# Patient Record
Sex: FEMALE | Race: BLACK OR AFRICAN AMERICAN | NOT HISPANIC OR LATINO | Employment: OTHER | URBAN - METROPOLITAN AREA
[De-identification: names, ages, dates, MRNs, and addresses within clinical notes are randomized per-mention and may not be internally consistent; named-entity substitution may affect disease eponyms.]

---

## 2019-11-04 ENCOUNTER — HOSPITAL ENCOUNTER (INPATIENT)
Facility: HOSPITAL | Age: 59
LOS: 6 days | Discharge: NURSING, REHAB OR LONG-TERM CARE | DRG: 494 | End: 2019-11-10
Attending: EMERGENCY MEDICINE | Admitting: GENERAL PRACTICE
Payer: MEDICARE

## 2019-11-04 ENCOUNTER — APPOINTMENT (EMERGENCY)
Dept: CT IMAGING | Facility: HOSPITAL | Age: 59
DRG: 494 | End: 2019-11-04
Payer: MEDICARE

## 2019-11-04 ENCOUNTER — APPOINTMENT (EMERGENCY)
Dept: RADIOLOGY | Facility: HOSPITAL | Age: 59
DRG: 494 | End: 2019-11-04
Payer: MEDICARE

## 2019-11-04 DIAGNOSIS — S82.142A TIBIAL PLATEAU FRACTURE, LEFT: Primary | ICD-10-CM

## 2019-11-04 DIAGNOSIS — E87.6 HYPOKALEMIA: ICD-10-CM

## 2019-11-04 PROBLEM — Z85.118 HISTORY OF LUNG CANCER: Status: ACTIVE | Noted: 2019-11-04

## 2019-11-04 PROBLEM — E78.5 HYPERLIPIDEMIA: Status: ACTIVE | Noted: 2019-11-04

## 2019-11-04 PROBLEM — I10 HTN (HYPERTENSION): Status: ACTIVE | Noted: 2019-11-04

## 2019-11-04 LAB
ANION GAP SERPL CALCULATED.3IONS-SCNC: 11 MMOL/L (ref 4–13)
APTT PPP: 31 SECONDS (ref 23–37)
ATRIAL RATE: 80 BPM
BASOPHILS # BLD AUTO: 0.05 THOUSANDS/ΜL (ref 0–0.1)
BASOPHILS NFR BLD AUTO: 1 % (ref 0–1)
BUN SERPL-MCNC: 14 MG/DL (ref 5–25)
CALCIUM SERPL-MCNC: 8.2 MG/DL (ref 8.3–10.1)
CHLORIDE SERPL-SCNC: 108 MMOL/L (ref 100–108)
CO2 SERPL-SCNC: 28 MMOL/L (ref 21–32)
CREAT SERPL-MCNC: 0.77 MG/DL (ref 0.6–1.3)
EOSINOPHIL # BLD AUTO: 0.09 THOUSAND/ΜL (ref 0–0.61)
EOSINOPHIL NFR BLD AUTO: 1 % (ref 0–6)
ERYTHROCYTE [DISTWIDTH] IN BLOOD BY AUTOMATED COUNT: 14.8 % (ref 11.6–15.1)
GFR SERPL CREATININE-BSD FRML MDRD: 98 ML/MIN/1.73SQ M
GLUCOSE SERPL-MCNC: 111 MG/DL (ref 65–140)
HCT VFR BLD AUTO: 36.8 % (ref 34.8–46.1)
HGB BLD-MCNC: 12.2 G/DL (ref 11.5–15.4)
IMM GRANULOCYTES # BLD AUTO: 0.02 THOUSAND/UL (ref 0–0.2)
IMM GRANULOCYTES NFR BLD AUTO: 0 % (ref 0–2)
INR PPP: 1.16 (ref 0.84–1.19)
LYMPHOCYTES # BLD AUTO: 1.57 THOUSANDS/ΜL (ref 0.6–4.47)
LYMPHOCYTES NFR BLD AUTO: 24 % (ref 14–44)
MCH RBC QN AUTO: 32.8 PG (ref 26.8–34.3)
MCHC RBC AUTO-ENTMCNC: 33.2 G/DL (ref 31.4–37.4)
MCV RBC AUTO: 99 FL (ref 82–98)
MONOCYTES # BLD AUTO: 0.43 THOUSAND/ΜL (ref 0.17–1.22)
MONOCYTES NFR BLD AUTO: 7 % (ref 4–12)
NEUTROPHILS # BLD AUTO: 4.28 THOUSANDS/ΜL (ref 1.85–7.62)
NEUTS SEG NFR BLD AUTO: 67 % (ref 43–75)
NRBC BLD AUTO-RTO: 0 /100 WBCS
P AXIS: 59 DEGREES
PLATELET # BLD AUTO: 281 THOUSANDS/UL (ref 149–390)
PMV BLD AUTO: 10.9 FL (ref 8.9–12.7)
POTASSIUM SERPL-SCNC: 3.6 MMOL/L (ref 3.5–5.3)
PR INTERVAL: 152 MS
PROTHROMBIN TIME: 14.9 SECONDS (ref 11.6–14.5)
QRS AXIS: 40 DEGREES
QRSD INTERVAL: 82 MS
QT INTERVAL: 386 MS
QTC INTERVAL: 445 MS
RBC # BLD AUTO: 3.72 MILLION/UL (ref 3.81–5.12)
SODIUM SERPL-SCNC: 147 MMOL/L (ref 136–145)
T WAVE AXIS: 47 DEGREES
VENTRICULAR RATE: 80 BPM
WBC # BLD AUTO: 6.44 THOUSAND/UL (ref 4.31–10.16)

## 2019-11-04 PROCEDURE — 73564 X-RAY EXAM KNEE 4 OR MORE: CPT

## 2019-11-04 PROCEDURE — 93005 ELECTROCARDIOGRAM TRACING: CPT

## 2019-11-04 PROCEDURE — 85025 COMPLETE CBC W/AUTO DIFF WBC: CPT | Performed by: PHYSICIAN ASSISTANT

## 2019-11-04 PROCEDURE — 80048 BASIC METABOLIC PNL TOTAL CA: CPT | Performed by: PHYSICIAN ASSISTANT

## 2019-11-04 PROCEDURE — 99285 EMERGENCY DEPT VISIT HI MDM: CPT

## 2019-11-04 PROCEDURE — 90471 IMMUNIZATION ADMIN: CPT | Performed by: ORTHOPAEDIC SURGERY

## 2019-11-04 PROCEDURE — 73700 CT LOWER EXTREMITY W/O DYE: CPT

## 2019-11-04 PROCEDURE — 36415 COLL VENOUS BLD VENIPUNCTURE: CPT | Performed by: PHYSICIAN ASSISTANT

## 2019-11-04 PROCEDURE — 93010 ELECTROCARDIOGRAM REPORT: CPT | Performed by: INTERNAL MEDICINE

## 2019-11-04 PROCEDURE — 85610 PROTHROMBIN TIME: CPT | Performed by: PHYSICIAN ASSISTANT

## 2019-11-04 PROCEDURE — 90682 RIV4 VACC RECOMBINANT DNA IM: CPT | Performed by: ORTHOPAEDIC SURGERY

## 2019-11-04 PROCEDURE — G0008 ADMIN INFLUENZA VIRUS VAC: HCPCS | Performed by: ORTHOPAEDIC SURGERY

## 2019-11-04 PROCEDURE — 99285 EMERGENCY DEPT VISIT HI MDM: CPT | Performed by: PHYSICIAN ASSISTANT

## 2019-11-04 PROCEDURE — 99223 1ST HOSP IP/OBS HIGH 75: CPT | Performed by: ORTHOPAEDIC SURGERY

## 2019-11-04 PROCEDURE — 99223 1ST HOSP IP/OBS HIGH 75: CPT | Performed by: HOSPITALIST

## 2019-11-04 PROCEDURE — 85730 THROMBOPLASTIN TIME PARTIAL: CPT | Performed by: PHYSICIAN ASSISTANT

## 2019-11-04 RX ORDER — ALBUTEROL SULFATE 90 UG/1
2 AEROSOL, METERED RESPIRATORY (INHALATION) EVERY 4 HOURS PRN
Status: DISCONTINUED | OUTPATIENT
Start: 2019-11-04 | End: 2019-11-10 | Stop reason: HOSPADM

## 2019-11-04 RX ORDER — TRIAMTERENE AND HYDROCHLOROTHIAZIDE 37.5; 25 MG/1; MG/1
37.5 CAPSULE ORAL DAILY
COMMUNITY
End: 2020-02-12

## 2019-11-04 RX ORDER — BUDESONIDE AND FORMOTEROL FUMARATE DIHYDRATE 80; 4.5 UG/1; UG/1
2 AEROSOL RESPIRATORY (INHALATION) 2 TIMES DAILY
COMMUNITY
End: 2019-12-04

## 2019-11-04 RX ORDER — TRIAMTERENE AND HYDROCHLOROTHIAZIDE 37.5; 25 MG/1; MG/1
1 TABLET ORAL DAILY
Status: DISCONTINUED | OUTPATIENT
Start: 2019-11-04 | End: 2019-11-10 | Stop reason: HOSPADM

## 2019-11-04 RX ORDER — OXYCODONE HYDROCHLORIDE AND ACETAMINOPHEN 5; 325 MG/1; MG/1
1 TABLET ORAL ONCE
Status: COMPLETED | OUTPATIENT
Start: 2019-11-04 | End: 2019-11-04

## 2019-11-04 RX ORDER — OXYCODONE HYDROCHLORIDE AND ACETAMINOPHEN 5; 325 MG/1; MG/1
2 TABLET ORAL EVERY 4 HOURS PRN
Status: DISCONTINUED | OUTPATIENT
Start: 2019-11-04 | End: 2019-11-05

## 2019-11-04 RX ORDER — ALBUTEROL SULFATE 90 UG/1
AEROSOL, METERED RESPIRATORY (INHALATION)
COMMUNITY
End: 2020-12-15 | Stop reason: SDUPTHER

## 2019-11-04 RX ORDER — MONTELUKAST SODIUM 10 MG/1
10 TABLET ORAL
Status: DISCONTINUED | OUTPATIENT
Start: 2019-11-04 | End: 2019-11-10 | Stop reason: HOSPADM

## 2019-11-04 RX ORDER — TRAMADOL HYDROCHLORIDE 50 MG/1
1 TABLET ORAL 3 TIMES DAILY PRN
COMMUNITY
End: 2019-11-10 | Stop reason: HOSPADM

## 2019-11-04 RX ORDER — MONTELUKAST SODIUM 10 MG/1
1 TABLET ORAL DAILY PRN
COMMUNITY
End: 2019-11-19

## 2019-11-04 RX ORDER — ICOSAPENT ETHYL 1000 MG/1
CAPSULE ORAL
COMMUNITY
End: 2022-03-14 | Stop reason: SDUPTHER

## 2019-11-04 RX ORDER — SODIUM CHLORIDE, SODIUM LACTATE, POTASSIUM CHLORIDE, CALCIUM CHLORIDE 600; 310; 30; 20 MG/100ML; MG/100ML; MG/100ML; MG/100ML
75 INJECTION, SOLUTION INTRAVENOUS CONTINUOUS
Status: DISCONTINUED | OUTPATIENT
Start: 2019-11-05 | End: 2019-11-07

## 2019-11-04 RX ORDER — DOCUSATE SODIUM 100 MG/1
100 CAPSULE, LIQUID FILLED ORAL 2 TIMES DAILY PRN
Status: DISCONTINUED | OUTPATIENT
Start: 2019-11-04 | End: 2019-11-10 | Stop reason: HOSPADM

## 2019-11-04 RX ORDER — OXYCODONE HYDROCHLORIDE AND ACETAMINOPHEN 5; 325 MG/1; MG/1
1 TABLET ORAL EVERY 4 HOURS PRN
Status: DISCONTINUED | OUTPATIENT
Start: 2019-11-04 | End: 2019-11-10 | Stop reason: HOSPADM

## 2019-11-04 RX ORDER — BUDESONIDE AND FORMOTEROL FUMARATE DIHYDRATE 80; 4.5 UG/1; UG/1
2 AEROSOL RESPIRATORY (INHALATION) 2 TIMES DAILY
Status: DISCONTINUED | OUTPATIENT
Start: 2019-11-04 | End: 2019-11-10 | Stop reason: HOSPADM

## 2019-11-04 RX ORDER — IBUPROFEN 400 MG/1
400 TABLET ORAL EVERY 6 HOURS PRN
Qty: 30 TABLET | Refills: 0 | Status: SHIPPED | OUTPATIENT
Start: 2019-11-04 | End: 2019-11-04 | Stop reason: ALTCHOICE

## 2019-11-04 RX ORDER — AMLODIPINE BESYLATE AND ATORVASTATIN CALCIUM 5; 20 MG/1; MG/1
TABLET, FILM COATED ORAL
COMMUNITY
End: 2021-06-15 | Stop reason: SDUPTHER

## 2019-11-04 RX ADMIN — OXYCODONE HYDROCHLORIDE AND ACETAMINOPHEN 1 TABLET: 5; 325 TABLET ORAL at 09:02

## 2019-11-04 RX ADMIN — BUDESONIDE AND FORMOTEROL FUMARATE DIHYDRATE 2 PUFF: 80; 4.5 AEROSOL RESPIRATORY (INHALATION) at 18:00

## 2019-11-04 RX ADMIN — BUDESONIDE AND FORMOTEROL FUMARATE DIHYDRATE 2 PUFF: 80; 4.5 AEROSOL RESPIRATORY (INHALATION) at 13:07

## 2019-11-04 RX ADMIN — DOCUSATE SODIUM 100 MG: 100 CAPSULE, LIQUID FILLED ORAL at 13:00

## 2019-11-04 RX ADMIN — OXYCODONE HYDROCHLORIDE AND ACETAMINOPHEN 2 TABLET: 5; 325 TABLET ORAL at 23:02

## 2019-11-04 RX ADMIN — INFLUENZA A VIRUS A/BRISBANE/02/2018 (H1N1) RECOMBINANT HEMAGGLUTININ ANTIGEN, INFLUENZA A VIRUS A/KANSAS/14/2017 (H3N2) RECOMBINANT HEMAGGLUTININ ANTIGEN, INFLUENZA B VIRUS B/PHUKET/3073/2013 RECOMBINANT HEMAGGLUTININ ANTIGEN, AND INFLUENZA B VIRUS B/MARYLAND/15/2016 RECOMBINANT HEMAGGLUTININ ANTIGEN 0.5 ML: 45; 45; 45; 45 INJECTION INTRAMUSCULAR at 13:07

## 2019-11-04 RX ADMIN — ENOXAPARIN SODIUM 40 MG: 40 INJECTION SUBCUTANEOUS at 13:07

## 2019-11-04 RX ADMIN — OXYCODONE HYDROCHLORIDE AND ACETAMINOPHEN 1 TABLET: 5; 325 TABLET ORAL at 13:06

## 2019-11-04 RX ADMIN — MORPHINE SULFATE 2 MG: 2 INJECTION, SOLUTION INTRAMUSCULAR; INTRAVENOUS at 20:13

## 2019-11-04 RX ADMIN — OXYCODONE HYDROCHLORIDE AND ACETAMINOPHEN 1 TABLET: 5; 325 TABLET ORAL at 17:59

## 2019-11-04 RX ADMIN — MONTELUKAST SODIUM 10 MG: 10 TABLET, FILM COATED ORAL at 22:52

## 2019-11-04 RX ADMIN — AMLODIPINE BESYLATE: 5 TABLET ORAL at 13:00

## 2019-11-04 NOTE — CONSULTS
Orthopedics   Hanane Mendoza 60 y o  female MRN: [id number]  Unit/Bed#: -01      Chief Complaint:   left knee pain    HPI:   60 y o  female status post fall complaining of left knee pain  Pain is made worse with motion or contact to the area and improves somewhat with rest   Patient states she fell injuring her left leg  She came to the [facility] and was [facility]  Patient was noted to have a displaced left knee tibial plateau fracture  Patient has been unable to ambulate  She does live alone  She denies any numbness or tingling lower extremity  She has severe pain left knee with any motion of the knee joint or any contact  Review Of Systems:   · Skin: Normal  · Neuro: See HPI  · Musculoskeletal: See HPI  · 14 point review of systems negative except as stated above     Past Medical History:   Past Medical History:   Diagnosis Date    Asthma     Cancer ([facility] [address] )     lung    Hyperlipidemia     Hypertension        Past Surgical History:   History reviewed  No pertinent surgical history  Family History:  Family history reviewed and non-contributory  History reviewed  No pertinent family history      Social History:  Social History     Socioeconomic History    Marital status: Single     Spouse name: None    Number of children: None    Years of education: None    Highest education level: None   Occupational History    None   Social Needs    Financial resource strain: None    Food insecurity:     Worry: None     Inability: None    Transportation needs:     Medical: None     Non-medical: None   Tobacco Use    Smoking status: Former Smoker    Smokeless tobacco: Never Used   Substance and Sexual Activity    Alcohol use: Yes     Comment: occ    Drug use: Never    Sexual activity: None   Lifestyle    Physical activity:     Days per week: None     Minutes per session: None    Stress: None   Relationships    Social connections:     Talks on phone: None     Gets together: None Attends Faith service: None     Active member of club or organization: None     Attends meetings of clubs or organizations: None     Relationship status: None    Intimate partner violence:     Fear of current or ex partner: None     Emotionally abused: None     Physically abused: None     Forced sexual activity: None   Other Topics Concern    None   Social History Narrative    None       Allergies:   No Known Allergies        Labs:  0   Lab Value Date/Time    HCT 36 8 11/04/2019 1057    HGB 12 2 11/04/2019 1057    INR 1 16 11/04/2019 1057    WBC 6 44 11/04/2019 1057       Meds:    Current Facility-Administered Medications:     albuterol (PROVENTIL HFA,VENTOLIN HFA) inhaler 2 puff, 2 puff, Inhalation, Q4H PRN, Randi Tran MD    amLODIPine-atorvastatin (CADUET 5/10) combo dose, , Oral, Daily, Randi Tran MD    budesonide-formoterol (SYMBICORT) 80-4 5 MCG/ACT inhaler 2 puff, 2 puff, Inhalation, BID, Randi Tran MD, 2 puff at 11/04/19 1307    docusate sodium (COLACE) capsule 100 mg, 100 mg, Oral, BID PRN, Randi rTan MD, 100 mg at 11/04/19 1300    enoxaparin (LOVENOX) subcutaneous injection 40 mg, 40 mg, Subcutaneous, Daily, Lamine Gorman PA-C, 40 mg at 11/04/19 1307    [START ON 11/5/2019] lactated ringers infusion, 75 mL/hr, Intravenous, Continuous, Randi Tran MD    montelukast (SINGULAIR) tablet 10 mg, 10 mg, Oral, HS, Randi Tran MD    morphine injection 2 mg, 2 mg, Intravenous, Q4H PRN, Randi Tran MD    oxyCODONE-acetaminophen (PERCOCET) 5-325 mg per tablet 1 tablet, 1 tablet, Oral, Q4H PRN, Randi Tran MD, 1 tablet at 11/04/19 1306    oxyCODONE-acetaminophen (PERCOCET) 5-325 mg per tablet 2 tablet, 2 tablet, Oral, Q4H PRN, Randi Tran MD    triamterene-hydrochlorothiazide (MAXZIDE-25) 37 5-25 mg per tablet 1 tablet, 1 tablet, Oral, Daily, Randi Tran MD    Blood Culture:   No results found for: BLOODCX    Wound Culture:   No results found for: WOUNDCULT    Ins and Outs:  No intake/output data recorded  Physical Exam:   /83   Pulse 80   Temp 98 1 °F (36 7 °C) (Oral)   Resp 16   Ht 5' 2" (1 575 m)   Wt 88 8 kg (195 lb 12 3 oz)   SpO2 100%   BMI 35 81 kg/m²   Gen: Alert and oriented to person, place, time  HEENT: EOMI, eyes clear, moist mucus membranes, hearing intact  Respiratory: Bilateral chest rise  No audible wheezing found  Cardiovascular: Regular Rate and Rhythm  Abdomen: soft nontender/nondistended  Musculoskeletal: left lower extremity  · Skin intact, no erythema or ecchymosis noted  · Tender to palpation over entire knee and proximal tibia  · Painful knee range of motion with large effusion  · Unable to tolerate varus/valgus stress secondary to pain  · Sensation intact left lower extremity  · Intact ankle dorsi/plantar flexion, EHL/FHL    Radiology:   I personally reviewed the films  X-rays left knee shows comminuted impacted lateral tibial plateau fracture      CT scan of the left knee shows comminuted impacted lateral tibial plateau fracture with approximately 1 0 cm above cortical depression  _*_*_*_*_*_*_*_*_*_*_*_*_*_*_*_*_*_*_*_*_*_*_*_*_*_*_*_*_*_*_*_*_*_*_*_*_*_*_*_*_*    Assessment:  60 y o  female status post fall with comminuted left lateral tibial plateau fracture with approximately 1 0 cm above cortical depression    Plan:   · Non weight bearing left lower extremity in knee immobilizer  · Knee immobilizer ordered and patient will be fitted for brace today  · To OR for ORIF of left tibial plateau fracture this Thursday  · Analgesics for pain as needed  · NPO Wednesday at midnight  · Medicine team for clearance to OR  · Body mass index is 35 81 kg/m²  mildly obese  Recommend behavior modifications, nutrition and physical activity    · Dispo: Ortho will follow up with patient      Karo Ratliff PA-C

## 2019-11-04 NOTE — PLAN OF CARE
Problem: PAIN - ADULT  Goal: Verbalizes/displays adequate comfort level or baseline comfort level  Description  Interventions:  - Encourage patient to monitor pain and request assistance  - Assess pain using appropriate pain scale  - Administer analgesics based on type and severity of pain and evaluate response  - Implement non-pharmacological measures as appropriate and evaluate response  - Consider cultural and social influences on pain and pain management  - Notify physician/advanced practitioner if interventions unsuccessful or patient reports new pain  Outcome: Progressing     Problem: INFECTION - ADULT  Goal: Absence or prevention of progression during hospitalization  Description  INTERVENTIONS:  - Assess and monitor for signs and symptoms of infection  - Monitor lab/diagnostic results  - Monitor all insertion sites, i e  indwelling lines, tubes, and drains  - Monitor endotracheal if appropriate and nasal secretions for changes in amount and color  - [place] appropriate cooling/warming therapies per order  - Administer medications as ordered  - Instruct and encourage patient and family to use good hand hygiene technique  - Identify and instruct in appropriate isolation precautions for identified infection/condition  Outcome: Progressing  Goal: Absence of fever/infection during neutropenic period  Description  INTERVENTIONS:  - Monitor WBC    Outcome: Progressing     Problem: SAFETY ADULT  Goal: Patient will remain free of falls  Description  INTERVENTIONS:  - Assess patient frequently for physical needs  -  Identify cognitive and physical deficits and behaviors that affect risk of falls    -  [place] fall precautions as indicated by assessment   - Educate patient/family on patient safety including physical limitations  - Instruct patient to call for assistance with activity based on assessment  - Modify environment to reduce risk of injury  - Consider OT/PT consult to assist with strengthening/mobility  Outcome: Progressing  Goal: Maintain or return to baseline ADL function  Description  INTERVENTIONS:  -  Assess patient's ability to carry out ADLs; assess patient's baseline for ADL function and identify physical deficits which impact ability to perform ADLs (bathing, care of mouth/teeth, toileting, grooming, dressing, etc )  - Assess/evaluate cause of self-care deficits   - Assess range of motion  - Assess patient's mobility; develop plan if impaired  - Assess patient's need for assistive devices and provide as appropriate  - Encourage maximum independence but intervene and supervise when necessary  - Involve family in performance of ADLs  - Assess for home care needs following discharge   - Consider OT consult to assist with ADL evaluation and planning for discharge  - Provide patient education as appropriate  Outcome: Progressing  Goal: Maintain or return mobility status to optimal level  Description  INTERVENTIONS:  - Assess patient's baseline mobility status (ambulation, transfers, stairs, etc )    - Identify cognitive and physical deficits and behaviors that affect mobility  - Identify mobility aids required to assist with transfers and/or ambulation (gait belt, sit-to-stand, lift, walker, cane, etc )  - Blum fall precautions as indicated by assessment  - Record patient progress and toleration of activity level on Mobility SBAR; progress patient to next Phase/Stage  - Instruct patient to call for assistance with activity based on assessment  - Consider rehabilitation consult to assist with strengthening/weightbearing, etc   Outcome: Progressing     Problem: DISCHARGE PLANNING  Goal: Discharge to home or other facility with appropriate resources  Description  INTERVENTIONS:  - Identify barriers to discharge w/patient and caregiver  - Arrange for needed discharge resources and transportation as appropriate  - Identify discharge learning needs (meds, wound care, etc )  - Arrange for interpretive services to assist at discharge as needed  - Refer to Case Management Department for coordinating discharge planning if the patient needs post-hospital services based on physician/advanced practitioner order or complex needs related to functional status, cognitive ability, or social support system  Outcome: Progressing     Problem: Knowledge Deficit  Goal: Patient/family/caregiver demonstrates understanding of disease process, treatment plan, medications, and discharge instructions  Description  Complete learning assessment and assess knowledge base    Interventions:  - Provide teaching at level of understanding  - Provide teaching via preferred learning methods  Outcome: Progressing

## 2019-11-04 NOTE — ASSESSMENT & PLAN NOTE
Patient denied that it was a syncopal episode and was purely due to a mechanical fall  -will put the patient on bed rest for now and fall precautions  -orthopedic surgery was made aware by the ED and plans on performing surgical intervention tomorrow  -NPO past midnight  -will order Percocet p r n  For pain and IV morphine p r n   For breakthrough pain  -Cole catheter placement   -physical therapy will eventually be ordered  -patient is medically cleared for surgical intervention

## 2019-11-04 NOTE — ED NOTES
1  CC  Left knee pain  2  Orientation status Alert and Oriented  3  Abnormal labs, tests, vitals (CT shows fractured left knee)  4  IV drips None  5  Time of last narcotics Percocet given @0902  6  IV lines, drains, tubes 20G right AC  7  Isolation status None  8  Skin Intact  9  Ambulation status Assist x2  10   ED RN phone number Alexys Brennan, MATTY [facility] [facility], RN  11/04/19 1785

## 2019-11-04 NOTE — H&P
H&P- Bard Hernandez 1960, 60 y o  female MRN: [id number]    Unit/Bed#: ED 15 Encounter: [id number]    Primary Care Provider: No primary care provider on file  Date and time [facility] to hospital: 11/4/2019  8:43 AM            [facility] Internal Medicine - History and Physical:       Bard Hernandez 60 y o  female MRN: [id number]  Unit/Bed#: ED 15 Encounter: [id number]  Admitting Physician: Emir Lewis MD  PCP: No primary care provider on file  Date of Admission:  11/04/19        Assessment and Plan:     * Tibial plateau fracture, left  Assessment & Plan  Patient denied that it was a syncopal episode and was purely due to a mechanical fall  -will put the patient on bed rest for now and fall precautions  -orthopedic surgery was made aware by the ED and plans on performing surgical intervention tomorrow  -NPO past midnight  -will order Percocet p r n  For pain and IV morphine p r n  For breakthrough pain  -Cole catheter placement   -physical therapy will eventually be ordered  -patient is medically cleared for surgical intervention    Hyperlipidemia  Assessment & Plan  -continue atorvastatin  -resume Vascepa upon discharge    HTN (hypertension)  Assessment & Plan  -continue amlodipine plus triamterene plus hydrochlorothiazide    History of lung cancer  Assessment & Plan  Noted  -continue home neb treatments            VTE Prophylaxis: Enoxaparin (Lovenox)  / sequential compression device   Code Status: Full  POLST: There is no POLST form on file for this patient (pre-hospital)    Anticipated Length of Stay:  Patient will be [facility] on an Inpatient basis with an anticipated length of stay of   2 midnights  Justification for Hospital Stay:  Tibial fracture    Total Time for Visit, including Counseling / Coordination of Care: 30 minutes  Greater than 50% of this total time spent on direct patient counseling and coordination of care      Chief Complaint:   Status post fall with trauma    History of Present Illness:    Yoon Cueva is a 60 y o  female who presents to the ED after having a fall episode  She stated that her significant other had pit bulls in the house and she became frightened by them and attempted to run away  In doing so it resulted in a fall and trauma to the left knee  She now complains of pain to the left knee and difficulty bearing weight on her left lower extremity  Imaging done in the emergency room showed evidence of a left-sided tibial plateau fracture  ED physician discussed the case with Orthopedic surgery and Ortho plans on performing surgical intervention tomorrow  The patient attributed her fall to purely a mechanical fall and denied that it was a syncopal episode  She denied chest pain/shortness of breath/dizziness/lightheadedness/syncope/near syncopal symptoms prior to the fall  The patient stated that she has had falls in the past that resulted in fractures  She also has had surgery to the right foot fracture in the past as well as rods and screws placed in her right hand fracture from fall  Review of Systems:    Review of Systems   All other systems reviewed and are negative  Past Medical and Surgical History:     Past Medical History:   Diagnosis Date    Asthma     Cancer ([facility] [address] )     lung    Hyperlipidemia     Hypertension        History reviewed  No pertinent surgical history  Meds/Allergies:    Prior to Admission medications    Medication Sig Start Date End Date Taking?  Authorizing Provider   albuterol (PROAIR HFA) 90 mcg/act inhaler ProAir HFA 90 mcg/actuation aerosol inhaler    Historical Provider, MD   amLODIPine-atorvastatin (CADUET) 5-20 MG per tablet amlodipine 5 mg-atorvastatin 20 mg tablet    Historical Provider, MD   budesonide-formoterol (SYMBICORT) 80-4 5 MCG/ACT inhaler Inhale 2 puffs 2 (two) times a day    Historical Provider, MD   Icosapent Ethyl (VASCEPA) 1 g CAPS Vascepa 1 gram capsule    Historical Provider, MD   montelukast (SINGULAIR) 10 mg tablet Take 1 tablet by mouth daily as needed    Historical Provider, MD   traMADol (ULTRAM) 50 mg tablet Take 1 tablet by mouth 3 (three) times a day as needed    Historical Provider, MD   triamterene-hydrochlorothiazide (DYAZIDE) 37 5-25 mg per capsule Take 37 5 capsules by mouth daily    Historical Provider, MD   ibuprofen (MOTRIN) 400 mg tablet Take 1 tablet (400 mg total) by mouth every 6 (six) hours as needed for moderate pain (knee pain/initial rx ) for up to 5 days 11/4/19 11/4/19  Brittany Enamorado PA-C     I have reviewed home medications with patient personally  Allergies: No Known Allergies    Social History:     Marital Status: Single     Social History     Substance and Sexual Activity   Alcohol Use Yes    Comment: occ     Social History     Tobacco Use   Smoking Status Former Smoker   Smokeless Tobacco Never Used     Social History     Substance and Sexual Activity   Drug Use Never       Family History:    non-contributory    Physical Exam:     Vitals:   Blood Pressure: 128/77 (11/04/19 1040)  Pulse: 75 (11/04/19 1040)  Temperature: 98 1 °F (36 7 °C) (11/04/19 0844)  Temp Source: Oral (11/04/19 0844)  Respirations: 16 (11/04/19 1040)  Weight - Scale: 88 8 kg (195 lb 12 3 oz) (11/04/19 0844)  SpO2: 100 % (11/04/19 1040)    Physical Exam   Constitutional: She appears well-developed and well-nourished  HENT:   Head: Normocephalic and atraumatic  Eyes: Pupils are equal, round, and reactive to light  EOM are normal    Neck: Neck supple  Cardiovascular: Normal rate and regular rhythm  Pulmonary/Chest: Effort normal and breath sounds normal    Abdominal: Soft  Bowel sounds are normal    Musculoskeletal:   (+) restricted range of motion to the left lower extremity due to pain   Neurological: She is alert  Skin: Skin is warm  Psychiatric: Her behavior is normal            Additional Data:     Lab Results: I have personally reviewed pertinent reports        Results from last 7 days   Lab Units 11/04/19  1057   WBC Thousand/uL 6 44   HEMOGLOBIN g/dL 12 2   HEMATOCRIT % 36 8   PLATELETS Thousands/uL 281   NEUTROS PCT % 67   LYMPHS PCT % 24   MONOS PCT % 7   EOS PCT % 1     Results from last 7 days   Lab Units 11/04/19  1057   SODIUM mmol/L 147*   POTASSIUM mmol/L 3 6   CHLORIDE mmol/L 108   CO2 mmol/L 28   BUN mg/dL 14   CREATININE mg/dL 0 77   ANION GAP mmol/L 11   CALCIUM mg/dL 8 2*   GLUCOSE RANDOM mg/dL 111     Results from last 7 days   Lab Units 11/04/19  1057   INR  1 16                   Imaging: I have personally reviewed pertinent reports  CT knee left wo contrast   Final Result by Estella Conklin MD (11/04 1008)   Comminuted impacted lateral tibial plateau fracture with approximately 1 0 cm above cortical depression  Associated layering lipohemarthrosis  Workstation performed: [id number]         XR knee 4+ views left injury   Final Result by Bela Blackmon DO (11/04 9998)      Comminuted and impacted lateral tibial plateau fracture  Workstation performed: [id number]                 [facility] Reviewed: Yes     ** Please Note: This note has been constructed using a voice recognition system   **

## 2019-11-05 LAB
ALBUMIN SERPL BCP-MCNC: 3.3 G/DL (ref 3.5–5)
ALP SERPL-CCNC: 78 U/L (ref 46–116)
ALT SERPL W P-5'-P-CCNC: 23 U/L (ref 12–78)
ANION GAP SERPL CALCULATED.3IONS-SCNC: 15 MMOL/L (ref 4–13)
AST SERPL W P-5'-P-CCNC: 47 U/L (ref 5–45)
BILIRUB SERPL-MCNC: 1.2 MG/DL (ref 0.2–1)
BUN SERPL-MCNC: 9 MG/DL (ref 5–25)
CALCIUM SERPL-MCNC: 7.8 MG/DL (ref 8.3–10.1)
CHLORIDE SERPL-SCNC: 97 MMOL/L (ref 100–108)
CO2 SERPL-SCNC: 23 MMOL/L (ref 21–32)
CREAT SERPL-MCNC: 0.61 MG/DL (ref 0.6–1.3)
ERYTHROCYTE [DISTWIDTH] IN BLOOD BY AUTOMATED COUNT: 14.5 % (ref 11.6–15.1)
GFR SERPL CREATININE-BSD FRML MDRD: 115 ML/MIN/1.73SQ M
GLUCOSE SERPL-MCNC: 109 MG/DL (ref 65–140)
HCT VFR BLD AUTO: 36.8 % (ref 34.8–46.1)
HGB BLD-MCNC: 12.6 G/DL (ref 11.5–15.4)
MCH RBC QN AUTO: 33.3 PG (ref 26.8–34.3)
MCHC RBC AUTO-ENTMCNC: 34.2 G/DL (ref 31.4–37.4)
MCV RBC AUTO: 97 FL (ref 82–98)
PLATELET # BLD AUTO: 283 THOUSANDS/UL (ref 149–390)
PMV BLD AUTO: 11 FL (ref 8.9–12.7)
POTASSIUM SERPL-SCNC: 4.6 MMOL/L (ref 3.5–5.3)
PROT SERPL-MCNC: 7.7 G/DL (ref 6.4–8.2)
RBC # BLD AUTO: 3.78 MILLION/UL (ref 3.81–5.12)
SODIUM SERPL-SCNC: 135 MMOL/L (ref 136–145)
WBC # BLD AUTO: 10.73 THOUSAND/UL (ref 4.31–10.16)

## 2019-11-05 PROCEDURE — 80053 COMPREHEN METABOLIC PANEL: CPT | Performed by: HOSPITALIST

## 2019-11-05 PROCEDURE — 99232 SBSQ HOSP IP/OBS MODERATE 35: CPT | Performed by: INTERNAL MEDICINE

## 2019-11-05 PROCEDURE — 85027 COMPLETE CBC AUTOMATED: CPT | Performed by: HOSPITALIST

## 2019-11-05 RX ORDER — HYDROMORPHONE HCL/PF 1 MG/ML
0.5 SYRINGE (ML) INJECTION ONCE
Status: COMPLETED | OUTPATIENT
Start: 2019-11-05 | End: 2019-11-05

## 2019-11-05 RX ORDER — HYDROMORPHONE HCL/PF 1 MG/ML
1 SYRINGE (ML) INJECTION EVERY 4 HOURS PRN
Status: DISCONTINUED | OUTPATIENT
Start: 2019-11-05 | End: 2019-11-05

## 2019-11-05 RX ORDER — HYDROMORPHONE HCL/PF 1 MG/ML
0.5 SYRINGE (ML) INJECTION EVERY 4 HOURS PRN
Status: DISCONTINUED | OUTPATIENT
Start: 2019-11-05 | End: 2019-11-07

## 2019-11-05 RX ORDER — SENNOSIDES 8.6 MG
1 TABLET ORAL
Status: DISCONTINUED | OUTPATIENT
Start: 2019-11-05 | End: 2019-11-10 | Stop reason: HOSPADM

## 2019-11-05 RX ADMIN — TRIAMTERENE AND HYDROCHLOROTHIAZIDE 1 TABLET: 37.5; 25 TABLET ORAL at 09:08

## 2019-11-05 RX ADMIN — DOCUSATE SODIUM 100 MG: 100 CAPSULE, LIQUID FILLED ORAL at 01:54

## 2019-11-05 RX ADMIN — MONTELUKAST SODIUM 10 MG: 10 TABLET, FILM COATED ORAL at 21:50

## 2019-11-05 RX ADMIN — AMLODIPINE BESYLATE: 5 TABLET ORAL at 09:08

## 2019-11-05 RX ADMIN — MORPHINE SULFATE 2 MG: 2 INJECTION, SOLUTION INTRAMUSCULAR; INTRAVENOUS at 07:34

## 2019-11-05 RX ADMIN — BUDESONIDE AND FORMOTEROL FUMARATE DIHYDRATE 2 PUFF: 80; 4.5 AEROSOL RESPIRATORY (INHALATION) at 09:09

## 2019-11-05 RX ADMIN — BUDESONIDE AND FORMOTEROL FUMARATE DIHYDRATE 2 PUFF: 80; 4.5 AEROSOL RESPIRATORY (INHALATION) at 18:33

## 2019-11-05 RX ADMIN — OXYCODONE HYDROCHLORIDE AND ACETAMINOPHEN 2 TABLET: 5; 325 TABLET ORAL at 19:47

## 2019-11-05 RX ADMIN — MORPHINE SULFATE 2 MG: 2 INJECTION, SOLUTION INTRAMUSCULAR; INTRAVENOUS at 14:33

## 2019-11-05 RX ADMIN — HYDROMORPHONE HYDROCHLORIDE 0.5 MG: 1 INJECTION, SOLUTION INTRAMUSCULAR; INTRAVENOUS; SUBCUTANEOUS at 15:30

## 2019-11-05 RX ADMIN — ENOXAPARIN SODIUM 40 MG: 40 INJECTION SUBCUTANEOUS at 09:08

## 2019-11-05 RX ADMIN — SENNOSIDES 8.6 MG: 8.6 TABLET, FILM COATED ORAL at 21:50

## 2019-11-05 NOTE — PLAN OF CARE
Problem: PAIN - ADULT  Goal: Verbalizes/displays adequate comfort level or baseline comfort level  Description  Interventions:  - Encourage patient to monitor pain and request assistance  - Assess pain using appropriate pain scale  - Administer analgesics based on type and severity of pain and evaluate response  - Implement non-pharmacological measures as appropriate and evaluate response  - Consider cultural and social influences on pain and pain management  - Notify physician/advanced practitioner if interventions unsuccessful or patient reports new pain  Outcome: Progressing     Problem: INFECTION - ADULT  Goal: Absence or prevention of progression during hospitalization  Description  INTERVENTIONS:  - Assess and monitor for signs and symptoms of infection  - Monitor lab/diagnostic results  - Monitor all insertion sites, i e  indwelling lines, tubes, and drains  - Monitor endotracheal if appropriate and nasal secretions for changes in amount and color  - [place] appropriate cooling/warming therapies per order  - Administer medications as ordered  - Instruct and encourage patient and family to use good hand hygiene technique  - Identify and instruct in appropriate isolation precautions for identified infection/condition  Outcome: Progressing  Goal: Absence of fever/infection during neutropenic period  Description  INTERVENTIONS:  - Monitor WBC    Outcome: Progressing     Problem: SAFETY ADULT  Goal: Patient will remain free of falls  Description  INTERVENTIONS:  - Assess patient frequently for physical needs  -  Identify cognitive and physical deficits and behaviors that affect risk of falls    -  [place] fall precautions as indicated by assessment   - Educate patient/family on patient safety including physical limitations  - Instruct patient to call for assistance with activity based on assessment  - Modify environment to reduce risk of injury  - Consider OT/PT consult to assist with strengthening/mobility  Outcome: Progressing  Goal: Maintain or return to baseline ADL function  Description  INTERVENTIONS:  -  Assess patient's ability to carry out ADLs; assess patient's baseline for ADL function and identify physical deficits which impact ability to perform ADLs (bathing, care of mouth/teeth, toileting, grooming, dressing, etc )  - Assess/evaluate cause of self-care deficits   - Assess range of motion  - Assess patient's mobility; develop plan if impaired  - Assess patient's need for assistive devices and provide as appropriate  - Encourage maximum independence but intervene and supervise when necessary  - Involve family in performance of ADLs  - Assess for home care needs following discharge   - Consider OT consult to assist with ADL evaluation and planning for discharge  - Provide patient education as appropriate  Outcome: Progressing  Goal: Maintain or return mobility status to optimal level  Description  INTERVENTIONS:  - Assess patient's baseline mobility status (ambulation, transfers, stairs, etc )    - Identify cognitive and physical deficits and behaviors that affect mobility  - Identify mobility aids required to assist with transfers and/or ambulation (gait belt, sit-to-stand, lift, walker, cane, etc )  - [place] fall precautions as indicated by assessment  - Record patient progress and toleration of activity level on Mobility SBAR; progress patient to next Phase/Stage  - Instruct patient to call for assistance with activity based on assessment  - Consider rehabilitation consult to assist with strengthening/weightbearing, etc   Outcome: Progressing     Problem: DISCHARGE PLANNING  Goal: Discharge to home or other facility with appropriate resources  Description  INTERVENTIONS:  - Identify barriers to discharge w/patient and caregiver  - Arrange for needed discharge resources and transportation as appropriate  - Identify discharge learning needs (meds, wound care, etc )  - Arrange for interpretive services to assist at discharge as needed  - Refer to Case Management Department for coordinating discharge planning if the patient needs post-hospital services based on physician/advanced practitioner order or complex needs related to functional status, cognitive ability, or social support system  Outcome: Progressing     Problem: Knowledge Deficit  Goal: Patient/family/caregiver demonstrates understanding of disease process, treatment plan, medications, and discharge instructions  Description  Complete learning assessment and assess knowledge base  Interventions:  - Provide teaching at level of understanding  - Provide teaching via preferred learning methods  Outcome: Progressing     Problem: Potential for Falls  Goal: Patient will remain free of falls  Description  INTERVENTIONS:  - Assess patient frequently for physical needs  -  Identify cognitive and physical deficits and behaviors that affect risk of falls    -  [place] fall precautions as indicated by assessment   - Educate patient/family on patient safety including physical limitations  - Instruct patient to call for assistance with activity based on assessment  - Modify environment to reduce risk of injury  - Consider OT/PT consult to assist with strengthening/mobility  Outcome: Progressing     Problem: Prexisting or High Potential for Compromised Skin Integrity  Goal: Skin integrity is maintained or improved  Description  INTERVENTIONS:  - Identify patients at risk for skin breakdown  - Assess and monitor skin integrity  - Assess and monitor nutrition and hydration status  - Monitor labs   - Assess for incontinence   - Turn and reposition patient  - Assist with mobility/ambulation  - Relieve pressure over bony prominences  - Avoid friction and shearing  - Provide appropriate hygiene as needed including keeping skin clean and dry  - Evaluate need for skin moisturizer/barrier cream  - Collaborate with interdisciplinary team   - Patient/family teaching  - Consider wound care consult   Outcome: Progressing     Problem: MUSCULOSKELETAL - ADULT  Goal: Maintain or return mobility to safest level of function  Description  INTERVENTIONS:  - Assess patient's ability to carry out ADLs; assess patient's baseline for ADL function and identify physical deficits which impact ability to perform ADLs (bathing, care of mouth/teeth, toileting, grooming, dressing, etc )  - Assess/evaluate cause of self-care deficits   - Assess range of motion  - Assess patient's mobility  - Assess patient's need for assistive devices and provide as appropriate  - Encourage maximum independence but intervene and supervise when necessary  - Involve family in performance of ADLs  - Assess for home care needs following discharge   - Consider OT consult to assist with ADL evaluation and planning for discharge  - Provide patient education as appropriate  Outcome: Progressing  Goal: Maintain proper alignment of affected body part  Description  INTERVENTIONS:  - Support, maintain and protect limb and body alignment  - Provide patient/ family with appropriate education  Outcome: Progressing

## 2019-11-05 NOTE — SOCIAL WORK
LOS 1 GMLOS none CM met with patient at bedside  Patient states she lives alone in a two story house  There is a step up to enter the house from outside  Patient states all her rooms are on one level  Patient states she was independent with ADLs  Patient states she broke her foot on valentines' day last year  Patient does not have rehab hx and hhc hx  Patient states she fills prescriptions with [facility], [place]   patient denies mental health dx hx and substance abuse hx  Patient states her brother - Kyler Yoon is her health representative  Patient is retired  Patient drives  Upon clearance for discharge patient is open to going to acute or SNF in [place] only   A post acute care recommendation was made by your care team for STR  Discussed Freedom of Choice with patient  List of facilities given to patient via in person  patient aware the list is custom filtered for them by preference  and that [facility] post acute providers are designated  Referrals are sent   CM to follow patient's needs  Nurse and SLIM informed  CM reviewed discharge planning process including the following: identifying help at home, patient preference for discharge planning needs, pharmacy preference, and availability of treatment team to discuss questions or concerns patient and/or family may have regarding understanding medications and recognizing signs and symptoms once discharged  CM also encouraged patient to follow up with all recommended appointments after discharge  Patient advised of importance for patient and family to participate in managing patients medical well being  CM name and role reviewed  Discharge Checklist reviewed and CM will continue to monitor for progress toward discharge goals in nursing and provider rounds

## 2019-11-06 ENCOUNTER — ANESTHESIA EVENT (INPATIENT)
Dept: PERIOP | Facility: HOSPITAL | Age: 59
DRG: 494 | End: 2019-11-06
Payer: MEDICARE

## 2019-11-06 LAB
ANION GAP SERPL CALCULATED.3IONS-SCNC: 13 MMOL/L (ref 4–13)
BUN SERPL-MCNC: 6 MG/DL (ref 5–25)
CALCIUM SERPL-MCNC: 8.8 MG/DL (ref 8.3–10.1)
CHLORIDE SERPL-SCNC: 95 MMOL/L (ref 100–108)
CO2 SERPL-SCNC: 27 MMOL/L (ref 21–32)
CREAT SERPL-MCNC: 0.85 MG/DL (ref 0.6–1.3)
ERYTHROCYTE [DISTWIDTH] IN BLOOD BY AUTOMATED COUNT: 14.1 % (ref 11.6–15.1)
GFR SERPL CREATININE-BSD FRML MDRD: 87 ML/MIN/1.73SQ M
GLUCOSE SERPL-MCNC: 125 MG/DL (ref 65–140)
HCT VFR BLD AUTO: 35.5 % (ref 34.8–46.1)
HGB BLD-MCNC: 12.4 G/DL (ref 11.5–15.4)
MCH RBC QN AUTO: 33.4 PG (ref 26.8–34.3)
MCHC RBC AUTO-ENTMCNC: 34.9 G/DL (ref 31.4–37.4)
MCV RBC AUTO: 96 FL (ref 82–98)
PLATELET # BLD AUTO: 251 THOUSANDS/UL (ref 149–390)
PMV BLD AUTO: 11 FL (ref 8.9–12.7)
POTASSIUM SERPL-SCNC: 2.6 MMOL/L (ref 3.5–5.3)
POTASSIUM SERPL-SCNC: 3.3 MMOL/L (ref 3.5–5.3)
RBC # BLD AUTO: 3.71 MILLION/UL (ref 3.81–5.12)
SODIUM SERPL-SCNC: 135 MMOL/L (ref 136–145)
WBC # BLD AUTO: 9.14 THOUSAND/UL (ref 4.31–10.16)

## 2019-11-06 PROCEDURE — 99232 SBSQ HOSP IP/OBS MODERATE 35: CPT | Performed by: INTERNAL MEDICINE

## 2019-11-06 PROCEDURE — 84132 ASSAY OF SERUM POTASSIUM: CPT | Performed by: INTERNAL MEDICINE

## 2019-11-06 PROCEDURE — 85027 COMPLETE CBC AUTOMATED: CPT | Performed by: INTERNAL MEDICINE

## 2019-11-06 PROCEDURE — 80048 BASIC METABOLIC PNL TOTAL CA: CPT | Performed by: INTERNAL MEDICINE

## 2019-11-06 PROCEDURE — 99024 POSTOP FOLLOW-UP VISIT: CPT | Performed by: PHYSICIAN ASSISTANT

## 2019-11-06 RX ORDER — POTASSIUM CHLORIDE 14.9 MG/ML
20 INJECTION INTRAVENOUS
Status: COMPLETED | OUTPATIENT
Start: 2019-11-06 | End: 2019-11-06

## 2019-11-06 RX ORDER — POTASSIUM CHLORIDE 20 MEQ/1
40 TABLET, EXTENDED RELEASE ORAL ONCE
Status: COMPLETED | OUTPATIENT
Start: 2019-11-06 | End: 2019-11-06

## 2019-11-06 RX ADMIN — POTASSIUM CHLORIDE 40 MEQ: 1500 TABLET, EXTENDED RELEASE ORAL at 06:43

## 2019-11-06 RX ADMIN — HYDROMORPHONE HYDROCHLORIDE 0.5 MG: 1 INJECTION, SOLUTION INTRAMUSCULAR; INTRAVENOUS; SUBCUTANEOUS at 16:26

## 2019-11-06 RX ADMIN — ENOXAPARIN SODIUM 40 MG: 40 INJECTION SUBCUTANEOUS at 08:39

## 2019-11-06 RX ADMIN — SENNOSIDES 8.6 MG: 8.6 TABLET, FILM COATED ORAL at 21:56

## 2019-11-06 RX ADMIN — OXYCODONE HYDROCHLORIDE AND ACETAMINOPHEN 1 TABLET: 5; 325 TABLET ORAL at 13:39

## 2019-11-06 RX ADMIN — AMLODIPINE BESYLATE: 5 TABLET ORAL at 08:38

## 2019-11-06 RX ADMIN — BUDESONIDE AND FORMOTEROL FUMARATE DIHYDRATE 2 PUFF: 80; 4.5 AEROSOL RESPIRATORY (INHALATION) at 17:17

## 2019-11-06 RX ADMIN — OXYCODONE HYDROCHLORIDE AND ACETAMINOPHEN 1 TABLET: 5; 325 TABLET ORAL at 08:39

## 2019-11-06 RX ADMIN — POTASSIUM CHLORIDE 20 MEQ: 200 INJECTION, SOLUTION INTRAVENOUS at 06:44

## 2019-11-06 RX ADMIN — HYDROMORPHONE HYDROCHLORIDE 0.5 MG: 1 INJECTION, SOLUTION INTRAMUSCULAR; INTRAVENOUS; SUBCUTANEOUS at 20:31

## 2019-11-06 RX ADMIN — BUDESONIDE AND FORMOTEROL FUMARATE DIHYDRATE 2 PUFF: 80; 4.5 AEROSOL RESPIRATORY (INHALATION) at 08:39

## 2019-11-06 RX ADMIN — OXYCODONE HYDROCHLORIDE AND ACETAMINOPHEN 1 TABLET: 5; 325 TABLET ORAL at 02:35

## 2019-11-06 RX ADMIN — POTASSIUM CHLORIDE 20 MEQ: 200 INJECTION, SOLUTION INTRAVENOUS at 09:52

## 2019-11-06 NOTE — PROGRESS NOTES
Progress Note - Anirudh Boland 1960, 60 y o  female MRN: [id number]    Unit/Bed#: -01 Encounter: [id number]    Primary Care Provider: No primary care provider on file  Date and time [facility] to hospital: 11/4/2019  8:43 AM        Hyperlipidemia  Assessment & Plan  -continue atorvastatin  -resume Vascepa upon discharge    HTN (hypertension)  Assessment & Plan  -continue amlodipine plus triamterene plus hydrochlorothiazide  Monitor creatinine surrounding surgery  History of lung cancer  Assessment & Plan  Noted  -continue home neb treatments    * Tibial plateau fracture, left  Assessment & Plan  Patient denied that it was a syncopal episode and was purely due to a mechanical fall  -will put the patient on bed rest for now and fall precautions  -orthopedic surgery was made aware by the ED and plans on performing surgical intervention tomorrow  -Patient for surgical repair on Thursday   -will order Percocet p r n  Morphine not effective will change to diluadid   -Cole catheter placement   -patient is medically cleared for surgical intervention        VTE Pharmacologic Prophylaxis:   Pharmacologic: Enoxaparin (Lovenox)x 3 doses  Mechanical: Mechanical VTE prophylaxis in place  Patient Centered Rounds: I have performed bedside rounds with nursing staff today  Discussions with Specialists or Other Care Team Provider: none  Education and Discussions with Family / Patient: updated brother at her request  Time Spent for Care: 30 minutes    If More than 50% of total time spent on counseling and coordination of care as described above indicate yes or no and described the counseling and coordination:no    Current Length of Stay: 1 day(s)  Current Patient Status: Inpatient   Certification Statement: The patient will continue to require additional inpatient hospital stay due to needs surgery on thursday    Discharge Plan: to be determined will need rehab  Code Status: Level 1 - Full Code    Subjective: Patient crying states no relief of pain overnight  No appetite    Objective:   Vitals:   Temp (24hrs), Av 2 °F (36 8 °C), Min:98 °F (36 7 °C), Max:98 3 °F (36 8 °C)    Temp:  [98 °F (36 7 °C)-98 3 °F (36 8 °C)] 98 3 °F (36 8 °C)  HR:  [96] 96  Resp:  [18] 18  BP: (138-140)/(93) 140/93  SpO2:  [97 %-98 %] 98 %  Body mass index is 35 81 kg/m²  Input and Output Summary (last 24 hours): Intake/Output Summary (Last 24 hours) at 2019 2339  Last data filed at 2019 1540  Gross per 24 hour   Intake --   Output 1500 ml   Net -1500 ml       Physical Exam:     Physical Exam   Constitutional: She is oriented to person, place, and time  She appears well-developed and well-nourished  No distress  HENT:   Head: Normocephalic and atraumatic  Right Ear: External ear normal    Left Ear: External ear normal    Nose: Nose normal    Mouth/Throat: Oropharynx is clear and moist  No oropharyngeal exudate  Eyes: Pupils are equal, round, and reactive to light  Conjunctivae and EOM are normal  Right eye exhibits no discharge  Left eye exhibits no discharge  No scleral icterus  Neck: Normal range of motion  Neck supple  No JVD present  No thyromegaly present  Cardiovascular: Normal rate, regular rhythm, normal heart sounds and intact distal pulses  Exam reveals no gallop and no friction rub  No murmur heard  Pulmonary/Chest: Effort normal and breath sounds normal  No respiratory distress  She has no wheezes  She has no rales  Abdominal: Soft  Bowel sounds are normal  She exhibits no distension  There is no tenderness  There is no rebound and no guarding  Musculoskeletal: Normal range of motion  She exhibits no edema or deformity  Lymphadenopathy:     She has no cervical adenopathy  Neurological: She is alert and oriented to person, place, and time  She has normal reflexes  She displays normal reflexes  No cranial nerve deficit  She exhibits normal muscle tone  Skin: Skin is warm and dry   No rash noted  She is not diaphoretic  No erythema  Psychiatric: She has a normal mood and affect  Vitals reviewed  Additional Data:   Labs:  Results from last 7 days   Lab Units 11/05/19  0447 11/04/19  1057   WBC Thousand/uL 10 73* 6 44   HEMOGLOBIN g/dL 12 6 12 2   HEMATOCRIT % 36 8 36 8   PLATELETS Thousands/uL 283 281   NEUTROS PCT %  --  67   LYMPHS PCT %  --  24   MONOS PCT %  --  7   EOS PCT %  --  1     Results from last 7 days   Lab Units 11/05/19  0447   POTASSIUM mmol/L 4 6   CHLORIDE mmol/L 97*   CO2 mmol/L 23   BUN mg/dL 9   CREATININE mg/dL 0 61   CALCIUM mg/dL 7 8*   ALK PHOS U/L 78   ALT U/L 23   AST U/L 47*     Results from last 7 days   Lab Units 11/04/19  1057   INR  1 16       * I Have Reviewed All Lab Data Listed Above  * Additional Pertinent Lab Tests Reviewed:  All Labs Within Last 24 Hours Reviewed    Imaging:    Imaging Reports Reviewed Today Include: none    Cultures:   Blood Culture: No results found for: BLOODCX  Urine Culture: No results found for: URINECX  Sputum Culture: No components found for: SPUTUMCX  Wound Culture: No results found for: WOUNDCULT    Last 24 Hours Medication List:     Current Facility-Administered Medications:  albuterol 2 puff Inhalation Q4H PRN Mercedes Conway MD    amLODIPine-atorvastatin (CADUET 5/10) combo dose  Oral Daily Mercedes Conway MD    budesonide-formoterol 2 puff Inhalation BID Mercedes Conway MD    docusate sodium 100 mg Oral BID PRN Mercedes Conway MD    enoxaparin 40 mg Subcutaneous Daily Lamine Gorman PA-C    HYDROmorphone 0 5 mg Intravenous Q4H PRN Helena Casanova MD    lactated ringers 75 mL/hr Intravenous Continuous Mercedes Conway MD Last Rate: Stopped (11/05/19 0103)   montelukast 10 mg Oral HS Mercedes Conway MD    oxyCODONE-acetaminophen 1 tablet Oral Q4H PRN Mercedes Conway MD    senna 1 tablet Oral HS Helena Casanova MD    triamterene-hydrochlorothiazide 1 tablet Oral Daily Mercedes Conway MD         Today, Patient Was Seen By: Sherry Ignacio MD    ** Please Note: Dragon 360 Dictation voice to text software may have been used in the creation of this document   **

## 2019-11-06 NOTE — PROGRESS NOTES
Progress Note - Orthopedics   Carter Ordonez 60 y o  female MRN: [id number]  Unit/Bed#: -01      Subjective:  Patient lying in bed comfortable in no acute distress  Patient was having some pain over night with swelling  Difficult for patient to use knee immobilizer as it causes some increased pain from the pressure  Patient looking forward to having surgery tomorrow        Labs:  0   Lab Value Date/Time    HCT 35 5 11/06/2019 0520    HCT 36 8 11/05/2019 0447    HCT 36 8 11/04/2019 1057    HGB 12 4 11/06/2019 0520    HGB 12 6 11/05/2019 0447    HGB 12 2 11/04/2019 1057    INR 1 16 11/04/2019 1057    WBC 9 14 11/06/2019 0520    WBC 10 73 (H) 11/05/2019 0447    WBC 6 44 11/04/2019 1057       Meds:    Current Facility-Administered Medications:     albuterol (PROVENTIL HFA,VENTOLIN HFA) inhaler 2 puff, 2 puff, Inhalation, Q4H PRN, Alireza Saenz MD    amLODIPine-atorvastatin (CADUET 5/10) combo dose, , Oral, Daily, Alireza Saenz MD    budesonide-formoterol [facility]) 80-4 5 MCG/ACT inhaler 2 puff, 2 puff, Inhalation, BID, Alireza Saenz MD, 2 puff at 11/05/19 1833    docusate sodium (COLACE) capsule 100 mg, 100 mg, Oral, BID PRN, Alireza Saenz MD, 100 mg at 11/05/19 0154    enoxaparin (LOVENOX) subcutaneous injection 40 mg, 40 mg, Subcutaneous, Daily, Lamine Gorman PA-C, 40 mg at 11/05/19 0908    HYDROmorphone (DILAUDID) injection 0 5 mg, 0 5 mg, Intravenous, Q4H PRN, Jimmy Mas MD    lactated ringers infusion, 75 mL/hr, Intravenous, Continuous, Alireza Saenz MD, Stopped at 11/05/19 0103    montelukast (SINGULAIR) tablet 10 mg, 10 mg, Oral, HS, Alireza Saenz MD, 10 mg at 11/05/19 2150    oxyCODONE-acetaminophen (PERCOCET) 5-325 mg per tablet 1 tablet, 1 tablet, Oral, Q4H PRN, Alireza Saenz MD, 1 tablet at 11/06/19 0235    potassium chloride 20 mEq IVPB (premix), 20 mEq, Intravenous, Q2H, Maame Lion PA-C, Last Rate: 50 mL/hr at 11/06/19 0644, 20 mEq at 11/06/19 0644   senna (SENOKOT) tablet 8 6 mg, 1 tablet, Oral, HS, David Ramachandran MD, 8 6 mg at 11/05/19 2150    triamterene-hydrochlorothiazide (MAXZIDE-25) 37 5-25 mg per tablet 1 tablet, 1 tablet, Oral, Daily, Alexandra Neal MD, 1 tablet at 11/05/19 0908    Blood Culture:   No results found for: BLOODCX    Wound Culture:   No results found for: WOUNDCULT    Ins and Outs:  I/O last 24 hours: In: -   Out: 2400 [Urine:2400]          Physical:  Vitals:    11/06/19 0719   BP: 142/92   Pulse: 98   Resp: 18   Temp: 98 3 °F (36 8 °C)   SpO2: 94%     Musculoskeletal: left lower extremity  · Skin intact with no erythema or ecchymosis, diffuse swelling throughout knee  · Tender to palpation distal femur and proximal tibia  · Tender to palpation over entire knee and proximal tibia  · Sensation is intact to light touch left extremity  · Painful range of motion of the knee with large effusion  · Range of motion and strength both deferred secondary to fracture  · Patient able to flex and extend ankle as well as move all toes       Radiology:   I personally reviewed the films  X-rays left knee shows comminuted impacted lateral tibial plateau fracture        CT scan of the left knee shows comminuted impacted lateral tibial plateau fracture with approximately 1 0 cm above cortical depression  _*_*_*_*_*_*_*_*_*_*_*_*_*_*_*_*_*_*_*_*_*_*_*_*_*_*_*_*_*_*_*_*_*_*_*_*_*_*_*_*_*     Assessment:  60 y o  female status post fall with comminuted left lateral tibial plateau fracture with approximately 1 0 cm above cortical depression  Patient spoke to doctor Deras about risks and benefits surgery and agreed  Consent was given and surgery scheduled for this Thursday      Plan:   · Non weight bearing left lower extremity in knee immobilizer  · Continue with knee immobilizer for comfort    · To OR tomorrow for ORIF left tibial plateau fracture  · Consent given for ORIF left tibial plateau fracture  · Pain medication per medical team  · NPO tonight  · Last dose Lovenox given today nothing past midnight  · Will follow up after surgery tomorrow        Sandeep Marie PA-C

## 2019-11-06 NOTE — PROGRESS NOTES
Pt refusing skin assessment of buttock and back  Pt also refusing repositioning  Pt educated on importance of assessment and repositioning  Pt still refusing  Will continue to monitor

## 2019-11-06 NOTE — ASSESSMENT & PLAN NOTE
Patient denied that it was a syncopal episode and was purely due to a mechanical fall  -will put the patient on bed rest for now and fall precautions  -orthopedic surgery was made aware by the ED and plans on performing surgical intervention tomorrow  -Patient for surgical repair on Thursday   -will order Percocet p r n   Morphine not effective will change to diluadid   -Cole catheter placement   -patient is medically cleared for surgical intervention

## 2019-11-06 NOTE — ASSESSMENT & PLAN NOTE
-continue amlodipine plus triamterene plus hydrochlorothiazide  Monitor creatinine surrounding surgery

## 2019-11-06 NOTE — PLAN OF CARE
Problem: PAIN - ADULT  Goal: Verbalizes/displays adequate comfort level or baseline comfort level  Description  Interventions:  - Encourage patient to monitor pain and request assistance  - Assess pain using appropriate pain scale  - Administer analgesics based on type and severity of pain and evaluate response  - Implement non-pharmacological measures as appropriate and evaluate response  - Consider cultural and social influences on pain and pain management  - Notify physician/advanced practitioner if interventions unsuccessful or patient reports new pain  Outcome: Progressing     Problem: INFECTION - ADULT  Goal: Absence or prevention of progression during hospitalization  Description  INTERVENTIONS:  - Assess and monitor for signs and symptoms of infection  - Monitor lab/diagnostic results  - Monitor all insertion sites, i e  indwelling lines, tubes, and drains  - Monitor endotracheal if appropriate and nasal secretions for changes in amount and color  - [place] appropriate cooling/warming therapies per order  - Administer medications as ordered  - Instruct and encourage patient and family to use good hand hygiene technique  - Identify and instruct in appropriate isolation precautions for identified infection/condition  Outcome: Progressing  Goal: Absence of fever/infection during neutropenic period  Description  INTERVENTIONS:  - Monitor WBC    Outcome: Progressing     Problem: SAFETY ADULT  Goal: Patient will remain free of falls  Description  INTERVENTIONS:  - Assess patient frequently for physical needs  -  Identify cognitive and physical deficits and behaviors that affect risk of falls    -  [place] fall precautions as indicated by assessment   - Educate patient/family on patient safety including physical limitations  - Instruct patient to call for assistance with activity based on assessment  - Modify environment to reduce risk of injury  - Consider OT/PT consult to assist with strengthening/mobility  Outcome: Progressing  Goal: Maintain or return to baseline ADL function  Description  INTERVENTIONS:  -  Assess patient's ability to carry out ADLs; assess patient's baseline for ADL function and identify physical deficits which impact ability to perform ADLs (bathing, care of mouth/teeth, toileting, grooming, dressing, etc )  - Assess/evaluate cause of self-care deficits   - Assess range of motion  - Assess patient's mobility; develop plan if impaired  - Assess patient's need for assistive devices and provide as appropriate  - Encourage maximum independence but intervene and supervise when necessary  - Involve family in performance of ADLs  - Assess for home care needs following discharge   - Consider OT consult to assist with ADL evaluation and planning for discharge  - Provide patient education as appropriate  Outcome: Progressing  Goal: Maintain or return mobility status to optimal level  Description  INTERVENTIONS:  - Assess patient's baseline mobility status (ambulation, transfers, stairs, etc )    - Identify cognitive and physical deficits and behaviors that affect mobility  - Identify mobility aids required to assist with transfers and/or ambulation (gait belt, sit-to-stand, lift, walker, cane, etc )  - [place] fall precautions as indicated by assessment  - Record patient progress and toleration of activity level on Mobility SBAR; progress patient to next Phase/Stage  - Instruct patient to call for assistance with activity based on assessment  - Consider rehabilitation consult to assist with strengthening/weightbearing, etc   Outcome: Progressing     Problem: DISCHARGE PLANNING  Goal: Discharge to home or other facility with appropriate resources  Description  INTERVENTIONS:  - Identify barriers to discharge w/patient and caregiver  - Arrange for needed discharge resources and transportation as appropriate  - Identify discharge learning needs (meds, wound care, etc )  - Arrange for interpretive services to assist at discharge as needed  - Refer to Case Management Department for coordinating discharge planning if the patient needs post-hospital services based on physician/advanced practitioner order or complex needs related to functional status, cognitive ability, or social support system  Outcome: Progressing     Problem: Knowledge Deficit  Goal: Patient/family/caregiver demonstrates understanding of disease process, treatment plan, medications, and discharge instructions  Description  Complete learning assessment and assess knowledge base  Interventions:  - Provide teaching at level of understanding  - Provide teaching via preferred learning methods  Outcome: Progressing     Problem: Potential for Falls  Goal: Patient will remain free of falls  Description  INTERVENTIONS:  - Assess patient frequently for physical needs  -  Identify cognitive and physical deficits and behaviors that affect risk of falls    -  [place] fall precautions as indicated by assessment   - Educate patient/family on patient safety including physical limitations  - Instruct patient to call for assistance with activity based on assessment  - Modify environment to reduce risk of injury  - Consider OT/PT consult to assist with strengthening/mobility  Outcome: Progressing     Problem: Prexisting or High Potential for Compromised Skin Integrity  Goal: Skin integrity is maintained or improved  Description  INTERVENTIONS:  - Identify patients at risk for skin breakdown  - Assess and monitor skin integrity  - Assess and monitor nutrition and hydration status  - Monitor labs   - Assess for incontinence   - Turn and reposition patient  - Assist with mobility/ambulation  - Relieve pressure over bony prominences  - Avoid friction and shearing  - Provide appropriate hygiene as needed including keeping skin clean and dry  - Evaluate need for skin moisturizer/barrier cream  - Collaborate with interdisciplinary team   - Patient/family teaching  - Consider wound care consult   Outcome: Progressing     Problem: MUSCULOSKELETAL - ADULT  Goal: Maintain or return mobility to safest level of function  Description  INTERVENTIONS:  - Assess patient's ability to carry out ADLs; assess patient's baseline for ADL function and identify physical deficits which impact ability to perform ADLs (bathing, care of mouth/teeth, toileting, grooming, dressing, etc )  - Assess/evaluate cause of self-care deficits   - Assess range of motion  - Assess patient's mobility  - Assess patient's need for assistive devices and provide as appropriate  - Encourage maximum independence but intervene and supervise when necessary  - Involve family in performance of ADLs  - Assess for home care needs following discharge   - Consider OT consult to assist with ADL evaluation and planning for discharge  - Provide patient education as appropriate  Outcome: Progressing  Goal: Maintain proper alignment of affected body part  Description  INTERVENTIONS:  - Support, maintain and protect limb and body alignment  - Provide patient/ family with appropriate education  Outcome: Progressing

## 2019-11-06 NOTE — PLAN OF CARE
Problem: PAIN - ADULT  Goal: Verbalizes/displays adequate comfort level or baseline comfort level  Description  Interventions:  - Encourage patient to monitor pain and request assistance  - Assess pain using appropriate pain scale  - Administer analgesics based on type and severity of pain and evaluate response  - Implement non-pharmacological measures as appropriate and evaluate response  - Consider cultural and social influences on pain and pain management  - Notify physician/advanced practitioner if interventions unsuccessful or patient reports new pain  Outcome: Progressing     Problem: INFECTION - ADULT  Goal: Absence or prevention of progression during hospitalization  Description  INTERVENTIONS:  - Assess and monitor for signs and symptoms of infection  - Monitor lab/diagnostic results  - Monitor all insertion sites, i e  indwelling lines, tubes, and drains  - Monitor endotracheal if appropriate and nasal secretions for changes in amount and color  - [place] appropriate cooling/warming therapies per order  - Administer medications as ordered  - Instruct and encourage patient and family to use good hand hygiene technique  - Identify and instruct in appropriate isolation precautions for identified infection/condition  Outcome: Progressing  Goal: Absence of fever/infection during neutropenic period  Description  INTERVENTIONS:  - Monitor WBC    Outcome: Progressing     Problem: SAFETY ADULT  Goal: Patient will remain free of falls  Description  INTERVENTIONS:  - Assess patient frequently for physical needs  -  Identify cognitive and physical deficits and behaviors that affect risk of falls    -  [place] fall precautions as indicated by assessment   - Educate patient/family on patient safety including physical limitations  - Instruct patient to call for assistance with activity based on assessment  - Modify environment to reduce risk of injury  - Consider OT/PT consult to assist with strengthening/mobility  Outcome: Progressing  Goal: Maintain or return to baseline ADL function  Description  INTERVENTIONS:  -  Assess patient's ability to carry out ADLs; assess patient's baseline for ADL function and identify physical deficits which impact ability to perform ADLs (bathing, care of mouth/teeth, toileting, grooming, dressing, etc )  - Assess/evaluate cause of self-care deficits   - Assess range of motion  - Assess patient's mobility; develop plan if impaired  - Assess patient's need for assistive devices and provide as appropriate  - Encourage maximum independence but intervene and supervise when necessary  - Involve family in performance of ADLs  - Assess for home care needs following discharge   - Consider OT consult to assist with ADL evaluation and planning for discharge  - Provide patient education as appropriate  Outcome: Progressing  Goal: Maintain or return mobility status to optimal level  Description  INTERVENTIONS:  - Assess patient's baseline mobility status (ambulation, transfers, stairs, etc )    - Identify cognitive and physical deficits and behaviors that affect mobility  - Identify mobility aids required to assist with transfers and/or ambulation (gait belt, sit-to-stand, lift, walker, cane, etc )  - [place] fall precautions as indicated by assessment  - Record patient progress and toleration of activity level on Mobility SBAR; progress patient to next Phase/Stage  - Instruct patient to call for assistance with activity based on assessment  - Consider rehabilitation consult to assist with strengthening/weightbearing, etc   Outcome: Progressing     Problem: DISCHARGE PLANNING  Goal: Discharge to home or other facility with appropriate resources  Description  INTERVENTIONS:  - Identify barriers to discharge w/patient and caregiver  - Arrange for needed discharge resources and transportation as appropriate  - Identify discharge learning needs (meds, wound care, etc )  - Arrange for interpretive services to assist at discharge as needed  - Refer to Case Management Department for coordinating discharge planning if the patient needs post-hospital services based on physician/advanced practitioner order or complex needs related to functional status, cognitive ability, or social support system  Outcome: Progressing     Problem: Knowledge Deficit  Goal: Patient/family/caregiver demonstrates understanding of disease process, treatment plan, medications, and discharge instructions  Description  Complete learning assessment and assess knowledge base  Interventions:  - Provide teaching at level of understanding  - Provide teaching via preferred learning methods  Outcome: Progressing     Problem: Potential for Falls  Goal: Patient will remain free of falls  Description  INTERVENTIONS:  - Assess patient frequently for physical needs  -  Identify cognitive and physical deficits and behaviors that affect risk of falls    -  [place] fall precautions as indicated by assessment   - Educate patient/family on patient safety including physical limitations  - Instruct patient to call for assistance with activity based on assessment  - Modify environment to reduce risk of injury  - Consider OT/PT consult to assist with strengthening/mobility  Outcome: Progressing     Problem: Prexisting or High Potential for Compromised Skin Integrity  Goal: Skin integrity is maintained or improved  Description  INTERVENTIONS:  - Identify patients at risk for skin breakdown  - Assess and monitor skin integrity  - Assess and monitor nutrition and hydration status  - Monitor labs   - Assess for incontinence   - Turn and reposition patient  - Assist with mobility/ambulation  - Relieve pressure over bony prominences  - Avoid friction and shearing  - Provide appropriate hygiene as needed including keeping skin clean and dry  - Evaluate need for skin moisturizer/barrier cream  - Collaborate with interdisciplinary team   - Patient/family teaching  - Consider wound care consult   Outcome: Progressing     Problem: MUSCULOSKELETAL - ADULT  Goal: Maintain or return mobility to safest level of function  Description  INTERVENTIONS:  - Assess patient's ability to carry out ADLs; assess patient's baseline for ADL function and identify physical deficits which impact ability to perform ADLs (bathing, care of mouth/teeth, toileting, grooming, dressing, etc )  - Assess/evaluate cause of self-care deficits   - Assess range of motion  - Assess patient's mobility  - Assess patient's need for assistive devices and provide as appropriate  - Encourage maximum independence but intervene and supervise when necessary  - Involve family in performance of ADLs  - Assess for home care needs following discharge   - Consider OT consult to assist with ADL evaluation and planning for discharge  - Provide patient education as appropriate  Outcome: Progressing  Goal: Maintain proper alignment of affected body part  Description  INTERVENTIONS:  - Support, maintain and protect limb and body alignment  - Provide patient/ family with appropriate education  Outcome: Progressing

## 2019-11-07 ENCOUNTER — APPOINTMENT (INPATIENT)
Dept: RADIOLOGY | Facility: HOSPITAL | Age: 59
DRG: 494 | End: 2019-11-07
Payer: MEDICARE

## 2019-11-07 ENCOUNTER — ANESTHESIA (INPATIENT)
Dept: PERIOP | Facility: HOSPITAL | Age: 59
DRG: 494 | End: 2019-11-07
Payer: MEDICARE

## 2019-11-07 LAB
ANION GAP SERPL CALCULATED.3IONS-SCNC: 6 MMOL/L (ref 4–13)
BUN SERPL-MCNC: 5 MG/DL (ref 5–25)
CALCIUM SERPL-MCNC: 8.7 MG/DL (ref 8.3–10.1)
CHLORIDE SERPL-SCNC: 98 MMOL/L (ref 100–108)
CO2 SERPL-SCNC: 30 MMOL/L (ref 21–32)
CREAT SERPL-MCNC: 0.73 MG/DL (ref 0.6–1.3)
GFR SERPL CREATININE-BSD FRML MDRD: 104 ML/MIN/1.73SQ M
GLUCOSE SERPL-MCNC: 123 MG/DL (ref 65–140)
POTASSIUM SERPL-SCNC: 3.2 MMOL/L (ref 3.5–5.3)
SODIUM SERPL-SCNC: 134 MMOL/L (ref 136–145)

## 2019-11-07 PROCEDURE — C1713 ANCHOR/SCREW BN/BN,TIS/BN: HCPCS | Performed by: ORTHOPAEDIC SURGERY

## 2019-11-07 PROCEDURE — C1769 GUIDE WIRE: HCPCS | Performed by: ORTHOPAEDIC SURGERY

## 2019-11-07 PROCEDURE — 73630 X-RAY EXAM OF FOOT: CPT

## 2019-11-07 PROCEDURE — 80048 BASIC METABOLIC PNL TOTAL CA: CPT | Performed by: INTERNAL MEDICINE

## 2019-11-07 PROCEDURE — 99232 SBSQ HOSP IP/OBS MODERATE 35: CPT | Performed by: INTERNAL MEDICINE

## 2019-11-07 PROCEDURE — 27535 TREAT KNEE FRACTURE: CPT | Performed by: PHYSICIAN ASSISTANT

## 2019-11-07 PROCEDURE — 0QSH04Z REPOSITION LEFT TIBIA WITH INTERNAL FIXATION DEVICE, OPEN APPROACH: ICD-10-PCS | Performed by: ORTHOPAEDIC SURGERY

## 2019-11-07 PROCEDURE — 73564 X-RAY EXAM KNEE 4 OR MORE: CPT

## 2019-11-07 PROCEDURE — 73564 X-RAY EXAM KNEE 4 OR MORE: CPT | Performed by: ORTHOPAEDIC SURGERY

## 2019-11-07 PROCEDURE — 27535 TREAT KNEE FRACTURE: CPT | Performed by: ORTHOPAEDIC SURGERY

## 2019-11-07 DEVICE — 3.5MM CORTEX SCREW SELF-TAPPING 50MM: Type: IMPLANTABLE DEVICE | Site: KNEE | Status: FUNCTIONAL

## 2019-11-07 DEVICE — 3.5MM CORTEX SCREW SELF-TAPPING 38MM: Type: IMPLANTABLE DEVICE | Site: KNEE | Status: FUNCTIONAL

## 2019-11-07 DEVICE — 3.7MM CANNULATED LOCKING SCREW 80MM: Type: IMPLANTABLE DEVICE | Site: KNEE | Status: FUNCTIONAL

## 2019-11-07 DEVICE — 3.7MM CANNULATED LOCKING SCREW 36MM: Type: IMPLANTABLE DEVICE | Site: KNEE | Status: FUNCTIONAL

## 2019-11-07 DEVICE — 3.7MM CANNULATED LOCKING SCREW 56MM: Type: IMPLANTABLE DEVICE | Site: KNEE | Status: FUNCTIONAL

## 2019-11-07 DEVICE — NORIAN DRILLABLE INJECT 5CC-STERILE
Type: IMPLANTABLE DEVICE | Site: KNEE | Status: FUNCTIONAL
Brand: NORIAN

## 2019-11-07 DEVICE — 3.7MM CANNULATED LOCKING SCREW 75MM: Type: IMPLANTABLE DEVICE | Site: KNEE | Status: FUNCTIONAL

## 2019-11-07 DEVICE — GRAFT BONE CANCELLOUS CHIPS 30ML: Type: IMPLANTABLE DEVICE | Site: KNEE | Status: FUNCTIONAL

## 2019-11-07 DEVICE — 3.5MM VA-LCP PROX TIBIA PLATE SMALL BEND/4H/87MM/LEFT
Type: IMPLANTABLE DEVICE | Site: KNEE | Status: FUNCTIONAL
Brand: VA-LCP

## 2019-11-07 DEVICE — 3.7MM CANNULATED LOCKING SCREW 70MM: Type: IMPLANTABLE DEVICE | Site: KNEE | Status: FUNCTIONAL

## 2019-11-07 RX ORDER — METOPROLOL TARTRATE 5 MG/5ML
2.5 INJECTION INTRAVENOUS EVERY 6 HOURS PRN
Status: DISCONTINUED | OUTPATIENT
Start: 2019-11-07 | End: 2019-11-07 | Stop reason: HOSPADM

## 2019-11-07 RX ORDER — CEFAZOLIN SODIUM 2 G/50ML
2000 SOLUTION INTRAVENOUS EVERY 8 HOURS
Status: COMPLETED | OUTPATIENT
Start: 2019-11-07 | End: 2019-11-08

## 2019-11-07 RX ORDER — ROCURONIUM BROMIDE 10 MG/ML
INJECTION, SOLUTION INTRAVENOUS AS NEEDED
Status: DISCONTINUED | OUTPATIENT
Start: 2019-11-07 | End: 2019-11-07 | Stop reason: SURG

## 2019-11-07 RX ORDER — SODIUM CHLORIDE 450 MG/100ML
100 INJECTION, SOLUTION INTRAVENOUS CONTINUOUS
Status: DISCONTINUED | OUTPATIENT
Start: 2019-11-07 | End: 2019-11-10

## 2019-11-07 RX ORDER — KETAMINE HCL IN NACL, ISO-OSM 100MG/10ML
SYRINGE (ML) INJECTION AS NEEDED
Status: DISCONTINUED | OUTPATIENT
Start: 2019-11-07 | End: 2019-11-07 | Stop reason: SURG

## 2019-11-07 RX ORDER — MIDAZOLAM HYDROCHLORIDE 2 MG/2ML
INJECTION, SOLUTION INTRAMUSCULAR; INTRAVENOUS AS NEEDED
Status: DISCONTINUED | OUTPATIENT
Start: 2019-11-07 | End: 2019-11-07 | Stop reason: SURG

## 2019-11-07 RX ORDER — TRAMADOL HYDROCHLORIDE 50 MG/1
50 TABLET ORAL EVERY 6 HOURS SCHEDULED
Status: DISCONTINUED | OUTPATIENT
Start: 2019-11-07 | End: 2019-11-07

## 2019-11-07 RX ORDER — HYDROMORPHONE HCL/PF 1 MG/ML
0.2 SYRINGE (ML) INJECTION EVERY 4 HOURS PRN
Status: DISCONTINUED | OUTPATIENT
Start: 2019-11-07 | End: 2019-11-10 | Stop reason: HOSPADM

## 2019-11-07 RX ORDER — POTASSIUM CHLORIDE 14.9 MG/ML
20 INJECTION INTRAVENOUS ONCE
Status: COMPLETED | OUTPATIENT
Start: 2019-11-07 | End: 2019-11-07

## 2019-11-07 RX ORDER — FENTANYL CITRATE/PF 50 MCG/ML
50 SYRINGE (ML) INJECTION
Status: COMPLETED | OUTPATIENT
Start: 2019-11-07 | End: 2019-11-07

## 2019-11-07 RX ORDER — PROPOFOL 10 MG/ML
INJECTION, EMULSION INTRAVENOUS AS NEEDED
Status: DISCONTINUED | OUTPATIENT
Start: 2019-11-07 | End: 2019-11-07 | Stop reason: SURG

## 2019-11-07 RX ORDER — HYDROMORPHONE HCL/PF 1 MG/ML
0.5 SYRINGE (ML) INJECTION
Status: DISCONTINUED | OUTPATIENT
Start: 2019-11-07 | End: 2019-11-07 | Stop reason: HOSPADM

## 2019-11-07 RX ORDER — ONDANSETRON 2 MG/ML
4 INJECTION INTRAMUSCULAR; INTRAVENOUS ONCE AS NEEDED
Status: DISCONTINUED | OUTPATIENT
Start: 2019-11-07 | End: 2019-11-07 | Stop reason: HOSPADM

## 2019-11-07 RX ORDER — HYDROMORPHONE HCL/PF 1 MG/ML
0.5 SYRINGE (ML) INJECTION
Status: DISCONTINUED | OUTPATIENT
Start: 2019-11-07 | End: 2019-11-10 | Stop reason: HOSPADM

## 2019-11-07 RX ORDER — FENTANYL CITRATE 50 UG/ML
INJECTION, SOLUTION INTRAMUSCULAR; INTRAVENOUS AS NEEDED
Status: DISCONTINUED | OUTPATIENT
Start: 2019-11-07 | End: 2019-11-07 | Stop reason: SURG

## 2019-11-07 RX ORDER — LIDOCAINE HYDROCHLORIDE 20 MG/ML
INJECTION, SOLUTION EPIDURAL; INFILTRATION; INTRACAUDAL; PERINEURAL AS NEEDED
Status: DISCONTINUED | OUTPATIENT
Start: 2019-11-07 | End: 2019-11-07 | Stop reason: SURG

## 2019-11-07 RX ORDER — MAGNESIUM HYDROXIDE 1200 MG/15ML
LIQUID ORAL AS NEEDED
Status: DISCONTINUED | OUTPATIENT
Start: 2019-11-07 | End: 2019-11-07 | Stop reason: HOSPADM

## 2019-11-07 RX ORDER — GABAPENTIN 100 MG/1
100 CAPSULE ORAL EVERY 8 HOURS
Status: DISCONTINUED | OUTPATIENT
Start: 2019-11-07 | End: 2019-11-10 | Stop reason: HOSPADM

## 2019-11-07 RX ORDER — CEFAZOLIN SODIUM 1 G/3ML
INJECTION, POWDER, FOR SOLUTION INTRAMUSCULAR; INTRAVENOUS AS NEEDED
Status: DISCONTINUED | OUTPATIENT
Start: 2019-11-07 | End: 2019-11-07 | Stop reason: SURG

## 2019-11-07 RX ORDER — LABETALOL 20 MG/4 ML (5 MG/ML) INTRAVENOUS SYRINGE
AS NEEDED
Status: DISCONTINUED | OUTPATIENT
Start: 2019-11-07 | End: 2019-11-07 | Stop reason: SURG

## 2019-11-07 RX ORDER — HYDROMORPHONE HCL/PF 1 MG/ML
SYRINGE (ML) INJECTION AS NEEDED
Status: DISCONTINUED | OUTPATIENT
Start: 2019-11-07 | End: 2019-11-07 | Stop reason: SURG

## 2019-11-07 RX ORDER — DEXAMETHASONE SODIUM PHOSPHATE 4 MG/ML
INJECTION, SOLUTION INTRA-ARTICULAR; INTRALESIONAL; INTRAMUSCULAR; INTRAVENOUS; SOFT TISSUE AS NEEDED
Status: DISCONTINUED | OUTPATIENT
Start: 2019-11-07 | End: 2019-11-07 | Stop reason: SURG

## 2019-11-07 RX ORDER — GLYCOPYRROLATE 0.2 MG/ML
INJECTION INTRAMUSCULAR; INTRAVENOUS AS NEEDED
Status: DISCONTINUED | OUTPATIENT
Start: 2019-11-07 | End: 2019-11-07 | Stop reason: SURG

## 2019-11-07 RX ORDER — NEOSTIGMINE METHYLSULFATE 1 MG/ML
INJECTION INTRAVENOUS AS NEEDED
Status: DISCONTINUED | OUTPATIENT
Start: 2019-11-07 | End: 2019-11-07 | Stop reason: SURG

## 2019-11-07 RX ORDER — ACETAMINOPHEN 325 MG/1
975 TABLET ORAL EVERY 8 HOURS
Status: DISCONTINUED | OUTPATIENT
Start: 2019-11-07 | End: 2019-11-10 | Stop reason: HOSPADM

## 2019-11-07 RX ORDER — ASPIRIN 325 MG
325 TABLET ORAL DAILY
Status: DISCONTINUED | OUTPATIENT
Start: 2019-11-08 | End: 2019-11-10 | Stop reason: HOSPADM

## 2019-11-07 RX ORDER — ONDANSETRON 2 MG/ML
INJECTION INTRAMUSCULAR; INTRAVENOUS AS NEEDED
Status: DISCONTINUED | OUTPATIENT
Start: 2019-11-07 | End: 2019-11-07 | Stop reason: SURG

## 2019-11-07 RX ADMIN — POTASSIUM CHLORIDE 20 MEQ: 14.9 INJECTION, SOLUTION INTRAVENOUS at 09:49

## 2019-11-07 RX ADMIN — HYDROMORPHONE HYDROCHLORIDE 0.4 MG: 1 INJECTION, SOLUTION INTRAMUSCULAR; INTRAVENOUS; SUBCUTANEOUS at 13:50

## 2019-11-07 RX ADMIN — GLYCOPYRROLATE 0.4 MG: 0.2 INJECTION, SOLUTION INTRAMUSCULAR; INTRAVENOUS at 16:11

## 2019-11-07 RX ADMIN — SODIUM CHLORIDE, SODIUM LACTATE, POTASSIUM CHLORIDE, AND CALCIUM CHLORIDE: .6; .31; .03; .02 INJECTION, SOLUTION INTRAVENOUS at 12:32

## 2019-11-07 RX ADMIN — HYDROMORPHONE HYDROCHLORIDE 0.5 MG: 1 INJECTION, SOLUTION INTRAMUSCULAR; INTRAVENOUS; SUBCUTANEOUS at 06:09

## 2019-11-07 RX ADMIN — CEFAZOLIN SODIUM 2000 MG: 2 SOLUTION INTRAVENOUS at 20:17

## 2019-11-07 RX ADMIN — Medication 30 MG: at 12:26

## 2019-11-07 RX ADMIN — MIDAZOLAM HYDROCHLORIDE 2 MG: 1 INJECTION, SOLUTION INTRAMUSCULAR; INTRAVENOUS at 12:17

## 2019-11-07 RX ADMIN — FENTANYL CITRATE 100 MCG: 50 INJECTION, SOLUTION INTRAMUSCULAR; INTRAVENOUS at 12:26

## 2019-11-07 RX ADMIN — HYDROMORPHONE HYDROCHLORIDE 0.5 MG: 1 INJECTION, SOLUTION INTRAMUSCULAR; INTRAVENOUS; SUBCUTANEOUS at 17:53

## 2019-11-07 RX ADMIN — ROCURONIUM BROMIDE 50 MG: 10 SOLUTION INTRAVENOUS at 12:26

## 2019-11-07 RX ADMIN — LIDOCAINE HYDROCHLORIDE 100 MG: 20 INJECTION, SOLUTION EPIDURAL; INFILTRATION; INTRACAUDAL; PERINEURAL at 12:26

## 2019-11-07 RX ADMIN — GABAPENTIN 100 MG: 100 CAPSULE ORAL at 18:49

## 2019-11-07 RX ADMIN — ONDANSETRON 4 MG: 2 INJECTION INTRAMUSCULAR; INTRAVENOUS at 12:26

## 2019-11-07 RX ADMIN — SODIUM CHLORIDE, SODIUM LACTATE, POTASSIUM CHLORIDE, AND CALCIUM CHLORIDE 75 ML/HR: .6; .31; .03; .02 INJECTION, SOLUTION INTRAVENOUS at 01:51

## 2019-11-07 RX ADMIN — PHENYLEPHRINE HYDROCHLORIDE 100 MCG: 10 INJECTION INTRAVENOUS at 12:36

## 2019-11-07 RX ADMIN — FENTANYL CITRATE 50 MCG: 50 INJECTION, SOLUTION INTRAMUSCULAR; INTRAVENOUS at 16:30

## 2019-11-07 RX ADMIN — MONTELUKAST SODIUM 10 MG: 10 TABLET, FILM COATED ORAL at 23:02

## 2019-11-07 RX ADMIN — SENNOSIDES 8.6 MG: 8.6 TABLET, FILM COATED ORAL at 23:02

## 2019-11-07 RX ADMIN — CEFAZOLIN 2000 MG: 1 INJECTION, POWDER, FOR SOLUTION INTRAVENOUS at 12:35

## 2019-11-07 RX ADMIN — DEXAMETHASONE SODIUM PHOSPHATE 4 MG: 4 INJECTION, SOLUTION INTRAMUSCULAR; INTRAVENOUS at 15:52

## 2019-11-07 RX ADMIN — BUDESONIDE AND FORMOTEROL FUMARATE DIHYDRATE 2 PUFF: 80; 4.5 AEROSOL RESPIRATORY (INHALATION) at 09:57

## 2019-11-07 RX ADMIN — AMLODIPINE BESYLATE: 5 TABLET ORAL at 09:50

## 2019-11-07 RX ADMIN — HYDROMORPHONE HYDROCHLORIDE 0.5 MG: 1 INJECTION, SOLUTION INTRAMUSCULAR; INTRAVENOUS; SUBCUTANEOUS at 23:02

## 2019-11-07 RX ADMIN — OXYCODONE HYDROCHLORIDE AND ACETAMINOPHEN 1 TABLET: 5; 325 TABLET ORAL at 09:49

## 2019-11-07 RX ADMIN — SODIUM CHLORIDE, SODIUM LACTATE, POTASSIUM CHLORIDE, AND CALCIUM CHLORIDE: .6; .31; .03; .02 INJECTION, SOLUTION INTRAVENOUS at 15:11

## 2019-11-07 RX ADMIN — ACETAMINOPHEN 975 MG: 325 TABLET, FILM COATED ORAL at 18:49

## 2019-11-07 RX ADMIN — HYDROMORPHONE HYDROCHLORIDE 0.5 MG: 1 INJECTION, SOLUTION INTRAMUSCULAR; INTRAVENOUS; SUBCUTANEOUS at 01:36

## 2019-11-07 RX ADMIN — SODIUM CHLORIDE, SODIUM LACTATE, POTASSIUM CHLORIDE, AND CALCIUM CHLORIDE 75 ML/HR: .6; .31; .03; .02 INJECTION, SOLUTION INTRAVENOUS at 18:21

## 2019-11-07 RX ADMIN — SODIUM CHLORIDE 100 ML/HR: 0.45 INJECTION, SOLUTION INTRAVENOUS at 18:50

## 2019-11-07 RX ADMIN — HYDROMORPHONE HYDROCHLORIDE 0.4 MG: 1 INJECTION, SOLUTION INTRAMUSCULAR; INTRAVENOUS; SUBCUTANEOUS at 13:18

## 2019-11-07 RX ADMIN — HYDROMORPHONE HYDROCHLORIDE 0.5 MG: 1 INJECTION, SOLUTION INTRAMUSCULAR; INTRAVENOUS; SUBCUTANEOUS at 17:22

## 2019-11-07 RX ADMIN — TRAMADOL HYDROCHLORIDE 50 MG: 50 TABLET, FILM COATED ORAL at 18:08

## 2019-11-07 RX ADMIN — PROPOFOL 200 MG: 10 INJECTION, EMULSION INTRAVENOUS at 12:26

## 2019-11-07 RX ADMIN — FENTANYL CITRATE 50 MCG: 50 INJECTION, SOLUTION INTRAMUSCULAR; INTRAVENOUS at 16:43

## 2019-11-07 RX ADMIN — NEOSTIGMINE METHYLSULFATE 2 MG: 1 INJECTION, SOLUTION INTRAVENOUS at 16:11

## 2019-11-07 RX ADMIN — HYDROMORPHONE HYDROCHLORIDE 0.4 MG: 1 INJECTION, SOLUTION INTRAMUSCULAR; INTRAVENOUS; SUBCUTANEOUS at 14:16

## 2019-11-07 RX ADMIN — HYDROMORPHONE HYDROCHLORIDE 0.4 MG: 1 INJECTION, SOLUTION INTRAMUSCULAR; INTRAVENOUS; SUBCUTANEOUS at 15:03

## 2019-11-07 RX ADMIN — Medication 20 MG: at 13:09

## 2019-11-07 RX ADMIN — LABETALOL 20 MG/4 ML (5 MG/ML) INTRAVENOUS SYRINGE 5 MG: at 15:54

## 2019-11-07 RX ADMIN — HYDROMORPHONE HYDROCHLORIDE 0.5 MG: 1 INJECTION, SOLUTION INTRAMUSCULAR; INTRAVENOUS; SUBCUTANEOUS at 17:08

## 2019-11-07 RX ADMIN — LABETALOL 20 MG/4 ML (5 MG/ML) INTRAVENOUS SYRINGE 10 MG: at 13:42

## 2019-11-07 RX ADMIN — HYDROMORPHONE HYDROCHLORIDE 0.4 MG: 1 INJECTION, SOLUTION INTRAMUSCULAR; INTRAVENOUS; SUBCUTANEOUS at 16:14

## 2019-11-07 NOTE — ANESTHESIA PREPROCEDURE EVALUATION
Review of Systems/Medical History  Patient summary reviewed        Cardiovascular  EKG reviewed, Hyperlipidemia, Hypertension ,    Pulmonary  Asthma ,   Comment: Lung Cancer s/p  Left lobectomy     GI/Hepatic  Negative GI/hepatic ROS          Negative  ROS        Endo/Other  Negative endo/other ROS      GYN  Negative gynecology ROS          Hematology  Negative hematology ROS      Musculoskeletal  Negative musculoskeletal ROS        Neurology  Negative neurology ROS      Psychology   Negative psychology ROS                   Anesthesia Plan  ASA Score- 2     Anesthesia Type- general with ASA Monitors  Additional Monitors:   Airway Plan:         Plan Factors-    Induction- intravenous  Postoperative Plan-     Informed Consent- Anesthetic plan and risks discussed with patient  I personally reviewed this patient with the CRNA  Discussed and agreed on the Anesthesia Plan with the CRNA  [facility]

## 2019-11-07 NOTE — ASSESSMENT & PLAN NOTE
Patient denied that it was a syncopal episode and was purely due to a mechanical fall  -will put the patient on bed rest for now and fall precautions  -orthopedic surgery was made aware by the ED and plans on performing surgical intervention tomorrow  -Patient for surgical repair on Thursday   -will order Percocet p r n   Morphine not effective will change to diluadid   -Cole catheter placement   -patient is medically cleared for surgical intervention in the am

## 2019-11-07 NOTE — ANESTHESIA POSTPROCEDURE EVALUATION
Post-Op Assessment Note    CV Status:  Stable  Pain Score: 7       Mental Status:  Alert and awake   Hydration Status:  Stable   PONV Controlled:  None   Airway Patency:  Patent   Post Op Vitals Reviewed: Yes      Staff: CRNA           /95 (11/07/19 1625)    Temp 97 9 °F (36 6 °C) (11/07/19 1625)    Pulse 97 (11/07/19 1625)   Resp (!) 10 (11/07/19 1625)    SpO2   95% on RA   Postop VS in PACU noted above, SV non-obstructed  PACU RN giving pain meds

## 2019-11-08 LAB
ANION GAP SERPL CALCULATED.3IONS-SCNC: 8 MMOL/L (ref 4–13)
BUN SERPL-MCNC: 5 MG/DL (ref 5–25)
CALCIUM SERPL-MCNC: 8.9 MG/DL (ref 8.3–10.1)
CHLORIDE SERPL-SCNC: 98 MMOL/L (ref 100–108)
CO2 SERPL-SCNC: 30 MMOL/L (ref 21–32)
CREAT SERPL-MCNC: 0.65 MG/DL (ref 0.6–1.3)
GFR SERPL CREATININE-BSD FRML MDRD: 112 ML/MIN/1.73SQ M
GLUCOSE SERPL-MCNC: 138 MG/DL (ref 65–140)
POTASSIUM SERPL-SCNC: 3.7 MMOL/L (ref 3.5–5.3)
SODIUM SERPL-SCNC: 136 MMOL/L (ref 136–145)

## 2019-11-08 PROCEDURE — G8979 MOBILITY GOAL STATUS: HCPCS

## 2019-11-08 PROCEDURE — 99232 SBSQ HOSP IP/OBS MODERATE 35: CPT | Performed by: INTERNAL MEDICINE

## 2019-11-08 PROCEDURE — 97163 PT EVAL HIGH COMPLEX 45 MIN: CPT

## 2019-11-08 PROCEDURE — 99024 POSTOP FOLLOW-UP VISIT: CPT | Performed by: ORTHOPAEDIC SURGERY

## 2019-11-08 PROCEDURE — G8978 MOBILITY CURRENT STATUS: HCPCS

## 2019-11-08 PROCEDURE — 80048 BASIC METABOLIC PNL TOTAL CA: CPT | Performed by: ORTHOPAEDIC SURGERY

## 2019-11-08 RX ADMIN — OXYCODONE HYDROCHLORIDE AND ACETAMINOPHEN 1 TABLET: 5; 325 TABLET ORAL at 19:20

## 2019-11-08 RX ADMIN — SENNOSIDES 8.6 MG: 8.6 TABLET, FILM COATED ORAL at 21:42

## 2019-11-08 RX ADMIN — MONTELUKAST SODIUM 10 MG: 10 TABLET, FILM COATED ORAL at 21:42

## 2019-11-08 RX ADMIN — SODIUM CHLORIDE 100 ML/HR: 0.45 INJECTION, SOLUTION INTRAVENOUS at 08:52

## 2019-11-08 RX ADMIN — BUDESONIDE AND FORMOTEROL FUMARATE DIHYDRATE 2 PUFF: 80; 4.5 AEROSOL RESPIRATORY (INHALATION) at 08:26

## 2019-11-08 RX ADMIN — ACETAMINOPHEN 975 MG: 325 TABLET, FILM COATED ORAL at 03:15

## 2019-11-08 RX ADMIN — SODIUM CHLORIDE 100 ML/HR: 0.45 INJECTION, SOLUTION INTRAVENOUS at 21:47

## 2019-11-08 RX ADMIN — CEFAZOLIN SODIUM 2000 MG: 2 SOLUTION INTRAVENOUS at 04:32

## 2019-11-08 RX ADMIN — BUDESONIDE AND FORMOTEROL FUMARATE DIHYDRATE 2 PUFF: 80; 4.5 AEROSOL RESPIRATORY (INHALATION) at 17:48

## 2019-11-08 RX ADMIN — OXYCODONE HYDROCHLORIDE AND ACETAMINOPHEN 1 TABLET: 5; 325 TABLET ORAL at 08:25

## 2019-11-08 RX ADMIN — GABAPENTIN 100 MG: 100 CAPSULE ORAL at 17:48

## 2019-11-08 RX ADMIN — ASPIRIN 325 MG ORAL TABLET 325 MG: 325 PILL ORAL at 08:24

## 2019-11-08 RX ADMIN — TRIAMTERENE AND HYDROCHLOROTHIAZIDE 1 TABLET: 37.5; 25 TABLET ORAL at 08:25

## 2019-11-08 RX ADMIN — HYDROMORPHONE HYDROCHLORIDE 0.5 MG: 1 INJECTION, SOLUTION INTRAMUSCULAR; INTRAVENOUS; SUBCUTANEOUS at 03:47

## 2019-11-08 RX ADMIN — AMLODIPINE BESYLATE: 5 TABLET ORAL at 08:24

## 2019-11-08 RX ADMIN — GABAPENTIN 100 MG: 100 CAPSULE ORAL at 03:15

## 2019-11-08 RX ADMIN — GABAPENTIN 100 MG: 100 CAPSULE ORAL at 14:04

## 2019-11-08 RX ADMIN — ACETAMINOPHEN 650 MG: 325 TABLET, FILM COATED ORAL at 17:47

## 2019-11-08 RX ADMIN — ACETAMINOPHEN 975 MG: 325 TABLET, FILM COATED ORAL at 14:03

## 2019-11-08 NOTE — PROGRESS NOTES
Orthopedics   Yoon Cueva 60 y o  female MRN: [id number]  Unit/Bed#: -01      Subjective:  60 y  o female post operative day 1 left tibial plateau ORIF  Patient states that her pain is under control at this time  She does note some numbness and tingling into her foot  She states she did not have that prior to the surgery  Patient is lying comfortable in bed  Knee immobilizer is in place  She denies any chest pain, shortness of breath, nausea, vomiting, diarrhea, lightheadedness, dizziness      Labs:  0   Lab Value Date/Time    HCT 35 5 11/06/2019 0520    HCT 36 8 11/05/2019 0447    HCT 36 8 11/04/2019 1057    HGB 12 4 11/06/2019 0520    HGB 12 6 11/05/2019 0447    HGB 12 2 11/04/2019 1057    INR 1 16 11/04/2019 1057    WBC 9 14 11/06/2019 0520    WBC 10 73 (H) 11/05/2019 0447    WBC 6 44 11/04/2019 1057       Meds:    Current Facility-Administered Medications:     acetaminophen (TYLENOL) tablet 975 mg, 975 mg, Oral, Q8H, Marie Deras MD, 975 mg at 11/08/19 0315    albuterol (PROVENTIL HFA,VENTOLIN HFA) inhaler 2 puff, 2 puff, Inhalation, Q4H PRN, Raymond Oro MD    amLODIPine-atorvastatin (CADUET 5/10) combo dose, , Oral, Daily, Raymond Oro MD    aspirin tablet 325 mg, 325 mg, Oral, Daily, Daryl Deras MD, 325 mg at 11/08/19 0824    budesonide-formoterol (SYMBICORT) 80-4 5 MCG/ACT inhaler 2 puff, 2 puff, Inhalation, BID, Raymond Oro MD, 2 puff at 11/08/19 0826    docusate sodium (COLACE) capsule 100 mg, 100 mg, Oral, BID PRN, Raymond Oro MD, 100 mg at 11/05/19 0154    gabapentin (NEURONTIN) capsule 100 mg, 100 mg, Oral, Q8H, Marie Deras MD, 100 mg at 11/08/19 0315    HYDROmorphone (DILAUDID) injection 0 2 mg, 0 2 mg, Intravenous, Q4H PRN, Rosemarie Erwin MD    HYDROmorphone (DILAUDID) injection 0 5 mg, 0 5 mg, Intravenous, Q3H PRN, Rosemarie Erwin MD, 0 5 mg at 11/08/19 0347    montelukast (SINGULAIR) tablet 10 mg, 10 mg, Oral, HS, Raymond Oro MD, 10 mg at 11/07/19 2302    oxyCODONE-acetaminophen (PERCOCET) 5-325 mg per tablet 1 tablet, 1 tablet, Oral, Q4H PRN, Ashish Roberts MD, 1 tablet at 11/08/19 0825    senna (SENOKOT) tablet 8 6 mg, 1 tablet, Oral, HS, Ashish Roberts MD, 8 6 mg at 11/07/19 2302    sodium chloride infusion 0 45 %, 100 mL/hr, Intravenous, Continuous, Ashish Roberts MD, Last Rate: 100 mL/hr at 11/08/19 0852, 100 mL/hr at 11/08/19 0852    triamterene-hydrochlorothiazide (MAXZIDE-25) 37 5-25 mg per tablet 1 tablet, 1 tablet, Oral, Daily, Ashish Roberts MD, 1 tablet at 11/08/19 0825    Blood Culture:   No results found for: BLOODCX    Wound Culture:   No results found for: WOUNDCULT    Ins and Outs:  I/O last 24 hours: In: 46 [P O :960; I V :2150]  Out: 5200 [Urine:5050; Blood:150]          Physical Exam:  Vitals:    11/08/19 0735   BP: (!) 166/106   Pulse: 104   Resp: 17   Temp: 97 9 °F (36 6 °C)   SpO2: 100%     left Lower Extremity extremity:  · Dressings C/D/I  · Knee immobilizer intact  · Patient is able to move her toes without complication  · She has decreased sensation to light touch 2nd through the 5th toes  · 2+ pulses for dorsal pedal pulse and tibialis posterior pulse    Assessment: 60 y  o female post operative day 1 left tibial plateau ORIF  Doing well  Some numbness and tingling is noted 2nd through 5th toe  Overall she states her pain is well controlled    Plan:  · Nonweightbearing left lower extremity  · DVT prophylaxis - aspirin 325 mg daily  · Analgesics as per primary team  · PT/OT  · Dispo: Ortho will follow      Ableino Tafoya PA-C

## 2019-11-08 NOTE — ASSESSMENT & PLAN NOTE
Patient denied that it was a syncopal episode and was purely due to a mechanical fall  Patient seen post op  C/O left foot pain  Vascular exam intact  Complaining of numbness in the 2n-5th digits  Xray of foot pending  Ortho attending aware

## 2019-11-08 NOTE — PLAN OF CARE
Problem: PHYSICAL THERAPY ADULT  Goal: Performs mobility at highest level of function for planned discharge setting  See evaluation for individualized goals  Description  Treatment/Interventions: Functional transfer training, LE strengthening/ROM, Elevations, Therapeutic exercise, Endurance training, Patient/family training, Equipment eval/education, Bed mobility, Gait training, Compensatory technique education, Spoke to nursing, Continued evaluation          See flowsheet documentation for full assessment, interventions and recommendations  Note:   Prognosis: Good  Problem List: Decreased strength, Decreased endurance, Decreased range of motion, Decreased mobility, Impaired balance, Decreased skin integrity, Orthopedic restrictions, Pain  Assessment: Pt is a 60 y o  female presenting to [facility] with a L tibial plateau fx after falling on her L knee  Pt is s/p L knee ORIF and POD#1  Pt resides alone in a two story home with 1 ELIU  She reports guest bedrooms upstairs and that her set up is on the main floor  PLOF independent with all ADL/IADLs  Pt has PMH significant for lung CA, HTN, and hyperlipidemia  Pt is agreeable to PT evaluation  PT examination findings revealed B/L UE and RLE sensation intact  Gross strength of RLE assessed during functional mobility and is at least 3/5 MMT  Unable to assess LLE sensation and strength due to ace bandage, knee immobilizer, and pain  Pt performed bed mobility min (A)x2 with increased time required, VC, and LE management  Pt able to transfer sit>stand/stand>sit min (A)x1 with UE support on RW  Pt ambulated 3' with RW min (A)x1 while NWB on LLE  Pt required increased time and VC for proper technique and sequence and was able to pivot and shimmy her RLE during mobilization  [place] Index 40/100 and Modified [place] 4  Pt is a good candidate for skilled PT to maximize functional mobility and improve overall quality of life   PT eval of high complexity due to PMH mentioned above and currenty medical status with WBS and continued monitoring from telemetry  Upon d/c, recommend STR to facilitate return to PLOF  Barriers to Discharge: Decreased caregiver support, Inaccessible home environment     Recommendation: Short-term skilled PT     PT - OK to Discharge: Yes(to STR when stable )    See flowsheet documentation for full assessment

## 2019-11-08 NOTE — ASSESSMENT & PLAN NOTE
-continue amlodipine plus triamterene plus hydrochlorothiazide   Monitor creatinine surrounding surgery DC diuretic if bp compromised

## 2019-11-08 NOTE — PROGRESS NOTES
Progress Note - Colin Leggett 1960, 60 y o  female MRN: [id number]    Unit/Bed#: -01 Encounter: [id number]    Primary Care Provider: No primary care provider on file  Date and time [facility] to hospital: 11/4/2019  8:43 AM        Hyperlipidemia  Assessment & Plan  -continue atorvastatin  -resume Vascepa upon discharge    HTN (hypertension)  Assessment & Plan  -continue amlodipine plus triamterene plus hydrochlorothiazide  Monitor creatinine surrounding surgery DC diuretic if bp compromised    History of lung cancer  Assessment & Plan  Noted  -continue home neb treatments    * Tibial plateau fracture, left  Assessment & Plan  Patient denied that it was a syncopal episode and was purely due to a mechanical fall  Patient seen post op  C/O left foot pain  Vascular exam intact  Complaining of numbness in the 2n-5th digits  Xray of foot pending  Ortho attending aware  VTE Pharmacologic Prophylaxis:   Pharmacologic: Other Medication: on hold post op will reassess to restart in am   Mechanical: Mechanical VTE prophylaxis in place  Patient Centered Rounds: I have performed bedside rounds with nursing staff today  Discussions with Specialists or Other Care Team Provider: reviewed ortho notes  Education and Discussions with Family / Patient: none present   Time Spent for Care: 30 minutes  If More than 50% of total time spent on counseling and coordination of care as described above indicate yes or no and described the counseling and coordination:no    Current Length of Stay: 3 day(s)  Current Patient Status: Inpatient   Certification Statement: The patient will continue to require additional inpatient hospital stay due to post op monitoring, rehab eval    Discharge Plan: to be determined will need rehab  Code Status: Level 1 - Full Code    Subjective:   Patient states preop pain better but now has pain on top of left foot with some numbness   Ortho aware  Vascular check intact   Feels numb from 2nd toe to 5 toe and across top of foot  Objective:   Vitals:   Temp (24hrs), Av 2 °F (36 8 °C), Min:97 4 °F (36 3 °C), Max:99 °F (37 2 °C)    Temp:  [97 4 °F (36 3 °C)-99 °F (37 2 °C)] 98 °F (36 7 °C)  HR:  [] 101  Resp:  [10-19] 12  BP: (125-173)/(79-98) 142/93  SpO2:  [94 %-100 %] 100 %  Body mass index is 35 81 kg/m²  Input and Output Summary (last 24 hours): Intake/Output Summary (Last 24 hours) at 2019 0601  Last data filed at 2019 0436  Gross per 24 hour   Intake 3110 ml   Output 4350 ml   Net -1240 ml       Physical Exam:     Physical Exam   Constitutional: She is oriented to person, place, and time  She appears well-developed and well-nourished  No distress  HENT:   Head: Normocephalic and atraumatic  Right Ear: External ear normal    Left Ear: External ear normal    Nose: Nose normal    Mouth/Throat: Oropharynx is clear and moist  No oropharyngeal exudate  Eyes: Pupils are equal, round, and reactive to light  Conjunctivae and EOM are normal  Right eye exhibits no discharge  Left eye exhibits no discharge  No scleral icterus  Neck: Normal range of motion  Neck supple  No JVD present  No thyromegaly present  Cardiovascular: Normal rate, regular rhythm, normal heart sounds and intact distal pulses  Exam reveals no gallop and no friction rub  No murmur heard  Pulmonary/Chest: Effort normal and breath sounds normal  No respiratory distress  She has no wheezes  She has no rales  Abdominal: Soft  Bowel sounds are normal  She exhibits no distension  There is no tenderness  There is no rebound and no guarding  Musculoskeletal: Normal range of motion  She exhibits edema (lower left leg)  She exhibits no deformity  Lymphadenopathy:     She has no cervical adenopathy  Neurological: She is alert and oriented to person, place, and time  She has normal reflexes  She displays normal reflexes  No cranial nerve deficit  She exhibits normal muscle tone     Skin: Skin is warm and dry  No rash noted  She is not diaphoretic  No erythema  Psychiatric: She has a normal mood and affect  Vitals reviewed  Additional Data:   Labs:  Results from last 7 days   Lab Units 11/06/19  0520  11/04/19  1057   WBC Thousand/uL 9 14   < > 6 44   HEMOGLOBIN g/dL 12 4   < > 12 2   HEMATOCRIT % 35 5   < > 36 8   PLATELETS Thousands/uL 251   < > 281   NEUTROS PCT %  --   --  67   LYMPHS PCT %  --   --  24   MONOS PCT %  --   --  7   EOS PCT %  --   --  1    < > = values in this interval not displayed  Results from last 7 days   Lab Units 11/07/19 11/05/19  0447   POTASSIUM mmol/L 3 2   < > 4 6   CHLORIDE mmol/L 98*   < > 97*   CO2 mmol/L 30   < > 23   BUN mg/dL 6   < > 9   CREATININE mg/dL 0 73   < > 0 61   CALCIUM mg/dL 8 7   < > 7 8*   ALK PHOS U/L  --   --  78   ALT U/L  --   --  23   AST U/L  --   --  47*    < > = values in this interval not displayed  Results from last 7 days   Lab Units 11/04/19  1057   INR  1 16       * I Have Reviewed All Lab Data Listed Above  * Additional Pertinent Lab Tests Reviewed: All Labs Within Last 24 Hours Reviewed    Imaging:    Imaging Reports Reviewed Today Include: xray foot pending      Cultures:   Blood Culture: No results found for: BLOODCX  Urine Culture: No results found for: URINECX  Sputum Culture: No components found for: SPUTUMCX  Wound Culture: No results found for: WOUNDCULT    Last 24 Hours Medication List:     Current Facility-Administered Medications:  acetaminophen 975 mg Oral Q8H Marie Deras MD    albuterol 2 puff Inhalation Q4H PRN Wesley Wheeler MD    amLODIPine-atorvastatin (CADUET 5/10) combo dose  Oral Daily Wesley Wheeler MD    aspirin 325 mg Oral Daily Wesley Wheeler MD    budesonide-formoterol 2 puff Inhalation BID Wesley Wheeler MD    docusate sodium 100 mg Oral BID PRN Wesley Wheeler MD    gabapentin 100 mg Oral Q8H Wesley Wheeler MD    HYDROmorphone 0 2 mg Intravenous Q4H PRN Jocelyne Lemons MD HYDROmorphone 0 5 mg Intravenous Q3H PRN Kenny Perea MD    montelukast 10 mg Oral HS Iain Sorenson MD    oxyCODONE-acetaminophen 1 tablet Oral Q4H PRN Iain Sorenson MD    senna 1 tablet Oral HS Iain Sorenson MD    sodium chloride 100 mL/hr Intravenous Continuous Iain Sorenson MD Last Rate: 100 mL/hr (11/07/19 1850)   triamterene-hydrochlorothiazide 1 tablet Oral Daily Iain Sorenson MD         Today, Patient Was Seen By: Kenny Perea MD    ** Please Note: Dragon 360 Dictation voice to text software may have been used in the creation of this document   **

## 2019-11-08 NOTE — PHYSICAL THERAPY NOTE
PT Evaluation  8:30-8:50 (20min)    Past Medical History:   Diagnosis Date    Cancer ([facility] [address] )     lung    Hyperlipidemia     Hypertension           11/08/19 0830   Note Type   Note type Eval only   Pain Assessment   Pain Assessment 0-10   Pain Score 5   Pain Type Surgical pain   Pain Location Foot;Leg;Knee   Pain Orientation Left   Home Living   Type of Home House   Home Layout Two level; Able to live on main level with bedroom/bathroom  (1STE; )   Bathroom Shower/Tub Walk-in shower   [address] [place] [address]  chair  (would like grab bars installed)   [address] Crutches;Cane  (brace and boot from ankle surgery)   Prior Function   Level of [place] Independent with ADLs and functional mobility   Lives With Alone   Receives Help From Family;Friend(s)   ADL [address] in the last 6 months 1 to 4  (2)   Vocational Retired  ( (retired))   Comments (+) drives   Restrictions/Precautions   [place] Bearing Precautions Per Order Yes   LLE [place] Bearing Per Order NWB   Braces or Orthoses LE Immobilizer  (L knee)   Other Precautions Bed Alarm; Chair Alarm;WBS;Multiple lines;O2;Fall Risk;Pain;Telemetry   General   Additional Pertinent History Pt is a 60 y o  female presenting to [facility] with a tibial plateau fracture after falling and landing on L knee  Pt is currently s/p L knee ORIF POD#1  PT consulted for mobility and d/c planning  Pt is agreeable to PT evaluation      Family/Caregiver Present No   Cognition   Orientation Level Oriented X4   RUE Assessment   RUE Assessment WFL   LUE Assessment   LUE Assessment WFL   RLE Assessment   RLE Assessment X  (grossly assessed with functional mobility (at least 3/5))   LLE Assessment   LLE Assessment X  (NWB)   Light Touch   RLE Light Touch Grossly intact   LLE Light Touch Not tested  (due to ace wrap and knee immobilizer)   Bed Mobility   Supine to Sit 4  Minimal assistance   Additional items Assist x 2;HOB elevated; Increased time required;Verbal cues;LE management   Transfers   Sit to Stand 4  Minimal assistance   Additional items Assist x 1; Increased time required;Verbal cues; Other  (UE support on walker)   Stand to Sit 4  Minimal assistance   Additional items Assist x 1; Increased time required;Verbal cues;Armrests   Ambulation/Elevation   Gait pattern   (NWB LLE, R foot pivot )   Gait Assistance 4  Minimal assist   Additional items Assist x 1;Verbal cues   Assistive Device Rolling walker   Distance 3'  (from bed to bedside chair)   Balance   Static Sitting Fair   Dynamic Sitting Fair -   Static Standing Fair -   Dynamic Standing Poor +   Ambulatory Poor +   Endurance Deficit   Endurance Deficit Yes   Activity Tolerance   Activity Tolerance Patient limited by fatigue;Patient limited by pain   Nurse Made Aware MATTY Waddell made aware of pt performance during PT evaluation  Pt left in bedside chair with call bell and chair alarm activated  Nsg students in room at end of session  Assessment   Prognosis Good   Problem List Decreased strength;Decreased endurance;Decreased range of motion;Decreased mobility; Impaired balance;Decreased skin integrity;Orthopedic restrictions;Pain   Assessment Pt is a 60 y o  female presenting to [facility] with a L tibial plateau fx after falling on her L knee  Pt is s/p L knee ORIF and POD#1  Pt resides alone in a two story home with 1 ELIU  She reports guest bedrooms upstairs and that her set up is on the main floor  PLOF independent with all ADL/IADLs  Pt has PMH significant for lung CA, HTN, and hyperlipidemia  Pt is agreeable to PT evaluation  PT examination findings revealed B/L UE and RLE sensation intact  Gross strength of RLE assessed during functional mobility and is at least 3/5 MMT  Unable to assess LLE sensation and strength due to ace bandage, knee immobilizer, and pain   Pt performed bed mobility min (A)x2 with increased time required, VC, and LE management  Pt able to transfer sit>stand/stand>sit min (A)x1 with UE support on RW  Pt ambulated 3' with RW min (A)x1 while NWB on LLE  Pt required increased time and VC for proper technique and sequence and was able to pivot and shimmy her RLE during mobilization  Barthel Index 40/100 and Modified Charlie 4  Pt is a good candidate for skilled PT to maximize functional mobility and improve overall quality of life  PT eval of high complexity due to PMH mentioned above and currenty medical status with WBS and continued monitoring from telemetry  Upon d/c, recommend STR to facilitate return to PLOF  Barriers to Discharge Decreased caregiver support; Inaccessible home environment   Goals   Patient Goals "to get back home"   STG Expiration Date 11/18/19   Short Term Goal #1 1  increase strength by 1/2 grade to improve functional mobility  2  perform bed mobility and functional transfer tasks mod (I)/(I) to decrease caregiver burden  3  ambulate 48' with least restrictive device mod (I)/(I) to promote functional independence in the home and community  4  navigate 1 stair with least restrictive device mod (I)/(I) to safely enter and exit the home  5  increase Barthel Index by 10 points to increase functional independence  PT Treatment Day 0   Plan   Treatment/Interventions Functional transfer training;LE strengthening/ROM; Elevations; Therapeutic exercise; Endurance training;Patient/family training;Equipment eval/education; Bed mobility;Gait training; Compensatory technique education;Spoke to nursing;Continued evaluation   PT Frequency 5x/wk; Weekend  (5x/week + 1wknd day)   Recommendation   Recommendation Short-term skilled PT   PT - OK to Discharge Yes  (to STR when stable )   Modified [place] Scale   Modified Charlie Scale 4   Barthel Index   Feeding 10   Bathing 0   Grooming Score 0   Dressing Score 0   Bladder Score 10   Bowels Score 10   Toilet Use Score 5   Transfers (Bed/Chair) Score 5   Mobility (Level Surface) Score 0   Stairs Score 0   Barthel Index Score 40       Kerry Lyman,SPT

## 2019-11-08 NOTE — NURSING NOTE
Pt arrive to unit from PACU, S/P Left ORIF  Pt has a acewrap and Immobolizer to the left knee  Pt complaining of left foot pain  Tylenol 975 given and neurontin as ordered  Pt received Tramadol and Dilaudid prior to arrival  Neurovascular checks within normal limits  Dr Mauro Collazo informed and will come to assess Pt at bedside  Endorsed report to Hai Young RN

## 2019-11-08 NOTE — SOCIAL WORK
LOS 4 GMLOS 3 CM met with patient in her room  Present is her sister  Patient is informed [facility] acute rehab is following her and all four of STR have accepted  Patient's first choice is acute  Brittaney Schneider from [facility] acute [phone] and fax [phone] is following patient

## 2019-11-09 PROBLEM — E87.6 HYPOKALEMIA: Status: ACTIVE | Noted: 2019-11-09

## 2019-11-09 LAB
ANION GAP SERPL CALCULATED.3IONS-SCNC: 10 MMOL/L (ref 4–13)
BUN SERPL-MCNC: 6 MG/DL (ref 5–25)
CALCIUM SERPL-MCNC: 9.5 MG/DL (ref 8.3–10.1)
CHLORIDE SERPL-SCNC: 97 MMOL/L (ref 100–108)
CO2 SERPL-SCNC: 31 MMOL/L (ref 21–32)
CREAT SERPL-MCNC: 0.78 MG/DL (ref 0.6–1.3)
GFR SERPL CREATININE-BSD FRML MDRD: 96 ML/MIN/1.73SQ M
GLUCOSE SERPL-MCNC: 97 MG/DL (ref 65–140)
PLATELET # BLD AUTO: 341 THOUSANDS/UL (ref 149–390)
PMV BLD AUTO: 10.3 FL (ref 8.9–12.7)
POTASSIUM SERPL-SCNC: 3.2 MMOL/L (ref 3.5–5.3)
SODIUM SERPL-SCNC: 138 MMOL/L (ref 136–145)

## 2019-11-09 PROCEDURE — 99232 SBSQ HOSP IP/OBS MODERATE 35: CPT | Performed by: INTERNAL MEDICINE

## 2019-11-09 PROCEDURE — 99024 POSTOP FOLLOW-UP VISIT: CPT | Performed by: PHYSICIAN ASSISTANT

## 2019-11-09 PROCEDURE — 80048 BASIC METABOLIC PNL TOTAL CA: CPT | Performed by: ORTHOPAEDIC SURGERY

## 2019-11-09 PROCEDURE — 85049 AUTOMATED PLATELET COUNT: CPT | Performed by: INTERNAL MEDICINE

## 2019-11-09 RX ORDER — POTASSIUM CHLORIDE 20 MEQ/1
40 TABLET, EXTENDED RELEASE ORAL ONCE
Status: COMPLETED | OUTPATIENT
Start: 2019-11-09 | End: 2019-11-09

## 2019-11-09 RX ADMIN — POTASSIUM CHLORIDE 40 MEQ: 1500 TABLET, EXTENDED RELEASE ORAL at 08:22

## 2019-11-09 RX ADMIN — GABAPENTIN 100 MG: 100 CAPSULE ORAL at 18:16

## 2019-11-09 RX ADMIN — MONTELUKAST SODIUM 10 MG: 10 TABLET, FILM COATED ORAL at 21:59

## 2019-11-09 RX ADMIN — DICLOFENAC SODIUM 2 G: 10 GEL TOPICAL at 18:18

## 2019-11-09 RX ADMIN — BUDESONIDE AND FORMOTEROL FUMARATE DIHYDRATE 2 PUFF: 80; 4.5 AEROSOL RESPIRATORY (INHALATION) at 18:18

## 2019-11-09 RX ADMIN — OXYCODONE HYDROCHLORIDE AND ACETAMINOPHEN 1 TABLET: 5; 325 TABLET ORAL at 19:11

## 2019-11-09 RX ADMIN — OXYCODONE HYDROCHLORIDE AND ACETAMINOPHEN 1 TABLET: 5; 325 TABLET ORAL at 08:23

## 2019-11-09 RX ADMIN — BUDESONIDE AND FORMOTEROL FUMARATE DIHYDRATE 2 PUFF: 80; 4.5 AEROSOL RESPIRATORY (INHALATION) at 08:25

## 2019-11-09 RX ADMIN — ASPIRIN 325 MG ORAL TABLET 325 MG: 325 PILL ORAL at 08:23

## 2019-11-09 RX ADMIN — AMLODIPINE BESYLATE: 5 TABLET ORAL at 08:23

## 2019-11-09 RX ADMIN — OXYCODONE HYDROCHLORIDE AND ACETAMINOPHEN 1 TABLET: 5; 325 TABLET ORAL at 14:55

## 2019-11-09 RX ADMIN — GABAPENTIN 100 MG: 100 CAPSULE ORAL at 02:17

## 2019-11-09 RX ADMIN — ACETAMINOPHEN 975 MG: 325 TABLET, FILM COATED ORAL at 02:17

## 2019-11-09 RX ADMIN — GABAPENTIN 100 MG: 100 CAPSULE ORAL at 12:16

## 2019-11-09 RX ADMIN — OXYCODONE HYDROCHLORIDE AND ACETAMINOPHEN 1 TABLET: 5; 325 TABLET ORAL at 02:16

## 2019-11-09 RX ADMIN — SODIUM CHLORIDE 100 ML/HR: 0.45 INJECTION, SOLUTION INTRAVENOUS at 23:33

## 2019-11-09 RX ADMIN — ACETAMINOPHEN 975 MG: 325 TABLET, FILM COATED ORAL at 12:16

## 2019-11-09 RX ADMIN — TRIAMTERENE AND HYDROCHLOROTHIAZIDE 1 TABLET: 37.5; 25 TABLET ORAL at 08:23

## 2019-11-09 RX ADMIN — DICLOFENAC SODIUM 2 G: 10 GEL TOPICAL at 22:00

## 2019-11-09 RX ADMIN — SENNOSIDES 8.6 MG: 8.6 TABLET, FILM COATED ORAL at 21:59

## 2019-11-09 NOTE — PROGRESS NOTES
Orthopedics   Tasneem Choe 60 y o  female MRN: [id number]  Unit/Bed#: -01      Subjective:  60 y  o female post operative day 2 left tibial plateau ORIF  Patient states her pain is under control at this point  She does note some heaviness and swelling into her foot and ankle  She states she has not been moving her toes or ankle around as much as she should  She is lying comfortable in a knee immobilizer  Dressing change was performed today  She denies any chest pain, shortness of breath, nausea, vomiting diarrhea, lightheadedness, dizziness  She states she still notes occasional numbness and tingling into her left foot from time to time      Labs:  0   Lab Value Date/Time    HCT 35 5 11/06/2019 0520    HCT 36 8 11/05/2019 0447    HCT 36 8 11/04/2019 1057    HGB 12 4 11/06/2019 0520    HGB 12 6 11/05/2019 0447    HGB 12 2 11/04/2019 1057    INR 1 16 11/04/2019 1057    WBC 9 14 11/06/2019 0520    WBC 10 73 (H) 11/05/2019 0447    WBC 6 44 11/04/2019 1057       Meds:    Current Facility-Administered Medications:     acetaminophen (TYLENOL) tablet 975 mg, 975 mg, Oral, Q8H, Marie Deras MD, 975 mg at 11/09/19 0217    albuterol (PROVENTIL HFA,VENTOLIN HFA) inhaler 2 puff, 2 puff, Inhalation, Q4H PRN, Rasheeda Hernandez MD    amLODIPine-atorvastatin (CADUET 5/10) combo dose, , Oral, Daily, Rasheeda Hernandez MD    aspirin tablet 325 mg, 325 mg, Oral, Daily, Julia Deras MD, 325 mg at 11/08/19 0824    budesonide-formoterol (SYMBICORT) 80-4 5 MCG/ACT inhaler 2 puff, 2 puff, Inhalation, BID, Rasheeda Hernandez MD, 2 puff at 11/08/19 1748    docusate sodium (COLACE) capsule 100 mg, 100 mg, Oral, BID PRN, Rasheeda Hernandez MD, 100 mg at 11/05/19 0154    gabapentin (NEURONTIN) capsule 100 mg, 100 mg, Oral, Q8H, Marie Deras MD, 100 mg at 11/09/19 0217    HYDROmorphone (DILAUDID) injection 0 2 mg, 0 2 mg, Intravenous, Q4H PRN, Moris Kline MD    HYDROmorphone (DILAUDID) injection 0 5 mg, 0 5 mg, Intravenous, Q3H PRN, Ynes Rossi MD, 0 5 mg at 11/08/19 0347    montelukast (SINGULAIR) tablet 10 mg, 10 mg, Oral, HS, Boni Deras MD, 10 mg at 11/08/19 2142    oxyCODONE-acetaminophen (PERCOCET) 5-325 mg per tablet 1 tablet, 1 tablet, Oral, Q4H PRN, Arcelia Beltre MD, 1 tablet at 11/09/19 0216    senna (SENOKOT) tablet 8 6 mg, 1 tablet, Oral, HS, Marie Deras MD, 8 6 mg at 11/08/19 2142    sodium chloride infusion 0 45 %, 100 mL/hr, Intravenous, Continuous, Arcelia Beltre MD, Last Rate: 100 mL/hr at 11/08/19 2147, 100 mL/hr at 11/08/19 2147    triamterene-hydrochlorothiazide (MAXZIDE-25) 37 5-25 mg per tablet 1 tablet, 1 tablet, Oral, Daily, Arcelia Beltre MD, 1 tablet at 11/08/19 0825    Blood Culture:   No results found for: BLOODCX    Wound Culture:   No results found for: WOUNDCULT    Ins and Outs:  I/O last 24 hours: In: 1860 [P O :860; I V :1000]  Out: 3709 [Urine:1550]          Physical Exam:  Vitals:    11/09/19 0735   BP: 152/90   Pulse: 84   Resp: 16   Temp: 97 8 °F (36 6 °C)   SpO2: 96%     left Lower Extremity extremity:  · Dressing change was performed today, Mepilex is still in place, new gauze, Nicole, Ace wrap was applied over anterior incision which had no active drainage and no signs or symptoms infection  Knee immobilizer was then reapplied  · Patient is able to wiggle her toes as well as her ankle with mild discomfort  · There is moderate amount of swelling over the foot and ankle  · Patient is neurovascularly intact  · 2+ pulses for DP pulse TP pulse    Assessment: 60 y  o female post operative day 2 left tibial plateau ORIF  Doing well    Plan:  · Nonweightbearing left lower extremity  · DVT prophylaxis - aspirin 325 mg daily  · Analgesics as per primary team  · PT/OT  · Dispo: [address] for discharge from ortho perspective  · Will continue to assess for acute blood loss anemia   · Patient is to be nonweightbearing left lower extremity    She will follow up with   [place] in 1 week for suture removal and new x-rays of the left lower extremity  She is to continue taking aspirin 325 mg daily for DVT prophylaxis  Patient is to go to acute rehab for care  She is to leave the Mepilex dressing on until her follow-up appointment  No further orthopedic intervention is needed at this time  If there are any questions, please reach out  Mookie Aranda PA-C

## 2019-11-09 NOTE — PROGRESS NOTES
Progress Note - Tasneem Choe 1960, 60 y o  female MRN: [id number]    Unit/Bed#: -01 Encounter: [id number]    Primary Care Provider: No primary care provider on file  Date and time [facility] to hospital: 11/4/2019  8:43 AM        Hypokalemia  Assessment & Plan  Replete orally and follow serial laboratories    Hyperlipidemia  Assessment & Plan  -continue atorvastatin  -resume Vascepa upon discharge    HTN (hypertension)  Assessment & Plan  -continue amlodipine plus triamterene plus hydrochlorothiazide  Monitor creatinine surrounding surgery DC diuretic if bp compromised    History of lung cancer  Assessment & Plan  Noted  -continue home neb treatments    * Tibial plateau fracture, left  Assessment & Plan  Patient denied that it was a syncopal episode and was purely due to a mechanical fall  Patient seen post op  C/O left foot pain which has improved  Patient does have edema that is dependent    Vascular exam intact  X-ray of the foot negative for fracture  Orthopedics loosened  her brace and change the dressing which the incision looked quite well  Patient is clear for discharge to rehab nonweightbearing with follow-up to Orthopedics in 1 week for repeat x-rays, suture removal  VTE Pharmacologic Prophylaxis:   Pharmacologic: Aspirin 325 mg  Mechanical: Mechanical VTE prophylaxis in place  Patient Centered Rounds: updated nursing after visit as she was with another patient  Discussions with Specialists or Other Care Team Provider: reviewed ortho notes  Education and Discussions with Family / Patient: none , friend at bedside present with patient permission  Time Spent for Care: 30 minutes    If More than 50% of total time spent on counseling and coordination of care as described above indicate yes or no and described the counseling and coordination:no    Current Length of Stay: 5 day(s)  Current Patient Status: Inpatient   Certification Statement: The patient will continue to require additional inpatient hospital stay due to awaiting rehab, texted CM regarding updated information    Discharge Plan: to be determined  Code Status: Level 1 - Full Code    Subjective:   Patient states pain is well controlled  Feels some tightness and the ankle on the left this is from edema  No new issues  Doing well  Has not moved her bowels but is passing gas some of them soon  Does not want additional medication to help  Objective:   Vitals:   Temp (24hrs), Av 2 °F (36 8 °C), Min:97 8 °F (36 6 °C), Max:98 6 °F (37 °C)    Temp:  [97 8 °F (36 6 °C)-98 6 °F (37 °C)] 97 8 °F (36 6 °C)  HR:  [] 84  Resp:  [16-18] 16  BP: (132-152)/(74-91) 152/90  SpO2:  [95 %-96 %] 96 %  Body mass index is 35 81 kg/m²  Input and Output Summary (last 24 hours): Intake/Output Summary (Last 24 hours) at 2019 1457  Last data filed at 2019 1310  Gross per 24 hour   Intake 1240 ml   Output 1750 ml   Net -510 ml       Physical Exam:  General well-developed obese female no acute distress normocephalic atraumatic pupils equal round and reactive to light extraocular muscles intact mucous membranes are moist neck is supple there is no JVD no lymph nodes no carotid bruits chest is decreased but clear to auscultation is no rhonchi rales or wheezes  Cardiovascular regular rate rhythm positive S1 and S2 no S3-S4 murmur or gallop  Abdomen soft nontender nondistended with positive bowel sounds no hepatosplenomegaly no guarding or rebound  Neurologically patient awake alert oriented cranial nerves 2-12 intact    Neurovascular check of the foot reveals good pulses sensation intact trace edema    Physical Exam    Additional Data:   Labs:  Results from last 7 days   Lab Units 19  1136 19  0520  19  1057   WBC Thousand/uL  --  9 14   < > 6 44   HEMOGLOBIN g/dL  --  12 4   < > 12 2   HEMATOCRIT %  --  35 5   < > 36 8   PLATELETS Thousands/uL 341 251   < > 281   NEUTROS PCT %  --   --   --  67   LYMPHS PCT %  --   --   --  24   MONOS PCT %  --   --   --  7   EOS PCT %  --   --   --  1    < > = values in this interval not displayed  Results from last 7 days   Lab Units 11/09/19  0727  11/05/19  0447   POTASSIUM mmol/L 3 2*   < > 4 6   CHLORIDE mmol/L 97*   < > 97*   CO2 mmol/L 31   < > 23   BUN mg/dL 6   < > 9   CREATININE mg/dL 0 78   < > 0 61   CALCIUM mg/dL 9 5   < > 7 8*   ALK PHOS U/L  --   --  78   ALT U/L  --   --  23   AST U/L  --   --  47*    < > = values in this interval not displayed  Results from last 7 days   Lab Units 11/04/19  1057   INR  1 16       * I Have Reviewed All Lab Data Listed Above  * Additional Pertinent Lab Tests Reviewed:  All Labs Within Last 24 Hours Reviewed    Imaging:    Imaging Reports Reviewed Today Include:  None    Cultures:   Blood Culture: No results found for: BLOODCX  Urine Culture: No results found for: URINECX  Sputum Culture: No components found for: SPUTUMCX  Wound Culture: No results found for: WOUNDCULT    Last 24 Hours Medication List:     Current Facility-Administered Medications:  acetaminophen 975 mg Oral Q8H Marie Deras MD    albuterol 2 puff Inhalation Q4H PRN Rickey Álvarez MD    amLODIPine-atorvastatin (CADUET 5/10) combo dose  Oral Daily Rickey Álvarez MD    aspirin 325 mg Oral Daily Rickey Álvarez MD    budesonide-formoterol 2 puff Inhalation BID Climmie Luana Deras MD    docusate sodium 100 mg Oral BID PRN Rickey Álvarez MD    gabapentin 100 mg Oral Q8H Marie Deras MD    HYDROmorphone 0 2 mg Intravenous Q4H PRN Abiola Ashley MD    HYDROmorphone 0 5 mg Intravenous Q3H PRN Abiola Ashley MD    montelukast 10 mg Oral HS Rickey Álvarez MD    oxyCODONE-acetaminophen 1 tablet Oral Q4H PRN Rickey Álvarez MD    senna 1 tablet Oral HS Marie Deras MD    sodium chloride 100 mL/hr Intravenous Continuous Rickey Álvarez MD Last Rate: 100 mL/hr (11/08/19 5949)   triamterene-hydrochlorothiazide 1 tablet Oral Daily Marie Deras MD         Today, Patient Was Seen By: Fabrizio Oliveira MD    ** Please Note: Dragon 360 Dictation voice to text software may have been used in the creation of this document   **

## 2019-11-09 NOTE — PLAN OF CARE
Problem: PAIN - ADULT  Goal: Verbalizes/displays adequate comfort level or baseline comfort level  Description  Interventions:  - Encourage patient to monitor pain and request assistance  - Assess pain using appropriate pain scale  - Administer analgesics based on type and severity of pain and evaluate response  - Implement non-pharmacological measures as appropriate and evaluate response  - Consider cultural and social influences on pain and pain management  - Notify physician/advanced practitioner if interventions unsuccessful or patient reports new pain  Outcome: Progressing     Problem: INFECTION - ADULT  Goal: Absence or prevention of progression during hospitalization  Description  INTERVENTIONS:  - Assess and monitor for signs and symptoms of infection  - Monitor lab/diagnostic results  - Monitor all insertion sites, i e  indwelling lines, tubes, and drains  - Monitor endotracheal if appropriate and nasal secretions for changes in amount and color  - [place] appropriate cooling/warming therapies per order  - Administer medications as ordered  - Instruct and encourage patient and family to use good hand hygiene technique  - Identify and instruct in appropriate isolation precautions for identified infection/condition  Outcome: Progressing  Goal: Absence of fever/infection during neutropenic period  Description  INTERVENTIONS:  - Monitor WBC    Outcome: Progressing     Problem: SAFETY ADULT  Goal: Patient will remain free of falls  Description  INTERVENTIONS:  - Assess patient frequently for physical needs  -  Identify cognitive and physical deficits and behaviors that affect risk of falls    -  [place] fall precautions as indicated by assessment   - Educate patient/family on patient safety including physical limitations  - Instruct patient to call for assistance with activity based on assessment  - Modify environment to reduce risk of injury  - Consider OT/PT consult to assist with strengthening/mobility  Outcome: Progressing  Goal: Maintain or return to baseline ADL function  Description  INTERVENTIONS:  -  Assess patient's ability to carry out ADLs; assess patient's baseline for ADL function and identify physical deficits which impact ability to perform ADLs (bathing, care of mouth/teeth, toileting, grooming, dressing, etc )  - Assess/evaluate cause of self-care deficits   - Assess range of motion  - Assess patient's mobility; develop plan if impaired  - Assess patient's need for assistive devices and provide as appropriate  - Encourage maximum independence but intervene and supervise when necessary  - Involve family in performance of ADLs  - Assess for home care needs following discharge   - Consider OT consult to assist with ADL evaluation and planning for discharge  - Provide patient education as appropriate  Outcome: Progressing  Goal: Maintain or return mobility status to optimal level  Description  INTERVENTIONS:  - Assess patient's baseline mobility status (ambulation, transfers, stairs, etc )    - Identify cognitive and physical deficits and behaviors that affect mobility  - Identify mobility aids required to assist with transfers and/or ambulation (gait belt, sit-to-stand, lift, walker, cane, etc )  - [place] fall precautions as indicated by assessment  - Record patient progress and toleration of activity level on Mobility SBAR; progress patient to next Phase/Stage  - Instruct patient to call for assistance with activity based on assessment  - Consider rehabilitation consult to assist with strengthening/weightbearing, etc   Outcome: Progressing     Problem: DISCHARGE PLANNING  Goal: Discharge to home or other facility with appropriate resources  Description  INTERVENTIONS:  - Identify barriers to discharge w/patient and caregiver  - Arrange for needed discharge resources and transportation as appropriate  - Identify discharge learning needs (meds, wound care, etc )  - Arrange for interpretive services to assist at discharge as needed  - Refer to Case Management Department for coordinating discharge planning if the patient needs post-hospital services based on physician/advanced practitioner order or complex needs related to functional status, cognitive ability, or social support system  Outcome: Progressing     Problem: Knowledge Deficit  Goal: Patient/family/caregiver demonstrates understanding of disease process, treatment plan, medications, and discharge instructions  Description  Complete learning assessment and assess knowledge base  Interventions:  - Provide teaching at level of understanding  - Provide teaching via preferred learning methods  Outcome: Progressing     Problem: Potential for Falls  Goal: Patient will remain free of falls  Description  INTERVENTIONS:  - Assess patient frequently for physical needs  -  Identify cognitive and physical deficits and behaviors that affect risk of falls    -  [place] fall precautions as indicated by assessment   - Educate patient/family on patient safety including physical limitations  - Instruct patient to call for assistance with activity based on assessment  - Modify environment to reduce risk of injury  - Consider OT/PT consult to assist with strengthening/mobility  Outcome: Progressing     Problem: Prexisting or High Potential for Compromised Skin Integrity  Goal: Skin integrity is maintained or improved  Description  INTERVENTIONS:  - Identify patients at risk for skin breakdown  - Assess and monitor skin integrity  - Assess and monitor nutrition and hydration status  - Monitor labs   - Assess for incontinence   - Turn and reposition patient  - Assist with mobility/ambulation  - Relieve pressure over bony prominences  - Avoid friction and shearing  - Provide appropriate hygiene as needed including keeping skin clean and dry  - Evaluate need for skin moisturizer/barrier cream  - Collaborate with interdisciplinary team   - Patient/family teaching  - Consider wound care consult   Outcome: Progressing     Problem: MUSCULOSKELETAL - ADULT  Goal: Maintain or return mobility to safest level of function  Description  INTERVENTIONS:  - Assess patient's ability to carry out ADLs; assess patient's baseline for ADL function and identify physical deficits which impact ability to perform ADLs (bathing, care of mouth/teeth, toileting, grooming, dressing, etc )  - Assess/evaluate cause of self-care deficits   - Assess range of motion  - Assess patient's mobility  - Assess patient's need for assistive devices and provide as appropriate  - Encourage maximum independence but intervene and supervise when necessary  - Involve family in performance of ADLs  - Assess for home care needs following discharge   - Consider OT consult to assist with ADL evaluation and planning for discharge  - Provide patient education as appropriate  Outcome: Progressing  Goal: Maintain proper alignment of affected body part  Description  INTERVENTIONS:  - Support, maintain and protect limb and body alignment  - Provide patient/ family with appropriate education  Outcome: Progressing

## 2019-11-09 NOTE — PROGRESS NOTES
Progress Note - Lisa Zaldivar 1960, 60 y o  female MRN: [id number]    Unit/Bed#: -[facility] Encounter: [id number]    Primary Care Provider: No primary care provider on file  Date and time [facility] to hospital: 11/4/2019  8:43 AM        Hypokalemia  Assessment & Plan  Replete orally and follow serial laboratories    Hyperlipidemia  Assessment & Plan  -continue atorvastatin  -resume Vascepa upon discharge    HTN (hypertension)  Assessment & Plan  -continue amlodipine plus triamterene plus hydrochlorothiazide  Monitor creatinine surrounding surgery DC diuretic if bp compromised    History of lung cancer  Assessment & Plan  Noted  -continue home neb treatments    * Tibial plateau fracture, left  Assessment & Plan  Patient denied that it was a syncopal episode and was purely due to a mechanical fall  Patient seen post op  C/O left foot pain  Vascular exam intact  X-ray of the foot intact  Orthopedics loose and her brace  Patient is clear for discharge to rehab nonweightbearing with follow-up to Orthopedics in 1 week for repeat x-rays  VTE Pharmacologic Prophylaxis:   Pharmacologic: ON HOLD POSTOP  Mechanical: Mechanical VTE prophylaxis in place  Patient Centered Rounds: I have performed bedside rounds with nursing staff today  Discussions with Specialists or Other Care Team Provider:  Will review ortho note  Education and Discussions with Family / Patient:  Updated patient  Time Spent for Care: 30 minutes    If More than 50% of total time spent on counseling and coordination of care as described above indicate yes or no and described the counseling and coordination:  No    Current Length of Stay: 4 day(s)  Current Patient Status: Inpatient   Certification Statement: The patient will continue to require additional inpatient hospital stay due to Obtaining rehab stay    Discharge Plan:  Rehab  Code Status: Level 1 - Full Code    Subjective:   Patient states pain is improved in her foot today still a little bit numb but better  No new issues  States they original pain in her leg is improved  Objective:   Vitals:   Temp (24hrs), Av 2 °F (36 8 °C), Min:97 8 °F (36 6 °C), Max:98 6 °F (37 °C)    Temp:  [97 8 °F (36 6 °C)-98 6 °F (37 °C)] 97 8 °F (36 6 °C)  HR:  [] 84  Resp:  [16-18] 16  BP: (132-152)/(74-91) 152/90  SpO2:  [95 %-96 %] 96 %  Body mass index is 35 81 kg/m²  Input and Output Summary (last 24 hours): Intake/Output Summary (Last 24 hours) at 2019 0752  Last data filed at 2019 0205  Gross per 24 hour   Intake 1860 ml   Output 1550 ml   Net 310 ml       Physical Exam:  General well-developed reasonably nourished female no acute distress normocephalic atraumatic pupils equal round and reactive to light extraocular muscles intact mucous membranes are moist neck is supple there is no JVD no lymph low to no carotid bruits  Chest is clear to auscultation is no rhonchi rales or wheezes  Cardiovascular regular rate rhythm positive S1 and S2 heard appreciate an S3-S4 murmur or gallop  Abdomen is soft nontender nondistended with positive bowel sounds no hepatosplenomegaly no guarding or rebound  Neurologically patient is awake alert oriented cranial nerves 2-12 intact extremities left leg is in a full brace pulse is present and adequate neurovascularly slight numbness in the foot but improved with feeling toes appropriately capillary refill is present and appropriate    Physical Exam    Additional Data:   Labs:  Results from last 7 days   Lab Units 19  0520  19  1057   WBC Thousand/uL 9 14   < > 6 44   HEMOGLOBIN g/dL 12 4   < > 12 2   HEMATOCRIT % 35 5   < > 36 8   PLATELETS Thousands/uL 251   < > 281   NEUTROS PCT %  --   --  67   LYMPHS PCT %  --   --  24   MONOS PCT %  --   --  7   EOS PCT %  --   --  1    < > = values in this interval not displayed       Results from last 7 days   Lab Units 19  0727  19  0447   POTASSIUM mmol/L 3 7   < > 4 6 CHLORIDE mmol/L 98   < > 97*   CO2 mmol/L 30   < > 23   BUN mg/dL 5   < > 9   CREATININE mg/dL 0 65   < > 0 61   CALCIUM mg/dL 8 9   < > 7 8*   ALK PHOS U/L  --   --  78   ALT U/L  --   --  23   AST U/L  --   --  47*    < > = values in this interval not displayed  Results from last 7 days   Lab Units 11/04/19  1057   INR  1 16       * I Have Reviewed All Lab Data Listed Above  * Additional Pertinent Lab Tests Reviewed: All Labs Within Last 24 Hours Reviewed    Imaging:    Imaging Reports Reviewed Today Include:  None    Cultures:   Blood Culture: No results found for: BLOODCX  Urine Culture: No results found for: URINECX  Sputum Culture: No components found for: SPUTUMCX  Wound Culture: No results found for: WOUNDCULT    Last 24 Hours Medication List:     Current Facility-Administered Medications:  acetaminophen 975 mg Oral Q8H Marie Deras MD    albuterol 2 puff Inhalation Q4H PRN Charlie Espino MD    amLODIPine-atorvastatin (CADUET 5/10) combo dose  Oral Daily Charlie Espino MD    aspirin 325 mg Oral Daily Charlie Espino MD    budesonide-formoterol 2 puff Inhalation BID Meche Franck Deras MD    docusate sodium 100 mg Oral BID PRN Charlie Espino MD    gabapentin 100 mg Oral Q8H Marie Deras MD    HYDROmorphone 0 2 mg Intravenous Q4H PRN Tulio Mendoza MD    HYDROmorphone 0 5 mg Intravenous Q3H PRN Tulio Mendoza MD    montelukast 10 mg Oral HS Charlie Espino MD    oxyCODONE-acetaminophen 1 tablet Oral Q4H PRN Charlie Espino MD    potassium chloride 40 mEq Oral Once Tulio Mendoza MD    senna 1 tablet Oral HS Charlie Espino MD    sodium chloride 100 mL/hr Intravenous Continuous Charlie Espino MD Last Rate: 100 mL/hr (11/08/19 2147)   triamterene-hydrochlorothiazide 1 tablet Oral Daily Charlie Espino MD         Today, Patient Was Seen By: Tulio Mendoza MD    ** Please Note: Dragon 360 Dictation voice to text software may have been used in the creation of this document  **

## 2019-11-09 NOTE — SOCIAL WORK
CM met with pt to discuss STR options  CM informed her that [address] accepted  CM explained that [facility] acute rehab has not made a determination as to if they accepted  Pt reported that she does not know anything about the accepting facilities as she is originally from [place]  Pt stated that she has a friend that is assisting her with making a decision  She said that she is reviewing the SNF list and reviewing ratings/reviews online but at this point, she stated that she wants a 4/5 star rated STR  CM informed her that CM can send referrals to the 5 star rated facilities  CM sent expanded pt's SNF referrals including 4/5 star rated SNFs

## 2019-11-09 NOTE — ASSESSMENT & PLAN NOTE
Patient denied that it was a syncopal episode and was purely due to a mechanical fall  Patient seen post op  C/O left foot pain which has improved  Patient does have edema that is dependent    Vascular exam intact  X-ray of the foot negative for fracture  Orthopedics loosened  her brace and change the dressing which the incision looked quite well  Patient is clear for discharge to rehab nonweightbearing with follow-up to Orthopedics in 1 week for repeat x-rays, suture removal

## 2019-11-09 NOTE — ASSESSMENT & PLAN NOTE
Patient denied that it was a syncopal episode and was purely due to a mechanical fall  Patient seen post op  C/O left foot pain  Vascular exam intact  X-ray of the foot intact  Orthopedics loose and her brace  Patient is clear for discharge to rehab nonweightbearing with follow-up to Orthopedics in 1 week for repeat x-rays

## 2019-11-10 VITALS
SYSTOLIC BLOOD PRESSURE: 160 MMHG | HEART RATE: 64 BPM | DIASTOLIC BLOOD PRESSURE: 87 MMHG | OXYGEN SATURATION: 97 % | HEIGHT: 62 IN | WEIGHT: 195.77 LBS | BODY MASS INDEX: 36.03 KG/M2 | RESPIRATION RATE: 16 BRPM | TEMPERATURE: 98 F

## 2019-11-10 LAB
ANION GAP SERPL CALCULATED.3IONS-SCNC: 9 MMOL/L (ref 4–13)
BUN SERPL-MCNC: 7 MG/DL (ref 5–25)
CALCIUM SERPL-MCNC: 9 MG/DL (ref 8.3–10.1)
CHLORIDE SERPL-SCNC: 97 MMOL/L (ref 100–108)
CO2 SERPL-SCNC: 30 MMOL/L (ref 21–32)
CREAT SERPL-MCNC: 0.67 MG/DL (ref 0.6–1.3)
GFR SERPL CREATININE-BSD FRML MDRD: 111 ML/MIN/1.73SQ M
GLUCOSE SERPL-MCNC: 98 MG/DL (ref 65–140)
POTASSIUM SERPL-SCNC: 3.3 MMOL/L (ref 3.5–5.3)
SODIUM SERPL-SCNC: 136 MMOL/L (ref 136–145)

## 2019-11-10 PROCEDURE — 97116 GAIT TRAINING THERAPY: CPT

## 2019-11-10 PROCEDURE — G8987 SELF CARE CURRENT STATUS: HCPCS

## 2019-11-10 PROCEDURE — 80048 BASIC METABOLIC PNL TOTAL CA: CPT | Performed by: ORTHOPAEDIC SURGERY

## 2019-11-10 PROCEDURE — 99239 HOSP IP/OBS DSCHRG MGMT >30: CPT | Performed by: INTERNAL MEDICINE

## 2019-11-10 PROCEDURE — 97110 THERAPEUTIC EXERCISES: CPT

## 2019-11-10 PROCEDURE — 97530 THERAPEUTIC ACTIVITIES: CPT

## 2019-11-10 PROCEDURE — 97167 OT EVAL HIGH COMPLEX 60 MIN: CPT

## 2019-11-10 PROCEDURE — G8988 SELF CARE GOAL STATUS: HCPCS

## 2019-11-10 RX ORDER — DOCUSATE SODIUM 100 MG/1
100 CAPSULE, LIQUID FILLED ORAL 2 TIMES DAILY PRN
Qty: 10 CAPSULE | Refills: 0 | Status: SHIPPED | OUTPATIENT
Start: 2019-11-10 | End: 2020-02-12

## 2019-11-10 RX ORDER — ACETAMINOPHEN 325 MG/1
975 TABLET ORAL EVERY 8 HOURS
Qty: 30 TABLET | Refills: 0
Start: 2019-11-10 | End: 2020-02-12

## 2019-11-10 RX ORDER — GABAPENTIN 100 MG/1
100 CAPSULE ORAL EVERY 8 HOURS
Qty: 90 CAPSULE | Refills: 0
Start: 2019-11-10 | End: 2020-02-12

## 2019-11-10 RX ORDER — POTASSIUM CHLORIDE 20 MEQ/1
20 TABLET, EXTENDED RELEASE ORAL DAILY
Status: DISCONTINUED | OUTPATIENT
Start: 2019-11-10 | End: 2019-11-10 | Stop reason: HOSPADM

## 2019-11-10 RX ORDER — ASPIRIN 325 MG
325 TABLET ORAL DAILY
Qty: 30 TABLET | Refills: 0
Start: 2019-11-11 | End: 2020-02-12

## 2019-11-10 RX ORDER — OXYCODONE HYDROCHLORIDE AND ACETAMINOPHEN 5; 325 MG/1; MG/1
1 TABLET ORAL EVERY 4 HOURS PRN
Qty: 60 TABLET | Refills: 0 | Status: SHIPPED | OUTPATIENT
Start: 2019-11-10 | End: 2019-11-20

## 2019-11-10 RX ORDER — POTASSIUM CHLORIDE 20 MEQ/1
20 TABLET, EXTENDED RELEASE ORAL DAILY
Qty: 30 TABLET | Refills: 0
Start: 2019-11-11 | End: 2020-02-12

## 2019-11-10 RX ADMIN — ASPIRIN 325 MG ORAL TABLET 325 MG: 325 PILL ORAL at 09:07

## 2019-11-10 RX ADMIN — POTASSIUM CHLORIDE 20 MEQ: 1500 TABLET, EXTENDED RELEASE ORAL at 09:07

## 2019-11-10 RX ADMIN — DICLOFENAC SODIUM 2 G: 10 GEL TOPICAL at 14:06

## 2019-11-10 RX ADMIN — GABAPENTIN 100 MG: 100 CAPSULE ORAL at 14:06

## 2019-11-10 RX ADMIN — DICLOFENAC SODIUM 2 G: 10 GEL TOPICAL at 09:09

## 2019-11-10 RX ADMIN — DICLOFENAC SODIUM 2 G: 10 GEL TOPICAL at 17:49

## 2019-11-10 RX ADMIN — BUDESONIDE AND FORMOTEROL FUMARATE DIHYDRATE 2 PUFF: 80; 4.5 AEROSOL RESPIRATORY (INHALATION) at 17:50

## 2019-11-10 RX ADMIN — ACETAMINOPHEN 975 MG: 325 TABLET, FILM COATED ORAL at 05:57

## 2019-11-10 RX ADMIN — BUDESONIDE AND FORMOTEROL FUMARATE DIHYDRATE 2 PUFF: 80; 4.5 AEROSOL RESPIRATORY (INHALATION) at 09:08

## 2019-11-10 RX ADMIN — GABAPENTIN 100 MG: 100 CAPSULE ORAL at 17:50

## 2019-11-10 RX ADMIN — OXYCODONE HYDROCHLORIDE AND ACETAMINOPHEN 1 TABLET: 5; 325 TABLET ORAL at 00:37

## 2019-11-10 RX ADMIN — AMLODIPINE BESYLATE: 5 TABLET ORAL at 09:07

## 2019-11-10 RX ADMIN — OXYCODONE HYDROCHLORIDE AND ACETAMINOPHEN 1 TABLET: 5; 325 TABLET ORAL at 07:28

## 2019-11-10 RX ADMIN — GABAPENTIN 100 MG: 100 CAPSULE ORAL at 05:57

## 2019-11-10 RX ADMIN — OXYCODONE HYDROCHLORIDE AND ACETAMINOPHEN 1 TABLET: 5; 325 TABLET ORAL at 16:44

## 2019-11-10 RX ADMIN — ACETAMINOPHEN 975 MG: 325 TABLET, FILM COATED ORAL at 17:50

## 2019-11-10 RX ADMIN — TRIAMTERENE AND HYDROCHLOROTHIAZIDE 1 TABLET: 37.5; 25 TABLET ORAL at 09:07

## 2019-11-10 NOTE — PROGRESS NOTES
Progress Note - Gomez Pearson 1960, 60 y o  female MRN: [id number]    Unit/Bed#: -01 Encounter: [id number]    Primary Care Provider: No primary care provider on file  Date and time [facility] to hospital: 11/4/2019  8:43 AM        Hypokalemia  Assessment & Plan  Replete orally  Schedule daily potassium 20 meq while on hctz and follow serial laboratories    Hyperlipidemia  Assessment & Plan  -continue atorvastatin  -resume Vascepa upon discharge    HTN (hypertension)  Assessment & Plan  -continue amlodipine plus triamterene plus hydrochlorothiazide  Monitor creatinine surrounding surgery DC diuretic if bp compromised    History of lung cancer  Assessment & Plan  Noted  -continue home neb treatments    * Tibial plateau fracture, left  Assessment & Plan  Patient denied that it was a syncopal episode and was purely due to a mechanical fall  Patient seen post op  C/O left foot pain which has improved  Patient does have edema that is dependent    Vascular exam intact  X-ray of the foot negative for fracture  Orthopedics loosened  her brace and change the dressing which the incision looked quite well  Patient is clear for discharge to rehab nonweightbearing with follow-up to Orthopedics in 1 week for repeat x-rays, suture removal  VTE Pharmacologic Prophylaxis:   Pharmacologic: aspirin 325  Mechanical: Mechanical VTE prophylaxis in place  Patient Centered Rounds: I have performed bedside rounds with nursing staff today  Discussions with Specialists or Other Care Team Provider: none  Education and Discussions with Family / Patient: patient  Time Spent for Care: 20 minutes    If More than 50% of total time spent on counseling and coordination of care as described above indicate yes or no and described the counseling and coordination:no    Current Length of Stay: 6 day(s)  Current Patient Status: Inpatient   Certification Statement: The patient will continue to require additional inpatient hospital stay due to awaiting acute rehab    Discharge Plan: awaiting   Code Status: Level 1 - Full Code    Subjective:   Feeling well, had multiple bowel movements  No new issues  Pain controlled  Objective:   Vitals:   Temp (24hrs), Av °F (36 7 °C), Min:98 °F (36 7 °C), Max:98 °F (36 7 °C)    Temp:  [98 °F (36 7 °C)] 98 °F (36 7 °C)  HR:  [64] 64  Resp:  [16] 16  BP: (141-149)/(83-85) 141/85  SpO2:  [97 %] 97 %  Body mass index is 35 81 kg/m²  Input and Output Summary (last 24 hours): Intake/Output Summary (Last 24 hours) at 11/10/2019 1337  Last data filed at 11/10/2019 0846  Gross per 24 hour   Intake 1230 ml   Output 1100 ml   Net 130 ml       Physical Exam:     Physical Exam   Constitutional: She is oriented to person, place, and time  She appears well-developed  No distress  HENT:   Head: Normocephalic and atraumatic  Right Ear: External ear normal    Left Ear: External ear normal    Nose: Nose normal    Mouth/Throat: Oropharynx is clear and moist  No oropharyngeal exudate  Eyes: Pupils are equal, round, and reactive to light  Conjunctivae and EOM are normal  Right eye exhibits no discharge  Left eye exhibits no discharge  No scleral icterus  Neck: Normal range of motion  Neck supple  No JVD present  No thyromegaly present  Cardiovascular: Normal rate, regular rhythm, normal heart sounds and intact distal pulses  Exam reveals no gallop and no friction rub  No murmur heard  Pulmonary/Chest: Breath sounds normal  No respiratory distress  She has no wheezes  She has no rales  Abdominal: Soft  Bowel sounds are normal  She exhibits no distension  There is no tenderness  There is no rebound and no guarding  Musculoskeletal: Normal range of motion  She exhibits edema (left leg 1 +)  She exhibits no deformity  Lymphadenopathy:     She has no cervical adenopathy  Neurological: She is alert and oriented to person, place, and time  She has normal reflexes   She displays normal reflexes  No cranial nerve deficit  She exhibits normal muscle tone  Skin: Skin is warm and dry  No rash noted  She is not diaphoretic  No erythema  Psychiatric: She has a normal mood and affect  Vitals reviewed  Additional Data:   Labs:  Results from last 7 days   Lab Units 11/09/19  1136 11/06/19  0520  11/04/19  1057   WBC Thousand/uL  --  9 14   < > 6 44   HEMOGLOBIN g/dL  --  12 4   < > 12 2   HEMATOCRIT %  --  35 5   < > 36 8   PLATELETS Thousands/uL 341 251   < > 281   NEUTROS PCT %  --   --   --  67   LYMPHS PCT %  --   --   --  24   MONOS PCT %  --   --   --  7   EOS PCT %  --   --   --  1    < > = values in this interval not displayed  Results from last 7 days   Lab Units 11/10/19  0607  11/05/19  0447   POTASSIUM mmol/L 3 3*   < > 4 6   CHLORIDE mmol/L 97*   < > 97*   CO2 mmol/L 30   < > 23   BUN mg/dL 7   < > 9   CREATININE mg/dL 0 67   < > 0 61   CALCIUM mg/dL 9 0   < > 7 8*   ALK PHOS U/L  --   --  78   ALT U/L  --   --  23   AST U/L  --   --  47*    < > = values in this interval not displayed  Results from last 7 days   Lab Units 11/04/19  1057   INR  1 16       * I Have Reviewed All Lab Data Listed Above  * Additional Pertinent Lab Tests Reviewed:  All Labs Within Last 24 Hours Reviewed    Imaging:    Imaging Reports Reviewed Today Include: none    Cultures:   Blood Culture: No results found for: BLOODCX  Urine Culture: No results found for: URINECX  Sputum Culture: No components found for: SPUTUMCX  Wound Culture: No results found for: WOUNDCULT    Last 24 Hours Medication List:     Current Facility-Administered Medications:  acetaminophen 975 mg Oral Q8H Marie Deras MD   albuterol 2 puff Inhalation Q4H PRN Liza Bolden MD   amLODIPine-atorvastatin (CADUET 5/10) combo dose  Oral Daily Liza Bolden MD   aspirin 325 mg Oral Daily Liza Bolden MD   budesonide-formoterol 2 puff Inhalation BID Liza Bolden MD   diclofenac sodium 2 g Topical 4x Daily Katherine L Elly Beltrán MD   docusate sodium 100 mg Oral BID PRN Shun Avila MD   gabapentin 100 mg Oral Q8H Marie Deras MD   HYDROmorphone 0 2 mg Intravenous Q4H PRN Jack Gan MD   HYDROmorphone 0 5 mg Intravenous Q3H PRN Jack Gan MD   montelukast 10 mg Oral HS Shun Avila MD   oxyCODONE-acetaminophen 1 tablet Oral Q4H PRN Shun Avila MD   potassium chloride 20 mEq Oral Daily Jack Gan MD   senna 1 tablet Oral HS Shun Avila MD   triamterene-hydrochlorothiazide 1 tablet Oral Daily Shun Avila MD        Today, Patient Was Seen By: Jack Gan MD    ** Please Note: Dragon 360 Dictation voice to text software may have been used in the creation of this document   **

## 2019-11-10 NOTE — DISCHARGE SUMMARY
[facility] 1960[facility] 60 [facility]  [facility] MRN: [id number]    Unit/Bed#: -01 Encounter: [id number]    Primary Care Provider: No primary care provider on file  Date and time [facility] to hospital: 11/4/2019  8:43 AM        Hypokalemia  Assessment & Plan  Replete orally  Schedule daily potassium 20 meq while on hctz and follow serial laboratories    Hyperlipidemia  Assessment & Plan  -continue atorvastatin  -resume Vascepa upon discharge    HTN (hypertension)  Assessment & Plan  -continue amlodipine plus triamterene plus hydrochlorothiazide  Monitor creatinine surrounding surgery DC diuretic if bp compromised    History of lung cancer  Assessment & Plan  Noted  -continue home neb treatments    * Tibial plateau fracture, left  Assessment & Plan  Patient denied that it was a syncopal episode and was purely due to a mechanical fall  Patient seen post op  C/O left foot pain which has improved  Patient does have edema that is dependent    Vascular exam intact  X-ray of the foot negative for fracture  Orthopedics loosened  her brace and change the dressing which the incision looked quite well  Patient is clear for discharge to rehab nonweightbearing with follow-up to Orthopedics in 1 week for repeat x-rays, suture removal  Discharging Physician / Practitioner: Helena Casanova MD  PCP: No primary care provider on file    Admission Date:   Admission Orders (From admission, onward)     Ordered        11/04/19 1046  Inpatient Admission  Once                   Discharge Date: 11/10/19    Resolved Problems  Date Reviewed: 11/7/2019    None          Consultations During Hospital Stay:  · Orthopedics      Procedures Performed:   · X-ray knee four view left:  Comminuted and impacted lateral tibial plateau fracture  · CT left knee:  Comminuted impacted left tibial plateau fracture with approximately 1 0 cm above cortical depression associated with layering Lipo heme arthrosis  · X-ray knee postop fluoroscopic guidance provided for surgical procedure  · X-ray left foot no acute osseous abnormality    Significant Findings / Test Results:   · As above  · Discharging creatinine 0 67    Incidental Findings:   · None    Test Results Pending at Discharge (will require follow up): · None     Outpatient Tests Requested:  · None    Complications:  None    Reason for Admission:  Fall with leg pain    Hospital Course:     Sally Day is a 60 y o  female patient who originally presented to the hospital on 11/4/2019 due to patient sustained a mechanical fall and developed left leg pain  She came to the emergency room was found to have a comminuted left tibial plateau fracture  Patient was taken to the OR on 11 7 and had a on a vent for repair  Her pain is improved  Postop she had some discomfort on the top of the left foot described as numbness and tingling with felt this might be secondary to swelling  Her wound was re-examined by Orthopedics postoperatively and she was cleared for discharge to rehabilitation  She has moved her bowels several times and has no other new issues to intervened upon  She will therefore be discharged to rehab  She will be maintained on her home doses of pulmonary medications and blood pressure medications which she has been tolerating without difficulty  For anticoagulant relation the patient will be on aspirin 325 mg  She will be nonweightbearing on the left leg  She will follow up with Orthopedics in 1 week for suture removal and evaluation  Dressing should remain intact until she is seen by Orthopedics  Please see above list of diagnoses and related plan for additional information       Condition at Discharge: good     Discharge Day Visit / Exam:     Subjective:  Patient doing well no new complaints looking for to going to rehab  Vitals: Blood Pressure: 160/87 (11/10/19 1500)  Pulse: 64 (11/10/19 1500)  Temperature: 98 °F (36 7 °C) (11/10/19 1500)  Temp Source: Oral (11/10/19 1500)  Respirations: 16 (11/10/19 1500)  Height: 5' 2" (157 5 cm) (11/04/19 1201)  Weight - Scale: 88 8 kg (195 lb 12 3 oz) (11/04/19 0844)  SpO2: 97 % (11/10/19 1500)  Exam:   Physical Exam  Please see my note from earlier today exam is unchanged  Discussion with Family:  None    Discharge instructions/Information to patient and family:   See after visit summary for information provided to patient and family  Provisions for Follow-Up Care:  See after visit summary for information related to follow-up care and any pertinent home health orders  Disposition:     Other [place] [facility]    For Discharges to [facility] SNF:   · Not Applicable to this Patient - Not Applicable to this Patient    Planned Readmission:  None     Discharge Statement:  I spent 35 minutes discharging the patient  This time was spent on the day of discharge  I had direct contact with the patient on the day of discharge  Greater than 50% of the total time was spent examining patient, answering all patient questions, arranging and discussing plan of care with patient as well as directly providing post-discharge instructions  Additional time then spent on discharge activities  Discharge Medications:  See after visit summary for reconciled discharge medications provided to patient and family        ** Please Note: This note has been constructed using a voice recognition system **

## 2019-11-10 NOTE — PLAN OF CARE
Problem: PAIN - ADULT  Goal: Verbalizes/displays adequate comfort level or baseline comfort level  Description  Interventions:  - Encourage patient to monitor pain and request assistance  - Assess pain using appropriate pain scale  - Administer analgesics based on type and severity of pain and evaluate response  - Implement non-pharmacological measures as appropriate and evaluate response  - Consider cultural and social influences on pain and pain management  - Notify physician/advanced practitioner if interventions unsuccessful or patient reports new pain  Outcome: Progressing     Problem: INFECTION - ADULT  Goal: Absence or prevention of progression during hospitalization  Description  INTERVENTIONS:  - Assess and monitor for signs and symptoms of infection  - Monitor lab/diagnostic results  - Monitor all insertion sites, i e  indwelling lines, tubes, and drains  - Monitor endotracheal if appropriate and nasal secretions for changes in amount and color  - [place] appropriate cooling/warming therapies per order  - Administer medications as ordered  - Instruct and encourage patient and family to use good hand hygiene technique  - Identify and instruct in appropriate isolation precautions for identified infection/condition  Outcome: Progressing  Goal: Absence of fever/infection during neutropenic period  Description  INTERVENTIONS:  - Monitor WBC    Outcome: Progressing     Problem: SAFETY ADULT  Goal: Patient will remain free of falls  Description  INTERVENTIONS:  - Assess patient frequently for physical needs  -  Identify cognitive and physical deficits and behaviors that affect risk of falls    -  [place] fall precautions as indicated by assessment   - Educate patient/family on patient safety including physical limitations  - Instruct patient to call for assistance with activity based on assessment  - Modify environment to reduce risk of injury  - Consider OT/PT consult to assist with strengthening/mobility  Outcome: Progressing  Goal: Maintain or return to baseline ADL function  Description  INTERVENTIONS:  -  Assess patient's ability to carry out ADLs; assess patient's baseline for ADL function and identify physical deficits which impact ability to perform ADLs (bathing, care of mouth/teeth, toileting, grooming, dressing, etc )  - Assess/evaluate cause of self-care deficits   - Assess range of motion  - Assess patient's mobility; develop plan if impaired  - Assess patient's need for assistive devices and provide as appropriate  - Encourage maximum independence but intervene and supervise when necessary  - Involve family in performance of ADLs  - Assess for home care needs following discharge   - Consider OT consult to assist with ADL evaluation and planning for discharge  - Provide patient education as appropriate  Outcome: Progressing  Goal: Maintain or return mobility status to optimal level  Description  INTERVENTIONS:  - Assess patient's baseline mobility status (ambulation, transfers, stairs, etc )    - Identify cognitive and physical deficits and behaviors that affect mobility  - Identify mobility aids required to assist with transfers and/or ambulation (gait belt, sit-to-stand, lift, walker, cane, etc )  - [place] fall precautions as indicated by assessment  - Record patient progress and toleration of activity level on Mobility SBAR; progress patient to next Phase/Stage  - Instruct patient to call for assistance with activity based on assessment  - Consider rehabilitation consult to assist with strengthening/weightbearing, etc   Outcome: Progressing     Problem: DISCHARGE PLANNING  Goal: Discharge to home or other facility with appropriate resources  Description  INTERVENTIONS:  - Identify barriers to discharge w/patient and caregiver  - Arrange for needed discharge resources and transportation as appropriate  - Identify discharge learning needs (meds, wound care, etc )  - Arrange for interpretive services to assist at discharge as needed  - Refer to Case Management Department for coordinating discharge planning if the patient needs post-hospital services based on physician/advanced practitioner order or complex needs related to functional status, cognitive ability, or social support system  Outcome: Progressing     Problem: Knowledge Deficit  Goal: Patient/family/caregiver demonstrates understanding of disease process, treatment plan, medications, and discharge instructions  Description  Complete learning assessment and assess knowledge base  Interventions:  - Provide teaching at level of understanding  - Provide teaching via preferred learning methods  Outcome: Progressing     Problem: Potential for Falls  Goal: Patient will remain free of falls  Description  INTERVENTIONS:  - Assess patient frequently for physical needs  -  Identify cognitive and physical deficits and behaviors that affect risk of falls    -  [place] fall precautions as indicated by assessment   - Educate patient/family on patient safety including physical limitations  - Instruct patient to call for assistance with activity based on assessment  - Modify environment to reduce risk of injury  - Consider OT/PT consult to assist with strengthening/mobility  Outcome: Progressing     Problem: Prexisting or High Potential for Compromised Skin Integrity  Goal: Skin integrity is maintained or improved  Description  INTERVENTIONS:  - Identify patients at risk for skin breakdown  - Assess and monitor skin integrity  - Assess and monitor nutrition and hydration status  - Monitor labs   - Assess for incontinence   - Turn and reposition patient  - Assist with mobility/ambulation  - Relieve pressure over bony prominences  - Avoid friction and shearing  - Provide appropriate hygiene as needed including keeping skin clean and dry  - Evaluate need for skin moisturizer/barrier cream  - Collaborate with interdisciplinary team   - Patient/family teaching  - Consider wound care consult   Outcome: Progressing     Problem: MUSCULOSKELETAL - ADULT  Goal: Maintain or return mobility to safest level of function  Description  INTERVENTIONS:  - Assess patient's ability to carry out ADLs; assess patient's baseline for ADL function and identify physical deficits which impact ability to perform ADLs (bathing, care of mouth/teeth, toileting, grooming, dressing, etc )  - Assess/evaluate cause of self-care deficits   - Assess range of motion  - Assess patient's mobility  - Assess patient's need for assistive devices and provide as appropriate  - Encourage maximum independence but intervene and supervise when necessary  - Involve family in performance of ADLs  - Assess for home care needs following discharge   - Consider OT consult to assist with ADL evaluation and planning for discharge  - Provide patient education as appropriate  Outcome: Progressing  Goal: Maintain proper alignment of affected body part  Description  INTERVENTIONS:  - Support, maintain and protect limb and body alignment  - Provide patient/ family with appropriate education  Outcome: Progressing

## 2019-11-10 NOTE — TRANSPORTATION MEDICAL NECESSITY
Section I - General Information    Name of Patient: Janet Zhou                 : 1960    Medicare #: [id number]  Transport Date: 11/10/19 (PCS is valid for round trips on this date and for all repetitive trips in the 60-day range as noted below )  Origin: [place] Guerrero 82: Jamila Mcmullen  Is the pt's stay covered under Medicare Part A (PPS/DRG)   []     Closest appropriate facility? If no, why is transport to more distant facility required? Yes  If hospice pt, is this transport related to pt's terminal illness? No       Section II - Medical Necessity Questionnaire  Ambulance transportation is medically necessary only if other means of transport are contraindicated or would be potentially harmful to the patient  To meet this requirement, the patient must either be "bed confined" or suffer from a condition such that transport by means other than ambulance is contraindicated by the patient's condition  The following questions must be answered by the medical professional signing below for this form to be valid:    1)  Describe the MEDICAL CONDITION (physical and/or mental) of this patient AT [address] [place] [address] that requires the patient to be transported in an ambulance and why transport by other means is contraindicated by the patient's condition: patient has tibial fracture, NWB lower extremity, fixator on knee, unhealed fracture  Pain on movement  Medical attendant required    2) Is the patient "bed confined" as defined below? No  To be "be confined" the patient must satisfy all three of the following conditions: (1) unable to get up from bed without Assistance; AND (2) unable to ambulate; AND (3) unable to sit in a chair or wheelchair  3) Can this patient safely be transported by car or wheelchair van (i e , seated during transport without a medical attendant or monitoring)? No    4) In addition to completing questions 1-3 above, please check any of the following conditions that apply*:   *Note: supporting documentation for any boxes checked must be maintained in the patient's medical records  If hosp-hosp transfer, describe services needed at 2nd facility not available at 1st facility? Non-Healed Fractures      Section III - Signature of Physician or Healthcare Professional  I certify that the above information is true and correct based on my evaluation of this patient, and represent that the patient requires transport by ambulance and that other forms of transport are contraindicated  I understand that this information will be used by the Centers for Medicare and Medicaid Services (CMS) to support the determination of medical necessity for ambulance services, and I represent that I have personal knowledge of the patient's condition at time of transport  []  If this box is checked, I also certify that the patient is physically or mentally incapable of signing the ambulance service's claim and that the institution with which I am affiliated has furnished care, services, or assistance to the patient  My signature below is made on behalf of the patient pursuant to 42 CFR §424 36(b)(4)  In accordance with 42 CFR §424 37, the specific reason(s) that the patient is physically or mentally incapable of signing the claim form is as follows: n/a  Signature of Physician* or Healthcare Professional_____________________________________________________________  Signature Date 11/10/19 (For scheduled repetitive transports, this form is not valid for transports performed more than 60 days after this date)    Printed Name & Credentials of Physician or Healthcare Professional (MD, DO, RN, etc )__________Katherine Mei LCSW______________________  *Form must be signed by patient's attending physician for scheduled, repetitive transports   For non-repetitive, unscheduled ambulance transports, if unable to obtain the signature of the attending physician, any of the following may sign (choose appropriate option below)  [] Physician Assistant []  Clinical Nurse Specialist []  Registered Nurse  []  Nurse Practitioner  [x] Discharge Planner

## 2019-11-10 NOTE — PLAN OF CARE
Problem: OCCUPATIONAL THERAPY ADULT  Goal: Performs self-care activities at highest level of function for planned discharge setting  See evaluation for individualized goals  Description  Treatment Interventions: ADL retraining, Functional transfer training, Endurance training, Patient/family training, Equipment evaluation/education, Compensatory technique education, Continued evaluation, Energy conservation, Activityengagement          See flowsheet documentation for full assessment, interventions and recommendations  Note:   Limitation: Decreased ADL status, Decreased endurance, Decreased self-care trans, Decreased high-level ADLs  Prognosis: Good  Assessment: Patient is a 60 y o  female seen for OT evaluation s/p admit to [id number] [facility] on 11/4/2019 w/Tibial plateau fracture, left  Patient is  post operative day 3 left tibial plateau ORIF, NWB to LLE  Commorbidities affecting patient's functional performance at time of assessment include:  Hypokalemia, HTN, h/o of Lung Cancer  Orders placed for OT evaluation and treatment  Performed at least two patient identifiers during session including name and wristband  Prior to admission, Patient reported independnet with ADLs/ IADLs   Patient lives alone in a 2 story house, 1st floor set up with [facility]  Patient ambulatory with no AD, drives and manageges her affairs independently  Personal factors affecting patient at time of initial evaluation include: limited caregiver support, difficulty performing ADLs and difficulty performing IADLs  Upon evaluation, patient requires supervision, set up and contact guard assist for UB ADLs, moderate and maximal assist for LB ADLs, transfers and functional ambulation in room and bathroom with moderate assist, with the use of Rolling Walker   Occupational performance is affected by the following deficits: decreased functional use of BUEs, degenerative arthritic joint changes, impaired gross motor coordination, dynamic sit/ stand balance deficit with poor standing tolerance time for self care and functional mobility, decreased activity tolerance, decreased safety awareness, increased pain, orthopedic restrictions and postural control and postural alignment deficit, requiring external assistance to complete transitional movements  Therapist completed  extensive additional review of medical records and additional review of physical, cognitive or psychosocial history, clinical examination identifying 5 or more performance deficits, clinical decision making of a high complexity , consistent with a high complexity level evaluation  Patient to benefit from continued Occupational Therapy treatment while in the hospital to address deficits as defined above and maximize level of functional independence with ADLs and functional mobility  Occupational Performance areas to address include: grooming , bathing/ shower, dressing, toilet hygiene, transfer to all surfaces, functional mobility, health maintenance, medication routine/ management, Leisure Participation and Social participation  From OT standpoint, recommendation at time of d/c would be Short Term Rehab         OT Discharge Recommendation: Short Term Rehab

## 2019-11-10 NOTE — SOCIAL WORK
Pt accepted at [facility]  Pt agreeable  She will contact her family  Cm to set up bls  IMM provided

## 2019-11-10 NOTE — PLAN OF CARE
Problem: PHYSICAL THERAPY ADULT  Goal: Performs mobility at highest level of function for planned discharge setting  See evaluation for individualized goals  Description  Treatment/Interventions: Functional transfer training, LE strengthening/ROM, Elevations, Therapeutic exercise, Endurance training, Patient/family training, Equipment eval/education, Bed mobility, Gait training, Spoke to nursing, OT  Equipment Recommended: Sukumar Lee (Comment)(BS, shower chair)       See flowsheet documentation for full assessment, interventions and recommendations  11/10/2019 1416 by Vivi Shaw PT  Outcome: Progressing  Note:   Prognosis: Good  Problem List: Decreased strength, Decreased endurance, Decreased range of motion, Decreased mobility, Impaired balance, Decreased skin integrity, Orthopedic restrictions, Pain  Assessment: Pt seen for PT treatment session this date with interventions consisting of gait training w/ emphasis on improving pt's ability to ambulate level surfaces x 5' with min A of 2 provided by therapist with RW, Therapeutic exercise consisting of: AROM 10 reps R LE in sitting position and therapeutic activity consisting of training: sit<>stand transfers  Pt agreeable to PT treatment session upon arrival, pt found seated OOB in recliner, in no apparent distress, A&O x 4 and responsive  In comparison to previous session, pt with improvements in initiation of seated therapeutic exercise program to enhance R LE strength and improved independence upon sit to stand transfers maintaining NWB L % of the time  Post session: pt returned back to recliner, chair alarm engaged, all needs in reach and RN notified of session findings/recommendations  Continue to recommend STR at time of d/c in order to maximize pt's functional independence and safety w/ mobility  Pt continues to be functioning below baseline level, and remains limited 2* factors listed above   PT will continue to see pt while here in order to address the deficits listed above and provide interventions consistent w/ POC in effort to achieve STGs  Barriers to Discharge: Decreased caregiver support, Inaccessible home environment     Recommendation: Short-term skilled PT, Rehab consult     PT - OK to Discharge: Yes(when medically cleared; if to STR)    See flowsheet documentation for full assessment  See flowsheet documentation for full assessment

## 2019-11-10 NOTE — OCCUPATIONAL THERAPY NOTE
Occupational Therapy Evaluation        Patient Name: Sonu Guzman  UZSIS'P Date: 11/10/2019       11/10/19 1340   Note Type   Note type Eval only   Restrictions/Precautions   Weight Bearing Precautions Per Order Yes   LLE Weight Bearing Per Order NWB   Braces or Orthoses LE Immobilizer  (L knee)   Other Precautions Chair Alarm; Bed Alarm;WBS; Limb alert;Telemetry;O2   Pain Assessment   Pain Assessment 0-10   Pain Score 3   Pain Type Surgical pain   Pain Location Leg   Pain Orientation Left   Pain Descriptors Aching;Dull   Pain Frequency Constant/continuous   Pain Onset Ongoing   Clinical Progression Gradually improving   Hospital Pain Intervention(s) Repositioned; Ambulation/increased activity   Response to Interventions toerated treatment   Multiple Pain Sites No   Home Living   Type of [address] Two level; Able to live on main level with bedroom/bathroom; Performs ADLs on one level  (1 ELIU)   Bathroom Shower/Tub Walk-in shower   Bathroom Toilet Standard   Bathroom Equipment Shower chair   [address] Cane;Crutches  (right ankle fracture on february/ 2019/ had CAM Boot)   Prior Function   Level of [place] Independent with ADLs and functional mobility   Lives With Alone   Receives Help From Family;Friend(s)   ADL Assistance Independent   IADLs Independent   Falls in the last 6 months 1 to 4  (2)   Vocational Retired  ( (retired))   Lifestyle   Autonomy Patient reported independnet with ADLs/ IADLs   Patient lives alone in a 2 story house, 1st floor set up with 1 ELIU  Patient ambulatory with no AD, drives and manageges her affairs independently     Reciprocal Relationships Supportive friends and family   Service to Others Retired [place] very optimisticDeng being active and independent   Psychosocial   Psychosocial (WDL) WDL   ADL   Eating Assistance 6  Modified independent   [address] 6 Modified Independent   UB Bathing Assistance 5  Supervision/Setup   LB Bathing Assistance 3  Moderate Assistance   UB Dressing Assistance 5  Supervision/Setup   LB Dressing Assistance 3  Moderate Assistance   Toileting Assistance  2  Maximal Assistance   Functional Assistance 3  Moderate Assistance   Bed Mobility   Additional Comments Pt sitting OOB in recliner chair upon arrival   Transfers   Sit to Stand 4  Minimal assistance   Additional items Assist x 1;Verbal cues; Increased time required   Stand to Sit 4  Minimal assistance   Additional items Assist x 1; Increased time required;Verbal cues   Toilet transfer 4  Minimal assistance   Additional items Assist x 1; Increased time required;Commode;Armrests   Additional Comments patient able to maintain NWB status of LLE during toilet gygiene 100% of the time     Functional Mobility   Functional Mobility 3  Moderate assistance   Additional items Rolling walker   Balance   Static Sitting Good   Dynamic Sitting Fair +   Static Standing Fair -   Dynamic Standing Poor +   Activity Tolerance   Activity Tolerance Patient limited by fatigue;Patient limited by pain   Nurse Made Aware RN verbalized aptient appropriate for therapy   RUE Assessment   RUE Assessment X  (AROM WFL, incomplete / h/o of wrist surgery)   RUE Overall AROM   R Mass Grasp incomplete grasp by 1/3, functional use of thumb, functional finger opposition   LUE Assessment   LUE Assessment WFL   Hand Function   Gross Motor Coordination Impaired   Fine Motor Coordination Functional   Sensation   Light Touch No apparent deficits  (BUEs)   Proprioception   Proprioception No apparent deficits   Vision-Basic Assessment   Current Vision Does not wear glasses   Patient Visual Report Other (Comment)  (No significant changes reported)   Cognition   Overall Cognitive Status WFL   Arousal/Participation Cooperative   Attention Within functional limits   Orientation Level Oriented X4   Memory Within functional limits Following Commands Follows all commands and directions without difficulty   Assessment   Limitation Decreased ADL status; Decreased endurance;Decreased self-care trans;Decreased high-level ADLs   Prognosis Good   Assessment Patient is a 60 y o  female seen for OT evaluation s/p admit to [id number] [facility] on 11/4/2019 w/Tibial plateau fracture, left  Patient is  post operative day 3 left tibial plateau ORIF, NWB to LLE  Commorbidities affecting patient's functional performance at time of assessment include:  Hypokalemia, HTN, h/o of Lung Cancer  Orders placed for OT evaluation and treatment  Performed at least two patient identifiers during session including name and wristband  Prior to admission, Patient reported independnet with ADLs/ IADLs   Patient lives alone in a 2 story house, 1st floor set up with 1 ELIU  Patient ambulatory with no AD, drives and manageges her affairs independently  Personal factors affecting patient at time of initial evaluation include: limited caregiver support, difficulty performing ADLs and difficulty performing IADLs  Upon evaluation, patient requires supervision, set up and contact guard assist for UB ADLs, moderate and maximal assist for LB ADLs, transfers and functional ambulation in room and bathroom with moderate assist, with the use of Rolling Walker  Occupational performance is affected by the following deficits: decreased functional use of BUEs, degenerative arthritic joint changes, impaired gross motor coordination, dynamic sit/ stand balance deficit with poor standing tolerance time for self care and functional mobility, decreased activity tolerance, decreased safety awareness, increased pain, orthopedic restrictions and postural control and postural alignment deficit, requiring external assistance to complete transitional movements   Therapist completed  extensive additional review of medical records and additional review of physical, cognitive or psychosocial history, clinical examination identifying 5 or more performance deficits, clinical decision making of a high complexity , consistent with a high complexity level evaluation  Patient to benefit from continued Occupational Therapy treatment while in the hospital to address deficits as defined above and maximize level of functional independence with ADLs and functional mobility  Occupational Performance areas to address include: grooming , bathing/ shower, dressing, toilet hygiene, transfer to all surfaces, functional mobility, health maintenance, medication routine/ management, Leisure Participation and Social participation  From OT standpoint, recommendation at time of d/c would be Short Term Rehab  Goals   Patient Goals " I will make you proud" I will get it done and will be running down the hallway"   Plan   Treatment Interventions ADL retraining;Functional transfer training; Endurance training;Patient/family training;Equipment evaluation/education; Compensatory technique education;Continued evaluation; Energy conservation; Activityengagement   Goal Expiration Date 11/24/19   OT Frequency 3-5x/wk   Recommendation   OT Discharge Recommendation Short Term Rehab   Barthel Index   Feeding 10   Bathing 0   Grooming Score 5   Dressing Score 5   Bladder Score 10   Bowels Score 10   Toilet Use Score 5   Transfers (Bed/Chair) Score 5   Mobility (Level Surface) Score 0   Stairs Score 0   Barthel Index Score 50   Modified Charlie Scale   Modified Charlie Scale 4         1 - Patient will verbalize and demonstrate use of energy conservation/ deep breathing technique and work simplification skills during functional activity with no verbal cues  2 - Patient will verbalize and demonstrate good body mechanics and joint protection techniques during  ADLs/ IADLs with no verbal cues      3 - Patient will increase OOB/ sitting tolerance to 2-4 hours per day for increased participation in self care and leisure tasks with no s/s of exertion  4 - Patient will increase standing tolerance time to 5  minutes with unilateral UE support to complete sink level ADLs@ mod I level  5 - Patient will increase sitting tolerance at edge of bed to 20 minutes to complete UB ADLs @ set up assist level  6 - Patient will transfer bed to Chair / toilet at Set up assist level with AD as indicated  7 - Patient will complete UB ADLs with set up assist      8 - Patient will complete LB ADLs with min assist with the use of adaptive equipment       9 - Patient will complete toileting hygiene with set up assist/ supervision for thoroughness    10 - Patient/ Family  will demonstrate competency with UE Home Exercise Program

## 2019-11-10 NOTE — PHYSICAL THERAPY NOTE
Physical Therapy Treatment Note       11/10/19 2665   Pain Assessment   Pain Assessment 0-10   Pain Score 3   Pain Type Surgical pain   Pain Location Leg   Pain Orientation Left   Pain Descriptors Aching;Dull   Pain Frequency Constant/continuous   Pain Onset Ongoing   Clinical Progression Gradually improving   Hospital Pain Intervention(s) Repositioned; Ambulation/increased activity   Diversional Activities Television   Response to Interventions Tolerated   Multiple Pain Sites No   Restrictions/Precautions   Weight Bearing Precautions Per Order Yes   LLE Weight Bearing Per Order NWB   Braces or Orthoses LE Immobilizer  (L knee)   Other Precautions Bed Alarm; Chair Alarm; Fall Risk;Pain;Multiple lines;WBS   General   Chart Reviewed Yes   Response to Previous Treatment Patient with no complaints from previous session  Family/Caregiver Present No   Cognition   Overall Cognitive Status WFL   Arousal/Participation Cooperative   Attention Within functional limits   Orientation Level Oriented X4   Memory Within functional limits   Following Commands Follows all commands and directions without difficulty   Comments Pt agreeable to PT treatment   Subjective   Subjective "I'm glad you showed me those exercises"   Bed Mobility   Additional Comments Pt sitting OOB in recliner chair upon arrival   Transfers   Sit to Stand 4  Minimal assistance  (progressing to CGA)   Additional items Assist x 1; Increased time required;Verbal cues;Armrests   Stand to Sit 4  Minimal assistance   Additional items Assist x 1; Increased time required;Verbal cues;Armrests   Toilet transfer 4  Minimal assistance   Additional items Assist x 1; Increased time required;Verbal cues; Commode;Armrests   Additional Comments Pt able to maintain NWB L % of the time throughout PT treatment   Ambulation/Elevation   Gait pattern Improper Weight shift; Excessively slow;Decreased foot clearance   Gait Assistance 4  Minimal assist   Additional items Assist x 2;Verbal cues  (A x 2 for enhanced safety and fall prevention)   Assistive Device Rolling walker   Distance 5'   Stair Management Assistance Not tested   Balance   Static Sitting Good   Dynamic Sitting Fair +   Static Standing Fair -   Dynamic Standing Poor +   Ambulatory Poor   Endurance Deficit   Endurance Deficit Yes   Activity Tolerance   Activity Tolerance Patient tolerated treatment well   Nurse Made Aware MATTY Castaneda confirmed patient appropriate for therapy; made aware of session outcomes   Exercises   Quad Sets Sitting;10 reps;AROM; Right   Hip Abduction Sitting;10 reps;AROM; Right   Knee AROM Long Arc Quad Sitting;10 reps;AROM; Right   Marching Sitting;10 reps;AROM; Right   Assessment   Prognosis Good   Problem List Decreased strength;Decreased endurance;Decreased range of motion;Decreased mobility; Impaired balance;Decreased skin integrity;Orthopedic restrictions;Pain   Assessment Pt seen for PT treatment session this date with interventions consisting of gait training w/ emphasis on improving pt's ability to ambulate level surfaces x 5' with min A of 2 provided by therapist with RW, Therapeutic exercise consisting of: AROM 10 reps R LE in sitting position and therapeutic activity consisting of training: sit<>stand transfers  Pt agreeable to PT treatment session upon arrival, pt found seated OOB in recliner, in no apparent distress, A&O x 4 and responsive  In comparison to previous session, pt with improvements in initiation of seated therapeutic exercise program to enhance R LE strength and improved independence upon sit to stand transfers maintaining NWB L % of the time  Post session: pt returned back to recliner, chair alarm engaged, all needs in reach and RN notified of session findings/recommendations  Continue to recommend STR at time of d/c in order to maximize pt's functional independence and safety w/ mobility   Pt continues to be functioning below baseline level, and remains limited 2* factors listed above  PT will continue to see pt while here in order to address the deficits listed above and provide interventions consistent w/ POC in effort to achieve STGs  Barriers to Discharge Decreased caregiver support; Inaccessible home environment   Goals   Patient Goals to continue with therapy at an acute rehab   STG Expiration Date 11/18/19   PT Treatment Day 1   Plan   Treatment/Interventions Functional transfer training;LE strengthening/ROM; Elevations; Therapeutic exercise; Endurance training;Patient/family training;Equipment eval/education; Bed mobility;Gait training;Spoke to nursing;OT   Progress Slow progress, decreased activity tolerance   PT Frequency 5x/wk; Other (Comment)  (+1x/weekend)   Recommendation   Recommendation Short-term skilled PT;Rehab consult   Equipment Recommended Walker; Other (Comment)  (BSC, shower chair)   PT - OK to Discharge Yes  (when medically cleared; if to STR)       Arielle Gonzales, PT, DPT    Time of PT treatment session: 13:25-14:04  39 minutes

## 2019-11-13 ENCOUNTER — TELEPHONE (OUTPATIENT)
Dept: OBGYN CLINIC | Facility: CLINIC | Age: 59
End: 2019-11-13

## 2019-11-19 ENCOUNTER — OFFICE VISIT (OUTPATIENT)
Dept: OBGYN CLINIC | Facility: CLINIC | Age: 59
End: 2019-11-19

## 2019-11-19 ENCOUNTER — APPOINTMENT (OUTPATIENT)
Dept: RADIOLOGY | Facility: CLINIC | Age: 59
End: 2019-11-19
Payer: COMMERCIAL

## 2019-11-19 VITALS — DIASTOLIC BLOOD PRESSURE: 71 MMHG | SYSTOLIC BLOOD PRESSURE: 105 MMHG | HEART RATE: 80 BPM

## 2019-11-19 DIAGNOSIS — Z98.890 S/P ORIF (OPEN REDUCTION INTERNAL FIXATION) FRACTURE: Primary | ICD-10-CM

## 2019-11-19 DIAGNOSIS — Z87.81 S/P ORIF (OPEN REDUCTION INTERNAL FIXATION) FRACTURE: Primary | ICD-10-CM

## 2019-11-19 DIAGNOSIS — Z98.890 S/P ORIF (OPEN REDUCTION INTERNAL FIXATION) FRACTURE: ICD-10-CM

## 2019-11-19 DIAGNOSIS — Z87.81 S/P ORIF (OPEN REDUCTION INTERNAL FIXATION) FRACTURE: ICD-10-CM

## 2019-11-19 PROCEDURE — 99024 POSTOP FOLLOW-UP VISIT: CPT | Performed by: ORTHOPAEDIC SURGERY

## 2019-11-19 PROCEDURE — 73560 X-RAY EXAM OF KNEE 1 OR 2: CPT

## 2019-11-19 RX ORDER — SODIUM PHOSPHATE, DIBASIC AND SODIUM PHOSPHATE, MONOBASIC 7; 19 G/133ML; G/133ML
1 ENEMA RECTAL
COMMUNITY
End: 2020-02-12

## 2019-11-19 RX ORDER — FLUTICASONE FUROATE AND VILANTEROL 100; 25 UG/1; UG/1
1 POWDER RESPIRATORY (INHALATION) DAILY
COMMUNITY
End: 2020-12-15 | Stop reason: SDUPTHER

## 2019-11-19 RX ORDER — BISACODYL 10 MG
10 SUPPOSITORY, RECTAL RECTAL DAILY
COMMUNITY
End: 2020-02-12

## 2019-11-19 NOTE — PROGRESS NOTES
CHIEF COMPLAINT:   Chief Complaint   Patient presents with    Left Knee - Post-op         PROCEDURE: S/P left knee lateral tibial plateau ORIF and bone graft 11/7/2019      SUBJECTIVE: Julian Ramsay is a 60 y o  female is here for follow up  Patient has been nonweightbearing  She is currently in a rehab facility  Pain well controlled  Denies any numbness tingling lower extremity  Patient currently has been using a knee immobilizer      ROS:  Review of systems form reviewed November 19, 2019  General: no fever, no chills  Respiratory:  No coughing, shortness of breath or wheezing  Cardiovascular:  No chest pain, no palpitations  Neurological:  No headaches, no confusion  Review of all other systems is negative    MEDS:   Current Outpatient Medications   Medication Sig Dispense Refill    acetaminophen (TYLENOL) 325 mg tablet Take 3 tablets (975 mg total) by mouth every 8 (eight) hours 30 tablet 0    albuterol (PROAIR HFA) 90 mcg/act inhaler ProAir HFA 90 mcg/actuation aerosol inhaler      amLODIPine-atorvastatin (CADUET) 5-20 MG per tablet amlodipine 5 mg-atorvastatin 20 mg tablet      aspirin 325 mg tablet Take 1 tablet (325 mg total) by mouth daily 30 tablet 0    bisacodyl (DULCOLAX) 10 mg suppository Insert 10 mg into the rectum daily      docusate sodium (COLACE) 100 mg capsule Take 1 capsule (100 mg total) by mouth 2 (two) times a day as needed for constipation 10 capsule 0    fluticasone-vilanterol (BREO ELLIPTA) 100-25 mcg/inh inhaler Inhale 1 puff daily Rinse mouth after use        gabapentin (NEURONTIN) 100 mg capsule Take 1 capsule (100 mg total) by mouth every 8 (eight) hours 90 capsule 0    Icosapent Ethyl (VASCEPA) 1 g CAPS Vascepa 1 gram capsule      magnesium hydroxide (MILK OF MAGNESIA) 400 mg/5 mL oral suspension Take 30 mL by mouth daily as needed for constipation      potassium chloride (K-DUR,KLOR-CON) 20 mEq tablet Take 1 tablet (20 mEq total) by mouth daily 30 tablet 0    sodium phosphate-biphosphate (FLEET) 7-19 g 118 mL enema Insert 1 enema into the rectum      triamterene-hydrochlorothiazide (DYAZIDE) 37 5-25 mg per capsule Take 37 5 capsules by mouth daily      montelukast (SINGULAIR) 10 mg tablet Take 10 mg by mouth daily at bedtime      oxyCODONE-acetaminophen (PERCOCET) 5-325 mg per tablet Take 1 tablet by mouth every 4 (four) hours as needed for moderate pain       No current facility-administered medications for this visit  ALLERGIES: Patient has no known allergies  Vitals:    11/19/19 1046   BP: 105/71   Pulse: 80       PHYSICAL EXAM:    General Appearance:  Alert, cooperative, no distress, appears stated age; nonweightbearing left lower extremity   Lungs:   respirations unlabored   Chest Wall:  No tenderness or deformity   Heart:  Normal Heart rate noted   Extremities: Extremities normal, atraumatic, no cyanosis or edema   Pulses: 2+ and symmetric   Neurologic: Normal     ORTHO EXAM:  Examination left knee shows incisions clean dry and intact with sutures which were removed today  No active drainage, no erythema no signs of infection    Sensation is intact light touch left lower extremity  Range of motion and strength both deferred  Patient has full range of motion of the left ankle and foot      IMAGING:  X-rays November 19, 2019 left knee shows hardware in acceptable position fracture well reduced    ASSESSMENT/PLAN:   S/p 2 weeks above procedure  Jacquelyn was seen today for post-op  Diagnoses and all orders for this visit:    S/P ORIF (open reduction internal fixation) fracture  -     Cancel: XR knee 3 vw left non injury; Future        There are no Patient Instructions on file for this visit  DISCUSSION SUMMARY:  Patient is S/P 2 weeks ORIF left lower lateral tibial plateau and bone chips  Sutures removed today with no complications noted Steri-Strips were applied  New sterile dressing was placed    Patient will discontinue use of the immobilizer  She was fitted with a T ROM brace locked in full extension  Patient will remain nonweightbearing for total of eight weeks  Follow up in two weeks for re-evaluation    X-rays left knee upon arrival   At this point will discuss possible physical therapy allowing range of motion    Scribe Attestation    I,:   Jose L Mccartney PA-C am acting as a scribe while in the presence of the attending physician :        I,:   Rickey Álvarez MD personally performed the services described in this documentation    as scribed in my presence :

## 2019-12-04 ENCOUNTER — OFFICE VISIT (OUTPATIENT)
Dept: OBGYN CLINIC | Facility: CLINIC | Age: 59
End: 2019-12-04

## 2019-12-04 ENCOUNTER — APPOINTMENT (OUTPATIENT)
Dept: RADIOLOGY | Facility: CLINIC | Age: 59
End: 2019-12-04
Payer: COMMERCIAL

## 2019-12-04 VITALS — SYSTOLIC BLOOD PRESSURE: 115 MMHG | DIASTOLIC BLOOD PRESSURE: 77 MMHG | HEART RATE: 81 BPM

## 2019-12-04 DIAGNOSIS — Z87.81 S/P ORIF (OPEN REDUCTION INTERNAL FIXATION) FRACTURE: Primary | ICD-10-CM

## 2019-12-04 DIAGNOSIS — Z87.81 S/P ORIF (OPEN REDUCTION INTERNAL FIXATION) FRACTURE: ICD-10-CM

## 2019-12-04 DIAGNOSIS — Z98.890 S/P ORIF (OPEN REDUCTION INTERNAL FIXATION) FRACTURE: Primary | ICD-10-CM

## 2019-12-04 DIAGNOSIS — Z98.890 S/P ORIF (OPEN REDUCTION INTERNAL FIXATION) FRACTURE: ICD-10-CM

## 2019-12-04 PROCEDURE — 73560 X-RAY EXAM OF KNEE 1 OR 2: CPT

## 2019-12-04 PROCEDURE — 99024 POSTOP FOLLOW-UP VISIT: CPT | Performed by: ORTHOPAEDIC SURGERY

## 2019-12-04 RX ORDER — OXYCODONE HYDROCHLORIDE AND ACETAMINOPHEN 5; 325 MG/1; MG/1
1 TABLET ORAL EVERY 4 HOURS PRN
COMMUNITY
End: 2020-02-12

## 2019-12-04 RX ORDER — MONTELUKAST SODIUM 10 MG/1
10 TABLET ORAL
COMMUNITY
End: 2022-03-14 | Stop reason: SDUPTHER

## 2019-12-04 NOTE — PROGRESS NOTES
CHIEF COMPLAINT:   Chief Complaint   Patient presents with    Left Knee - Post-op         PROCEDURE: S/P left knee lateral tibial plateau ORIF and bone graft 11/7/2019      SUBJECTIVE:  Patient still residing at [facility] rehab  She has been nonweightbearing and has been in a T ROM brace locked in full extension  Pain has been well controlled  Limited use of Percocet  Complains of some pain in the ankle at times and some mild pain over the incision  She denies any numbness tingling lower extremity  ROS:  Review of systems form reviewed December 4, 2019  General: no fever, no chills  Respiratory:  No coughing, shortness of breath or wheezing  Cardiovascular:  No chest pain, no palpitations  Neurological:  No headaches, no confusion  Review of all other systems is negative    MEDS:   Current Outpatient Medications   Medication Sig Dispense Refill    acetaminophen (TYLENOL) 325 mg tablet Take 3 tablets (975 mg total) by mouth every 8 (eight) hours 30 tablet 0    albuterol (PROAIR HFA) 90 mcg/act inhaler ProAir HFA 90 mcg/actuation aerosol inhaler      amLODIPine-atorvastatin (CADUET) 5-20 MG per tablet amlodipine 5 mg-atorvastatin 20 mg tablet      aspirin 325 mg tablet Take 1 tablet (325 mg total) by mouth daily 30 tablet 0    bisacodyl (DULCOLAX) 10 mg suppository Insert 10 mg into the rectum daily      docusate sodium (COLACE) 100 mg capsule Take 1 capsule (100 mg total) by mouth 2 (two) times a day as needed for constipation 10 capsule 0    fluticasone-vilanterol (BREO ELLIPTA) 100-25 mcg/inh inhaler Inhale 1 puff daily Rinse mouth after use        gabapentin (NEURONTIN) 100 mg capsule Take 1 capsule (100 mg total) by mouth every 8 (eight) hours 90 capsule 0    Icosapent Ethyl (VASCEPA) 1 g CAPS Vascepa 1 gram capsule      magnesium hydroxide (MILK OF MAGNESIA) 400 mg/5 mL oral suspension Take 30 mL by mouth daily as needed for constipation      montelukast (SINGULAIR) 10 mg tablet Take 10 mg by mouth daily at bedtime      oxyCODONE-acetaminophen (PERCOCET) 5-325 mg per tablet Take 1 tablet by mouth every 4 (four) hours as needed for moderate pain      potassium chloride (K-DUR,KLOR-CON) 20 mEq tablet Take 1 tablet (20 mEq total) by mouth daily 30 tablet 0    sodium phosphate-biphosphate (FLEET) 7-19 g 118 mL enema Insert 1 enema into the rectum      triamterene-hydrochlorothiazide (DYAZIDE) 37 5-25 mg per capsule Take 37 5 capsules by mouth daily       No current facility-administered medications for this visit  ALLERGIES: Patient has no known allergies  Vitals:    12/04/19 1049   BP: 115/77   Pulse: 81       PHYSICAL EXAM:    General Appearance:  Alert, cooperative, no distress, appears stated age; nonweightbearing left lower extremity   Lungs:   respirations unlabored   Chest Wall:  No tenderness or deformity   Heart:  Normal Heart rate noted   Extremities: Extremities normal, atraumatic, no cyanosis or edema   Pulses: 2+ and symmetric   Neurologic: Normal     ORTHO EXAM:  Left knee exam with well-healed incisions  No erythema, no active drainage no signs of infection  Range of motion and strength deferred  Sensation is intact to light touch  Knee stable to varus-valgus stress    IMAGING:  X-rays December 4, 2019 left knee shows hardware in acceptable position fracture fragments reduced and buttressed with bone graft and Norian    ASSESSMENT/PLAN:   S/p 4 weeks above procedure  Jacquelyn was seen today for post-op  Diagnoses and all orders for this visit:    S/P ORIF (open reduction internal fixation) fracture  -     Cancel: XR knee 3 vw left non injury; Future        There are no Patient Instructions on file for this visit  DISCUSSION SUMMARY:  Patient is S/P 4 weeks ORIF left lower lateral tibial plateau and bone chips  Patient will be nonweightbearing for total 10 weeks  Physical therapy can now start gentle range of motion from 0-70 degrees only    T ROM brace to be left in place at all times except for therapy and bathing    Will follow up with us in two weeks another x-ray of left knee upon arrival     Scribe Attestation    I,:   Jose L Mccartney PA-C am acting as a scribe while in the presence of the attending physician :        I,:   Rasheeda Hernandez MD personally performed the services described in this documentation    as scribed in my presence :

## 2019-12-04 NOTE — OP NOTE
OPERATIVE REPORT    Patient Name: Leoncio Martinez    :  1960  MRN: [id number]  Pt Location: [facility] OR ROOM 03    Surgery Date:   2019    Surgeon(s) and Role:   Donell Cuello MD - Primary   Berny Reece PA-C - Assisting    Preop Diagnosis:  Depressed Comminuted Lateral Tibial Plateau Fracture, Left Knee    Post-Op Diagnosis:  Depressed Comminuted Lateral Tibial Plateau Fracture, Left Knee    Procedure(s) (LRB):  OPEN REDUCTION W/ INTERNAL FIXATION (ORIF) LATERAL TIBIAL PLATEAU (Left) WITH ALLOGRAFT AND CALCIUM PHOSPHATE AUGMENTATION    Specimen(s):  None    Estimated Blood Loss:   150 mL    Drains:  None    Implants:   Implant Name Type Inv   Item Serial No   Lot No  LRB No  Used   [id number]  GRAFT BONE CANCELLOUS CHIPS 30ML [id number] MUSCULOSKELETAL TRANSPLAN  Left 1   NORIAN DRILLABLE INJECT 5ML STRL - [id number]  NORIAN DRILLABLE INJECT 5ML STRL  Synthes [id number] Left 1   SCREW CRTX 3 5 X 38MM SLF TAP SS - [id number] Screw SCREW CRTX 3 5 X 38MM SLF TAP SS  Synthes  Left 1   SCREW CRTX 3 5 X 45MM SLF TAP - [id number] Screw SCREW CRTX 3 5 X 45MM SLF TAP  Synthes  Left 1   SCREW CRTX 3 5 X 50MM SLF TAP - [id number] Screw SCREW CRTX 3 5 X 50MM SLF TAP  Synthes  Left 1   PLATE VA-LCP 2 7ET PROX TIB SM BEND 4HL/LT/87MM - [id number] Plate PLATE VA-LCP 7 9LX PROX TIB SM BEND 4HL/LT/87MM  Synthes  Left 1   SCREW LCK VAUGHN 3 7 X 56MM - [id number] Screw SCREW LCK VAUGHN 3 7 X 56MM  Synthes  Left 1   SCREW LCK VAUGHN 3 7 X 36MM - [id number] Screw SCREW LCK VAUGHN 3 7 X 36MM  Synthes  Left 1   SCREW LCK VAUGHN 3 7 X 70MM - [id number] Screw SCREW LCK VAUGHN 3 7 X 70MM  Synthes  Left 1   SCREW LCK VAUGHN 3 7 X 75MM - [id number] Screw SCREW LCK VAUGHN 3 7 X 75MM  Synthes  Left 1   SCREW LCK VAUGHN 3 7 X 80MM - [id number] Screw SCREW LCK VAUGHN 3 7 X 80MM  Synthes  Left 1       Anesthesia Type:   General    Operative Indications:  Depressed comminuted primarily lateral left tibial plateau fracture  Risks and benefits of open reduction internal fixation and augmenting with allograft cancellous bone chips and Norian bone filler discussed in detail with patient  Patient understood risks and benefits and wished to proceed  All questions answered  Operative Findings:  Severe comminution of lateral plateau left knee  Comminuted Fracture extending to Tibial spine  Lateral meniscus displaced into fracture   Depressed fragments elevated to joint level  Good fixation    Complications:   None    Procedure and Technique:  Patient had the left knee marked in the preoperative area  Risks and benefits of procedure reviewed and patient wished proceed  All questions answered  Patient received 2 g prophylactic IV Ancef  She was taken to the operating room  Identified  Placed on the OR table supine position  General anesthesia administered  Bump placed under the left buttock  Tourniquet placed on the left thigh  Left thigh knee leg ankle and foot prepped and draped in the usual sterile fashion  Leg elevated and tourniquet elevated to 300 lb pressure  Knee draped over a triangle  This allowed for gravity traction  It also allowed for easy fluoro lateral views  Anterior lateral approach to the lateral tibial plateau performed  Lazy-S incision utilized starting distally lateral to the tibial tubercle and patella tendon gently curved posteriorly over Gerdy's tubercle and then curved again crossing the joint line and 90°  Dissection carried down through deep cural fascia and iliotibial band  Full-thickness flaps developed  Anterior tibialis fascia incised inferior to the joint and lateral to the patella tendon and anterior tibialis elevated  Sub meniscal joint capsule incision made  Meniscal sutures placed through meniscus and coronary ligament  Meniscus elevated out of the depressed joint surface  Retraction of the meniscus afforded visualization of the defect    Part of the infrapatellar fat pad excised to increase visualization  Next an anterior medial bone window created through a small 1 cm incision to allow bone tamp access from below the depressed fragments  Bone tamps were then utilized to elevate the depressed lateral plateau fragments up to the level of the joint surface  Unfortunately there were multiple small cortical fragments  There elevated on last from below  A dental pick was utilized through the capsular incision to help align the small fragments  We could also manually palpate the fragments at the level the joint surface  Then a low-profile contoured variable angle Synthes 4 hole periarticular proximal tibial plate was placed along the lateral aspect of the proximal tibia proximally was positioned just below the joint line  A K-wire was utilized to temporarily fix the plate there  Similarly a K-wire was used distally along the tibial shaft  Fluoro views confirmed good position of the plate  Then one 3 7 compression screw was placed proximally and one 3 5 compression screw placed distally  These screws lagged the plate down to bone and afforded compression to the lateral plateau  Then 3 more 3 7mm locking screws were placed as rafting screws proximally  And three 3 5 locking screws were placed distally  Next the metaphyseal void was filled with a combination of crushed cancellous allograft and Norian bone filler  Fluoro views and visualization through the capsulotomy confirmed acceptable reduction and fixation of the fracture  The meniscal and capsular sutures were then tied to both a flap of inferior capsule and to the plate  The wound was copiously irrigated  A subfascial drain was placed  Tourniquet was let down with Bovie  The anterior tibialis fascia was closed with 0 Vicryl  Iliotibial band in sub crow fascia 0 Vicryl  Subcutaneous tissue closed with 2 O Vicryl and skin closed with nylon stitches  Small anterior medial incision closed with 2 O Vicryl and nylon stitches    Bulky Yates dressing applied  Postoperative brace  Patient tolerated the procedure well  No complications  She went to recovery in stable condition  [employer] PAUL Mccartney assisted throughout the case with exposure and fracture reduction  I was present for the entire procedure    Patient Disposition:  PACU     SIGNATURE: Lloyd Camejo MD  DATE: December 4, 2019  TIME: 10:59 AM      Portions of the record may have been created with voice recognition software   Occasional wrong word or "sound a like" substitutions may have occurred due to the inherent limitations of voice recognition software   Read the chart carefully and recognize, using context, where substitutions have occurred

## 2019-12-18 ENCOUNTER — OFFICE VISIT (OUTPATIENT)
Dept: OBGYN CLINIC | Facility: CLINIC | Age: 59
End: 2019-12-18

## 2019-12-18 ENCOUNTER — APPOINTMENT (OUTPATIENT)
Dept: RADIOLOGY | Facility: CLINIC | Age: 59
End: 2019-12-18
Payer: COMMERCIAL

## 2019-12-18 VITALS — SYSTOLIC BLOOD PRESSURE: 118 MMHG | DIASTOLIC BLOOD PRESSURE: 80 MMHG | HEART RATE: 87 BPM

## 2019-12-18 DIAGNOSIS — Z87.81 S/P ORIF (OPEN REDUCTION INTERNAL FIXATION) FRACTURE: Primary | ICD-10-CM

## 2019-12-18 DIAGNOSIS — Z98.890 S/P ORIF (OPEN REDUCTION INTERNAL FIXATION) FRACTURE: ICD-10-CM

## 2019-12-18 DIAGNOSIS — Z98.890 S/P ORIF (OPEN REDUCTION INTERNAL FIXATION) FRACTURE: Primary | ICD-10-CM

## 2019-12-18 DIAGNOSIS — Z87.81 S/P ORIF (OPEN REDUCTION INTERNAL FIXATION) FRACTURE: ICD-10-CM

## 2019-12-18 PROCEDURE — 73562 X-RAY EXAM OF KNEE 3: CPT

## 2019-12-18 PROCEDURE — 99024 POSTOP FOLLOW-UP VISIT: CPT | Performed by: PHYSICIAN ASSISTANT

## 2019-12-18 NOTE — PROGRESS NOTES
CHIEF COMPLAINT:   Chief Complaint   Patient presents with    Left Knee - Post-op         PROCEDURE: S/P left knee lateral tibial plateau ORIF and bone graft 11/7/2019      SUBJECTIVE:  Patient still residing at [facility]  Patient has been using the brace at all times  Has been actively participating in physical therapy  Pain has been well controlled  Patient in T ROM brace locked in full extension and has been nonweightbearing  Denies numbness tingling lower extremity    ROS:  Review of systems form reviewed December 18, 2019  General: no fever, no chills  Respiratory:  No coughing, shortness of breath or wheezing  Cardiovascular:  No chest pain, no palpitations  Neurological:  No headaches, no confusion  Review of all other systems is negative    MEDS:   Current Outpatient Medications   Medication Sig Dispense Refill    acetaminophen (TYLENOL) 325 mg tablet Take 3 tablets (975 mg total) by mouth every 8 (eight) hours 30 tablet 0    albuterol (PROAIR HFA) 90 mcg/act inhaler ProAir HFA 90 mcg/actuation aerosol inhaler      amLODIPine-atorvastatin (CADUET) 5-20 MG per tablet amlodipine 5 mg-atorvastatin 20 mg tablet      aspirin 325 mg tablet Take 1 tablet (325 mg total) by mouth daily 30 tablet 0    bisacodyl (DULCOLAX) 10 mg suppository Insert 10 mg into the rectum daily      docusate sodium (COLACE) 100 mg capsule Take 1 capsule (100 mg total) by mouth 2 (two) times a day as needed for constipation 10 capsule 0    fluticasone-vilanterol (BREO ELLIPTA) 100-25 mcg/inh inhaler Inhale 1 puff daily Rinse mouth after use        gabapentin (NEURONTIN) 100 mg capsule Take 1 capsule (100 mg total) by mouth every 8 (eight) hours 90 capsule 0    Icosapent Ethyl (VASCEPA) 1 g CAPS Vascepa 1 gram capsule      magnesium hydroxide (MILK OF MAGNESIA) 400 mg/5 mL oral suspension Take 30 mL by mouth daily as needed for constipation      montelukast (SINGULAIR) 10 mg tablet Take 10 mg by mouth daily at bedtime      oxyCODONE-acetaminophen (PERCOCET) 5-325 mg per tablet Take 1 tablet by mouth every 4 (four) hours as needed for moderate pain      potassium chloride (K-DUR,KLOR-CON) 20 mEq tablet Take 1 tablet (20 mEq total) by mouth daily 30 tablet 0    sodium phosphate-biphosphate (FLEET) 7-19 g 118 mL enema Insert 1 enema into the rectum      triamterene-hydrochlorothiazide (DYAZIDE) 37 5-25 mg per capsule Take 37 5 capsules by mouth daily       No current facility-administered medications for this visit  ALLERGIES: Patient has no known allergies  Vitals:    12/18/19 1319   BP: 118/80   Pulse: 87       PHYSICAL EXAM:    General Appearance:  Alert, cooperative, no distress, appears stated age; nonweightbearing left lower extremity   Lungs:   respirations unlabored   Chest Wall:  No tenderness or deformity   Heart:  Normal Heart rate noted   Extremities: Extremities normal, atraumatic, no cyanosis or edema   Pulses: 2+ and symmetric   Neurologic: Normal     ORTHO EXAM:  Examination left knee shows well-healed incision  No erythema, no ecchymosis no signs of infection  Range of motion and strength of the knee both deferred secondary to recent surgery  Sensation is intact light touch left lower extremity  Calf soft and nontender    IMAGING:  X-rays December 18, 2019 left knee shows stable alignment of plate and screws and bone graft lateral tibial plateau    ASSESSMENT/PLAN:   S/p 6 weeks above procedure  Jacquelyn was seen today for post-op  Diagnoses and all orders for this visit:    S/P ORIF (open reduction internal fixation) fracture  -     XR knee 3 vw left non injury; Future        There are no Patient Instructions on file for this visit  DISCUSSION SUMMARY:  Patient is S/P 6 weeks ORIF left lower lateral tibial plateau and bone chips  Patient doing well  He will continue being nonweightbearing    Total time for nonweightbearing will be 10 weeks at least   T ROM to be left in place at all times except for therapy and bathing  Physical therapy will be allowed to remove brace for gentle range of motion 0-90 degrees only    Follow-up in four weeks and have x-rays of left knee upon arrival   Can discuss possible weight-bearing status then

## 2019-12-26 ENCOUNTER — TELEPHONE (OUTPATIENT)
Dept: OBGYN CLINIC | Facility: HOSPITAL | Age: 59
End: 2019-12-26

## 2019-12-26 NOTE — TELEPHONE ENCOUNTER
Caller: Renato Varela [facility]   Call back number: [phone] ext [phone]  Patient's doctor: Dr Olson [facility] can not see the patient until she has a PCP  They are asking if until she gets a PCP if you would sign any orders they may have so she can be seen   Please advise

## 2019-12-27 NOTE — TELEPHONE ENCOUNTER
Debbie Madrid from [facility] called again    Patient is being d/c today from their facility    [facility] needs to confirm that Dr Nicole Galarza is willing to sign off on home health services once the patient is home     Octavia#   [phone]

## 2019-12-27 NOTE — TELEPHONE ENCOUNTER
Please call Gia Sharma from the [facility] care center  Letter know we will sign off on any orthopedic related home health services  Any medical issues will need to be addressed by her PCP    Clines Cornersfernando

## 2020-01-15 ENCOUNTER — OFFICE VISIT (OUTPATIENT)
Dept: OBGYN CLINIC | Facility: CLINIC | Age: 60
End: 2020-01-15

## 2020-01-15 ENCOUNTER — APPOINTMENT (OUTPATIENT)
Dept: RADIOLOGY | Facility: CLINIC | Age: 60
End: 2020-01-15
Payer: MEDICARE

## 2020-01-15 VITALS — HEART RATE: 83 BPM | SYSTOLIC BLOOD PRESSURE: 123 MMHG | DIASTOLIC BLOOD PRESSURE: 82 MMHG

## 2020-01-15 DIAGNOSIS — Z98.890 S/P ORIF (OPEN REDUCTION INTERNAL FIXATION) FRACTURE: ICD-10-CM

## 2020-01-15 DIAGNOSIS — Z87.81 S/P ORIF (OPEN REDUCTION INTERNAL FIXATION) FRACTURE: ICD-10-CM

## 2020-01-15 DIAGNOSIS — Z98.890 S/P ORIF (OPEN REDUCTION INTERNAL FIXATION) FRACTURE: Primary | ICD-10-CM

## 2020-01-15 DIAGNOSIS — Z87.81 S/P ORIF (OPEN REDUCTION INTERNAL FIXATION) FRACTURE: Primary | ICD-10-CM

## 2020-01-15 PROCEDURE — 99024 POSTOP FOLLOW-UP VISIT: CPT | Performed by: ORTHOPAEDIC SURGERY

## 2020-01-15 PROCEDURE — 73560 X-RAY EXAM OF KNEE 1 OR 2: CPT

## 2020-01-15 NOTE — PROGRESS NOTES
PROCEDURE: S/P left knee lateral tibial plateau ORIF and bone graft 11/7/2019    SUBJECTIVE  Patient is a 24-year-old man here 10 weeks status post left lateral tibial plateau ORIF with use of bone graft  Patient was last seen in the office 12/18/2019 where he was advised to continue T ROM at all times, nonweightbearing left lower extremity and physical therapy could begin gentle range of motion from 0-90 degrees  Today patient states that she has continue nonweightbearing left lower extremity  Patient states she has been ambulating with use of a wheelchair  Patient states that she has been removing the T ROM at times of wrist   Patient has no complaints of pain at this time  Patient states that she has return to her own home        ROS:   General: No fever, no chills, no weight loss, no weight gain  HEENT:  No loss of hearing, no nose bleeds, no sore throat  Eyes:  No eye pain, no red eyes, no visual disturbance  Respiratory:  + cough, no shortness of breath, no wheezing  Cardiovascular:  No chest pain, no palpitations, no edema  GI: No abdominal pain, no nausea, no vomiting  Endocrine: No frequent urination, no excessive thirst  Urinary:  No dysuria, no hematuria, no incontinence  Musculoskeletal: see HPI and PE  Skin:  No rash, no wounds  Neurological:  No dizziness, no headache, no numbness  Psychiatric:  No difficulty concentrating, no depression, no suicide thoughts, no anxiety  Review of all other systems is negative    PMH:  Past Medical History:   Diagnosis Date    Cancer ([facility] [address] )     lung    Hyperlipidemia     Hypertension        PSH:  Past Surgical History:   Procedure Laterality Date    HYSTERECTOMY      LUNG LOBECTOMY      ORIF TIBIAL PLATEAU Left 15/1/8250    Procedure: OPEN REDUCTION W/ INTERNAL FIXATION (ORIF) TIBIAL PLATEAU;  Surgeon: Lisette Jaimes MD;  [facility];  Service: Orthopedics    WRIST SURGERY         Medications:  Current Outpatient Medications   Medication Sig Dispense Refill    acetaminophen (TYLENOL) 325 mg tablet Take 3 tablets (975 mg total) by mouth every 8 (eight) hours 30 tablet 0    albuterol (PROAIR HFA) 90 mcg/act inhaler ProAir HFA 90 mcg/actuation aerosol inhaler      amLODIPine-atorvastatin (CADUET) 5-20 MG per tablet amlodipine 5 mg-atorvastatin 20 mg tablet      aspirin 325 mg tablet Take 1 tablet (325 mg total) by mouth daily 30 tablet 0    bisacodyl (DULCOLAX) 10 mg suppository Insert 10 mg into the rectum daily      docusate sodium (COLACE) 100 mg capsule Take 1 capsule (100 mg total) by mouth 2 (two) times a day as needed for constipation 10 capsule 0    fluticasone-vilanterol (BREO ELLIPTA) 100-25 mcg/inh inhaler Inhale 1 puff daily Rinse mouth after use   gabapentin (NEURONTIN) 100 mg capsule Take 1 capsule (100 mg total) by mouth every 8 (eight) hours 90 capsule 0    Icosapent Ethyl (VASCEPA) 1 g CAPS Vascepa 1 gram capsule      magnesium hydroxide (MILK OF MAGNESIA) 400 mg/5 mL oral suspension Take 30 mL by mouth daily as needed for constipation      montelukast (SINGULAIR) 10 mg tablet Take 10 mg by mouth daily at bedtime      oxyCODONE-acetaminophen (PERCOCET) 5-325 mg per tablet Take 1 tablet by mouth every 4 (four) hours as needed for moderate pain      potassium chloride (K-DUR,KLOR-CON) 20 mEq tablet Take 1 tablet (20 mEq total) by mouth daily 30 tablet 0    sodium phosphate-biphosphate (FLEET) 7-19 g 118 mL enema Insert 1 enema into the rectum      triamterene-hydrochlorothiazide (DYAZIDE) 37 5-25 mg per capsule Take 37 5 capsules by mouth daily       No current facility-administered medications for this visit          Allergies:  No Known Allergies    Family History:  Family History   Problem Relation Age of Onset    Diabetes Mother     Diabetes Father     Breast cancer Sister     Diabetes Brother        Social History:  Social History     Occupational History    Not on file   Tobacco Use    Smoking status: Former Smoker     Last attempt to quit: 2009     Years since quittin 0    Smokeless tobacco: Never Used   Substance and Sexual Activity    Alcohol use: Not Currently     Comment: occ    Drug use: Never    Sexual activity: Not on file       Physical Exam:  General :  Alert, cooperative, no distress, appears stated age  Blood pressure 123/82, pulse 83  Head:  Normocephalic, without obvious abnormality, atraumatic   Eyes:  Conjunctiva/corneas clear, EOM's intact,   Ears: Both ears normal appearance, no hearing deficits  Nose: Nares normal, septum midline, no drainage    Neck: Supple,  trachea midline, no adenopathy, no tenderness, no mass   Back:   Symmetric, no curvature, ROM normal, no tenderness   Lungs:   Respirations unlabored   Chest Wall:  No tenderness or deformity   Extremities: Extremities normal, atraumatic, no cyanosis or edema      Pulses: 2+ and symmetric   Skin: Skin color, texture, turgor normal, no rashes or lesions      Neurologic: Normal           Left Knee Exam     Range of Motion   Extension: normal   Flexion: 90     Comments:  Well-healed incision  No pain with motion            Imaging Studies: The following imaging studies were reviewed in office today  My findings are noted  X-rays of the left knee performed today show fixation intact, acceptable alignment of tibial plateau fracture, increased calcification noted at bone graft site      Assessment  Encounter Diagnosis   Name Primary?  S/P ORIF (open reduction internal fixation) fracture Yes         Plan:  X-rays were discussed with patient  Patient was advised that the joint line has been restored although she will have arthritic changes in the future  Patient may be partial weight-bearing with use of a walker  Patient was advised of the for the next 2 weeks she may be touchdown weight-bearing with her left lower extremity with use of walker without wheels    Patient was advised the following 2 weeks she may begin 50% weight-bearing left lower extremity with walker without wheels  Patient was advised to discontinue Ace wrap  Patient was advised to continue T ROM with ambulation  Patient will follow up in the office in 4 weeks  X-rays of the left knee will be performed at follow-up visit      Scribe Attestation    I,:   Darien Herzog am acting as a scribe while in the presence of the attending physician :        I,:   Ashish Roberts MD personally performed the services described in this documentation    as scribed in my presence :

## 2020-01-20 ENCOUNTER — TELEPHONE (OUTPATIENT)
Dept: OBGYN CLINIC | Facility: HOSPITAL | Age: 60
End: 2020-01-20

## 2020-01-20 DIAGNOSIS — Z98.890 S/P ORIF (OPEN REDUCTION INTERNAL FIXATION) FRACTURE: Primary | ICD-10-CM

## 2020-01-20 DIAGNOSIS — Z87.81 S/P ORIF (OPEN REDUCTION INTERNAL FIXATION) FRACTURE: Primary | ICD-10-CM

## 2020-01-20 NOTE — TELEPHONE ENCOUNTER
Patient sees Dr Clarissa Sweeney at [facility] is calling in wanting to know this patients protocals  He is asking if it is ok for the patient to start active and passive stretching  He is asking if someone can please contact him back relating this    Call back# [phone]

## 2020-01-21 NOTE — TELEPHONE ENCOUNTER
Hello,    Please call PT and let them know patient is S/P  ORIF left tibial plateau fracture 68/1/5155  Toe touch weight bearing for 2 weeks LLE  Starting 1/30/2020 patient can advance to 50% WB LLE  Ambulate with TROM brace  May start gentle ROM    I will place pt script    angi

## 2020-02-12 ENCOUNTER — OFFICE VISIT (OUTPATIENT)
Dept: OBGYN CLINIC | Facility: CLINIC | Age: 60
End: 2020-02-12
Payer: MEDICARE

## 2020-02-12 ENCOUNTER — APPOINTMENT (OUTPATIENT)
Dept: RADIOLOGY | Facility: CLINIC | Age: 60
End: 2020-02-12
Payer: MEDICARE

## 2020-02-12 VITALS — RESPIRATION RATE: 20 BRPM | HEART RATE: 90 BPM | DIASTOLIC BLOOD PRESSURE: 81 MMHG | SYSTOLIC BLOOD PRESSURE: 129 MMHG

## 2020-02-12 DIAGNOSIS — Z87.81 S/P ORIF (OPEN REDUCTION INTERNAL FIXATION) FRACTURE: ICD-10-CM

## 2020-02-12 DIAGNOSIS — Z87.81 S/P ORIF (OPEN REDUCTION INTERNAL FIXATION) FRACTURE: Primary | ICD-10-CM

## 2020-02-12 DIAGNOSIS — Z98.890 S/P ORIF (OPEN REDUCTION INTERNAL FIXATION) FRACTURE: ICD-10-CM

## 2020-02-12 DIAGNOSIS — Z98.890 S/P ORIF (OPEN REDUCTION INTERNAL FIXATION) FRACTURE: Primary | ICD-10-CM

## 2020-02-12 PROCEDURE — 99213 OFFICE O/P EST LOW 20 MIN: CPT | Performed by: ORTHOPAEDIC SURGERY

## 2020-02-12 PROCEDURE — 73562 X-RAY EXAM OF KNEE 3: CPT

## 2020-02-12 NOTE — PROGRESS NOTES
CHIEF COMPLAINT:   Chief Complaint   Patient presents with    Left Knee - Follow-up, Swelling         PROCEDURE: S/P left knee lateral tibial plateau ORIF and bone graft 11/7/2019      SUBJECTIVE:  Patient enters for follow-up left knee surgery  Patient has been work with home therapy  She has been partial weight-bearing since last visit  She tells me at times she was able to put full weight on it with no pain or discomfort  Patient states she has no complaints today of pain  Denies any numbness or tingling lower extremity  ROS:  Review of systems form reviewed November 12, 2020  General: no fever, no chills  Respiratory:  No coughing, shortness of breath or wheezing  Cardiovascular:  No chest pain, no palpitations  Neurological:  No headaches, no confusion  Review of all other systems is negative    MEDS:   Current Outpatient Medications   Medication Sig Dispense Refill    albuterol (PROAIR HFA) 90 mcg/act inhaler ProAir HFA 90 mcg/actuation aerosol inhaler      amLODIPine-atorvastatin (CADUET) 5-20 MG per tablet amlodipine 5 mg-atorvastatin 20 mg tablet      fluticasone-vilanterol (BREO ELLIPTA) 100-25 mcg/inh inhaler Inhale 1 puff daily Rinse mouth after use   Icosapent Ethyl (VASCEPA) 1 g CAPS Vascepa 1 gram capsule      montelukast (SINGULAIR) 10 mg tablet Take 10 mg by mouth daily at bedtime       No current facility-administered medications for this visit  ALLERGIES: Pollen extract      Vitals:    02/12/20 1413   BP: 129/81   Pulse: 90   Resp: 20       PHYSICAL EXAM:    General Appearance:  Alert, cooperative, no distress, appears stated age   Lungs:   respirations unlabored   Chest Wall:  No tenderness or deformity   Heart:  Normal Heart rate noted   Extremities: Extremities normal, atraumatic, no cyanosis or edema   Pulses: 2+ and symmetric   Neurologic: Normal     ORTHO EXAM:  Left knee exam with well-healed incisions  No erythema, no signs of infection    Active range of motion 0 to 95° with passive to 105°  Sensation intact light touch left lower extremity  Calf soft and nontender    IMAGING:  X-rays February 12, 2020 left knee shows stable alignment of plate and screws and bone graft lateral tibial plateau with increased calcification at bone graft site    ASSESSMENT/PLAN:   S/p 14 weeks above procedure  Jacquelyn was seen today for follow-up and swelling  Diagnoses and all orders for this visit:    S/P ORIF (open reduction internal fixation) fracture  -     XR knee 3 vw left non injury; Future  -     Ambulatory referral to Physical Therapy; Future        There are no Patient Instructions on file for this visit  DISCUSSION SUMMARY:  Patient is S/P 3 1/2 months ORIF left lower lateral tibial plateau and bone chips  Patient doing excellent and is being discharged from home therapy  She will now start outpatient physical therapy  Prescription will be given to her today to start outpatient PT  Patient is now weight-bearing as tolerated left lower extremity  They will work on range of motion and strengthening    Follow up with us in six weeks for re-evaluation and x-rays of the left knee upon arrival      Scribe Attestation    I,:   Jose L Mccartney PA-C am acting as a scribe while in the presence of the attending physician :        I,:   Fran Madrid MD personally performed the services described in this documentation    as scribed in my presence :

## 2020-02-26 ENCOUNTER — TELEPHONE (OUTPATIENT)
Dept: OBGYN CLINIC | Facility: HOSPITAL | Age: 60
End: 2020-02-26

## 2020-02-26 NOTE — TELEPHONE ENCOUNTER
Patient sees Dr Lenin Eisenberg Early the physical therapist is calling in stating that he is discharging the patient from home health PT  He is just calling in to let the Dr be aware of this information          Call back if needed # [phone]

## 2020-03-16 ENCOUNTER — EVALUATION (OUTPATIENT)
Dept: PHYSICAL THERAPY | Facility: CLINIC | Age: 60
End: 2020-03-16
Payer: MEDICARE

## 2020-03-16 DIAGNOSIS — M25.562 CHRONIC PAIN OF LEFT KNEE: Primary | ICD-10-CM

## 2020-03-16 DIAGNOSIS — G89.29 CHRONIC PAIN OF LEFT KNEE: Primary | ICD-10-CM

## 2020-03-16 PROCEDURE — 97161 PT EVAL LOW COMPLEX 20 MIN: CPT | Performed by: PHYSICAL THERAPIST

## 2020-03-16 PROCEDURE — 97110 THERAPEUTIC EXERCISES: CPT | Performed by: PHYSICAL THERAPIST

## 2020-03-16 NOTE — PROGRESS NOTES
PT Evaluation     Today's date: 3/16/2020  Patient name: Salvador Esparza  : 1960  MRN: [id number]  Referring provider: Osorio Guerrero PA-C  Dx:   Encounter Diagnosis     ICD-10-CM    1  Chronic pain of left knee M25 562     G89 29        Start Time: 1500  Stop Time: 1600  Total time in clinic (min): 60 minutes    Assessment  Assessment details: Patient presents with long leg brace and SPC, FWBing with no restrictions per MD  Patient had home care for tibial plateau fx and ORIF from a fall while running from a [facility]  Knee ROM   Patient had knee pain with extended standing and walking more than 10 minutes  Also Pain with normal ADLs'  Patient can benefit from skilled PT to progress gait to walking without AD, build balance and strength  Understanding of Dx/Px/POC: good   Prognosis: good    Goals  3 weeks  Increase knee ROM by 10 degrees  Decrease pain with standing 10 minutes to minimal  6 weeks  Able to stand and walk 20 minutes without severe pain  Normal knee ROM for stairs  Normal balance for walking without AD in community  Plan  Planned modality interventions: unattended electrical stimulation  Planned therapy interventions: manual therapy, strengthening, stretching, patient education, home exercise program and gait training  Frequency: 3x week  Duration in weeks: 6        Subjective Evaluation    History of Present Illness  Mechanism of injury: [employer] running from dog  Fx tibial plateau  Nov     Had homecare  Pain  Current pain ratin  At worst pain ratin    Patient Goals  Patient goals for therapy: decreased pain, independence with ADLs/IADLs and improved balance          Objective     Active Range of Motion   Left Knee   Flexion: 110 degrees   Extension: 12 degrees     Passive Range of Motion   Left Knee   Flexion: 112 degrees   Extension: 10 degrees     Strength/Myotome Testing     Left Knee   Normal strength             Precautions: none      Manual Exercise Diary  3/16            HEP standing 4-ways 15            bike 5'bcw            TM             foam             Gr  disc             CS/HR 10            Circuit full             Step ups                                                                                                                                                                             Modalities

## 2020-03-16 NOTE — LETTER
2020    America Avendaño PA-C  [address] [facility]  [address] [place] [address] Drive    Patient: Joseph Dudley   YOB: 1960   Date of Visit: 3/16/2020     Encounter Diagnosis     ICD-10-CM    1  Chronic pain of left knee M25 562     G89 29        Dear Dr Sarah Thornton: Thank you for your recent referral of Joseph Dudley  Please review the attached evaluation summary from Bernell's recent visit  Please verify that you agree with the plan of care by signing the attached order  If you have any questions or concerns, please do not hesitate to call  I sincerely appreciate the opportunity to share in the care of one of your patients and hope to have another opportunity to work with you in the near future  Sincerely,    Manisha Pandey, PT      Referring Provider:      I certify that I have read the below Plan of Care and certify the need for these services furnished under this plan of treatment while under my care  America Avendaño PA-C  [address] [facility]  [address]  [facility] [address]  [place]          PT Evaluation     Today's date: 3/16/2020  Patient name: Joseph Dudley  : 1960  MRN: [id number]  Referring provider: Maycol Barahona PA-C  Dx:   Encounter Diagnosis     ICD-10-CM    1  Chronic pain of left knee M25 562     G89 29        Start Time: 1500  Stop Time: 1600  Total time in clinic (min): 60 minutes    Assessment  Assessment details: Patient presents with long leg brace and SPC, FWBing with no restrictions per MD  Patient had home care for tibial plateau fx and ORIF from a fall while running from a [facility]  Knee ROM   Patient had knee pain with extended standing and walking more than 10 minutes  Also Pain with normal ADLs'  Patient can benefit from skilled PT to progress gait to walking without AD, build balance and strength     Understanding of Dx/Px/POC: good   Prognosis: good    Goals  3 weeks  Increase knee ROM by 10 degrees  Decrease pain with standing 10 minutes to minimal  6 weeks  Able to stand and walk 20 minutes without severe pain  Normal knee ROM for stairs  Normal balance for walking without AD in community  Plan  Planned modality interventions: unattended electrical stimulation  Planned therapy interventions: manual therapy, strengthening, stretching, patient education, home exercise program and gait training  Frequency: 3x week  Duration in weeks: 6        Subjective Evaluation    History of Present Illness  Mechanism of injury: [employer] Adhikari running from dog  Fx tibial plateau  Nov     Had homecare  Pain  Current pain ratin  At worst pain ratin    Patient Goals  Patient goals for therapy: decreased pain, independence with ADLs/IADLs and improved balance          Objective     Active Range of Motion   Left Knee   Flexion: 110 degrees   Extension: 12 degrees     Passive Range of Motion   Left Knee   Flexion: 112 degrees   Extension: 10 degrees     Strength/Myotome Testing     Left Knee   Normal strength             Precautions: none      Manual                                                                                   Exercise Diary  3/16            HEP standing 4-ways 15            bike 5'bcw            TM             foam             Gr  disc             CS/HR 10            Circuit full             Step ups                                                                                                                                                                             Modalities

## 2020-03-17 ENCOUNTER — OFFICE VISIT (OUTPATIENT)
Dept: PHYSICAL THERAPY | Facility: CLINIC | Age: 60
End: 2020-03-17
Payer: MEDICARE

## 2020-03-17 DIAGNOSIS — M25.562 CHRONIC PAIN OF LEFT KNEE: Primary | ICD-10-CM

## 2020-03-17 DIAGNOSIS — G89.29 CHRONIC PAIN OF LEFT KNEE: Primary | ICD-10-CM

## 2020-03-17 PROCEDURE — 97110 THERAPEUTIC EXERCISES: CPT

## 2020-03-17 PROCEDURE — 97116 GAIT TRAINING THERAPY: CPT

## 2020-03-17 NOTE — PROGRESS NOTES
Daily Note     Today's date: 3/17/2020  Patient name: Jenny Bahena  : 1960  MRN: [id number]  Referring provider: Marchelle Kehr, PA-C  Dx:   Encounter Diagnosis     ICD-10-CM    1  Chronic pain of left knee M25 562     G89 29                   Subjective: Pt reports L knee feeling "good" today  Objective: See treatment diary below      Assessment: Tolerated therapy session well having no increases in pain  Unable to perform leg extension machine secondary to lack of flexion ROM  Cueing to increase heel strike during gait  Pt educated on benefits of CP and parameters to continue using correctly at home in elevated position  Plan: Continue with current POC to address pt deficits        Precautions: none      Manual                                                                                   Exercise Diary  3/16 3/17           HEP standing 4-ways 15            bike 5'bcw 10' bcw           TM             foam  3' EC           Gr  disc             CS/HR 10 10"x10           Circuit full  2x15 LE, no leg ext           Step ups  4" 10x 6" 10x            Gait Training gym  8' no AD                                                                                                                                                              Modalities  3/17            CP 10'

## 2020-03-19 ENCOUNTER — OFFICE VISIT (OUTPATIENT)
Dept: PHYSICAL THERAPY | Facility: CLINIC | Age: 60
End: 2020-03-19
Payer: MEDICARE

## 2020-03-19 DIAGNOSIS — M25.562 CHRONIC PAIN OF LEFT KNEE: Primary | ICD-10-CM

## 2020-03-19 DIAGNOSIS — G89.29 CHRONIC PAIN OF LEFT KNEE: Primary | ICD-10-CM

## 2020-03-19 PROCEDURE — 97116 GAIT TRAINING THERAPY: CPT

## 2020-03-19 PROCEDURE — 97110 THERAPEUTIC EXERCISES: CPT

## 2020-03-19 NOTE — PROGRESS NOTES
Daily Note     Today's date: 3/19/2020  Patient name: Tamiko Corado  : 1960  MRN: [id number]  Referring provider: Loura Paget, PA-C  Dx:   Encounter Diagnosis     ICD-10-CM    1  Chronic pain of left knee M25 562     G89 29                   Subjective: Pt reports no pain in L knee  She presents with BL LE swelling today  She has not been taking her inhaler medication as indicated  Objective: See treatment diary below      Assessment: Primary PT took pt BP and was elevated but not significantly  Pt was educated to see doctor tomorrow if swelling in [address] persists  Gait is normalizing without SPC, cueing to increase heel strike  Verbal cueing for correct exercise technique  No SOB during session  Plan: Continue with current POC to address pt deficits        Precautions: none      Manual                                                                                   Exercise Diary  3/16 3/17 3/19          HEP standing 4-ways 15            bike 5'bcw 10' bcw 10' bcw          TM             foam  3' EC           Gr  disc             CS/HR 10 10"x10 10"x10          Circuit full  2x15 LE, no leg ext 2x15 LE lvl1, no leg ext           Step ups  4" 10x 6" 10x  6" 2x10          Gait Training gym  8' no AD 8' no ADw/cue                                                                                                                                                             Modalities  3/17 3/19           CP 10' 10'

## 2020-03-23 ENCOUNTER — APPOINTMENT (OUTPATIENT)
Dept: PHYSICAL THERAPY | Facility: CLINIC | Age: 60
End: 2020-03-23
Payer: MEDICARE

## 2020-03-24 ENCOUNTER — OFFICE VISIT (OUTPATIENT)
Dept: PHYSICAL THERAPY | Facility: CLINIC | Age: 60
End: 2020-03-24
Payer: MEDICARE

## 2020-03-24 DIAGNOSIS — G89.29 CHRONIC PAIN OF LEFT KNEE: Primary | ICD-10-CM

## 2020-03-24 DIAGNOSIS — M25.562 CHRONIC PAIN OF LEFT KNEE: Primary | ICD-10-CM

## 2020-03-24 PROCEDURE — 97116 GAIT TRAINING THERAPY: CPT

## 2020-03-24 PROCEDURE — 97110 THERAPEUTIC EXERCISES: CPT

## 2020-03-24 PROCEDURE — 97112 NEUROMUSCULAR REEDUCATION: CPT

## 2020-03-24 NOTE — PROGRESS NOTES
Daily Note     Today's date: 3/24/2020  Patient name: Yoon Cueva  : 1960  MRN: [id number]  Referring provider: Octavia Vega PA-C  Dx:   Encounter Diagnosis     ICD-10-CM    1  Chronic pain of left knee M25 562     G89 29                   Subjective: Pt reports no pain in L knee  There is no longer swelling in BL LE  Objective: See treatment diary below      Assessment: Pt tolerated therapy session well having no increases in pain  Reviewed pt HEP strengthening exercises and stretching; visual and tactile cueing for upright posture and correct exercise technique  Attempted TM but pt did not feel comfortable therefore continued gait training without AD on gym floor; cueing to increase heel strike and arm swing  Pt is challenged by balance on foam with EC  Ended with CP in elevated position to L knee to decrease edema  Plan: Continue with current POC to address pt deficits        Precautions: none      Manual                                                                                   Exercise Diary  3/16 3/17 3/19 3/24         HEP standing 4-ways 15   review 10'         bike 5'bcw 10' bcw 10' bcw 10' full rev         TM    2'          foam  3' EC  5' EC, weight shifts         Gr  disc             CS/HR 10 10"x10 10"x10 10"x10         Circuit full  2x15 LE, no leg ext 2x15 LE lvl1, no leg ext  2x15 LE no leg ext         Step ups  4" 10x 6" 10x  6" 2x10 6" 2x10         Gait Training gym  8' no AD 8' no ADw/cue 8' no AD                                                                                                                                                            Modalities  3/17 3/19 3/24          CP 10' 10' 10'

## 2020-03-26 ENCOUNTER — TELEPHONE (OUTPATIENT)
Dept: OBGYN CLINIC | Facility: CLINIC | Age: 60
End: 2020-03-26

## 2020-03-26 NOTE — TELEPHONE ENCOUNTER
Call to this patient no answer left message to reschedule appointment with Dr Layo Sahu due to Covid-19 guide line for face to face appointments at this time  I asked to please call Back to [phone] to arrange

## 2020-03-30 ENCOUNTER — OFFICE VISIT (OUTPATIENT)
Dept: PHYSICAL THERAPY | Facility: CLINIC | Age: 60
End: 2020-03-30
Payer: MEDICARE

## 2020-03-30 DIAGNOSIS — M25.562 CHRONIC PAIN OF LEFT KNEE: Primary | ICD-10-CM

## 2020-03-30 DIAGNOSIS — G89.29 CHRONIC PAIN OF LEFT KNEE: Primary | ICD-10-CM

## 2020-03-30 PROCEDURE — 97110 THERAPEUTIC EXERCISES: CPT | Performed by: PHYSICAL THERAPIST

## 2020-03-30 PROCEDURE — 97112 NEUROMUSCULAR REEDUCATION: CPT | Performed by: PHYSICAL THERAPIST

## 2020-03-30 PROCEDURE — 97116 GAIT TRAINING THERAPY: CPT | Performed by: PHYSICAL THERAPIST

## 2020-03-30 NOTE — PROGRESS NOTES
Daily Note     Today's date: 3/30/2020  Patient name: Leoncio Martinez  : 1960  MRN: [id number]  Referring provider: Kamilah Giron PA-C  Dx:   Encounter Diagnosis     ICD-10-CM    1  Chronic pain of left knee M25 562     G89 29                   Subjective: Pt reports pain lateral knee "where the screws are"      Objective: See treatment diary below      Assessment: Pt tolerated therapy well  VC's for glute activation on step  PROM to knee with pain lateral knee  Plan: Continue with current POC to address pt deficits        Precautions: none      Manual                                                                                   Exercise Diary  3/16 3/17 3/19 3/24 3/30        HEP standing 4-ways 15   review 10'         bike 5'bcw 10' bcw 10' bcw 10' full rev 15        TM    2'          foam  3' EC  5' EC, weight shifts         Gr  disc             CS/HR 10 10"x10 10"x10 10"x10 10        Circuit full  2x15 LE, no leg ext 2x15 LE lvl1, no leg ext  2x15 LE no leg ext 2x15        Step ups  4" 10x 6" 10x  6" 2x10 6" 2x10 8" 20        Gait Training gym  8' no AD 8' no ADw/cue 8' no AD 8'        PROM     10'                                                                                                                                              Modalities  3/17 3/19 3/24          CP 10' 10' 10'

## 2020-03-31 ENCOUNTER — OFFICE VISIT (OUTPATIENT)
Dept: PHYSICAL THERAPY | Facility: CLINIC | Age: 60
End: 2020-03-31
Payer: MEDICARE

## 2020-03-31 DIAGNOSIS — M25.562 CHRONIC PAIN OF LEFT KNEE: Primary | ICD-10-CM

## 2020-03-31 DIAGNOSIS — G89.29 CHRONIC PAIN OF LEFT KNEE: Primary | ICD-10-CM

## 2020-03-31 PROCEDURE — 97110 THERAPEUTIC EXERCISES: CPT

## 2020-03-31 NOTE — PROGRESS NOTES
Daily Note     Today's date: 3/31/2020  Patient name: Tasneem Choe  : 1960  MRN: [id number]  Referring provider: Yang Hawkins PA-C  Dx:   Encounter Diagnosis     ICD-10-CM    1  Chronic pain of left knee M25 562     G89 29                   Subjective: Pt reports 7/10 pain in L knee with some edema in lateral aspect that started last week  Her doctor cancelled her appointment secondary to COVID-19  Objective: See treatment diary below      Assessment: Modified program secondary to increase in pain but did note a decrease in pain with movement  Pt educated to call doctor to see if she is able to get an appointment sooner  PROM to improve extension  Ended with CP in elevated position to decrease edema and pain  Plan: Continue with current POC to address pt deficits        Precautions: none      Manual                                                                                   Exercise Diary  3/16 3/17 3/19 3/24 3/30 3/31       HEP standing 4-ways 15   review 10'         bike 5'bcw 10' bcw 10' bcw 10' full rev 15 15'       TM    2'          foam  3' EC  5' EC, weight shifts         Gr  disc             CS/HR 10 10"x10 10"x10 10"x10 10 10"x10       Circuit full  2x15 LE, no leg ext 2x15 LE lvl1, no leg ext  2x15 LE no leg ext 2x15 30x LE, no ext       Step ups  4" 10x 6" 10x  6" 2x10 6" 2x10 8" 20 6" 2x10       Gait Training gym  8' no AD 8' no ADw/cue 8' no AD 8'        PROM     10' 10'                                                                                                                                             Modalities  3/17 3/19 3/24 3/31         CP 10' 10' 10' 10'

## 2020-04-02 ENCOUNTER — OFFICE VISIT (OUTPATIENT)
Dept: PHYSICAL THERAPY | Facility: CLINIC | Age: 60
End: 2020-04-02
Payer: MEDICARE

## 2020-04-02 DIAGNOSIS — M25.562 CHRONIC PAIN OF LEFT KNEE: Primary | ICD-10-CM

## 2020-04-02 DIAGNOSIS — G89.29 CHRONIC PAIN OF LEFT KNEE: Primary | ICD-10-CM

## 2020-04-02 PROCEDURE — 97110 THERAPEUTIC EXERCISES: CPT

## 2020-04-02 PROCEDURE — 97112 NEUROMUSCULAR REEDUCATION: CPT

## 2020-04-06 ENCOUNTER — OFFICE VISIT (OUTPATIENT)
Dept: PHYSICAL THERAPY | Facility: CLINIC | Age: 60
End: 2020-04-06
Payer: MEDICARE

## 2020-04-06 DIAGNOSIS — M25.562 CHRONIC PAIN OF LEFT KNEE: Primary | ICD-10-CM

## 2020-04-06 DIAGNOSIS — G89.29 CHRONIC PAIN OF LEFT KNEE: Primary | ICD-10-CM

## 2020-04-06 PROCEDURE — 97116 GAIT TRAINING THERAPY: CPT | Performed by: PHYSICAL THERAPIST

## 2020-04-06 PROCEDURE — 97110 THERAPEUTIC EXERCISES: CPT | Performed by: PHYSICAL THERAPIST

## 2020-04-07 ENCOUNTER — OFFICE VISIT (OUTPATIENT)
Dept: PHYSICAL THERAPY | Facility: CLINIC | Age: 60
End: 2020-04-07
Payer: MEDICARE

## 2020-04-07 DIAGNOSIS — G89.29 CHRONIC PAIN OF LEFT KNEE: Primary | ICD-10-CM

## 2020-04-07 DIAGNOSIS — M25.562 CHRONIC PAIN OF LEFT KNEE: Primary | ICD-10-CM

## 2020-04-07 PROCEDURE — 97530 THERAPEUTIC ACTIVITIES: CPT

## 2020-04-07 PROCEDURE — 97110 THERAPEUTIC EXERCISES: CPT

## 2020-04-09 ENCOUNTER — OFFICE VISIT (OUTPATIENT)
Dept: PHYSICAL THERAPY | Facility: CLINIC | Age: 60
End: 2020-04-09
Payer: MEDICARE

## 2020-04-09 DIAGNOSIS — M25.562 CHRONIC PAIN OF LEFT KNEE: Primary | ICD-10-CM

## 2020-04-09 DIAGNOSIS — G89.29 CHRONIC PAIN OF LEFT KNEE: Primary | ICD-10-CM

## 2020-04-09 PROCEDURE — 97116 GAIT TRAINING THERAPY: CPT

## 2020-04-09 PROCEDURE — 97112 NEUROMUSCULAR REEDUCATION: CPT

## 2020-04-09 PROCEDURE — 97110 THERAPEUTIC EXERCISES: CPT

## 2020-04-13 ENCOUNTER — APPOINTMENT (OUTPATIENT)
Dept: PHYSICAL THERAPY | Facility: CLINIC | Age: 60
End: 2020-04-13
Payer: MEDICARE

## 2020-04-14 ENCOUNTER — OFFICE VISIT (OUTPATIENT)
Dept: PHYSICAL THERAPY | Facility: CLINIC | Age: 60
End: 2020-04-14
Payer: MEDICARE

## 2020-04-14 DIAGNOSIS — M25.562 CHRONIC PAIN OF LEFT KNEE: Primary | ICD-10-CM

## 2020-04-14 DIAGNOSIS — G89.29 CHRONIC PAIN OF LEFT KNEE: Primary | ICD-10-CM

## 2020-04-14 PROCEDURE — 97112 NEUROMUSCULAR REEDUCATION: CPT

## 2020-04-14 PROCEDURE — 97110 THERAPEUTIC EXERCISES: CPT

## 2020-04-14 PROCEDURE — 97116 GAIT TRAINING THERAPY: CPT

## 2020-04-15 ENCOUNTER — APPOINTMENT (OUTPATIENT)
Dept: PHYSICAL THERAPY | Facility: CLINIC | Age: 60
End: 2020-04-15
Payer: MEDICARE

## 2020-04-16 ENCOUNTER — OFFICE VISIT (OUTPATIENT)
Dept: PHYSICAL THERAPY | Facility: CLINIC | Age: 60
End: 2020-04-16
Payer: MEDICARE

## 2020-04-16 DIAGNOSIS — G89.29 CHRONIC PAIN OF LEFT KNEE: Primary | ICD-10-CM

## 2020-04-16 DIAGNOSIS — M25.562 CHRONIC PAIN OF LEFT KNEE: Primary | ICD-10-CM

## 2020-04-16 PROCEDURE — 97112 NEUROMUSCULAR REEDUCATION: CPT

## 2020-04-16 PROCEDURE — 97110 THERAPEUTIC EXERCISES: CPT

## 2020-04-20 ENCOUNTER — OFFICE VISIT (OUTPATIENT)
Dept: PHYSICAL THERAPY | Facility: CLINIC | Age: 60
End: 2020-04-20
Payer: MEDICARE

## 2020-04-20 DIAGNOSIS — M25.562 CHRONIC PAIN OF LEFT KNEE: Primary | ICD-10-CM

## 2020-04-20 DIAGNOSIS — G89.29 CHRONIC PAIN OF LEFT KNEE: Primary | ICD-10-CM

## 2020-04-20 PROCEDURE — 97110 THERAPEUTIC EXERCISES: CPT | Performed by: PHYSICAL THERAPIST

## 2020-04-21 ENCOUNTER — APPOINTMENT (OUTPATIENT)
Dept: RADIOLOGY | Facility: CLINIC | Age: 60
End: 2020-04-21
Payer: MEDICARE

## 2020-04-21 ENCOUNTER — OFFICE VISIT (OUTPATIENT)
Dept: OBGYN CLINIC | Facility: CLINIC | Age: 60
End: 2020-04-21
Payer: MEDICARE

## 2020-04-21 ENCOUNTER — OFFICE VISIT (OUTPATIENT)
Dept: PHYSICAL THERAPY | Facility: CLINIC | Age: 60
End: 2020-04-21
Payer: MEDICARE

## 2020-04-21 VITALS — RESPIRATION RATE: 20 BRPM | BODY MASS INDEX: 34.74 KG/M2 | HEIGHT: 62 IN | WEIGHT: 188.8 LBS

## 2020-04-21 DIAGNOSIS — Z98.890 S/P ORIF (OPEN REDUCTION INTERNAL FIXATION) FRACTURE: ICD-10-CM

## 2020-04-21 DIAGNOSIS — Z87.81 S/P ORIF (OPEN REDUCTION INTERNAL FIXATION) FRACTURE: ICD-10-CM

## 2020-04-21 DIAGNOSIS — Z87.81 S/P ORIF (OPEN REDUCTION INTERNAL FIXATION) FRACTURE: Primary | ICD-10-CM

## 2020-04-21 DIAGNOSIS — Z98.890 S/P ORIF (OPEN REDUCTION INTERNAL FIXATION) FRACTURE: Primary | ICD-10-CM

## 2020-04-21 DIAGNOSIS — G89.29 CHRONIC PAIN OF LEFT KNEE: Primary | ICD-10-CM

## 2020-04-21 DIAGNOSIS — M25.562 CHRONIC PAIN OF LEFT KNEE: Primary | ICD-10-CM

## 2020-04-21 PROCEDURE — 99213 OFFICE O/P EST LOW 20 MIN: CPT | Performed by: ORTHOPAEDIC SURGERY

## 2020-04-21 PROCEDURE — 73562 X-RAY EXAM OF KNEE 3: CPT

## 2020-04-21 PROCEDURE — 97110 THERAPEUTIC EXERCISES: CPT

## 2020-04-21 PROCEDURE — 97112 NEUROMUSCULAR REEDUCATION: CPT

## 2020-04-23 ENCOUNTER — OFFICE VISIT (OUTPATIENT)
Dept: PHYSICAL THERAPY | Facility: CLINIC | Age: 60
End: 2020-04-23
Payer: MEDICARE

## 2020-04-23 DIAGNOSIS — G89.29 CHRONIC PAIN OF LEFT KNEE: Primary | ICD-10-CM

## 2020-04-23 DIAGNOSIS — M25.562 CHRONIC PAIN OF LEFT KNEE: Primary | ICD-10-CM

## 2020-04-23 PROCEDURE — 97110 THERAPEUTIC EXERCISES: CPT

## 2020-07-07 ENCOUNTER — TELEPHONE (OUTPATIENT)
Dept: INTERNAL MEDICINE CLINIC | Facility: CLINIC | Age: 60
End: 2020-07-07

## 2020-07-07 NOTE — TELEPHONE ENCOUNTER
1  Do you presently have a fever or flu-like symptoms? No  2  Do you have symptoms of an upper respiratory infection like runny nose, sore throat, or cough? No  3  Are you suffering from new headache that you have not had in the past?  Yes  4  Do you have/have you experienced any new shortness of breath recently? No  5  Do you have any new diarrhea, nausea or vomiting? No  6  Have you been in contact with anyone who has been sick or diagnosed with COVID-19?  No  SEEING  ANTONIO  7/8/20

## 2020-07-08 ENCOUNTER — APPOINTMENT (OUTPATIENT)
Dept: LAB | Facility: CLINIC | Age: 60
End: 2020-07-08
Payer: MEDICARE

## 2020-07-08 ENCOUNTER — OFFICE VISIT (OUTPATIENT)
Dept: INTERNAL MEDICINE CLINIC | Facility: CLINIC | Age: 60
End: 2020-07-08
Payer: MEDICARE

## 2020-07-08 VITALS
WEIGHT: 191.6 LBS | BODY MASS INDEX: 32.71 KG/M2 | DIASTOLIC BLOOD PRESSURE: 90 MMHG | HEART RATE: 84 BPM | HEIGHT: 64 IN | OXYGEN SATURATION: 99 % | TEMPERATURE: 98.3 F | SYSTOLIC BLOOD PRESSURE: 162 MMHG

## 2020-07-08 DIAGNOSIS — E78.00 PURE HYPERCHOLESTEROLEMIA: ICD-10-CM

## 2020-07-08 DIAGNOSIS — Z12.31 SCREENING MAMMOGRAM, ENCOUNTER FOR: ICD-10-CM

## 2020-07-08 DIAGNOSIS — I10 ESSENTIAL HYPERTENSION: Primary | ICD-10-CM

## 2020-07-08 DIAGNOSIS — N89.8 VAGINAL ODOR: ICD-10-CM

## 2020-07-08 DIAGNOSIS — Z13.29 SCREENING FOR THYROID DISORDER: ICD-10-CM

## 2020-07-08 DIAGNOSIS — Z12.4 CERVICAL CANCER SCREENING: ICD-10-CM

## 2020-07-08 DIAGNOSIS — K64.9 HEMORRHOIDS, UNSPECIFIED HEMORRHOID TYPE: ICD-10-CM

## 2020-07-08 DIAGNOSIS — E87.6 HYPOKALEMIA: ICD-10-CM

## 2020-07-08 PROBLEM — R73.03 PREDIABETES: Status: ACTIVE | Noted: 2020-01-13

## 2020-07-08 PROBLEM — J44.9 COPD, MILD (HCC): Status: ACTIVE | Noted: 2020-07-08

## 2020-07-08 LAB
BACTERIA UR QL AUTO: ABNORMAL /HPF
BILIRUB UR QL STRIP: NEGATIVE
CLARITY UR: CLEAR
COLOR UR: YELLOW
GLUCOSE UR STRIP-MCNC: NEGATIVE MG/DL
HGB UR QL STRIP.AUTO: NEGATIVE
HYALINE CASTS #/AREA URNS LPF: ABNORMAL /LPF
KETONES UR STRIP-MCNC: NEGATIVE MG/DL
LEUKOCYTE ESTERASE UR QL STRIP: ABNORMAL
NITRITE UR QL STRIP: NEGATIVE
NON-SQ EPI CELLS URNS QL MICRO: ABNORMAL /HPF
PH UR STRIP.AUTO: 7 [PH]
PROT UR STRIP-MCNC: NEGATIVE MG/DL
RBC #/AREA URNS AUTO: ABNORMAL /HPF
SP GR UR STRIP.AUTO: 1.01 (ref 1–1.03)
UROBILINOGEN UR QL STRIP.AUTO: 0.2 E.U./DL
WBC #/AREA URNS AUTO: ABNORMAL /HPF

## 2020-07-08 PROCEDURE — 3080F DIAST BP >= 90 MM HG: CPT | Performed by: NURSE PRACTITIONER

## 2020-07-08 PROCEDURE — 1036F TOBACCO NON-USER: CPT | Performed by: NURSE PRACTITIONER

## 2020-07-08 PROCEDURE — 3008F BODY MASS INDEX DOCD: CPT | Performed by: NURSE PRACTITIONER

## 2020-07-08 PROCEDURE — 99203 OFFICE O/P NEW LOW 30 MIN: CPT | Performed by: NURSE PRACTITIONER

## 2020-07-08 PROCEDURE — 3077F SYST BP >= 140 MM HG: CPT | Performed by: NURSE PRACTITIONER

## 2020-07-08 PROCEDURE — 81001 URINALYSIS AUTO W/SCOPE: CPT | Performed by: NURSE PRACTITIONER

## 2020-07-08 RX ORDER — ZINC OXIDE AND COCOA BUTTER 270; 2052 MG/1; MG/1
SUPPOSITORY RECTAL AS NEEDED
Refills: 0 | Status: CANCELLED | OUTPATIENT
Start: 2020-07-08

## 2020-07-08 RX ORDER — POTASSIUM BICARBONATE 782 MG/1
TABLET, EFFERVESCENT ORAL
COMMUNITY
Start: 2020-06-18 | End: 2022-03-14 | Stop reason: SDDI

## 2020-07-08 RX ORDER — TRIAMTERENE AND HYDROCHLOROTHIAZIDE 37.5; 25 MG/1; MG/1
1 TABLET ORAL DAILY
COMMUNITY
Start: 2020-06-17 | End: 2022-03-14 | Stop reason: SDUPTHER

## 2020-07-08 RX ORDER — HYDROCORTISONE ACETATE 25 MG/1
25 SUPPOSITORY RECTAL 2 TIMES DAILY
Qty: 12 SUPPOSITORY | Refills: 0 | Status: SHIPPED | OUTPATIENT
Start: 2020-07-08 | End: 2022-03-14 | Stop reason: SDUPTHER

## 2020-07-08 NOTE — ASSESSMENT & PLAN NOTE
Blood pressure elevated today at 162/90  Patient is anxious and nervous after driving in the rain to get to the appt  States normally at home blood pressures are well controlled  Will continue current medications of triamterene-HCTZ 37 5-25 mg daily, and amlodipine-atorvastatin 5-20 mg daily

## 2020-07-08 NOTE — PROGRESS NOTES
INTERNAL MEDICINE INITIAL OFFICE VISIT  [facility] - [facility]    NAME: Alverna Prader  AGE: 60 y o  SEX: female  : 1960     DATE: 2020     Assessment and Plan:     Problem List Items Addressed This Visit        Cardiovascular and Mediastinum    HTN (hypertension) - Primary     Blood pressure elevated today at 162/90  Patient is anxious and nervous after driving in the rain to get to the appt  States normally at home blood pressures are well controlled  Will continue current medications of triamterene-HCTZ 37 5-25 mg daily, and amlodipine-atorvastatin 5-20 mg daily  Relevant Medications    triamterene-hydrochlorothiazide (MAXZIDE-25) 37 5-25 mg per tablet       Other    Hyperlipidemia     Continue atorvastatin and vascepa  Will recheck lipid panel for next appointment  Hypokalemia     Continue with potassium supplements at this time  Will recheck labs  If stable can discontinue and follow closely while on diuretic therapy  Other Visit Diagnoses     Hemorrhoids, unspecified hemorrhoid type        Relevant Medications    hydrocortisone (ANUSOL-HC) 25 mg suppository    Vaginal odor        Relevant Orders    Ambulatory referral to Gynecology    Molecular Vaginal Panel    UA w Reflex to Microscopic w Reflex to Culture - Clinic Collect    Screening mammogram, encounter for        Relevant Orders    Mammo screening bilateral w 3d & cad    Cervical cancer screening        Relevant Orders    Ambulatory referral to Gynecology    Screening for thyroid disorder              No follow-ups on file  Chief Complaint:     Chief Complaint   Patient presents with   Lillie Jameson Establish Care     new patient with a possible UTI      History of Present Illness:     Patient here as new patient to establish care  States her main concern is that should would like to see a GYN provider  Has had a vaginal odor for two years and is unsure why   Patient states the smell can knock you off your feet  Hours after taking a shower the smell returns  There is no discharge noted  No irritation or rash  Patient states she has not been sexually active since 2011, and denies any previous history of STD's  Is unable to describe the exact odor  States she was [facility] [place]  [facility], however she would like to keep her care in this area with [facility]  PMH-hypertension, hyperlipidemia, lung cancer with left upper lung lobectomy  Cancer free since this time  PSH-hysterectomy, lobectomy, right wrist surgery with plate and screws, ORIF left tibial plateau  PFH- diabetes and cancer  Patient previously was receiving care in [place], [place]  PCP was Mars Ford  Unsure of last mammo, ordered today  Most recent blood work was December 2019  Patient states she was started on potassium supplements after her hospitalization because her level was low  Is wondering if she will need this supplement forever  Advised we can repeat blood work to evaluate the need for supplements  Patient also complains of chronic hemorrhoids since she gave birth to her son  Was offered surgery in the past and patient declined  Used Anusol in the past with good relief  Was given Calmol-4 and states this was not as effective, requesting refill for Anusol  The following portions of the patient's history were reviewed and updated as appropriate: allergies, current medications, past family history, past medical history, past social history, past surgical history and problem list      Review of Systems:     Review of Systems   Constitutional: Negative for chills, fatigue and fever  HENT: Negative for congestion, ear pain, hearing loss, postnasal drip, rhinorrhea, sinus pain and sore throat  Eyes: Negative for photophobia and visual disturbance  Respiratory: Negative for cough, chest tightness and shortness of breath  Cardiovascular: Positive for leg swelling  Negative for chest pain and palpitations  Gastrointestinal: Negative for diarrhea, nausea and vomiting  Genitourinary: Negative for difficulty urinating, dysuria, flank pain, frequency, genital sores, hematuria, vaginal discharge and vaginal pain         "terrible odor"     Musculoskeletal: Positive for arthralgias  Skin: Negative for rash and wound  Allergic/Immunologic: Positive for environmental allergies  Neurological: Negative for dizziness, weakness and numbness  Psychiatric/Behavioral: Negative for dysphoric mood  The patient is not nervous/anxious           Past Medical History:     Past Medical History:   Diagnosis Date    Cancer [facility])     lung, upper left lung lobectomy    Hyperlipidemia     Hypertension         Past Surgical History:     Past Surgical History:   Procedure Laterality Date    HYSTERECTOMY      LUNG LOBECTOMY      ORIF TIBIAL PLATEAU Left 262    Procedure: OPEN REDUCTION W/ INTERNAL FIXATION (ORIF) TIBIAL PLATEAU;  Surgeon: Tiffani Craft MD;  Location[facility];  Service: Orthopedics    WRIST SURGERY          Social History:     Social History     Socioeconomic History    Marital status: Single     Spouse name: None    Number of children: None    Years of education: None    Highest education level: None   Occupational History    None   Social Needs    Financial resource strain: None    Food insecurity:     Worry: None     Inability: None    Transportation needs:     Medical: None     Non-medical: None   Tobacco Use    Smoking status: Former Smoker     Last attempt to quit: 2009     Years since quittin 5    Smokeless tobacco: Never Used   Substance and Sexual Activity    Alcohol use: Not Currently     Comment: occ    Drug use: Never    Sexual activity: None   Lifestyle    Physical activity:     Days per week: 0 days     Minutes per session: 0 min    Stress: Not at all   Relationships    Social connections:     Talks on phone: None     Gets together: None     Attends Caodaism service: None     Active member of club or organization: None     Attends meetings of clubs or organizations: None     Relationship status: None    Intimate partner violence:     Fear of current or ex partner: None     Emotionally abused: None     Physically abused: None     Forced sexual activity: None   Other Topics Concern    None   Social History Narrative    None         Family History:     Family History   Problem Relation Age of Onset    Diabetes Mother     Diabetes Father     Breast cancer Sister     Diabetes Brother         Current Medications:     Current Outpatient Medications:     albuterol (PROAIR HFA) 90 mcg/act inhaler, ProAir HFA 90 mcg/actuation aerosol inhaler, Disp: , Rfl:     amLODIPine-atorvastatin (CADUET) 5-20 MG per tablet, amlodipine 5 mg-atorvastatin 20 mg tablet, Disp: , Rfl:     EFFER-K 20 MEQ TBEF, TAKE 1 TAB BY MOUTH DAILY  APPT NEEDED FOR FUTURE REFILLS , Disp: , Rfl:     fluticasone-vilanterol (BREO ELLIPTA) 100-25 mcg/inh inhaler, Inhale 1 puff daily Rinse mouth after use , Disp: , Rfl:     Icosapent Ethyl (VASCEPA) 1 g CAPS, Vascepa 1 gram capsule, Disp: , Rfl:     montelukast (SINGULAIR) 10 mg tablet, Take 10 mg by mouth daily at bedtime, Disp: , Rfl:     triamterene-hydrochlorothiazide (MAXZIDE-25) 37 5-25 mg per tablet, Take 1 tablet by mouth daily, Disp: , Rfl:     hydrocortisone (ANUSOL-HC) 25 mg suppository, Insert 1 suppository (25 mg total) into the rectum 2 (two) times a day, Disp: 12 suppository, Rfl: 0     Allergies: Allergies   Allergen Reactions    Pollen Extract         Physical Exam:     /90 (BP Location: Left arm, Patient Position: Sitting, Cuff Size: Standard)   Pulse 84   Temp 98 3 °F (36 8 °C) (Temporal) Comment: no nsids  Ht 5' 3 5" (1 613 m)   Wt 86 9 kg (191 lb 9 6 oz)   SpO2 99%   BMI 33 41 kg/m²     Physical Exam   Constitutional: She is oriented to person, place, and time   She appears well-developed and well-nourished  No distress  HENT:   Head: Normocephalic and atraumatic  Right Ear: Hearing, tympanic membrane, external ear and ear canal normal    Left Ear: Hearing, tympanic membrane, external ear and ear canal normal    Nose: Nose normal  No mucosal edema or rhinorrhea  Mouth/Throat: Uvula is midline, oropharynx is clear and moist and mucous membranes are normal  No dental caries  No oropharyngeal exudate  Eyes: Pupils are equal, round, and reactive to light  Conjunctivae, EOM and lids are normal  Right eye exhibits no discharge  Left eye exhibits no discharge  Neck: Trachea normal, normal range of motion and phonation normal  Neck supple  No tracheal deviation present  No thyromegaly present  Cardiovascular: Normal rate, regular rhythm, normal heart sounds, intact distal pulses and normal pulses  No murmur heard  Pulmonary/Chest: Effort normal and breath sounds normal  No respiratory distress  She has no wheezes  Abdominal: Soft  Normal appearance and bowel sounds are normal  She exhibits no distension  There is no splenomegaly or hepatomegaly  There is no tenderness  Musculoskeletal: Normal range of motion  She exhibits no edema or tenderness  Lymphadenopathy:     She has no cervical adenopathy  Neurological: She is alert and oriented to person, place, and time  No sensory deficit  Skin: Skin is warm, dry and intact  Capillary refill takes less than 2 seconds  No rash noted  She is not diaphoretic  Psychiatric: She has a normal mood and affect  Her speech is normal and behavior is normal  Judgment and thought content normal  Cognition and memory are normal         Data:     Laboratory Results: I have personally reviewed the pertinent laboratory results/reports   Radiology/Other Diagnostic Testing Results: I have personally reviewed pertinent reports        Patient Instructions   Bacterial Vaginosis   WHAT YOU NEED TO KNOW:   Bacterial vaginosis (BV) is an infection in the vagina  It may cause vaginitis, which is irritation and inflammation of the vagina  DISCHARGE INSTRUCTIONS:   Medicines:   · Antibiotics: These are given to kill the bacteria that cause BV  They may be given as a pill or a cream to put in your vagina  Take or use as directed  · Take your medicine as directed  Contact your healthcare provider if you think your medicine is not helping or if you have side effects  Tell him of her if you are allergic to any medicine  Keep a list of the medicines, vitamins, and herbs you take  Include the amounts, and when and why you take them  Bring the list or the pill bottles to follow-up visits  Carry your medicine list with you in case of an emergency  Prevent bacterial vaginosis:   · Keep your vaginal area clean and dry:  Wear underwear and pantyhose with a cotton crotch  Wipe from front to back after you urinate or have a bowel movement  After bathing, rinse soap from your vaginal area to decrease your risk for irritation  · Do not use products that cause irritation:  Always use unscented tampons or sanitary pads  Do not use feminine sprays, powders, or scented tampons because they may cause irritation and increase your risk of BV  Detergents and fabric softeners may also cause irritation  · Do not douche: This can cause an imbalance in healthy vaginal bacteria  · Use latex condoms: This helps prevent another infection and keeps your partner from getting the infection  Contact your healthcare provider if:   · Your symptoms come back or do not improve with treatment  · You have vaginal bleeding that is not your monthly period  · You have questions or concerns about your condition or care  © 2017 [facility] Information is for End User's use only and may not be sold, redistributed or otherwise used for commercial purposes   All illustrations and images included in CareNotes® are the copyrighted property of A D A M , Inc  or [employer]  The above information is an  only  It is not intended as medical advice for individual conditions or treatments  Talk to your doctor, nurse or pharmacist before following any medical regimen to see if it is safe and effective for you          FRANCI Puentes  [facility]

## 2020-07-08 NOTE — ASSESSMENT & PLAN NOTE
Continue with potassium supplements at this time  Will recheck labs  If stable can discontinue and follow closely while on diuretic therapy

## 2020-07-09 ENCOUNTER — TELEPHONE (OUTPATIENT)
Dept: ADMINISTRATIVE | Facility: OTHER | Age: 60
End: 2020-07-09

## 2020-07-09 ENCOUNTER — TELEPHONE (OUTPATIENT)
Dept: INTERNAL MEDICINE CLINIC | Facility: CLINIC | Age: 60
End: 2020-07-09

## 2020-07-09 NOTE — LETTER
Procedure Request Form: Colonoscopy      Date Requested: /20  Patient: Jv Bird  Patient : 1960   Referring Provider: FRANCI Chang        Date of Procedure ______________________________       The above patient has informed us that they have completed their   most recent Colonoscopy at your facility  Please complete   this form and attach all corresponding procedure reports/results  Comments __________________________________________________________  ____________________________________________________________________  ____________________________________________________________________  ____________________________________________________________________    Facility Completing Procedure _________________________________________    Form Completed By (print name) _______________________________________      Signature __________________________________________________________      These reports are needed for  compliance  Please fax this completed form and a copy of the procedure report to our office located at [place] [address] as soon as possible to [phone] attention Barbara: Phone [phone]    We thank you for your assistance in treating our mutual patient             Procedure Request Form: Mammogram      Date Requested: 20  Patient: Jv Villalobos  Patient : 1960   Referring Provider: FRANCI Chang        Date of Procedure ______________________________       The above patient has informed us that they have completed their   most recent Mammogram at your facility  Please complete   this form and attach all corresponding procedure reports/results      Comments __________________________________________________________  ____________________________________________________________________  ____________________________________________________________________  ____________________________________________________________________    Facility Completing Procedure _________________________________________    Form Completed By (print name) _______________________________________      Signature __________________________________________________________      These reports are needed for  compliance    Please fax this completed form and a copy of the procedure report to our office located at [place] [address] as soon as possible to [phone] attention Barbara: Phone [phone]    We thank you for your assistance in treating our mutual patient

## 2020-07-09 NOTE — TELEPHONE ENCOUNTER
----- Message from Nanda Munguia sent at 7/9/2020  8:54 AM EDT -----  Please let patient know her urinalysis was normal  Thank you!

## 2020-07-09 NOTE — LETTER
Procedure Request Form: Colonoscopy      Date Requested: 07/10/20  Patient: Yazmin Clement  Patient : 1960   Referring Provider: Warren Wells, CRNP        Date of Procedure ______________________________       The above patient has informed us that they have completed their   most recent Colonoscopy at your facility  Please complete   this form and attach all corresponding procedure reports/results  Comments __________________________________________________________  ____________________________________________________________________  ____________________________________________________________________  ____________________________________________________________________    Facility Completing Procedure _________________________________________    Form Completed By (print name) _______________________________________      Signature __________________________________________________________      These reports are needed for  compliance  Please fax this completed form and a copy of the procedure report to our office located at [place] [address] as soon as possible to [phone] attention Barbara: Phone [phone]    We thank you for your assistance in treating our mutual patient               Procedure Request Form: Mammogram      Date Requested: 07/10/20  Patient: Yazmin Awan  Patient : 1960   Referring Provider: FRANCI Willis        Date of Procedure ______________________________       The above patient has informed us that they have completed their   most recent Mammogram at your facility  Please complete   this form and attach all corresponding procedure reports/results      Comments __________________________________________________________  ____________________________________________________________________  ____________________________________________________________________  ____________________________________________________________________    Facility Completing Procedure _________________________________________    Form Completed By (print name) _______________________________________      Signature __________________________________________________________      These reports are needed for  compliance    Please fax this completed form and a copy of the procedure report to our office located at [place] [address] as soon as possible to [phone] attention Barbara: Phone [phone]    We thank you for your assistance in treating our mutual patient

## 2020-07-09 NOTE — TELEPHONE ENCOUNTER
----- Message from An Nguyen MA sent at 7/8/2020  2:09 PM EDT -----  Regarding: [facility] INTERNAL MED- COLONOSCOPY  Contact: [phone]  07/08/20 2:10 PM    Hello, our patient Nael Lopez has had CRC: Colonoscopy and CRC: CT Colonography completed/performed  Please assist in updating the patient chart by pulling a previous Electronic Medical Record (EMR) document  The previous EMR is MAMMO  and making an External outreach to Selene Rodriguez MD   facility located in [place] [address] # [phone], [place], [address]      Thank you,  An Nguyen MA  [facility]  [address]

## 2020-07-09 NOTE — TELEPHONE ENCOUNTER
----- Message from Keshia Silva, [address] sent at 2020  2:07 PM EDT -----  [address]: [phone]  20 2:08 PM    Hello, our patient Kiera Banda has had Mammogram completed/performed  Please assist in updating the patient chart by making an External outreach to Pelon Smith MD facility located in [place] [address] # [phone], [facility] (phone)  [phone]  The date of service is within the last 3 years       Thank you,  Keshia Silva MA  [facility]  [address]

## 2020-07-10 NOTE — TELEPHONE ENCOUNTER
Upon review of the In Basket request and the patient's chart, initial outreach has been made via fax, please see Contacts section for details  A second outreach attempt will be made within 5 business days      Thank you  Shiraz Carrera MA

## 2020-07-17 NOTE — TELEPHONE ENCOUNTER
As a follow-up, a second attempt has been made for outreach via fax, please see Contacts section for details  A third and final attempt will be made within 5 business days      Thank you  Sharlene Hi MA

## 2020-07-20 NOTE — TELEPHONE ENCOUNTER
As a final attempt, a third outreach has been made via telephone call  Please see Contacts section for details  This encounter will be closed and completed by end of day  Should we receive the requested information because of previous outreach attempts, the requested patient's chart will be updated appropriately       Thank you  Laly Colbert MA

## 2020-07-22 ENCOUNTER — OFFICE VISIT (OUTPATIENT)
Dept: OBGYN CLINIC | Age: 60
End: 2020-07-22
Payer: MEDICARE

## 2020-07-22 VITALS
DIASTOLIC BLOOD PRESSURE: 84 MMHG | BODY MASS INDEX: 33.58 KG/M2 | TEMPERATURE: 96 F | WEIGHT: 192.6 LBS | SYSTOLIC BLOOD PRESSURE: 142 MMHG

## 2020-07-22 DIAGNOSIS — Z90.711 HISTORY OF PARTIAL HYSTERECTOMY: ICD-10-CM

## 2020-07-22 DIAGNOSIS — Z12.11 SCREENING FOR COLON CANCER: ICD-10-CM

## 2020-07-22 DIAGNOSIS — Z01.419 ENCOUNTER FOR GYNECOLOGICAL EXAMINATION WITHOUT ABNORMAL FINDING: Primary | ICD-10-CM

## 2020-07-22 DIAGNOSIS — Z78.0 POST-MENOPAUSAL: ICD-10-CM

## 2020-07-22 PROCEDURE — 87624 HPV HI-RISK TYP POOLED RSLT: CPT | Performed by: NURSE PRACTITIONER

## 2020-07-22 PROCEDURE — G0145 SCR C/V CYTO,THINLAYER,RESCR: HCPCS | Performed by: NURSE PRACTITIONER

## 2020-07-22 PROCEDURE — G0101 CA SCREEN;PELVIC/BREAST EXAM: HCPCS | Performed by: NURSE PRACTITIONER

## 2020-07-22 NOTE — PATIENT INSTRUCTIONS
Menopause   WHAT YOU NEED TO KNOW:   What is menopause? Menopause is a normal stage in a woman's life when her monthly periods stop  Menopause starts when the ovaries slowly stop making the female hormones estrogen and progesterone  After menopause, a woman is no longer able to become pregnant  A woman who has not had a period for a full year after the age of 39 is considered to be in menopause  Perimenopause is a stage before menopause that may cause signs and symptoms similar to menopause  Perimenopause can last an average of 4 to 5 years  What are the signs and symptoms of menopause? The signs and symptoms of menopause can be different from woman to woman:  · Menstrual period changes such as skipped periods or periods that are closer together, or lighter or heavier than usual    · Hot flashes (feeling warm, flushed, and sweaty)     · Mood changes such as irritability or decreased desire to have sex    · Breast changes such as tenderness or pain    · Hair changes such as thinning hair or increased hair on your face    · Vaginal changes such as increased dryness     · Urinary changes such as increased urinary tract infections (UTIs) or urgency (feeling that you need to urinate right away)    · Other symptoms such as headaches, trouble sleeping, fatigue, or heart palpitations (strong, fast heartbeats)  What do I need to know about menopause? · You can still get pregnant while you have periods  Continue to use birth control if you do not want to get pregnant  You may need to use birth control until it has been 1 year since your periods stopped  Ask your healthcare provider when you can stop using birth control to prevent pregnancy  · Hormone replacement therapy can be used to treat symptoms of menopause  Hormone replacement therapy (HRT) is medicine that replaces your low hormone levels  HRT contains estrogen and sometimes progestin  HRT has benefits and risks   HRT decreases your risk for bone fractures by helping to prevent osteoporosis  HRT also protects you from colon cancer  HRT may increase your risk for breast cancer, blood clots, heart disease, and stroke  Ask your healthcare provider if HRT is right for you  How can I live a healthy lifestyle during and after menopause? After menopause, your risk for heart disease and bone loss increases  Ask about these and other ways to stay healthy:  · Exercise regularly  Exercise helps you maintain a healthy weight  Exercise can also help to control your blood pressure and cholesterol levels  Include weight-bearing exercise for strong bones  Ask your healthcare provider about the best exercise plan for you  · Eat a variety of healthy foods  Include fruits, vegetables, whole grains (whole-wheat bread, pasta, and cereals), low-fat dairy, and lean protein foods (beans, poultry, and fish)  Limit foods high in sodium (salt)  Ask your healthcare provider for more information about a meal plan that is right for you  · Maintain a healthy weight  Check with your healthcare provider before you start any weight loss program      · Take supplements as directed  You may need extra calcium and vitamin D to help prevent osteoporosis  · Limit alcohol and caffeine  Alcohol and caffeine may worsen your symptoms  · Do not smoke  If you smoke, it is never too late to quit  You are more likely to have a heart attack, lung disease, blood clots, and cancer if you smoke  Ask your healthcare provider for information if you need help quitting  When should I contact my healthcare provider? · You have vaginal bleeding after menopause  · You have questions or concerns about your condition or care  CARE AGREEMENT:   You have the right to help plan your care  Learn about your health condition and how it may be treated  Discuss treatment options with your caregivers to decide what care you want to receive  You always have the right to refuse treatment   The above information is an  only  It is not intended as medical advice for individual conditions or treatments  Talk to your doctor, nurse or pharmacist before following any medical regimen to see if it is safe and effective for you  © 2017 [employer] [employer] Information is for End User's use only and may not be sold, redistributed or otherwise used for commercial purposes  All illustrations and images included in CareNotes® are the copyrighted property of A D A M , Inc  or [employer]

## 2020-07-22 NOTE — PROGRESS NOTES
Diagnoses and all orders for this visit:    1  Encounter for gynecological examination without abnormal finding  -     Liquid-based pap, screening  Pap collected today, discussed new guidelines  Ok to use lubrication or OTC vaginal moisturizers twice a week  Healthy eating and nutrition, daily weight bearing exercise discussed and SBE reinforced  2  Post-menopausal  Discussed calcium and vitamin D daily intake  3  Screening for colon cancer    4  History of partial hysterectomy  -     US pelvis complete w transvaginal; Future    Other orders  -     Cancel: Ambulatory referral to Gastroenterology; Future        All questions and concerns answered  Call with any questions  Pleasant 60 y o  postmenopausal female here for new patient annual exam   vaginal delivery  Patient had a partial hysterectomy for fibroids, has ovaries but is unsure if all of uterus and cervix is in place  She denies any vaginal bleeding of any kind  Denies vaginal itching, discharge and odor  Denies pelvic pain, breast problems and urinary incontinence  Her  recently passed away and sister also passed away from breast cancer  She is up-to-date on mammogram, colonoscopy and dexa scan  Based on new pap guidelines, patient will need pap today  Last pap was many years  Ago  No history of abnormal pap smears  She moved out here from [facility] after retiring from [employer]  She may be experiencing depression since  passed away, feels lonely as she is living alone  Plans to move back to [facility] after "Covid-19 gets under control" she states  Vitals:    20 1354   BP: 142/84   BP Location: Right arm   Patient Position: Sitting   Cuff Size: Standard   Temp: (!) 96 °F (35 6 °C)   Weight: 87 4 kg (192 lb 9 6 oz)     Body mass index is 33 58 kg/m²      Allergies   Allergen Reactions    Pollen Extract        Past Medical History:   Diagnosis Date    Cancer [facility])     lung, upper left lung lobectomy    Hyperlipidemia     Hypertension      Past Surgical History:   Procedure Laterality Date    HYSTERECTOMY      LUNG LOBECTOMY      ORIF TIBIAL PLATEAU Left     Procedure: OPEN REDUCTION W/ INTERNAL FIXATION (ORIF) TIBIAL PLATEAU;  Surgeon: Jacki Salgado MD;  Location: [facility];  Service: Orthopedics    WRIST SURGERY       Family History   Problem Relation Age of Onset    Diabetes Mother     Diabetes Father     Breast cancer Sister     Diabetes Brother     No Known Problems Son     Ovarian cancer Neg Hx     Cervical cancer Neg Hx     Uterine cancer Neg Hx      Social History     Tobacco Use    Smoking status: Former Smoker     Last attempt to quit:      Years since quittin 5    Smokeless tobacco: Never Used   Substance Use Topics    Alcohol use: Not Currently     Comment: occ    Drug use: Never       Current Outpatient Medications:     albuterol (PROAIR HFA) 90 mcg/act inhaler, ProAir HFA 90 mcg/actuation aerosol inhaler, Disp: , Rfl:     amLODIPine-atorvastatin (CADUET) 5-20 MG per tablet, amlodipine 5 mg-atorvastatin 20 mg tablet, Disp: , Rfl:     EFFER-K 20 MEQ TBEF, TAKE 1 TAB BY MOUTH DAILY   APPT NEEDED FOR FUTURE REFILLS , Disp: , Rfl:     fluticasone-vilanterol (BREO ELLIPTA) 100-25 mcg/inh inhaler, Inhale 1 puff daily Rinse mouth after use , Disp: , Rfl:     hydrocortisone (ANUSOL-HC) 25 mg suppository, Insert 1 suppository (25 mg total) into the rectum 2 (two) times a day, Disp: 12 suppository, Rfl: 0    Icosapent Ethyl (VASCEPA) 1 g CAPS, Vascepa 1 gram capsule, Disp: , Rfl:     montelukast (SINGULAIR) 10 mg tablet, Take 10 mg by mouth daily at bedtime, Disp: , Rfl:     triamterene-hydrochlorothiazide (MAXZIDE-25) 37 5-25 mg per tablet, Take 1 tablet by mouth daily, Disp: , Rfl:     OB History    Para Term  AB Living   1         1   SAB TAB Ectopic Multiple Live Births                  # Outcome Date GA Lbr Benji/2nd Weight Sex Delivery Anes PTL Lv 1                       Review of Systems   Constitutional: Negative for chills, fatigue, fever and unexpected weight change  Respiratory: Negative for shortness of breath  Gastrointestinal: Negative for anal bleeding, blood in stool, constipation and diarrhea  Genitourinary: Negative for difficulty urinating, dysuria and hematuria  OBGyn Exam     Physical Exam   Constitutional: She appears well-developed and well-nourished  No distress  Head: Normocephalic  Neck: Normal range of motion  Neck supple  Pulmonary: Effort normal   Breasts: bilateral without masses, skin changes or nipple discharge  Bilaterally soft and warm to touch  No areas of erythema or pain  Abdominal: Soft  Non-tender  Pelvic exam was performed with patient supine  No labial fusion, Mild thinning w/o  leukoplakia  There is no rash, tenderness, lesion or injury on the right labia  There is no rash, tenderness, lesion or injury on the left labia  Uterus is partial surgically removed, Non tender  Ureathral meatus w/o pus or discharge during bladder palpation and urethral stripping  Cervix surgically removed vs stenotic? Right adnexum displays no mass, no tenderness and no fullness  Left adnexum displays no mass, no tenderness and no fullness  No erythema or tenderness in the vagina, R/T vaginal dryness  Mild  signs of atrophy, no erosion, surgical shortening w/o tightening of vaginal canal  No foreign body in the vagina  No signs of injury around the vagina  No vaginal discharge found  PAP collected off of cervical cuff? ?    Prolapse: mild pelvic floor weakness  Lymphadenopathy:          Right: No inguinal adenopathy present  Left: No inguinal adenopathy present  Perineum and anal area w/o lesions hemorrhoids or noticeable bleeding

## 2020-07-29 LAB
HPV HR 12 DNA CVX QL NAA+PROBE: NEGATIVE
HPV16 DNA CVX QL NAA+PROBE: NEGATIVE
HPV18 DNA CVX QL NAA+PROBE: NEGATIVE

## 2020-07-30 LAB
LAB AP GYN PRIMARY INTERPRETATION: NORMAL
Lab: NORMAL

## 2020-08-31 ENCOUNTER — HOSPITAL ENCOUNTER (OUTPATIENT)
Dept: MAMMOGRAPHY | Facility: CLINIC | Age: 60
Discharge: HOME | End: 2020-08-31
Payer: MEDICARE

## 2020-08-31 VITALS — WEIGHT: 192 LBS | BODY MASS INDEX: 35.33 KG/M2 | HEIGHT: 62 IN

## 2020-08-31 DIAGNOSIS — Z12.31 SCREENING MAMMOGRAM, ENCOUNTER FOR: ICD-10-CM

## 2020-08-31 PROCEDURE — 77063 BREAST TOMOSYNTHESIS BI: CPT

## 2020-08-31 PROCEDURE — 77067 SCR MAMMO BI INCL CAD: CPT

## 2020-09-16 ENCOUNTER — TELEPHONE (OUTPATIENT)
Dept: INTERNAL MEDICINE CLINIC | Facility: CLINIC | Age: 60
End: 2020-09-16

## 2020-09-16 NOTE — TELEPHONE ENCOUNTER
----- Message from Nanda Munguia sent at 9/16/2020 11:18 AM EDT -----  Please let patient know mammogram was normal  Advised to repeat in one year  Thank you

## 2020-12-15 DIAGNOSIS — J44.9 COPD, MILD (HCC): Primary | ICD-10-CM

## 2020-12-15 RX ORDER — ALBUTEROL SULFATE 90 UG/1
2 AEROSOL, METERED RESPIRATORY (INHALATION) EVERY 6 HOURS PRN
Qty: 1 INHALER | Refills: 1 | Status: SHIPPED | OUTPATIENT
Start: 2020-12-15 | End: 2021-06-15 | Stop reason: SDUPTHER

## 2020-12-15 RX ORDER — FLUTICASONE FUROATE AND VILANTEROL TRIFENATATE 100; 25 UG/1; UG/1
1 POWDER RESPIRATORY (INHALATION) DAILY
Qty: 1 INHALER | Refills: 1 | Status: SHIPPED | OUTPATIENT
Start: 2020-12-15 | End: 2021-06-15 | Stop reason: SDUPTHER

## 2021-06-15 DIAGNOSIS — I10 ESSENTIAL HYPERTENSION: Primary | ICD-10-CM

## 2021-06-15 DIAGNOSIS — J44.9 COPD, MILD (HCC): ICD-10-CM

## 2021-06-15 RX ORDER — ALBUTEROL SULFATE 90 UG/1
2 AEROSOL, METERED RESPIRATORY (INHALATION) EVERY 6 HOURS PRN
Qty: 18 G | Refills: 1 | Status: SHIPPED | OUTPATIENT
Start: 2021-06-15 | End: 2022-03-14 | Stop reason: SDUPTHER

## 2021-06-15 RX ORDER — AMLODIPINE BESYLATE AND ATORVASTATIN CALCIUM 5; 20 MG/1; MG/1
1 TABLET, FILM COATED ORAL DAILY
Qty: 90 TABLET | Refills: 0 | Status: SHIPPED | OUTPATIENT
Start: 2021-06-15 | End: 2021-09-08

## 2021-06-15 RX ORDER — FLUTICASONE FUROATE AND VILANTEROL TRIFENATATE 100; 25 UG/1; UG/1
1 POWDER RESPIRATORY (INHALATION) DAILY
Qty: 60 EACH | Refills: 1 | Status: SHIPPED | OUTPATIENT
Start: 2021-06-15 | End: 2022-03-14 | Stop reason: SDUPTHER

## 2021-07-14 ENCOUNTER — TELEPHONE (OUTPATIENT)
Dept: INTERNAL MEDICINE CLINIC | Facility: CLINIC | Age: 61
End: 2021-07-14

## 2021-07-14 ENCOUNTER — TELEPHONE (OUTPATIENT)
Dept: PULMONOLOGY | Facility: CLINIC | Age: 61
End: 2021-07-14

## 2021-07-14 NOTE — TELEPHONE ENCOUNTER
Ivan Herrera from [facility] called, needs nebulizer order with  recent office note addended    Must say nebulizer for home use to manage hypoxia, include dosage & frequency    Fax # [phone]    [facility] # [phone]

## 2021-07-15 NOTE — TELEPHONE ENCOUNTER
I placed the order for the nebulizer  I am not sure how to go back to addend the note this was a year ago  She will need follow up appt  Thanks

## 2021-09-08 DIAGNOSIS — I10 ESSENTIAL HYPERTENSION: ICD-10-CM

## 2021-09-08 RX ORDER — AMLODIPINE BESYLATE AND ATORVASTATIN CALCIUM 5; 20 MG/1; MG/1
TABLET, FILM COATED ORAL
Qty: 90 TABLET | Refills: 0 | Status: SHIPPED | OUTPATIENT
Start: 2021-09-08 | End: 2021-09-29 | Stop reason: SDUPTHER

## 2021-09-29 DIAGNOSIS — I10 ESSENTIAL HYPERTENSION: ICD-10-CM

## 2021-09-29 RX ORDER — AMLODIPINE BESYLATE AND ATORVASTATIN CALCIUM 5; 20 MG/1; MG/1
1 TABLET, FILM COATED ORAL DAILY
Qty: 30 TABLET | Refills: 0 | Status: SHIPPED | OUTPATIENT
Start: 2021-09-29 | End: 2022-03-14 | Stop reason: SDUPTHER

## 2021-09-30 NOTE — TELEPHONE ENCOUNTER
Called patient to schedule appt, she is out of the area until November, will call schedule when she is back in town, still wants her med refill in the meantime

## 2021-10-27 NOTE — PATIENT INSTRUCTIONS
Cystoscopy    Cystoscopy is a procedure that lets your doctor look directly inside your urethra and bladder. It can be used to:  · Help diagnose a problem with your urethra, bladder, or kidneys.  · Take a sample (biopsy) of bladder or urethral tissue.  · Treat certain problems (such as removing kidney stones).  · Place a stent to bypass an obstruction.  · Take special X-rays of the kidneys.  Based on the findings, your doctor may recommend other tests or treatments.  What is a cystoscope?  A cystoscope is a telescope-like instrument that contains lenses and fiberoptics (small glass wires that make bright light). The cystoscope may be straight and rigid, or flexible to bend around curves in the urethra. The doctor may look directly into the cystoscope, or project the image onto a monitor.  Getting ready  · Ask your doctor if you should stop taking any medicines before the procedure.  · Ask whether you should avoid eating or drinking anything after midnight before the procedure.  · Follow any other instructions your doctor gives you.  Tell your doctor before the exam if you:  · Take any medicines, such as aspirin or blood thinners  · Have allergies to any medicines  · Are pregnant   The procedure  Cystoscopy is done in the doctor’s office, surgery center, or hospital. The doctor and a nurse are present during the procedure. It takes only a few minutes, longer if a biopsy, X-ray, or treatment needs to be done.  During the procedure:  · You lie on an exam table on your back, knees bent and legs apart. You are covered with a drape.  · Your urethra and the area around it are washed. Anesthetic jelly may be applied to numb the urethra. Other pain medicine is usually not needed. In some cases, you may be offered a mild sedative to help you relax. If a more extensive procedure is to be done, such as a biopsy or kidney stone removal, general anesthesia may be needed.  · The cystoscope is inserted. A sterile fluid is put  Bacterial Vaginosis   WHAT YOU NEED TO KNOW:   Bacterial vaginosis (BV) is an infection in the vagina  It may cause vaginitis, which is irritation and inflammation of the vagina  DISCHARGE INSTRUCTIONS:   Medicines:   · Antibiotics: These are given to kill the bacteria that cause BV  They may be given as a pill or a cream to put in your vagina  Take or use as directed  · Take your medicine as directed  Contact your healthcare provider if you think your medicine is not helping or if you have side effects  Tell him of her if you are allergic to any medicine  Keep a list of the medicines, vitamins, and herbs you take  Include the amounts, and when and why you take them  Bring the list or the pill bottles to follow-up visits  Carry your medicine list with you in case of an emergency  Prevent bacterial vaginosis:   · Keep your vaginal area clean and dry:  Wear underwear and pantyhose with a cotton crotch  Wipe from front to back after you urinate or have a bowel movement  After bathing, rinse soap from your vaginal area to decrease your risk for irritation  · Do not use products that cause irritation:  Always use unscented tampons or sanitary pads  Do not use feminine sprays, powders, or scented tampons because they may cause irritation and increase your risk of BV  Detergents and fabric softeners may also cause irritation  · Do not douche: This can cause an imbalance in healthy vaginal bacteria  · Use latex condoms: This helps prevent another infection and keeps your partner from getting the infection  Contact your healthcare provider if:   · Your symptoms come back or do not improve with treatment  · You have vaginal bleeding that is not your monthly period  · You have questions or concerns about your condition or care  © 2017 [employer] Information is for End User's use only and may not be sold, redistributed or otherwise used for commercial purposes   All illustrations and images included in CareNotes® are the copyrighted property of A D A M , Inc  or [employer]  The above information is an  only  It is not intended as medical advice for individual conditions or treatments  Talk to your doctor, nurse or pharmacist before following any medical regimen to see if it is safe and effective for you  into the bladder to expand it. You may feel pressure from this fluid.  · When the procedure is done, the cystoscope is removed.  After the procedure  If you had a sedative, general anesthesia, or spinal anesthesia, you must have someone drive you home. Once you’re home:  · Drink plenty of fluids.  · You may have burning or light bleeding when you urinate--this is normal.  · Medicines may be prescribed to ease any discomfort or prevent infection. Take these as directed.  · Call your doctor if you have heavy bleeding or blood clots, burning that lasts more than a day, a fever over 100°F  (38° C), or trouble urinating.  Date Last Reviewed: 1/1/2017  © 0207-6374 [employer]. [address] [place] [address], [place] [address]. All rights reserved. This information is not intended as a substitute for professional medical care. Always follow your healthcare professional's instructions.

## 2022-03-14 ENCOUNTER — OFFICE VISIT (OUTPATIENT)
Dept: INTERNAL MEDICINE CLINIC | Facility: CLINIC | Age: 62
End: 2022-03-14
Payer: MEDICARE

## 2022-03-14 ENCOUNTER — APPOINTMENT (OUTPATIENT)
Dept: LAB | Facility: CLINIC | Age: 62
End: 2022-03-14
Payer: MEDICARE

## 2022-03-14 VITALS
OXYGEN SATURATION: 97 % | DIASTOLIC BLOOD PRESSURE: 76 MMHG | BODY MASS INDEX: 33.57 KG/M2 | HEART RATE: 82 BPM | SYSTOLIC BLOOD PRESSURE: 124 MMHG | TEMPERATURE: 98 F | HEIGHT: 62 IN | WEIGHT: 182.4 LBS

## 2022-03-14 DIAGNOSIS — Z11.59 NEED FOR HEPATITIS C SCREENING TEST: ICD-10-CM

## 2022-03-14 DIAGNOSIS — Z12.31 ENCOUNTER FOR SCREENING MAMMOGRAM FOR BREAST CANCER: ICD-10-CM

## 2022-03-14 DIAGNOSIS — E78.00 PURE HYPERCHOLESTEROLEMIA: ICD-10-CM

## 2022-03-14 DIAGNOSIS — J45.909 ASTHMA DUE TO ENVIRONMENTAL ALLERGIES: ICD-10-CM

## 2022-03-14 DIAGNOSIS — Z13.820 ENCOUNTER FOR OSTEOPOROSIS SCREENING IN ASYMPTOMATIC POSTMENOPAUSAL PATIENT: ICD-10-CM

## 2022-03-14 DIAGNOSIS — Z11.4 SCREENING FOR HIV (HUMAN IMMUNODEFICIENCY VIRUS): ICD-10-CM

## 2022-03-14 DIAGNOSIS — I10 ESSENTIAL HYPERTENSION: Primary | ICD-10-CM

## 2022-03-14 DIAGNOSIS — J44.9 COPD, MILD (HCC): ICD-10-CM

## 2022-03-14 DIAGNOSIS — Z13.29 SCREENING FOR THYROID DISORDER: ICD-10-CM

## 2022-03-14 DIAGNOSIS — R73.03 PREDIABETES: ICD-10-CM

## 2022-03-14 DIAGNOSIS — I10 ESSENTIAL HYPERTENSION: ICD-10-CM

## 2022-03-14 DIAGNOSIS — K64.9 HEMORRHOIDS, UNSPECIFIED HEMORRHOID TYPE: ICD-10-CM

## 2022-03-14 DIAGNOSIS — Z78.0 ENCOUNTER FOR OSTEOPOROSIS SCREENING IN ASYMPTOMATIC POSTMENOPAUSAL PATIENT: ICD-10-CM

## 2022-03-14 DIAGNOSIS — E55.9 VITAMIN D DEFICIENCY: ICD-10-CM

## 2022-03-14 LAB
25(OH)D3 SERPL-MCNC: 9.8 NG/ML (ref 30–100)
ALBUMIN SERPL BCP-MCNC: 3.8 G/DL (ref 3.5–5)
ALP SERPL-CCNC: 103 U/L (ref 46–116)
ALT SERPL W P-5'-P-CCNC: 32 U/L (ref 12–78)
ANION GAP SERPL CALCULATED.3IONS-SCNC: 8 MMOL/L (ref 4–13)
AST SERPL W P-5'-P-CCNC: 81 U/L (ref 5–45)
BASOPHILS # BLD AUTO: 0.06 THOUSANDS/ΜL (ref 0–0.1)
BASOPHILS NFR BLD AUTO: 1 % (ref 0–1)
BILIRUB SERPL-MCNC: 0.64 MG/DL (ref 0.2–1)
BUN SERPL-MCNC: 9 MG/DL (ref 5–25)
CALCIUM SERPL-MCNC: 8.5 MG/DL (ref 8.3–10.1)
CHLORIDE SERPL-SCNC: 107 MMOL/L (ref 100–108)
CHOLEST SERPL-MCNC: 232 MG/DL
CO2 SERPL-SCNC: 28 MMOL/L (ref 21–32)
CREAT SERPL-MCNC: 0.62 MG/DL (ref 0.6–1.3)
EOSINOPHIL # BLD AUTO: 0.22 THOUSAND/ΜL (ref 0–0.61)
EOSINOPHIL NFR BLD AUTO: 4 % (ref 0–6)
ERYTHROCYTE [DISTWIDTH] IN BLOOD BY AUTOMATED COUNT: 18.7 % (ref 11.6–15.1)
EST. AVERAGE GLUCOSE BLD GHB EST-MCNC: 100 MG/DL
GFR SERPL CREATININE-BSD FRML MDRD: 96 ML/MIN/1.73SQ M
GLUCOSE P FAST SERPL-MCNC: 101 MG/DL (ref 65–99)
HBA1C MFR BLD: 5.1 %
HCT VFR BLD AUTO: 32.1 % (ref 34.8–46.1)
HCV AB SER QL: NORMAL
HDLC SERPL-MCNC: 65 MG/DL
HGB BLD-MCNC: 10.3 G/DL (ref 11.5–15.4)
IMM GRANULOCYTES # BLD AUTO: 0.04 THOUSAND/UL (ref 0–0.2)
IMM GRANULOCYTES NFR BLD AUTO: 1 % (ref 0–2)
LYMPHOCYTES # BLD AUTO: 1.47 THOUSANDS/ΜL (ref 0.6–4.47)
LYMPHOCYTES NFR BLD AUTO: 29 % (ref 14–44)
MCH RBC QN AUTO: 31.7 PG (ref 26.8–34.3)
MCHC RBC AUTO-ENTMCNC: 32.1 G/DL (ref 31.4–37.4)
MCV RBC AUTO: 99 FL (ref 82–98)
MONOCYTES # BLD AUTO: 0.42 THOUSAND/ΜL (ref 0.17–1.22)
MONOCYTES NFR BLD AUTO: 8 % (ref 4–12)
NEUTROPHILS # BLD AUTO: 2.91 THOUSANDS/ΜL (ref 1.85–7.62)
NEUTS SEG NFR BLD AUTO: 57 % (ref 43–75)
NONHDLC SERPL-MCNC: 167 MG/DL
NRBC BLD AUTO-RTO: 0 /100 WBCS
PLATELET # BLD AUTO: 509 THOUSANDS/UL (ref 149–390)
PMV BLD AUTO: 10.3 FL (ref 8.9–12.7)
POTASSIUM SERPL-SCNC: 3.1 MMOL/L (ref 3.5–5.3)
PROT SERPL-MCNC: 8.1 G/DL (ref 6.4–8.2)
RBC # BLD AUTO: 3.25 MILLION/UL (ref 3.81–5.12)
SODIUM SERPL-SCNC: 143 MMOL/L (ref 136–145)
TRIGL SERPL-MCNC: 418 MG/DL
TSH SERPL DL<=0.05 MIU/L-ACNC: 0.79 UIU/ML (ref 0.36–3.74)
WBC # BLD AUTO: 5.12 THOUSAND/UL (ref 4.31–10.16)

## 2022-03-14 PROCEDURE — 87389 HIV-1 AG W/HIV-1&-2 AB AG IA: CPT

## 2022-03-14 PROCEDURE — 80053 COMPREHEN METABOLIC PANEL: CPT

## 2022-03-14 PROCEDURE — 86803 HEPATITIS C AB TEST: CPT

## 2022-03-14 PROCEDURE — 82306 VITAMIN D 25 HYDROXY: CPT

## 2022-03-14 PROCEDURE — 99214 OFFICE O/P EST MOD 30 MIN: CPT

## 2022-03-14 PROCEDURE — 83036 HEMOGLOBIN GLYCOSYLATED A1C: CPT

## 2022-03-14 PROCEDURE — 36415 COLL VENOUS BLD VENIPUNCTURE: CPT

## 2022-03-14 PROCEDURE — 84443 ASSAY THYROID STIM HORMONE: CPT

## 2022-03-14 PROCEDURE — 85025 COMPLETE CBC W/AUTO DIFF WBC: CPT

## 2022-03-14 PROCEDURE — 80061 LIPID PANEL: CPT

## 2022-03-14 RX ORDER — MONTELUKAST SODIUM 10 MG/1
10 TABLET ORAL
Qty: 30 TABLET | Refills: 5 | Status: SHIPPED | OUTPATIENT
Start: 2022-03-14

## 2022-03-14 RX ORDER — ICOSAPENT ETHYL 1000 MG/1
1 CAPSULE ORAL DAILY
Qty: 30 CAPSULE | Refills: 5 | Status: SHIPPED | OUTPATIENT
Start: 2022-03-14 | End: 2022-03-30

## 2022-03-14 RX ORDER — ALBUTEROL SULFATE 2.5 MG/3ML
2.5 SOLUTION RESPIRATORY (INHALATION) EVERY 6 HOURS PRN
Qty: 180 ML | Refills: 5 | Status: SHIPPED | OUTPATIENT
Start: 2022-03-14 | End: 2022-03-14

## 2022-03-14 RX ORDER — TRIAMTERENE AND HYDROCHLOROTHIAZIDE 37.5; 25 MG/1; MG/1
1 TABLET ORAL DAILY
Qty: 30 TABLET | Refills: 5 | Status: SHIPPED | OUTPATIENT
Start: 2022-03-14 | End: 2022-05-26

## 2022-03-14 RX ORDER — FLUTICASONE FUROATE AND VILANTEROL TRIFENATATE 100; 25 UG/1; UG/1
1 POWDER RESPIRATORY (INHALATION) DAILY
Qty: 60 EACH | Refills: 1 | Status: SHIPPED | OUTPATIENT
Start: 2022-03-14

## 2022-03-14 RX ORDER — HYDROCORTISONE ACETATE 25 MG/1
25 SUPPOSITORY RECTAL 2 TIMES DAILY
Qty: 12 SUPPOSITORY | Refills: 0 | Status: SHIPPED | OUTPATIENT
Start: 2022-03-14

## 2022-03-14 RX ORDER — ALBUTEROL SULFATE 90 UG/1
2 AEROSOL, METERED RESPIRATORY (INHALATION) EVERY 6 HOURS PRN
Qty: 18 G | Refills: 1 | Status: SHIPPED | OUTPATIENT
Start: 2022-03-14

## 2022-03-14 RX ORDER — ALBUTEROL SULFATE 2.5 MG/3ML
2.5 SOLUTION RESPIRATORY (INHALATION) EVERY 6 HOURS PRN
Qty: 180 ML | Refills: 5 | Status: SHIPPED | OUTPATIENT
Start: 2022-03-14

## 2022-03-14 RX ORDER — AMLODIPINE BESYLATE AND ATORVASTATIN CALCIUM 5; 20 MG/1; MG/1
1 TABLET, FILM COATED ORAL DAILY
Qty: 30 TABLET | Refills: 0 | Status: SHIPPED | OUTPATIENT
Start: 2022-03-14 | End: 2022-06-29

## 2022-03-14 RX ORDER — ALBUTEROL SULFATE 2.5 MG/3ML
2.5 SOLUTION RESPIRATORY (INHALATION) EVERY 6 HOURS PRN
Qty: 180 ML | Refills: 5 | Status: SHIPPED | OUTPATIENT
Start: 2022-03-14 | End: 2022-03-14 | Stop reason: SDUPTHER

## 2022-03-14 NOTE — PROGRESS NOTES
[place]    NAME: Carvin Ganser  AGE: 57 y o  SEX: female  : 1960     DATE: 3/14/2022     Assessment and Plan:     Problem List Items Addressed This Visit        Respiratory    COPD, mild ([address] )     Patient reports the symptoms are stable  Relevant Medications    montelukast (SINGULAIR) 10 mg tablet    albuterol (ProAir HFA) 90 mcg/act inhaler    fluticasone-vilanterol (Breo Ellipta) 100-25 mcg/inh inhaler    albuterol (2 5 mg/3 mL) 0 083 % nebulizer solution       Cardiovascular and Mediastinum    Essential hypertension - Primary     Blood pressure is 124/76 and stable    Continue current medications         Relevant Medications    triamterene-hydrochlorothiazide (MAXZIDE-25) 37 5-25 mg per tablet    amLODIPine-atorvastatin (CADUET) 5-20 MG per tablet    Other Relevant Orders    CBC and differential    Comprehensive metabolic panel       Other    Hyperlipidemia    Relevant Medications    Icosapent Ethyl (Vascepa) 1 g CAPS    Other Relevant Orders    Lipid panel    Prediabetes    Relevant Orders    Hemoglobin A1C      Other Visit Diagnoses     Screening for HIV (human immunodeficiency virus)        Relevant Orders    HIV 1/2 Antigen/Antibody (4th Generation) w Reflex [facility]    Encounter for screening mammogram for breast cancer        Relevant Orders    Mammo screening bilateral w 3d & cad    Hemorrhoids, unspecified hemorrhoid type        Relevant Medications    hydrocortisone (ANUSOL-HC) 25 mg suppository    Encounter for osteoporosis screening in asymptomatic postmenopausal patient        Relevant Orders    DXA bone density spine hip and pelvis    Need for hepatitis C screening test        Relevant Orders    Hepatitis C Antibody ([facility])    Screening for thyroid disorder        Relevant Orders    TSH, 3rd generation with Free T4 reflex    Asthma due to environmental allergies        Relevant Medications    montelukast (SINGULAIR) 10 mg tablet    albuterol (ProAir HFA) 90 mcg/act inhaler    fluticasone-vilanterol (Breo Ellipta) 100-25 mcg/inh inhaler    albuterol (2 5 mg/3 mL) 0 083 % nebulizer solution    Vitamin D deficiency        Relevant Orders    Vitamin D 25 hydroxy              Return in about 6 months (around 9/14/2022) for Recheck  Chief Complaint:     Chief Complaint   Patient presents with   [facility] pt  History of Present Illness:     Jacquelyn presents to the office today to establish care with this provider  She had been seeing another provider who has a longer with the practice  Overall she reports feeling well  She states she does not like to leave her home or go out for appointments  She states it has become difficult for her to drive that her vision is changing  Her last eye exam was 3 years ago I recommend patient have her eyes examined as she may need a new prescription for her glasses  Her only other complaint today is constipation  States her last bowel movement was 2 days ago  She states she is going to  [place] on her way home for this appointment  Review of Systems:     Review of Systems   Constitutional: Negative  HENT: Negative  Respiratory: Negative  Cardiovascular: Negative  Gastrointestinal: Positive for abdominal pain and constipation  Negative for nausea and vomiting  Genitourinary: Negative  Musculoskeletal: Negative  Neurological: Negative  Psychiatric/Behavioral: The patient is nervous/anxious           Problem List:     Patient Active Problem List   Diagnosis    Tibial plateau fracture, left    History of lung cancer    HTN (hypertension)    Hyperlipidemia    Hypokalemia    Prediabetes    COPD, mild [address] [address]    Post-menopausal    Encounter for gynecological examination without abnormal finding        Objective:     /76   Pulse 82   Temp 98 °F (36 7 °C)   Ht 5' 2" (1 575 m)   Wt 82 7 kg (182 lb 6 4 oz)   SpO2 97%   BMI 33 36 kg/m²     Physical Exam  Vitals and nursing note reviewed  Constitutional:       General: She is not in acute distress  Appearance: She is well-developed  She is obese  HENT:      Head: Normocephalic and atraumatic  Right Ear: Tympanic membrane normal       Left Ear: Tympanic membrane normal       Nose: Nose normal  No congestion or rhinorrhea  Mouth/Throat:      Mouth: Mucous membranes are moist       Pharynx: Oropharynx is clear  Eyes:      Conjunctiva/sclera: Conjunctivae normal       Pupils: Pupils are equal, round, and reactive to light  Neck:      Thyroid: No thyromegaly  Cardiovascular:      Rate and Rhythm: Normal rate and regular rhythm  Pulses: Normal pulses  Heart sounds: Normal heart sounds  No murmur heard  Pulmonary:      Effort: Pulmonary effort is normal  No respiratory distress  Breath sounds: Normal breath sounds  Abdominal:      General: Bowel sounds are normal       Palpations: Abdomen is soft  Tenderness: There is no abdominal tenderness  Musculoskeletal:         General: Normal range of motion  Cervical back: Normal range of motion and neck supple  Lymphadenopathy:      Cervical: No cervical adenopathy  Skin:     General: Skin is warm and dry  Capillary Refill: Capillary refill takes less than 2 seconds  Neurological:      General: No focal deficit present  Mental Status: She is alert and oriented to person, place, and time  Psychiatric:         Mood and Affect: Mood normal          Behavior: Behavior normal          Thought Content: Thought content normal          Judgment: Judgment normal          I spent 20 minutes with this patient      [address] [place] [address]  [facility]

## 2022-03-14 NOTE — PATIENT INSTRUCTIONS
Miralax and Colace     Potassium Content of Foods List   AMBULATORY CARE:   Potassium  is a mineral that is found in most foods  Potassium helps to balance fluids and minerals in your body  It also helps your body maintain a normal blood pressure  Potassium helps your muscles contract and your nerves function normally  Why you may need to change the amount of potassium you eat:   · You may need more potassium  if you have hypokalemia (low potassium levels) or high blood pressure  You may also need more potassium if you are taking diuretics  Diuretics and certain medicines cause your body to lose potassium  · You may need less potassium  in your diet if you have hyperkalemia (high potassium levels) or kidney disease  Potassium content of fruit:  The amount of potassium in milligrams (mg) contained in each fruit or serving of fruit is listed beside the item  · High-potassium foods (more than 200 mg per serving):      ? 1 medium banana (425)    ? ½ of a papaya (390)    ? ½ cup of prune juice (370)    ? ¼ cup of raisins (270)    ? 1 medium tamika (325) or kiwi (240)    ? 1 small orange (240) or ½ cup of orange juice (235)    ? ½ cup of cubed cantaloupe (215) or diced honeydew melon (200)    ? 1 medium pear (200)    · Medium-potassium foods (50 to 200 mg per serving):      ? 1 medium peach (185)    ? 1 small apple or ½ cup of apple juice (150)    ? ½ cup of peaches canned in juice (120)    ? ½ cup of canned pineapple (100)    ? ½ cup of fresh, sliced strawberries (425)    ? ½ cup of watermelon (85)    · Low-potassium foods (less than 50 mg per serving):      ? ½ cup of cranberries (45) or cranberry juice cocktail (20)    ? ½ cup of nectar of papaya, tamika, or pear (35)    Potassium content of vegetables:   · High-potassium foods (more than 200 mg per serving):      ? 1 medium baked potato, with skin (925)    ? 1 baked medium sweet potato, with skin (450)    ?  ½ cup of tomato or vegetable juice (275), or 1 medium raw tomato (290)    ? ½ cup of mushrooms (280)    ? ½ cup of fresh brussels sprouts (250)    ? ½ cup of cooked zucchini (220) or winter squash (250)    ? ¼ of a medium avocado (245)    ? ½ cup of broccoli (230)    · Medium-potassium foods (50 to 200 mg per serving):      ? ½ cup of corn (195)    ? ½ cup of fresh or cooked carrots (180)    ? ½ cup of fresh cauliflower (150)    ? ½ cup of asparagus (155)    ? ½ cup of canned peas (90)     ? 1 cup of lettuce, all types (100)    ? ½ cup of fresh green beans (90)    ? ½ cup of frozen green beans (85)    ? ½ cup of cucumber (80)    Potassium content of protein foods:   · High-potassium foods (more than 200 mg per serving):      ? ½ cup of cooked posadas beans (400) or lentils (365)    ? 1 cup of soy milk (300)    ? 3 ounces of baked or broiled salmon (319)    ? 3 ounces of roasted turkey, dark meat (250)    ? ¼ cup of sunflower seeds (241)    ? 3 ounces of cooked lean beef (224)    ? 2 tablespoons of smooth peanut butter (210)    · Medium-potassium foods (50 to 200 mg per serving):      ? 1 ounce of salted peanuts, almonds, or cashews (200)    ? 1 large egg (60 mg)    Potassium content of dairy foods:   · High-potassium foods (more than 200 mg per serving):      ? 6 ounces of yogurt (260 to 435)    ? 1 cup of nonfat, low-fat, or whole milk (350 to 380)    · Medium-potassium foods (50 to 200 mg per serving):      ? ½ cup of ricotta cheese (154)    ? ½ cup of vanilla ice cream (131)    ?  ½ cup of low-fat (2%) cottage cheese (110)    · Low-potassium foods (less than 50 mg per serving):      ? 1 ounce of cheese (20 to 30)      Potassium content of grains:   · 1 slice of white bread (30)    · ½ cup of white or brown rice (50)    · ½ cup of spaghetti or macaroni (30)    · 1 flour or corn tortilla (50)    · 1 four-inch waffle (50)    Potassium content of other foods:   · 1 tablespoon of molasses (295)    · 1½ ounces of chocolate (165)    · Some salt substitutes may contain a high amount of potassium  Check the food label to find the amount of potassium it contains  © Copyright [employer] 2022 Information is for End User's use only and may not be sold, redistributed or otherwise used for commercial purposes  All illustrations and images included in CareNotes® are the copyrighted property of A D A M , Inc  or Jake Ascencio  The above information is an  only  It is not intended as medical advice for individual conditions or treatments  Talk to your doctor, nurse or pharmacist before following any medical regimen to see if it is safe and effective for you  Constipation   WHAT YOU NEED TO KNOW:   Constipation means you have hard, dry bowel movements, or you go longer than usual between bowel movements  DISCHARGE INSTRUCTIONS:   Call your doctor if:   · You have blood in your bowel movements  · You have a fever and abdominal pain with the constipation  · Your constipation gets worse  · You start to vomit  · You have questions or concerns about your condition or care  Medicines:   · Medicine  such as a laxative may help relax and loosen your intestines to help you have a bowel movement  Your provider may recommend you only use laxatives for a short time  Long-term use may make your bowels dependent on the medicine  · Take your medicine as directed  Contact your healthcare provider if you think your medicine is not helping or if you have side effects  Tell him of her if you are allergic to any medicine  Keep a list of the medicines, vitamins, and herbs you take  Include the amounts, and when and why you take them  Bring the list or the pill bottles to follow-up visits  Carry your medicine list with you in case of an emergency  Relieve constipation:   · A suppository  may be used to help soften your bowel movements  This may make them easier to pass  A suppository is guided into your rectum through your anus           · An enema  is liquid medicine used to clear bowel movement from your rectum  The medicine is put into your rectum through your anus  Prevent constipation:   · Drink liquids as directed  You may need to drink extra liquids to help soften and move your bowels  Ask how much liquid to drink each day and which liquids are best for you  · Eat high-fiber foods  This may help decrease constipation by adding bulk to your bowel movements  High-fiber foods include fruit, vegetables, whole-grain breads and cereals, and beans  Your healthcare provider or dietitian can help you create a high-fiber meal plan  Your provider may also recommend a fiber supplement if you cannot get enough fiber from food  · Exercise regularly  Regular physical activity can help stimulate your intestines  Walking is a good exercise to prevent or relieve constipation  Ask which exercises are best for you  · Schedule a time each day to have a bowel movement  This may help train your body to have regular bowel movements  Bend forward while you are on the toilet to help move the bowel movement out  Sit on the toilet for at least 10 minutes, even if you do not have a bowel movement  · Talk to your healthcare provider about your medicines  Certain medicines, such as opioids, can cause constipation  Your provider may be able to make medicine changes  For example, he or she may change the kind of medicine, or change when you take it  Follow up with your doctor as directed:  Write down your questions so you remember to ask them during your visits  © Copyright [facility] 2022 Information is for End User's use only and may not be sold, redistributed or otherwise used for commercial purposes  All illustrations and images included in CareNotes® are the copyrighted property of A D A M , Inc  or [facility] Neli Cox   The above information is an  only  It is not intended as medical advice for individual conditions or treatments   Talk to your doctor, nurse or pharmacist before following any medical regimen to see if it is safe and effective for you

## 2022-03-15 LAB — HIV 1+2 AB+HIV1 P24 AG SERPL QL IA: NORMAL

## 2022-03-22 ENCOUNTER — TELEPHONE (OUTPATIENT)
Dept: INTERNAL MEDICINE CLINIC | Facility: CLINIC | Age: 62
End: 2022-03-22

## 2022-03-22 NOTE — TELEPHONE ENCOUNTER
----- Message from [address] sent at 3/22/2022 10:26 AM EDT -----  Please call the patient regarding her abnormal result  I would like to set up an appointment to review patient's labs with her  See if she will come in    If not could she do a video virtual

## 2022-03-30 ENCOUNTER — OFFICE VISIT (OUTPATIENT)
Dept: INTERNAL MEDICINE CLINIC | Facility: CLINIC | Age: 62
End: 2022-03-30
Payer: MEDICARE

## 2022-03-30 ENCOUNTER — APPOINTMENT (OUTPATIENT)
Dept: LAB | Facility: CLINIC | Age: 62
End: 2022-03-30
Payer: MEDICARE

## 2022-03-30 VITALS
WEIGHT: 182 LBS | OXYGEN SATURATION: 98 % | RESPIRATION RATE: 16 BRPM | TEMPERATURE: 98.1 F | SYSTOLIC BLOOD PRESSURE: 166 MMHG | HEART RATE: 77 BPM | DIASTOLIC BLOOD PRESSURE: 108 MMHG | BODY MASS INDEX: 33.29 KG/M2

## 2022-03-30 DIAGNOSIS — D64.9 ANEMIA, UNSPECIFIED TYPE: Primary | ICD-10-CM

## 2022-03-30 DIAGNOSIS — K62.5 RECTAL BLEED: ICD-10-CM

## 2022-03-30 DIAGNOSIS — E87.6 HYPOKALEMIA: ICD-10-CM

## 2022-03-30 DIAGNOSIS — E78.00 PURE HYPERCHOLESTEROLEMIA: ICD-10-CM

## 2022-03-30 DIAGNOSIS — E55.9 VITAMIN D DEFICIENCY: ICD-10-CM

## 2022-03-30 DIAGNOSIS — R73.03 PREDIABETES: ICD-10-CM

## 2022-03-30 DIAGNOSIS — D64.9 ANEMIA, UNSPECIFIED TYPE: ICD-10-CM

## 2022-03-30 DIAGNOSIS — K64.9 HEMORRHOIDS, UNSPECIFIED HEMORRHOID TYPE: ICD-10-CM

## 2022-03-30 LAB
FERRITIN SERPL-MCNC: 392 NG/ML (ref 8–388)
IRON SATN MFR SERPL: 91 % (ref 15–50)
IRON SERPL-MCNC: 247 UG/DL (ref 50–170)
TIBC SERPL-MCNC: 270 UG/DL (ref 250–450)

## 2022-03-30 PROCEDURE — 83540 ASSAY OF IRON: CPT

## 2022-03-30 PROCEDURE — 86258 DGP ANTIBODY EACH IG CLASS: CPT

## 2022-03-30 PROCEDURE — 83550 IRON BINDING TEST: CPT

## 2022-03-30 PROCEDURE — 36415 COLL VENOUS BLD VENIPUNCTURE: CPT

## 2022-03-30 PROCEDURE — 99214 OFFICE O/P EST MOD 30 MIN: CPT

## 2022-03-30 PROCEDURE — 82728 ASSAY OF FERRITIN: CPT

## 2022-03-30 PROCEDURE — 86364 TISS TRNSGLTMNASE EA IG CLAS: CPT

## 2022-03-30 PROCEDURE — 86231 EMA EACH IG CLASS: CPT

## 2022-03-30 PROCEDURE — 82784 ASSAY IGA/IGD/IGG/IGM EACH: CPT

## 2022-03-30 RX ORDER — ERGOCALCIFEROL 1.25 MG/1
50000 CAPSULE ORAL WEEKLY
Qty: 12 CAPSULE | Refills: 0 | Status: SHIPPED | OUTPATIENT
Start: 2022-03-30 | End: 2022-05-26

## 2022-03-30 RX ORDER — ICOSAPENT ETHYL 1000 MG/1
1 CAPSULE ORAL 2 TIMES DAILY
Qty: 180 CAPSULE | Refills: 1 | Status: SHIPPED | OUTPATIENT
Start: 2022-03-30

## 2022-03-30 RX ORDER — POTASSIUM CHLORIDE 750 MG/1
10 CAPSULE, EXTENDED RELEASE ORAL 2 TIMES DAILY
Qty: 60 CAPSULE | Refills: 5 | Status: SHIPPED | OUTPATIENT
Start: 2022-03-30

## 2022-03-30 NOTE — PATIENT INSTRUCTIONS
Vitamin D Deficiency   AMBULATORY CARE:   Vitamin D deficiency  is a low level of vitamin D in your body  Vitamin D helps your body absorb calcium from foods  Your body makes vitamin D when your skin is exposed to sunlight  You can also get vitamin D from certain foods  Most of the vitamin D in your body comes from sunlight exposure  Common symptoms include the following:  Low levels of vitamin D can lead to weak and brittle bones that are more likely to fracture  You may not have any signs and symptoms, or you may have any of the following:  · Bone pain or discomfort in your lower back, pelvis, or legs    · Muscle aches and weakness    · Low back pain in women    · Poor growth, irritability, and frequent respiratory tract infections in infants    · Deformed bones and slow growth in children    Call your doctor or dietitian if:   · You continue to have symptoms, or your symptoms get worse  · You think you took too much of a vitamin D supplement, and you have nausea, vomiting, or a headache  · You have questions or concerns about your condition or care  Treatment for vitamin D deficiency  includes high doses of vitamin D for 8 to 12 weeks to increase your levels  Your levels will then be rechecked  If your levels are still low, you will need to take vitamin D supplements for another 8 weeks  After your levels have gone back to normal, you may need to continue to take a vitamin D supplement  Amount of vitamin D do you need each day:  The amount of vitamin D you need depends on your age  You may need more than the recommended amounts below if you take certain medicines or you are obese  Ask your healthcare provider how much vitamin D you need  · Infants up to 1 year of age: 0 international units (IU)    · Children 1 year and older: 600 IU    · Adults aged 23to 79years old: 600 IU    · Adults older than 70 years: 800 IU    Prevent vitamin D deficiency:   · Eat foods that are high in vitamin D    Fatty fish such as mackerel, canned tuna and sardines, and salmon are good sources of vitamin D  Eggs, almonds, and meat such as liver are also good sources  Certain foods such as milk, juice, and cereal are fortified with vitamin D          · Give your  infant a vitamin D supplement  of 400 IU each day  · Take vitamin D supplements as directed  High doses of vitamin D can be toxic  Your healthcare provider will tell you how much vitamin D you should take each day  Vitamin D is best absorbed when taken with food  · Expose your skin to sunlight as directed  Ask your healthcare provider how you can safely expose your skin to sunlight and for how long  Too much exposure to sunlight can cause skin cancer  Follow up with your doctor or dietitian as directed:  Write down your questions so you remember to ask them during your visits  © Copyright [employer] 2022 Information is for End User's use only and may not be sold, redistributed or otherwise used for commercial purposes  All illustrations and images included in CareNotes® are the copyrighted property of A D A M , Inc  or [facility] Neli Cox   The above information is an  only  It is not intended as medical advice for individual conditions or treatments  Talk to your doctor, nurse or pharmacist before following any medical regimen to see if it is safe and effective for you  Hemorrhoids   AMBULATORY CARE:   Hemorrhoids  are swollen blood vessels inside your rectum (internal hemorrhoids) or on your anus (external hemorrhoids)  Sometimes a hemorrhoid may prolapse  This means it extends out of your anus    Common symptoms include the following:   · Pain or itching around your anus or inside your rectum    · Swelling or bumps around your anus    · Bright red blood in your bowel movement, on the toilet paper, or in the toilet bowl    · Tissue bulging out of your anus (prolapsed hemorrhoids)    · Incontinence (poor control over urine or bowel movements)    Seek care immediately if:   · You have severe pain in your rectum or around your anus  · You have severe pain in your abdomen and you are vomiting  · You have bleeding from your anus that soaks through your underwear  Contact your healthcare provider if:   · You have frequent and painful bowel movements  · Your hemorrhoid looks or feels more swollen than usual      · You do not have a bowel movement for 2 days or more  · You see or feel tissue coming through your anus  · You have questions or concerns about your condition or care  Treatment for hemorrhoids  may include medicines to decrease pain, swelling, and itching  The medicine may be a pill, pad, cream, or ointment  Medicine may also be given to soften your bowel movement  Surgery and other procedures may be needed to shrink or remove your hemorrhoids  Manage your symptoms:   · Apply ice on your anus for 15 to 20 minutes every hour or as directed  Use an ice pack, or put crushed ice in a plastic bag  Cover it with a towel before you apply it to your anus  Ice helps prevent tissue damage and decreases swelling and pain  · Take a sitz bath  Fill a bathtub with 4 to 6 inches of warm water  You may also use a sitz bath pan that fits inside a toilet bowl  Sit in the sitz bath for 15 minutes  Do this 3 times a day, and after each bowel movement  The warm water can help decrease pain and swelling  · Keep your anal area clean  Gently wash the area with warm water daily  Soap may irritate the area  After a bowel movement, wipe with moist towelettes or wet toilet paper  Dry toilet paper can irritate the area  Prevent hemorrhoids:   · Do not strain to have a bowel movement  Do not sit on the toilet too long  These actions can increase pressure on the tissues in your rectum and anus  · Drink plenty of liquids  Liquids can help prevent constipation   Ask how much liquid to drink each day and which liquids are best for you  · Eat a variety of high-fiber foods  Examples include fruits, vegetables, and whole grains  Ask your healthcare provider how much fiber you need each day  You may need to take a fiber supplement  · Exercise as directed  Exercise, such as walking, may make it easier to have a bowel movement  Ask your healthcare provider to help you create an exercise plan  · Do not have anal sex  Anal sex can weaken the skin around your rectum and anus  · Avoid heavy lifting  This can cause straining and increase your risk for another hemorrhoid  Follow up with your doctor as directed:  Write down your questions so you remember to ask them during your visits  © Copyright [address] 2022 Information is for End User's use only and may not be sold, redistributed or otherwise used for commercial purposes  All illustrations and images included in CareNotes® are the copyrighted property of [employer] , Inc  or [facility] Neli Cox   The above information is an  only  It is not intended as medical advice for individual conditions or treatments  Talk to your doctor, nurse or pharmacist before following any medical regimen to see if it is safe and effective for you

## 2022-03-30 NOTE — PROGRESS NOTES
Daisha    NAME: Ariel Brennan  AGE: 57 y o  SEX: female  : 1960     DATE: 2022     Assessment and Plan:     Problem List Items Addressed This Visit        Other    Hyperlipidemia    Relevant Medications    Icosapent Ethyl (Vascepa) 1 g CAPS    Hypokalemia     Patient has chronic hypokalemia  She states that the potassium replacement she had been on cause diarrhea  Will try Micro-K capsules 10 mEq use b i d  Thelda Fare K was 3 1  Relevant Medications    potassium chloride (MICRO-K) 10 MEQ CR capsule    Prediabetes     Hemoglobin A1c is 5 1 today         Anemia - Primary     Discussed pain with patient her CBC results which include decreased H&H and elevated platelet count  I counseled patient I would like her to see Gastroenterology to rule out any type of GI bleed  She reports that she has had hemorrhoids which had bled a a few times  She denies any blood in her stools or dark stools  She is agreeable to see GI   Will also have patient get iron studies today and celiac is she states that whenever she eats she she has to have a bowel movement  And gets abdominal cramping         Relevant Orders    Ambulatory Referral to Gastroenterology    Celiac Disease Antibody Profile (Completed)    Iron Panel (Includes Ferritin, Iron Sat%, Iron, and TIBC) (Completed)      Other Visit Diagnoses     Rectal bleed        Relevant Orders    Ambulatory Referral to Gastroenterology    Hemorrhoids, unspecified hemorrhoid type        Relevant Orders    Ambulatory Referral to Gastroenterology    Vitamin D deficiency        Relevant Medications    ergocalciferol (VITAMIN D2) 50,000 units             Return in about 2 months (around 2022) for Recheck       Chief Complaint:     Chief Complaint   Patient presents with    Follow-up     pt states that she is here to speak with provider about her blood work         History of Present Illness:     Jacquelyn WIN Win Martínez presents to the office today for a follow up visit for the following:   Review blood work  Patient had blood work prior to today's appointment which was reviewed with them     Recent Results (from the past 672 hour(s))   Hepatitis C Antibody ([facility])    Collection Time: 03/14/22  3:40 PM   Result Value Ref Range    Hepatitis C Ab Non-reactive Non-reactive   HIV 1/2 Antigen/Antibody (4th Generation) w Reflex [facility]    Collection Time: 03/14/22  3:40 PM   Result Value Ref Range    HIV-1/HIV-2 Ab Non-Reactive Non-Reactive   CBC and differential    Collection Time: 03/14/22  3:40 PM   Result Value Ref Range    WBC 5 12 4 31 - 10 16 Thousand/uL    RBC 3 25 (L) 3 81 - 5 12 Million/uL    Hemoglobin 10 3 (L) 11 5 - 15 4 g/dL    Hematocrit 32 1 (L) 34 8 - 46 1 %    MCV 99 (H) 82 - 98 fL    MCH 31 7 26 8 - 34 3 pg    MCHC 32 1 31 4 - 37 4 g/dL    RDW 18 7 (H) 11 6 - 15 1 %    MPV 10 3 8 9 - 12 7 fL    Platelets 336 (H) 705 - 390 Thousands/uL    nRBC 0 /100 WBCs    Neutrophils Relative 57 43 - 75 %    Immat GRANS % 1 0 - 2 %    Lymphocytes Relative 29 14 - 44 %    Monocytes Relative 8 4 - 12 %    Eosinophils Relative 4 0 - 6 %    Basophils Relative 1 0 - 1 %    Neutrophils Absolute 2 91 1 85 - 7 62 Thousands/µL    Immature Grans Absolute 0 04 0 00 - 0 20 Thousand/uL    Lymphocytes Absolute 1 47 0 60 - 4 47 Thousands/µL    Monocytes Absolute 0 42 0 17 - 1 22 Thousand/µL    Eosinophils Absolute 0 22 0 00 - 0 61 Thousand/µL    Basophils Absolute 0 06 0 00 - 0 10 Thousands/µL   Comprehensive metabolic panel    Collection Time: 03/14/22  3:40 PM   Result Value Ref Range    Sodium 143 136 - 145 mmol/L    Potassium 3 1 (L) 3 5 - 5 3 mmol/L    Chloride 107 100 - 108 mmol/L    CO2 28 21 - 32 mmol/L    ANION GAP 8 4 - 13 mmol/L    BUN 9 5 - 25 mg/dL    Creatinine 0 62 0 60 - 1 30 mg/dL    Glucose, Fasting 101 (H) 65 - 99 mg/dL    Calcium 8 5 8 3 - 10 1 mg/dL    AST 81 (H) 5 - 45 U/L    ALT 32 12 - 78 U/L    Alkaline Phosphatase 103 46 - 116 U/L    Total Protein 8 1 6 4 - 8 2 g/dL    Albumin 3 8 3 5 - 5 0 g/dL    Total Bilirubin 0 64 0 20 - 1 00 mg/dL    eGFR 96 ml/min/1 73sq m   Lipid panel    Collection Time: 03/14/22  3:40 PM   Result Value Ref Range    Cholesterol 232 (H) See Comment mg/dL    Triglycerides 418 (H) See Comment mg/dL    HDL, Direct 65 >=50 mg/dL    LDL Calculated      Non-HDL-Chol (CHOL-HDL) 167 mg/dl   TSH, 3rd generation with Free T4 reflex    Collection Time: 03/14/22  3:40 PM   Result Value Ref Range    TSH 3RD GENERATON 0 789 0 358 - 3 740 uIU/mL   Hemoglobin A1C    Collection Time: 03/14/22  3:40 PM   Result Value Ref Range    Hemoglobin A1C 5 1 Normal 3 8-5 6%; PreDiabetic 5 7-6 4%;  Diabetic >=6 5%; Glycemic control for adults with diabetes <7 0% %     mg/dl   Vitamin D 25 hydroxy    Collection Time: 03/14/22  3:40 PM   Result Value Ref Range    Vit D, 25-Hydroxy 9 8 (L) 30 0 - 100 0 ng/mL   Celiac Disease Antibody Profile    Collection Time: 03/30/22  2:21 PM   Result Value Ref Range    IgA 343 87 - 352 mg/dL    Gliadin IgA 5 0 - 19 units    Gliadin IgG 2 0 - 19 units    Tissue Transglut Ab IGG <2 0 - 5 U/mL    TISSUE TRANSGLUTAMINASE IGA <2 0 - 3 U/mL    Endomysial IgA Negative Negative   Iron Saturation %    Collection Time: 03/30/22  2:22 PM   Result Value Ref Range    Iron Saturation 91 (H) 15 - 50 %    TIBC 270 250 - 450 ug/dL    Iron 247 (H) 50 - 170 ug/dL   Ferritin    Collection Time: 03/30/22  2:22 PM   Result Value Ref Range    Ferritin 392 (H) 8 - 388 ng/mL              Review of Systems:     Review of Systems     Problem List:     Patient Active Problem List   Diagnosis    Tibial plateau fracture, left    History of lung cancer    Essential hypertension    Hyperlipidemia    Hypokalemia    Prediabetes    COPD, mild ([address] )    Post-menopausal    Encounter for gynecological examination without abnormal finding    Anemia        Objective:     BP (!) 166/108 (BP Location: Left arm, Patient Position: Sitting, Cuff Size: Standard)   Pulse 77   Temp 98 1 °F (36 7 °C) (Tympanic)   Resp 16   Wt 82 6 kg (182 lb)   SpO2 98%   BMI 33 29 kg/m²     Physical Exam  Constitutional:       General: She is not in acute distress  Appearance: She is obese  HENT:      Head: Normocephalic and atraumatic  Right Ear: External ear normal       Left Ear: External ear normal       Nose: Nose normal       Mouth/Throat:      Mouth: Mucous membranes are moist       Pharynx: Oropharynx is clear  Eyes:      Conjunctiva/sclera: Conjunctivae normal    Cardiovascular:      Rate and Rhythm: Normal rate and regular rhythm  Pulses: Normal pulses  Heart sounds: Normal heart sounds  No murmur heard  Pulmonary:      Effort: Pulmonary effort is normal       Breath sounds: Normal breath sounds  No wheezing  Musculoskeletal:         General: Normal range of motion  Cervical back: Neck supple  Skin:     General: Skin is warm and dry  Capillary Refill: Capillary refill takes less than 2 seconds  Neurological:      Mental Status: She is alert and oriented to person, place, and time  Psychiatric:         Mood and Affect: Mood normal          Behavior: Behavior normal          Thought Content: Thought content normal          Judgment: Judgment normal          I spent 20minutes with this patient      [address] [place] [address]  [facility]

## 2022-03-31 ENCOUNTER — TELEPHONE (OUTPATIENT)
Dept: GASTROENTEROLOGY | Facility: CLINIC | Age: 62
End: 2022-03-31

## 2022-03-31 LAB
ENDOMYSIUM IGA SER QL: NEGATIVE
GLIADIN PEPTIDE IGA SER-ACNC: 5 UNITS (ref 0–19)
GLIADIN PEPTIDE IGG SER-ACNC: 2 UNITS (ref 0–19)
IGA SERPL-MCNC: 343 MG/DL (ref 87–352)
TTG IGA SER-ACNC: <2 U/ML (ref 0–3)
TTG IGG SER-ACNC: <2 U/ML (ref 0–5)

## 2022-03-31 NOTE — TELEPHONE ENCOUNTER
LMOM for patient to phone back and schedule an office appt with Dr Vera Hernández or a PA Karrie Nissen

## 2022-04-01 PROBLEM — D64.9 ANEMIA: Status: ACTIVE | Noted: 2022-04-01

## 2022-04-01 NOTE — ASSESSMENT & PLAN NOTE
Discussed pain with patient her CBC results which include decreased H&H and elevated platelet count  I counseled patient I would like her to see Gastroenterology to rule out any type of GI bleed  She reports that she has had hemorrhoids which had bled a a few times  She denies any blood in her stools or dark stools  She is agreeable to see GI   Will also have patient get iron studies today and celiac is she states that whenever she eats she she has to have a bowel movement    And gets abdominal cramping

## 2022-04-01 NOTE — ASSESSMENT & PLAN NOTE
Patient has chronic hypokalemia  She states that the potassium replacement she had been on cause diarrhea  Will try Micro-K capsules 10 mEq use b i d  Lyla Dorado K was 3 1

## 2022-05-24 ENCOUNTER — RA CDI HCC (OUTPATIENT)
Dept: OTHER | Facility: HOSPITAL | Age: 62
End: 2022-05-24

## 2022-05-24 NOTE — PROGRESS NOTES
[address]  coding opportunities       Chart reviewed, no opportunity found: CHART REVIEWED, NO OPPORTUNITY FOUND        Patients Insurance     Medicare Insurance: Medicare

## 2022-05-25 ENCOUNTER — HOSPITAL ENCOUNTER (OUTPATIENT)
Dept: MAMMOGRAPHY | Facility: CLINIC | Age: 62
Discharge: HOME | End: 2022-05-25
Payer: MEDICARE

## 2022-05-25 DIAGNOSIS — Z12.31 ENCOUNTER FOR SCREENING MAMMOGRAM FOR BREAST CANCER: ICD-10-CM

## 2022-05-25 PROCEDURE — 77067 SCR MAMMO BI INCL CAD: CPT

## 2022-05-25 PROCEDURE — 77063 BREAST TOMOSYNTHESIS BI: CPT

## 2022-05-26 ENCOUNTER — OFFICE VISIT (OUTPATIENT)
Dept: GASTROENTEROLOGY | Facility: CLINIC | Age: 62
End: 2022-05-26
Payer: MEDICARE

## 2022-05-26 VITALS
BODY MASS INDEX: 31.36 KG/M2 | WEIGHT: 170.4 LBS | HEART RATE: 78 BPM | SYSTOLIC BLOOD PRESSURE: 122 MMHG | HEIGHT: 62 IN | DIASTOLIC BLOOD PRESSURE: 78 MMHG

## 2022-05-26 DIAGNOSIS — K52.9 CHRONIC DIARRHEA: Primary | ICD-10-CM

## 2022-05-26 DIAGNOSIS — K62.5 BRBPR (BRIGHT RED BLOOD PER RECTUM): ICD-10-CM

## 2022-05-26 DIAGNOSIS — K21.9 GASTROESOPHAGEAL REFLUX DISEASE, UNSPECIFIED WHETHER ESOPHAGITIS PRESENT: ICD-10-CM

## 2022-05-26 DIAGNOSIS — R10.13 EPIGASTRIC PAIN: ICD-10-CM

## 2022-05-26 PROCEDURE — 99203 OFFICE O/P NEW LOW 30 MIN: CPT | Performed by: PHYSICIAN ASSISTANT

## 2022-05-26 NOTE — PATIENT INSTRUCTIONS
Scheduled date of EGD/colonoscopy (as of today):6/29/22  Physician performing EGD/colonoscopy: Andrew  Location of EGD/colonoscopy:[place]  Desired bowel prep reviewed with patient:miralax/dulcolax  Instructions reviewed with patient by:Dinora TIPTON  Clearances:   none

## 2022-05-26 NOTE — PROGRESS NOTES
[facility] - Outpatient Consultation  Rachel Rob 57 y o  female MRN: [id number]  Encounter: [id number]          ASSESSMENT AND PLAN:      1  Chronic diarrhea  2  BRBPR (bright red blood per rectum)  - She reports chronic diarrhea for the past 3 years where she will have 6-7 loose BMs daily with intermittent BRBPR which may be hemorrhoidal  - Schedule colonoscopy given her change in bowel habits since her last colonoscopy as well as for her reported history of polyps removed in 2018      3  Epigastric pain  4  Gastroesophageal reflux disease, unspecified whether esophagitis present  - She reports a prior EGD in 2018 showing an "acidic tumor" but she cannot elaborate further and reports rare peptic symptoms  - Schedule EGD at the time of the colonoscopy for further evaluation    ______________________________________________________________________    HPI:  Amarilis Perez is a 58 yo F with a PMH of HTN, COPD, lung cancer, presenting due to an episode of BRBPR a few weeks ago in the setting of acute constipation and bloating  She reports that her symptoms have now resolved and she is back to having her baseline BMs of 6-7 loose BMs daily  She does occasionally see BRBPR with wiping or in her BMs  She reports her last colonoscopy was in 2018 in [place] and she believes she had polyps removed but she was not having diarrhea at that time  She has no family history of colon cancer  She reports occasional heartburn which primarily happened when she was bloated and constipated  She had a EGD in 2018 and she reports it showed an acidic tumor but she never followed up  She denies dysphagia, nausea, or vomiting  REVIEW OF SYSTEMS:    CONSTITUTIONAL: Denies any fever, chills, rigors, and weight loss  HEENT: No earache or tinnitus  Denies hearing loss or visual disturbances  CARDIOVASCULAR: No chest pain or palpitations     RESPIRATORY: Denies any cough, hemoptysis, shortness of breath or dyspnea on exertion  GASTROINTESTINAL: As noted in the History of Present Illness  GENITOURINARY: No problems with urination  Denies any hematuria or dysuria  NEUROLOGIC: No dizziness or vertigo, denies headaches  MUSCULOSKELETAL: Denies any muscle or joint pain  SKIN: Denies skin rashes or itching  ENDOCRINE: Denies excessive thirst  Denies intolerance to heat or cold  PSYCHOSOCIAL: Denies depression or anxiety  Denies any recent memory loss         Historical Information   Past Medical History:   Diagnosis Date    Cancer [facility])     lung, upper left lung lobectomy    Hyperlipidemia     Hypertension      Past Surgical History:   Procedure Laterality Date    HYSTERECTOMY      LUNG LOBECTOMY      ORIF TIBIAL PLATEAU Left     Procedure: OPEN REDUCTION W/ INTERNAL FIXATION (ORIF) TIBIAL PLATEAU;  Surgeon: Jackie Sandhoff, MD;  Location: [facility] OR;  Service: Orthopedics    WRIST SURGERY       Social History   Social History     Substance and Sexual Activity   Alcohol Use Not Currently    Comment: occ     Social History     Substance and Sexual Activity   Drug Use Never     Social History     Tobacco Use   Smoking Status Former Smoker    Quit date:     Years since quittin 4   Smokeless Tobacco Never Used     Family History   Problem Relation Age of Onset    Diabetes Mother     Diabetes Father     Breast cancer Sister     No Known Problems Sister     No Known Problems Sister     No Known Problems Sister     No Known Problems Maternal Grandmother     No Known Problems Paternal Grandmother     Diabetes Brother     No Known Problems Son     No Known Problems Maternal Aunt     No Known Problems Maternal Aunt     No Known Problems Maternal Aunt     No Known Problems Maternal Aunt     No Known Problems Maternal Aunt     No Known Problems Maternal Aunt     No Known Problems Maternal Aunt     No Known Problems Paternal Aunt     No Known Problems Paternal Aunt     No Known Problems Paternal Aunt     Ovarian cancer Neg Hx     Cervical cancer Neg Hx     Uterine cancer Neg Hx        Meds/Allergies       Current Outpatient Medications:     albuterol (2 5 mg/3 mL) 0 083 % nebulizer solution    albuterol (ProAir HFA) 90 mcg/act inhaler    fluticasone-vilanterol (Breo Ellipta) 100-25 mcg/inh inhaler    hydrocortisone (ANUSOL-HC) 25 mg suppository    Icosapent Ethyl (Vascepa) 1 g CAPS    montelukast (SINGULAIR) 10 mg tablet    potassium chloride (MICRO-K) 10 MEQ CR capsule    amLODIPine-atorvastatin (CADUET) 5-20 MG per tablet    Allergies   Allergen Reactions    Pollen Extract            Objective     Blood pressure 122/78, pulse 78, height 5' 2" (1 575 m), weight 77 3 kg (170 lb 6 4 oz)  Body mass index is 31 17 kg/m²  PHYSICAL EXAM:      General Appearance:   Alert, cooperative, no distress   HEENT:   Normocephalic, atraumatic, anicteric      Neck:  Supple, symmetrical, trachea midline   Lungs:   Clear to auscultation bilaterally; no rales, rhonchi or wheezing; respirations unlabored    Heart[de-identified]   Regular rate and rhythm; no murmur, rub, or gallop  Abdomen:   Soft, non-tender, non-distended; normal bowel sounds; no masses, no organomegaly    Genitalia:   Deferred    Rectal:   Deferred    Extremities:  No cyanosis, clubbing or edema    Pulses:  2+ and symmetric    Skin:  No jaundice, rashes, or lesions    Lymph nodes:  No palpable cervical lymphadenopathy        Lab Results:   No visits with results within 1 Day(s) from this visit     Latest known visit with results is:   Appointment on 03/30/2022   Component Date Value    IgA 03/30/2022 343     Gliadin IgA 03/30/2022 5     Gliadin IgG 03/30/2022 2     Tissue Transglut Ab IGG 03/30/2022 <2     TISSUE TRANSGLUTAMINASE * 03/30/2022 <2     Endomysial IgA 03/30/2022 Negative     Iron Saturation 03/30/2022 91 (A)    TIBC 03/30/2022 270     Iron 03/30/2022 247 (A)    Ferritin 03/30/2022 392 (A)         Radiology Results: Mammo screening bilateral w 3d & cad    Result Date: 5/26/2022  Narrative: DIAGNOSIS: Encounter for screening mammogram for breast cancer TECHNIQUE: Digital screening mammography was performed  Computer Aided Detection (CAD) analyzed all applicable images  COMPARISONS:  Multiple prior exams dating between 10/24/2009 and 08/31/2020  RELEVANT HISTORY: Family Breast Cancer History: History of breast cancer in Sister  Family Medical History: Family medical history includes breast cancer in sister  Personal History: Hormone history includes birth control  Surgical history includes hysterectomy  No known relevant medical history  The patient is scheduled in a reminder system for screening mammography  8-10% of cancers will be missed on mammography  Management of a palpable abnormality must be based on clinical grounds  Patients will be notified of their results via letter from our facility  Accredited by [employer] of Radiology and FDA  RISK ASSESSMENT: 5 Year Tyrer-Cuzick: 0 98 % 10 Year Tyrer-Cuzick: 1 88 % Lifetime Tyrer-Cuzick: 4 4 % TISSUE DENSITY: The breasts are almost entirely fatty  INDICATION: Ace Bishop is a 57 y o  female presenting for screening mammography  FINDINGS: Bilateral There are no suspicious masses, grouped microcalcifications or areas of unexplained architectural distortion  The skin and nipple areolar complex are unremarkable  Benign-appearing calcification is noted in the right breast       Impression: No mammographic evidence of malignancy  ASSESSMENT/BI-RADS CATEGORY: Left: 2 - Benign Right: 2 - Benign Overall: 2 - Benign RECOMMENDATION:      - Routine screening mammogram in 1 year for both breasts   Workstation ID: [id number]

## 2022-06-13 ENCOUNTER — OFFICE VISIT (OUTPATIENT)
Dept: INTERNAL MEDICINE CLINIC | Facility: CLINIC | Age: 62
End: 2022-06-13
Payer: MEDICARE

## 2022-06-13 ENCOUNTER — APPOINTMENT (OUTPATIENT)
Dept: LAB | Facility: CLINIC | Age: 62
End: 2022-06-13
Payer: MEDICARE

## 2022-06-13 VITALS
OXYGEN SATURATION: 96 % | SYSTOLIC BLOOD PRESSURE: 118 MMHG | BODY MASS INDEX: 32.39 KG/M2 | HEART RATE: 97 BPM | RESPIRATION RATE: 16 BRPM | WEIGHT: 176 LBS | HEIGHT: 62 IN | TEMPERATURE: 97.6 F | DIASTOLIC BLOOD PRESSURE: 72 MMHG

## 2022-06-13 DIAGNOSIS — D53.9 MACROCYTIC ANEMIA: ICD-10-CM

## 2022-06-13 DIAGNOSIS — R74.8 ABNORMAL LEVELS OF OTHER SERUM ENZYMES: ICD-10-CM

## 2022-06-13 DIAGNOSIS — Z72.89 ALCOHOL USE: ICD-10-CM

## 2022-06-13 DIAGNOSIS — R20.2 PARESTHESIA OF BOTH HANDS: ICD-10-CM

## 2022-06-13 DIAGNOSIS — R20.2 PARESTHESIA OF BOTH LOWER EXTREMITIES: ICD-10-CM

## 2022-06-13 DIAGNOSIS — D64.9 ANEMIA, UNSPECIFIED TYPE: ICD-10-CM

## 2022-06-13 DIAGNOSIS — Z78.0 ENCOUNTER FOR OSTEOPOROSIS SCREENING IN ASYMPTOMATIC POSTMENOPAUSAL PATIENT: ICD-10-CM

## 2022-06-13 DIAGNOSIS — Z13.820 ENCOUNTER FOR OSTEOPOROSIS SCREENING IN ASYMPTOMATIC POSTMENOPAUSAL PATIENT: ICD-10-CM

## 2022-06-13 DIAGNOSIS — R20.2 PARESTHESIA OF BOTH LOWER EXTREMITIES: Primary | ICD-10-CM

## 2022-06-13 LAB
BASOPHILS # BLD AUTO: 0.05 THOUSANDS/ΜL (ref 0–0.1)
BASOPHILS NFR BLD AUTO: 1 % (ref 0–1)
EOSINOPHIL # BLD AUTO: 0.26 THOUSAND/ΜL (ref 0–0.61)
EOSINOPHIL NFR BLD AUTO: 5 % (ref 0–6)
ERYTHROCYTE [DISTWIDTH] IN BLOOD BY AUTOMATED COUNT: 16.9 % (ref 11.6–15.1)
HCT VFR BLD AUTO: 36.4 % (ref 34.8–46.1)
HGB BLD-MCNC: 11.8 G/DL (ref 11.5–15.4)
IMM GRANULOCYTES # BLD AUTO: 0.01 THOUSAND/UL (ref 0–0.2)
IMM GRANULOCYTES NFR BLD AUTO: 0 % (ref 0–2)
LYMPHOCYTES # BLD AUTO: 1.41 THOUSANDS/ΜL (ref 0.6–4.47)
LYMPHOCYTES NFR BLD AUTO: 29 % (ref 14–44)
MCH RBC QN AUTO: 33.9 PG (ref 26.8–34.3)
MCHC RBC AUTO-ENTMCNC: 32.4 G/DL (ref 31.4–37.4)
MCV RBC AUTO: 105 FL (ref 82–98)
MONOCYTES # BLD AUTO: 0.42 THOUSAND/ΜL (ref 0.17–1.22)
MONOCYTES NFR BLD AUTO: 9 % (ref 4–12)
NEUTROPHILS # BLD AUTO: 2.66 THOUSANDS/ΜL (ref 1.85–7.62)
NEUTS SEG NFR BLD AUTO: 56 % (ref 43–75)
NRBC BLD AUTO-RTO: 0 /100 WBCS
PLATELET # BLD AUTO: 331 THOUSANDS/UL (ref 149–390)
PMV BLD AUTO: 10.5 FL (ref 8.9–12.7)
RBC # BLD AUTO: 3.48 MILLION/UL (ref 3.81–5.12)
WBC # BLD AUTO: 4.81 THOUSAND/UL (ref 4.31–10.16)

## 2022-06-13 PROCEDURE — 84207 ASSAY OF VITAMIN B-6: CPT

## 2022-06-13 PROCEDURE — 99213 OFFICE O/P EST LOW 20 MIN: CPT

## 2022-06-13 PROCEDURE — 85025 COMPLETE CBC W/AUTO DIFF WBC: CPT

## 2022-06-13 PROCEDURE — 82747 ASSAY OF FOLIC ACID RBC: CPT

## 2022-06-13 PROCEDURE — 80053 COMPREHEN METABOLIC PANEL: CPT

## 2022-06-13 PROCEDURE — 82607 VITAMIN B-12: CPT

## 2022-06-13 PROCEDURE — 36415 COLL VENOUS BLD VENIPUNCTURE: CPT

## 2022-06-13 NOTE — PATIENT INSTRUCTIONS
Anemia   WHAT YOU NEED TO KNOW:   Anemia is a low number of red blood cells or a low amount of hemoglobin in your red blood cells  Hemoglobin is a protein that helps carry oxygen throughout your body  Red blood cells use iron to create hemoglobin  Anemia may develop if your body does not have enough iron  It may also develop if your body does not make enough red blood cells or they die faster than your body can make them  DISCHARGE INSTRUCTIONS:   Call your local emergency number (911 in the [place]), or have someone call if:   You lose consciousness  You have severe chest pain  Return to the emergency department if:   You have dark or bloody bowel movements  Call your doctor if:   Your symptoms are worse, even after treatment  You have questions or concerns about your condition or care  Medicines:   Iron or folic acid supplements  help increase your red blood cell and hemoglobin levels  Vitamin B12 injections  may help boost your red blood cell level and decrease your symptoms  Ask your healthcare provider how to inject B12  Take your medicine as directed  Contact your healthcare provider if you think your medicine is not helping or if you have side effects  Tell him of her if you are allergic to any medicine  Keep a list of the medicines, vitamins, and herbs you take  Include the amounts, and when and why you take them  Bring the list or the pill bottles to follow-up visits  Carry your medicine list with you in case of an emergency  Prevent anemia:  Eat healthy foods rich in iron and vitamin C  Nuts, meat, dark leafy green vegetables, and beans are high in iron and protein  Vitamin C helps your body absorb iron  Foods rich in vitamin C include oranges and other citrus fruits  Ask your healthcare provider for a list of other foods that are high in iron or vitamin C  Ask if you need to be on a special diet            Follow up with your doctor as directed:  Write down your questions so you remember to ask them during your visits  © Copyright [employer] 2022 Information is for End User's use only and may not be sold, redistributed or otherwise used for commercial purposes  All illustrations and images included in CareNotes® are the copyrighted property of A D A M [employer] Inc  or Jake Ascencio  The above information is an  only  It is not intended as medical advice for individual conditions or treatments  Talk to your doctor, nurse or pharmacist before following any medical regimen to see if it is safe and effective for you

## 2022-06-13 NOTE — PROGRESS NOTES
Daisha    NAME: Salvador Luis  AGE: 57 y o  SEX: female  : 1960     DATE: 2022     Assessment and Plan:     Problem List Items Addressed This Visit        Other    Macrocytic anemia    Relevant Orders    Folate RBC (Completed)    Alcohol use     Patient states that she uses alcohol excessively on weekends  Patient states that she knows the amount of alcohol is not healthy  Patient was offered resources if she would need to help of stay from alcohol  Declined this time  Counseled patient to try to modify alcohol use  That this may be the cause of her abnormal blood work  Relevant Orders    CBC and differential (Completed)    Vitamin B12 (Completed)    Vitamin B6    Comprehensive metabolic panel (Completed)      Other Visit Diagnoses     Paresthesia of both lower extremities    -  Primary    Relevant Orders    Vitamin B12 (Completed)    Vitamin B6    Paresthesia of both hands        Relevant Orders    CBC and differential (Completed)    Vitamin B12 (Completed)    Vitamin B6    Encounter for osteoporosis screening in asymptomatic postmenopausal patient        Relevant Orders    DXA bone density spine hip and pelvis    Abnormal levels of other serum enzymes         Relevant Orders    Vitamin B6              No follow-ups on file  Chief Complaint:     Chief Complaint   Patient presents with    Follow-up     Patient reports had labs drawn and following up on  it, foot and finger tip sensitivity  no relief of the sensation,         History of Present Illness:     Jacquelyn presents to the office today for follow-up and review of labs  She reports today that she is having pins and needles sensation of her feet and hands bilaterally  Review of Systems:     Review of Systems   Constitutional: Negative  HENT: Negative  Respiratory: Negative  Cardiovascular: Negative  Gastrointestinal: Negative    Negative for abdominal pain, anal bleeding and rectal pain  Genitourinary: Negative  Musculoskeletal: Negative  Skin: Negative  Neurological:        Paresthesia of bilateral hands and feet   Psychiatric/Behavioral:        Reports excessive use of alcohol on weekends        Problem List:     Patient Active Problem List   Diagnosis    Tibial plateau fracture, left    History of lung cancer    Essential hypertension    Hyperlipidemia    Hypokalemia    Prediabetes    COPD, mild ([address] )    Post-menopausal    Encounter for gynecological examination without abnormal finding    Anemia        Objective:     /72 (BP Location: Left arm, Patient Position: Sitting, Cuff Size: Standard)   Pulse 97   Temp 97 6 °F (36 4 °C) (Tympanic)   Resp 16   Ht 5' 2" (1 575 m)   Wt 79 8 kg (176 lb)   SpO2 96%   BMI 32 19 kg/m²     Physical Exam  Vitals and nursing note reviewed  Constitutional:       General: She is not in acute distress  Appearance: She is well-developed  She is obese  HENT:      Head: Normocephalic and atraumatic  Right Ear: Tympanic membrane normal       Left Ear: Tympanic membrane normal       Nose: Nose normal  No congestion or rhinorrhea  Mouth/Throat:      Mouth: Mucous membranes are moist       Pharynx: Oropharynx is clear  Eyes:      Conjunctiva/sclera: Conjunctivae normal       Pupils: Pupils are equal, round, and reactive to light  Neck:      Thyroid: No thyromegaly  Cardiovascular:      Rate and Rhythm: Normal rate and regular rhythm  Pulses: Normal pulses  Heart sounds: Normal heart sounds  No murmur heard  Pulmonary:      Effort: Pulmonary effort is normal  No respiratory distress  Breath sounds: Normal breath sounds  Abdominal:      General: Bowel sounds are normal  There is no distension  Palpations: Abdomen is soft  Tenderness: There is no abdominal tenderness  Musculoskeletal:         General: Normal range of motion        Cervical back: Normal range of motion and neck supple  Lymphadenopathy:      Cervical: No cervical adenopathy  Skin:     General: Skin is warm and dry  Capillary Refill: Capillary refill takes less than 2 seconds  Neurological:      General: No focal deficit present  Mental Status: She is alert and oriented to person, place, and time  Psychiatric:         Mood and Affect: Mood normal          Behavior: Behavior normal          Thought Content: Thought content normal          Judgment: Judgment normal          I spent 15 minutes with this patient      FRANCI Aguilar  [facility] [address] [place] [address]

## 2022-06-14 LAB
ALBUMIN SERPL BCP-MCNC: 3.5 G/DL (ref 3.5–5)
ALP SERPL-CCNC: 112 U/L (ref 46–116)
ALT SERPL W P-5'-P-CCNC: 67 U/L (ref 12–78)
ANION GAP SERPL CALCULATED.3IONS-SCNC: 7 MMOL/L (ref 4–13)
AST SERPL W P-5'-P-CCNC: 138 U/L (ref 5–45)
BILIRUB SERPL-MCNC: 0.42 MG/DL (ref 0.2–1)
BUN SERPL-MCNC: 9 MG/DL (ref 5–25)
CALCIUM SERPL-MCNC: 8.8 MG/DL (ref 8.3–10.1)
CHLORIDE SERPL-SCNC: 109 MMOL/L (ref 100–108)
CO2 SERPL-SCNC: 30 MMOL/L (ref 21–32)
CREAT SERPL-MCNC: 0.63 MG/DL (ref 0.6–1.3)
GFR SERPL CREATININE-BSD FRML MDRD: 96 ML/MIN/1.73SQ M
GLUCOSE P FAST SERPL-MCNC: 93 MG/DL (ref 65–99)
POTASSIUM SERPL-SCNC: 3.2 MMOL/L (ref 3.5–5.3)
PROT SERPL-MCNC: 7.9 G/DL (ref 6.4–8.2)
SODIUM SERPL-SCNC: 146 MMOL/L (ref 136–145)
VIT B12 SERPL-MCNC: 326 PG/ML (ref 100–900)

## 2022-06-16 LAB
FOLATE BLD-MCNC: 285 NG/ML
FOLATE RBC-MCNC: 833 NG/ML
HCT VFR BLD AUTO: 34.2 % (ref 34–46.6)

## 2022-06-20 ENCOUNTER — TELEPHONE (OUTPATIENT)
Dept: INTERNAL MEDICINE CLINIC | Facility: CLINIC | Age: 62
End: 2022-06-20

## 2022-06-20 PROBLEM — F10.90 ALCOHOL USE: Status: ACTIVE | Noted: 2022-06-20

## 2022-06-20 PROBLEM — D53.9 MACROCYTIC ANEMIA: Status: ACTIVE | Noted: 2022-04-01

## 2022-06-20 PROBLEM — Z72.89 ALCOHOL USE: Status: ACTIVE | Noted: 2022-06-20

## 2022-06-20 PROBLEM — Z78.9 ALCOHOL USE: Status: ACTIVE | Noted: 2022-06-20

## 2022-06-20 NOTE — TELEPHONE ENCOUNTER
I spoke with Alicia Benítez by telephone  I reviewed her labs with her  I recommended she decrease her alcohol consumption  Encouraged her to reach out if she needs any assistance with this  She has added a vitamin B12 supplement  I also suggested she add a multivitamin  Will repeat labs prior to her next appointment in September    She was instructed if she has any questions or concerns to please reach out to the office

## 2022-06-21 NOTE — ASSESSMENT & PLAN NOTE
Patient states that she uses alcohol excessively on weekends  Patient states that she knows the amount of alcohol is not healthy  Patient was offered resources if she would need to help of stay from alcohol  Declined this time  Counseled patient to try to modify alcohol use  That this may be the cause of her abnormal blood work

## 2022-06-29 ENCOUNTER — HOSPITAL ENCOUNTER (OUTPATIENT)
Dept: GASTROENTEROLOGY | Facility: HOSPITAL | Age: 62
Setting detail: OUTPATIENT SURGERY
Discharge: HOME | End: 2022-06-29
Admitting: PHYSICIAN ASSISTANT
Payer: MEDICARE

## 2022-06-29 ENCOUNTER — ANESTHESIA (OUTPATIENT)
Dept: GASTROENTEROLOGY | Facility: HOSPITAL | Age: 62
End: 2022-06-29

## 2022-06-29 ENCOUNTER — ANESTHESIA EVENT (OUTPATIENT)
Dept: GASTROENTEROLOGY | Facility: HOSPITAL | Age: 62
End: 2022-06-29

## 2022-06-29 VITALS
TEMPERATURE: 97.4 F | HEART RATE: 60 BPM | RESPIRATION RATE: 18 BRPM | DIASTOLIC BLOOD PRESSURE: 79 MMHG | OXYGEN SATURATION: 98 % | SYSTOLIC BLOOD PRESSURE: 140 MMHG | HEIGHT: 62 IN | BODY MASS INDEX: 32.5 KG/M2 | WEIGHT: 176.59 LBS

## 2022-06-29 DIAGNOSIS — D53.9 MACROCYTIC ANEMIA: ICD-10-CM

## 2022-06-29 DIAGNOSIS — K52.9 CHRONIC DIARRHEA: ICD-10-CM

## 2022-06-29 DIAGNOSIS — K58.0 IRRITABLE BOWEL SYNDROME WITH DIARRHEA: Primary | ICD-10-CM

## 2022-06-29 DIAGNOSIS — K21.9 GASTROESOPHAGEAL REFLUX DISEASE, UNSPECIFIED WHETHER ESOPHAGITIS PRESENT: ICD-10-CM

## 2022-06-29 DIAGNOSIS — K62.5 BRBPR (BRIGHT RED BLOOD PER RECTUM): ICD-10-CM

## 2022-06-29 DIAGNOSIS — R10.13 EPIGASTRIC PAIN: ICD-10-CM

## 2022-06-29 PROCEDURE — 88305 TISSUE EXAM BY PATHOLOGIST: CPT | Performed by: SPECIALIST

## 2022-06-29 PROCEDURE — 43239 EGD BIOPSY SINGLE/MULTIPLE: CPT | Performed by: INTERNAL MEDICINE

## 2022-06-29 PROCEDURE — 45385 COLONOSCOPY W/LESION REMOVAL: CPT | Performed by: INTERNAL MEDICINE

## 2022-06-29 RX ORDER — OMEGA-3S/DHA/EPA/FISH OIL/D3 300MG-1000
400 CAPSULE ORAL DAILY
COMMUNITY

## 2022-06-29 RX ORDER — LIDOCAINE HYDROCHLORIDE 20 MG/ML
INJECTION, SOLUTION EPIDURAL; INFILTRATION; INTRACAUDAL; PERINEURAL AS NEEDED
Status: DISCONTINUED | OUTPATIENT
Start: 2022-06-29 | End: 2022-06-29

## 2022-06-29 RX ORDER — PANTOPRAZOLE SODIUM 40 MG/1
40 TABLET, DELAYED RELEASE ORAL DAILY
Qty: 30 TABLET | Refills: 2 | Status: SHIPPED | OUTPATIENT
Start: 2022-06-29 | End: 2022-07-21

## 2022-06-29 RX ORDER — PROPOFOL 10 MG/ML
INJECTION, EMULSION INTRAVENOUS AS NEEDED
Status: DISCONTINUED | OUTPATIENT
Start: 2022-06-29 | End: 2022-06-29

## 2022-06-29 RX ORDER — GLYCOPYRROLATE 0.2 MG/ML
INJECTION INTRAMUSCULAR; INTRAVENOUS AS NEEDED
Status: DISCONTINUED | OUTPATIENT
Start: 2022-06-29 | End: 2022-06-29

## 2022-06-29 RX ORDER — SODIUM CHLORIDE, SODIUM LACTATE, POTASSIUM CHLORIDE, CALCIUM CHLORIDE 600; 310; 30; 20 MG/100ML; MG/100ML; MG/100ML; MG/100ML
125 INJECTION, SOLUTION INTRAVENOUS CONTINUOUS
Status: DISCONTINUED | OUTPATIENT
Start: 2022-06-29 | End: 2022-07-03 | Stop reason: HOSPADM

## 2022-06-29 RX ADMIN — PROPOFOL 50 MG: 10 INJECTION, EMULSION INTRAVENOUS at 08:01

## 2022-06-29 RX ADMIN — PROPOFOL 50 MG: 10 INJECTION, EMULSION INTRAVENOUS at 08:04

## 2022-06-29 RX ADMIN — PROPOFOL 50 MG: 10 INJECTION, EMULSION INTRAVENOUS at 08:06

## 2022-06-29 RX ADMIN — PROPOFOL 150 MG: 10 INJECTION, EMULSION INTRAVENOUS at 08:00

## 2022-06-29 RX ADMIN — SODIUM CHLORIDE, SODIUM LACTATE, POTASSIUM CHLORIDE, AND CALCIUM CHLORIDE: .6; .31; .03; .02 INJECTION, SOLUTION INTRAVENOUS at 07:28

## 2022-06-29 RX ADMIN — PROPOFOL 50 MG: 10 INJECTION, EMULSION INTRAVENOUS at 08:11

## 2022-06-29 RX ADMIN — LIDOCAINE HYDROCHLORIDE 80 MG: 20 INJECTION, SOLUTION EPIDURAL; INFILTRATION; INTRACAUDAL at 08:00

## 2022-06-29 RX ADMIN — GLYCOPYRROLATE 0.1 MCG: 0.2 INJECTION, SOLUTION INTRAMUSCULAR; INTRAVENOUS at 07:53

## 2022-06-29 NOTE — ANESTHESIA PREPROCEDURE EVALUATION
Procedure:  EGD  COLONOSCOPY       1  Chronic diarrhea  2  BRBPR (bright red blood per rectum)  - She reports chronic diarrhea for the past 3 years where she will have 6-7 loose BMs daily with intermittent BRBPR which may be hemorrhoidal  - Schedule colonoscopy given her change in bowel habits since her last colonoscopy as well as for her reported history of polyps removed in 2018        3  Epigastric pain  4  Gastroesophageal reflux disease, unspecified whether esophagitis present  - She reports a prior EGD in 2018 showing an "acidic tumor" but she cannot elaborate further and reports rare peptic symptoms  - Schedule EGD at the time of the colonoscopy for further evaluation     ______________________________________________________________________     HPI:  Chelsi Hubbard is a 58 yo F with a PMH of HTN, COPD, lung cancer, presenting due to an episode of BRBPR a few weeks ago in the setting of acute constipation and bloating  She reports that her symptoms have now resolved and she is back to having her baseline BMs of 6-7 loose BMs daily  She does occasionally see BRBPR with wiping or in her BMs  She reports her last colonoscopy was in 2018 in [place] and she believes she had polyps removed but she was not having diarrhea at that time  She has no family history of colon cancer  She reports occasional heartburn which primarily happened when she was bloated and constipated  She had a EGD in 2018 and she reports it showed an acidic tumor but she never followed up   She denies dysphagia, nausea, or vomiting          Relevant Problems   CARDIO   (+) Essential hypertension   (+) Hyperlipidemia      HEMATOLOGY   (+) Macrocytic anemia      NEURO/PSYCH   (+) History of lung cancer      PULMONARY   (+) COPD, mild (HCC)        Physical Exam    Airway    Mallampati score: III  TM Distance: >3 FB  Neck ROM: full     Dental       Cardiovascular  Cardiovascular exam normal    Pulmonary  Pulmonary exam normal     Other Findings        Anesthesia Plan  ASA Score- 3     Anesthesia Type- IV sedation with anesthesia with ASA Monitors  Additional Monitors:   Airway Plan:           Plan Factors-Exercise tolerance (METS): <4 METS  Chart reviewed  Existing labs reviewed  Patient summary reviewed  Patient is not a current smoker  Induction- intravenous  Postoperative Plan-     Informed Consent- Anesthetic plan and risks discussed with patient  I personally reviewed this patient with the CRNA  Discussed and agreed on the Anesthesia Plan with the CRNA             Current as of 22  History Comments History Comments   Hypertension  Hyperlipidemia    Cancer ([address] ) lung, upper left lung lobectomy         Obstetric History       1    Para   1    Term   1            AB        Living   1      SAB        IAB        Ectopic        Multiple        Live Births                Surgical History     Current as of 22  WRIST SURGERY HYSTERECTOMY   LUNG LOBECTOMY ORIF TIBIAL PLATEAU   KNEE SURGERY      Substance History     Current as of 22  Smoking Status: Former Smoker   Quit Smokin09   Smokeless Tobacco Status: Never Used   Alcohol use: Yes, unspecified volume   Drug use: Never     Problem List     Current as of 22  Tibial plateau fracture, left   History of lung cancer   Essential hypertension   Hyperlipidemia   Hypokalemia   Prediabetes   COPD, mild ([address] )   Post-menopausal   Encounter for gynecological examination without abnormal finding   Macrocytic anemia   Alcohol use

## 2022-06-29 NOTE — H&P
History and Physical - [facility]  Suhail Meredith 57 y o  female MRN: [id number]                  HPI: Suhail Meredith is a 57y o  year old female who presents for endoscopy colonoscopy for evaluation of GERD abdominal pain and diarrhea and hematochezia      REVIEW OF SYSTEMS: Per the HPI, and otherwise unremarkable      Historical Information   Past Medical History:   Diagnosis Date    Cancer [facility])     lung, upper left lung lobectomy    Hyperlipidemia     Hypertension      Past Surgical History:   Procedure Laterality Date    HYSTERECTOMY      KNEE SURGERY Left     LUNG LOBECTOMY      ORIF TIBIAL PLATEAU Left     Procedure: OPEN REDUCTION W/ INTERNAL FIXATION (ORIF) TIBIAL PLATEAU;  Surgeon: Zay Joseph MD;  [facility];  Service: Orthopedics    WRIST SURGERY       Social History   Social History     Substance and Sexual Activity   Alcohol Use Yes    Comment: weekends     Social History     Substance and Sexual Activity   Drug Use Never     Social History     Tobacco Use   Smoking Status Former Smoker    Quit date:     Years since quittin 4   Smokeless Tobacco Never Used     Family History   Problem Relation Age of Onset    Diabetes Mother     Diabetes Father     Breast cancer Sister     No Known Problems Sister     No Known Problems Sister     No Known Problems Sister     No Known Problems Maternal Grandmother     No Known Problems Paternal Grandmother     Diabetes Brother     No Known Problems Son     No Known Problems Maternal Aunt     No Known Problems Maternal Aunt     No Known Problems Maternal Aunt     No Known Problems Maternal Aunt     No Known Problems Maternal Aunt     No Known Problems Maternal Aunt     No Known Problems Maternal Aunt     No Known Problems Paternal Aunt     No Known Problems Paternal Aunt     No Known Problems Paternal Aunt     Ovarian cancer Neg Hx     Cervical cancer Neg Hx     Uterine cancer Neg Hx        Meds/Allergies     (Not in a hospital admission)      Allergies   Allergen Reactions    Pollen Extract        Objective     Blood pressure 157/85, pulse 70, temperature (!) 97 3 °F (36 3 °C), resp  rate 20, height 5' 2" (1 575 m), weight 80 1 kg (176 lb 9 4 oz), SpO2 98 %  PHYSICAL EXAM    /85   Pulse 70   Temp (!) 97 3 °F (36 3 °C)   Resp 20   Ht 5' 2" (1 575 m)   Wt 80 1 kg (176 lb 9 4 oz)   SpO2 98%   BMI 32 30 kg/m²       Gen: NAD  CV: RRR  CHEST: Clear  ABD: soft, NT/ND  EXT: no edema      ASSESSMENT/PLAN:  This is a 57y o  year old female here for endoscopy colonoscopy, and she is stable and optimized for her procedure

## 2022-06-30 LAB — VIT B6 SERPL-MCNC: 2.4 UG/L (ref 3.4–65.2)

## 2022-07-01 ENCOUNTER — TELEPHONE (OUTPATIENT)
Dept: INTERNAL MEDICINE CLINIC | Facility: CLINIC | Age: 62
End: 2022-07-01

## 2022-07-01 NOTE — TELEPHONE ENCOUNTER
----- Message from Buzz DO Prosper sent at 7/1/2022  7:05 AM EDT -----  Vitamin b6 levels are low  Start 100mg OTC vitamin B6 daily  Vitamin B12 is normal  Potassium is low  Continue potassium supplementation and increase potassium intake in diet

## 2022-07-01 NOTE — TELEPHONE ENCOUNTER
Patient made aware of the results and to add VIT b6 100mg and potassium supplementation and nutrition intake

## 2022-07-13 ENCOUNTER — TELEPHONE (OUTPATIENT)
Dept: GASTROENTEROLOGY | Facility: CLINIC | Age: 62
End: 2022-07-13

## 2022-07-13 ENCOUNTER — TELEPHONE (OUTPATIENT)
Dept: INTERNAL MEDICINE CLINIC | Facility: CLINIC | Age: 62
End: 2022-07-13

## 2022-07-13 NOTE — TELEPHONE ENCOUNTER
Carolyn Oliver patient - Patient called and was told she needs a prior auth for med  rifaximin (XIFAXAN) 550 mg tablet [[id number]]    Please Call patient to let her know when it is complete    Thx

## 2022-07-13 NOTE — TELEPHONE ENCOUNTER
PT is getting concerned  Has been seeing Geraldine for numbness in feet  Started in her toes in June and now is above her ankles  Has been to a Podiatrist who advised pt nothing is wrong w/ her feet  Pt has had bld drawn and follow up appts w/ Geraldine  Could this be nerve damage? Pt states that another physician would address her concerns while she was out on medical leave      Please return call: [phone]

## 2022-07-13 NOTE — TELEPHONE ENCOUNTER
Yes it sounds like this could be neuropathy  Her vitamin B6 level was recently low and she was told to start supplementation  It will take some time for her b6 levels to normalize  But she needs to stay away from alcohol as that could make nerve health worse  There are other medications to treat neuropathy   Would recommend she see Geraldine when she returns from leave or she can make an appointment to see another provider in the meantime

## 2022-07-18 ENCOUNTER — TELEPHONE (OUTPATIENT)
Dept: GASTROENTEROLOGY | Facility: CLINIC | Age: 62
End: 2022-07-18

## 2022-07-18 NOTE — TELEPHONE ENCOUNTER
9: 19am-Called patient and spoke with her in regards to he biopsy results  Pt voiced understanding of those results  Pt concerned about  a medication she called a couple days ago that Dr Whalen Primes prescribed  I only see a note from [address] in regards to [place]  Called pt's pharmacy to verify which medication she needed a prior auth for  Spoke with Sharyn Mcfarland and he stated that it was the [place]  10:40am- Called pt and LMOM- sent prior auth through covermymeds  Awaiting their determination      Key#: [id number]

## 2022-07-18 NOTE — TELEPHONE ENCOUNTER
----- Message from Lindy Ricks MD sent at 7/17/2022  7:31 PM EDT -----  Please tell her the polyps were precancerous and she will need a colonoscopy in 3yrs      Please tell her the biopsies of her esophagus, stomach, small intestine, and colon were all normal

## 2022-07-21 DIAGNOSIS — D53.9 MACROCYTIC ANEMIA: ICD-10-CM

## 2022-07-21 RX ORDER — PANTOPRAZOLE SODIUM 40 MG/1
TABLET, DELAYED RELEASE ORAL
Qty: 90 TABLET | Refills: 1 | Status: SHIPPED | OUTPATIENT
Start: 2022-07-21

## 2022-09-14 ENCOUNTER — OFFICE VISIT (OUTPATIENT)
Dept: INTERNAL MEDICINE CLINIC | Facility: CLINIC | Age: 62
End: 2022-09-14
Payer: MEDICARE

## 2022-09-14 ENCOUNTER — HOSPITAL ENCOUNTER (OUTPATIENT)
Dept: BONE DENSITY | Facility: CLINIC | Age: 62
Discharge: HOME | End: 2022-09-14
Payer: MEDICARE

## 2022-09-14 ENCOUNTER — APPOINTMENT (OUTPATIENT)
Dept: LAB | Facility: CLINIC | Age: 62
End: 2022-09-14
Payer: MEDICARE

## 2022-09-14 VITALS
DIASTOLIC BLOOD PRESSURE: 68 MMHG | HEIGHT: 62 IN | WEIGHT: 180 LBS | BODY MASS INDEX: 33.13 KG/M2 | OXYGEN SATURATION: 96 % | HEART RATE: 64 BPM | SYSTOLIC BLOOD PRESSURE: 126 MMHG | RESPIRATION RATE: 20 BRPM | TEMPERATURE: 97.8 F

## 2022-09-14 DIAGNOSIS — R20.2 PARESTHESIA OF BOTH HANDS: ICD-10-CM

## 2022-09-14 DIAGNOSIS — E87.6 HYPOKALEMIA: ICD-10-CM

## 2022-09-14 DIAGNOSIS — M85.80 OSTEOPENIA, UNSPECIFIED LOCATION: ICD-10-CM

## 2022-09-14 DIAGNOSIS — Z78.9 ALCOHOL USE: ICD-10-CM

## 2022-09-14 DIAGNOSIS — R20.2 PARESTHESIA OF BOTH LOWER EXTREMITIES: Primary | ICD-10-CM

## 2022-09-14 DIAGNOSIS — E55.9 VITAMIN D DEFICIENCY: ICD-10-CM

## 2022-09-14 DIAGNOSIS — D53.9 MACROCYTIC ANEMIA: ICD-10-CM

## 2022-09-14 DIAGNOSIS — Z13.820 ENCOUNTER FOR OSTEOPOROSIS SCREENING IN ASYMPTOMATIC POSTMENOPAUSAL PATIENT: ICD-10-CM

## 2022-09-14 DIAGNOSIS — Z78.0 ENCOUNTER FOR OSTEOPOROSIS SCREENING IN ASYMPTOMATIC POSTMENOPAUSAL PATIENT: ICD-10-CM

## 2022-09-14 DIAGNOSIS — R20.2 PARESTHESIA OF BOTH LOWER EXTREMITIES: ICD-10-CM

## 2022-09-14 LAB
25(OH)D3 SERPL-MCNC: 26.3 NG/ML (ref 30–100)
ALBUMIN SERPL BCP-MCNC: 3.6 G/DL (ref 3.5–5)
ALP SERPL-CCNC: 104 U/L (ref 46–116)
ALT SERPL W P-5'-P-CCNC: 35 U/L (ref 12–78)
ANION GAP SERPL CALCULATED.3IONS-SCNC: 4 MMOL/L (ref 4–13)
AST SERPL W P-5'-P-CCNC: 22 U/L (ref 5–45)
BASOPHILS # BLD AUTO: 0.04 THOUSANDS/ΜL (ref 0–0.1)
BASOPHILS NFR BLD AUTO: 1 % (ref 0–1)
BILIRUB SERPL-MCNC: 0.47 MG/DL (ref 0.2–1)
BUN SERPL-MCNC: 11 MG/DL (ref 5–25)
CALCIUM SERPL-MCNC: 9.7 MG/DL (ref 8.3–10.1)
CHLORIDE SERPL-SCNC: 109 MMOL/L (ref 96–108)
CO2 SERPL-SCNC: 26 MMOL/L (ref 21–32)
CREAT SERPL-MCNC: 0.89 MG/DL (ref 0.6–1.3)
EOSINOPHIL # BLD AUTO: 0.12 THOUSAND/ΜL (ref 0–0.61)
EOSINOPHIL NFR BLD AUTO: 2 % (ref 0–6)
ERYTHROCYTE [DISTWIDTH] IN BLOOD BY AUTOMATED COUNT: 16.7 % (ref 11.6–15.1)
GFR SERPL CREATININE-BSD FRML MDRD: 69 ML/MIN/1.73SQ M
GLUCOSE P FAST SERPL-MCNC: 112 MG/DL (ref 65–99)
HCT VFR BLD AUTO: 35.7 % (ref 34.8–46.1)
HGB BLD-MCNC: 11.5 G/DL (ref 11.5–15.4)
IMM GRANULOCYTES # BLD AUTO: 0.03 THOUSAND/UL (ref 0–0.2)
IMM GRANULOCYTES NFR BLD AUTO: 0 % (ref 0–2)
LYMPHOCYTES # BLD AUTO: 1.54 THOUSANDS/ΜL (ref 0.6–4.47)
LYMPHOCYTES NFR BLD AUTO: 22 % (ref 14–44)
MCH RBC QN AUTO: 31 PG (ref 26.8–34.3)
MCHC RBC AUTO-ENTMCNC: 32.2 G/DL (ref 31.4–37.4)
MCV RBC AUTO: 96 FL (ref 82–98)
MONOCYTES # BLD AUTO: 0.68 THOUSAND/ΜL (ref 0.17–1.22)
MONOCYTES NFR BLD AUTO: 10 % (ref 4–12)
NEUTROPHILS # BLD AUTO: 4.47 THOUSANDS/ΜL (ref 1.85–7.62)
NEUTS SEG NFR BLD AUTO: 65 % (ref 43–75)
NRBC BLD AUTO-RTO: 0 /100 WBCS
PLATELET # BLD AUTO: 305 THOUSANDS/UL (ref 149–390)
PMV BLD AUTO: 11.9 FL (ref 8.9–12.7)
POTASSIUM SERPL-SCNC: 3.5 MMOL/L (ref 3.5–5.3)
PROT SERPL-MCNC: 8 G/DL (ref 6.4–8.4)
RBC # BLD AUTO: 3.71 MILLION/UL (ref 3.81–5.12)
SODIUM SERPL-SCNC: 139 MMOL/L (ref 135–147)
WBC # BLD AUTO: 6.88 THOUSAND/UL (ref 4.31–10.16)

## 2022-09-14 PROCEDURE — 77080 DXA BONE DENSITY AXIAL: CPT

## 2022-09-14 PROCEDURE — 82306 VITAMIN D 25 HYDROXY: CPT

## 2022-09-14 PROCEDURE — 36415 COLL VENOUS BLD VENIPUNCTURE: CPT

## 2022-09-14 PROCEDURE — 85025 COMPLETE CBC W/AUTO DIFF WBC: CPT

## 2022-09-14 PROCEDURE — 99214 OFFICE O/P EST MOD 30 MIN: CPT

## 2022-09-14 PROCEDURE — G0439 PPPS, SUBSEQ VISIT: HCPCS

## 2022-09-14 PROCEDURE — 80053 COMPREHEN METABOLIC PANEL: CPT

## 2022-09-14 RX ORDER — GABAPENTIN 100 MG/1
100 CAPSULE ORAL 3 TIMES DAILY
Qty: 90 CAPSULE | Refills: 1 | Status: SHIPPED | OUTPATIENT
Start: 2022-09-14

## 2022-09-14 RX ORDER — ALBUTEROL SULFATE 0.63 MG/3ML
SOLUTION RESPIRATORY (INHALATION)
COMMUNITY

## 2022-09-14 NOTE — PROGRESS NOTES
Assessment and Plan:     Problem List Items Addressed This Visit        Musculoskeletal and Integument    Osteopenia     DEXA scan completed today shows osteopenia  Patient counseled on calcium and vitamin-D supplementation  Relevant Orders    Vitamin D 25 hydroxy (Completed)       Other    Hypokalemia    Relevant Orders    Comprehensive metabolic panel (Completed)    Macrocytic anemia     Patient has stopped using alcohol  Repeat CBC         Relevant Orders    CBC and differential (Completed)    Alcohol use     Patient reports she has been abstaining from alcohol since her last appointment with me  No issues or concerns  Paresthesia of both lower extremities - Primary     Worsening numbness and tingling in her bilateral feet  She states it started with her ankles now  Will start gabapentin 100 milligrams t i d  p r n  she will follow-up by phone to let me know if her symptoms improve with this  We counseled on vitamin B 6  Will repeat vitamin B6 level prior to next visit  Relevant Medications    gabapentin (Neurontin) 100 mg capsule    Other Relevant Orders    Comprehensive metabolic panel (Completed)      Other Visit Diagnoses     Paresthesia of both hands        Vitamin D deficiency               Preventive health issues were discussed with patient, and age appropriate screening tests were ordered as noted in patient's After Visit Summary  Personalized health advice and appropriate referrals for health education or preventive services given if needed, as noted in patient's After Visit Summary  History of Present Illness:     Patient presents for a Medicare Wellness Visit    Jacquelyn presents for follow-up appointment to review lab work  She is still having paresthesia of her feet  She was instructed to take vitamin B 6 however there was a misunderstanding and she has not been taking that  She had her DEXA scan earlier today and we reviewed those results as well    She offers no new complaints or concerns  Patient Care Team:  Asha Garcia as PCP - General (Nurse Practitioner)  Rei Sutherland PA-C (Gastroenterology)     Review of Systems:     Review of Systems   Constitutional: Negative  HENT: Negative  Respiratory: Negative  Cardiovascular: Negative  Gastrointestinal: Negative  Genitourinary: Negative  Musculoskeletal: Negative  Neurological: Positive for numbness (Neuropathy bilateral feet and ankles)  Psychiatric/Behavioral: Negative           Problem List:     Patient Active Problem List   Diagnosis    Tibial plateau fracture, left    History of lung cancer    Essential hypertension    Hyperlipidemia    Hypokalemia    Prediabetes    COPD, mild ([facility] [address] )    Post-menopausal    Encounter for gynecological examination without abnormal finding    Macrocytic anemia    Alcohol use    Paresthesia of both lower extremities    Osteopenia      Past Medical and Surgical History:     Past Medical History:   Diagnosis Date    Cancer ([facility] [address] ) 2011    lung, upper left lung lobectomy    Hyperlipidemia     Hypertension      Past Surgical History:   Procedure Laterality Date    HYSTERECTOMY      KNEE SURGERY Left     LUNG LOBECTOMY      ORIF TIBIAL PLATEAU Left 71/78/0264    Procedure: OPEN REDUCTION W/ INTERNAL FIXATION (ORIF) TIBIAL PLATEAU;  Surgeon: Imtiaz Quiroga MD;  Location: [facility];  Service: Orthopedics    WRIST SURGERY        Family History:     Family History   Problem Relation Age of Onset    Diabetes Mother     Diabetes Father     Breast cancer Sister     No Known Problems Sister     No Known Problems Sister     No Known Problems Sister     No Known Problems Maternal Grandmother     No Known Problems Paternal Grandmother     Diabetes Brother     No Known Problems Son     No Known Problems Maternal Aunt     No Known Problems Maternal Aunt     No Known Problems Maternal Aunt     No Known Problems Maternal Aunt     No Known Problems Maternal Aunt     No Known Problems Maternal Aunt     No Known Problems Maternal Aunt     No Known Problems Paternal Aunt     No Known Problems Paternal Aunt     No Known Problems Paternal Aunt     Ovarian cancer Neg Hx     Cervical cancer Neg Hx     Uterine cancer Neg Hx       Social History:     Social History     Socioeconomic History    Marital status: Single     Spouse name: None    Number of children: None    Years of education: None    Highest education level: None   Occupational History    None   Tobacco Use    Smoking status: Former Smoker     Quit date:      Years since quittin 7    Smokeless tobacco: Never Used   Vaping Use    Vaping Use: Never used   Substance and Sexual Activity    Alcohol use: Yes     Comment: weekends    Drug use: Never    Sexual activity: None   Other Topics Concern    None   Social History Narrative    None     Social Determinants of Health     Financial Resource Strain: Low Risk     Difficulty of Paying Living Expenses: Not hard at all   Food Insecurity: Not on file   Transportation Needs: No Transportation Needs    Lack of Transportation (Medical): No    Lack of Transportation (Non-Medical):  No   Physical Activity: Not on file   Stress: Not on file   Social Connections: Not on file   Intimate Partner Violence: Not on file   Housing Stability: Not on file      Medications and Allergies:     Current Outpatient Medications   Medication Sig Dispense Refill    albuterol (2 5 mg/3 mL) 0 083 % nebulizer solution Take 3 mL (2 5 mg total) by nebulization every 6 (six) hours as needed for wheezing or shortness of breath (Patient taking differently: Take 2 5 mg by nebulization every 6 (six) hours as needed for wheezing or shortness of breath As needed) 180 mL 5    albuterol (ACCUNEB) 0 63 MG/3ML nebulizer solution albuterol sulfate      albuterol (ProAir HFA) 90 mcg/act inhaler Inhale 2 puffs every 6 (six) hours as needed for wheezing or shortness of breath 18 g 1    amlodipine (NORVASC) 1 mg/ml SUSP oral suspension amlodipine      Atorvastatin Calcium (LIPITOR PO) atorvastatin      cholecalciferol (VITAMIN D3) 400 units tablet Take 400 Units by mouth daily      fluticasone-vilanterol (Breo Ellipta) 100-25 mcg/inh inhaler Inhale 1 puff daily Rinse mouth after use  60 each 1    fluticasone-vilanterol (BREO ELLIPTA) 100-25 mcg/inh inhaler Breo Ellipta      gabapentin (Neurontin) 100 mg capsule Take 1 capsule (100 mg total) by mouth 3 (three) times a day 90 capsule 1    hydrocortisone (ANUSOL-HC) 25 mg suppository Insert 1 suppository (25 mg total) into the rectum 2 (two) times a day 12 suppository 0    Icosapent Ethyl (Vascepa) 1 g CAPS Take 1 capsule (1 g total) by mouth 2 (two) times a day 180 capsule 1    montelukast (SINGULAIR) 10 mg tablet Take 1 tablet (10 mg total) by mouth daily at bedtime 30 tablet 5    pantoprazole (PROTONIX) 40 mg tablet TAKE 1 TABLET BY MOUTH EVERY DAY 90 tablet 1    potassium chloride (MICRO-K) 10 MEQ CR capsule Take 1 capsule (10 mEq total) by mouth 2 (two) times a day 60 capsule 5    amLODIPine-atorvastatin (CADUET) 5-20 MG per tablet Take 1 tablet by mouth daily 30 tablet 0     No current facility-administered medications for this visit       Allergies   Allergen Reactions    Pollen Extract       Immunizations:     Immunization History   Administered Date(s) Administered    COVID-19 MODERNA VACC 0 5 ML IM 04/07/2021, 05/05/2021    INFLUENZA 12/30/2019, 12/30/2019    Influenza, recombinant, quadrivalent,injectable, preservative free 11/04/2019    Influenza, seasonal, injectable 08/15/2019    Tdap 12/30/2019    Zoster Vaccine Recombinant 12/30/2019      Health Maintenance:         Topic Date Due    Breast Cancer Screening: Mammogram  05/25/2023    Colorectal Cancer Screening  06/28/2025    Cervical Cancer Screening  07/22/2025    HIV Screening  Completed    Hepatitis C Screening  Completed Topic Date Due    Pneumococcal Vaccine: Pediatrics (0 to 5 Years) and At-Risk Patients (6 to 59 Years) (1 - PCV) Never done    COVID-19 Vaccine (3 - Booster for Moderna series) 10/05/2021    Influenza Vaccine (1) 09/01/2022      Medicare Screening Tests and Risk Assessments:     Kirsty Herzog is here for her Initial Wellness visit  Health Risk Assessment:   Patient rates overall health as good  Patient feels that their physical health rating is slightly better  Patient is very satisfied with their life  Eyesight was rated as slightly worse  Hearing was rated as same  Patient feels that their emotional and mental health rating is much better  Patients states they are never, rarely angry  Patient states they are never, rarely unusually tired/fatigued  Pain experienced in the last 7 days has been some  Patient's pain rating has been 6/10  Patient states that she has experienced no weight loss or gain in last 6 months  Fall Risk Screening: In the past year, patient has experienced: history of falling in past year    Number of falls: 1  Injured during fall?: Yes    Feels unsteady when standing or walking?: No    Worried about falling?: No      Urinary Incontinence Screening:   Patient has not leaked urine accidently in the last six months  Home Safety:  Patient has trouble with stairs inside or outside of their home  Patient has working smoke alarms and has working carbon monoxide detector  Home safety hazards include: none  Nutrition:   Current diet is Regular and Limited junk food  Medications:   Patient is currently taking over-the-counter supplements  OTC medications include: see medication list  Patient is able to manage medications  Activities of Daily Living (ADLs)/Instrumental Activities of Daily Living (IADLs):   Walk and transfer into and out of bed and chair?: Yes  Dress and groom yourself?: Yes    Bathe or shower yourself?: Yes    Feed yourself?  Yes  Do your laundry/housekeeping?: Yes  Manage your money, pay your bills and track your expenses?: Yes  Make your own meals?: Yes    Do your own shopping?: Yes    Previous Hospitalizations:   Any hospitalizations or ED visits within the last 12 months?: No      Advance Care Planning:   Living will: No    Advanced directive: No      Cognitive Screening:   Provider or family/friend/caregiver concerned regarding cognition?: No    PREVENTIVE SCREENINGS      Cardiovascular Screening:    General: Screening Not Indicated and History Lipid Disorder      Diabetes Screening:     General: Screening Current      Colorectal Cancer Screening:     General: Screening Current      Breast Cancer Screening:     General: Screening Current      Cervical Cancer Screening:    General: Screening Current      Osteoporosis Screening:    General: Screening Current      Abdominal Aortic Aneurysm (AAA) Screening:        General: Screening Not Indicated      Lung Cancer Screening:     General: Screening Not Indicated and History Lung Cancer      Hepatitis C Screening:    General: Screening Current    Screening, Brief Intervention, and Referral to Treatment (SBIRT)    Screening  Typical number of drinks in a day: 1  Typical number of drinks in a week: 1  Interpretation: Low risk drinking behavior  Single Item Drug Screening:  How often have you used an illegal drug (including marijuana) or a prescription medication for non-medical reasons in the past year? never    Single Item Drug Screen Score: 0  Interpretation: Negative screen for possible drug use disorder    Brief Intervention  Alcohol & drug use screenings were reviewed  No concerns regarding substance use disorder identified  Healthy alcohol use/limits discussed  Other Counseling Topics:   Car/seat belt/driving safety and calcium and vitamin D intake and regular weightbearing exercise       No exam data present     Physical Exam:     /68 (BP Location: Left arm, Patient Position: Sitting, Cuff Size: Large) Pulse 64   Temp 97 8 °F (36 6 °C) (Tympanic)   Resp 20   Ht 5' 2" (1 575 m)   Wt 81 6 kg (180 lb)   SpO2 96%   BMI 32 92 kg/m²     Physical Exam  Vitals and nursing note reviewed  Constitutional:       General: She is not in acute distress  Appearance: She is well-developed  She is obese  HENT:      Head: Normocephalic and atraumatic  Right Ear: Tympanic membrane normal       Left Ear: Tympanic membrane normal       Nose: Nose normal  No congestion or rhinorrhea  Mouth/Throat:      Mouth: Mucous membranes are moist       Pharynx: Oropharynx is clear  Eyes:      Conjunctiva/sclera: Conjunctivae normal       Pupils: Pupils are equal, round, and reactive to light  Neck:      Thyroid: No thyromegaly  Cardiovascular:      Rate and Rhythm: Normal rate and regular rhythm  Pulses: Normal pulses  Heart sounds: Normal heart sounds  No murmur heard  Pulmonary:      Effort: Pulmonary effort is normal  No respiratory distress  Breath sounds: Normal breath sounds  Abdominal:      General: Bowel sounds are normal       Palpations: Abdomen is soft  Tenderness: There is no abdominal tenderness  Musculoskeletal:         General: Normal range of motion  Cervical back: Normal range of motion and neck supple  Lymphadenopathy:      Cervical: No cervical adenopathy  Skin:     General: Skin is warm and dry  Capillary Refill: Capillary refill takes less than 2 seconds  Neurological:      General: No focal deficit present  Mental Status: She is alert and oriented to person, place, and time  Psychiatric:         Mood and Affect: Mood normal          Behavior: Behavior normal          Thought Content:  Thought content normal          Judgment: Judgment normal           FRANCI Greene

## 2022-09-14 NOTE — PATIENT INSTRUCTIONS
Vit B6 vitamin   Vitamin D3 2000 IU daily  Calcium Rich diet    Gabapentin 100mg three times as needed for nerve pain    Osteopenia   AMBULATORY CARE:   Osteopenia  is a condition that causes bone loss and low bone mineral density (BMD)  Low BMD can weaken your bones and increase your risk for fractures  Osteopenia does not cause signs or symptoms  You may not know you have it until you break a bone  Osteopenia must be managed or treated so it does not worsen and become osteoporosis  Osteoporosis is a serious condition that causes bones to become weak, brittle, and easily fractured  Treatment for osteopenia  depends on your risk for fracture  If your risk is low, you may only need to make exercise, nutrition, and other lifestyle changes to manage osteopenia  The changes can help prevent more bone mineral loss and reduce your risk for fractures  If your risk is high, you may be given medicines to help strengthen your bones, prevent bone breakdown, or increase bone formation  Manage osteopenia:   Exercise as directed  Do weight-bearing exercises, such as brisk walking, dancing, or yoga  Weight-bearing exercises help build or maintain bone  Exercises such as swimming and bike riding are non-weight-bearing and will not build or maintain bone  Weightlifting also helps strengthen bones and build muscle  Extra muscle can help protect your bones  Your healthcare provider may recommend weightlifting 3 times per week as part of your exercise routine  Increase calcium and vitamin D as directed  Calcium and vitamin D work together to help build bone  The body deposits calcium into the bones until about age 27  You will then need to get enough calcium and vitamin D to maintain what your bones are storing  Your healthcare provider may tell you to eat more dairy products, such as milk and cheese, for calcium  Spinach, salmon, and dried beans are also good sources of calcium   Cereal, bread, and orange juice may be fortified with vitamin D  You also get vitamin D from exposure to sunlight  Your healthcare provider may also suggest a calcium or vitamin D supplement  Do not take supplements unless directed  Limit or do not drink alcohol as directed  Alcohol can take calcium from your bones and increase your risk for fractures  Ask your healthcare provider if it is safe for you to drink alcohol  Women should limit alcohol to 1 drink per day  Men should limit alcohol to 2 drinks per day  A drink of alcohol is 12 ounces of beer, 5 ounces of wine, or 1½ ounces of liquor  Do not smoke  Nicotine can damage blood vessels and make it more difficult to manage your osteopenia  Smoking also affects bone density and increases your risk for bone fractures  Do not use e-cigarettes or smokeless tobacco in place of cigarettes or to help you quit  They still contain nicotine  Ask your healthcare provider for information if you currently smoke and need help quitting  Ask your healthcare provider for information if you need help quitting  Prevent falls  Decrease your risk for falling by removing items from the floor and removing loose carpets  Turn the lights on where you will be walking  Follow up with your doctor as directed:  Write down your questions so you remember to ask them during your visits  © Copyright [employer] 2022 Information is for End User's use only and may not be sold, redistributed or otherwise used for commercial purposes  All illustrations and images included in CareNotes® are the copyrighted property of [employer]  or Jake Ascencio  The above information is an  only  It is not intended as medical advice for individual conditions or treatments  Talk to your doctor, nurse or pharmacist before following any medical regimen to see if it is safe and effective for you

## 2022-09-15 ENCOUNTER — TELEPHONE (OUTPATIENT)
Dept: INTERNAL MEDICINE CLINIC | Facility: CLINIC | Age: 62
End: 2022-09-15

## 2022-09-15 PROBLEM — R20.2 PARESTHESIA OF BOTH LOWER EXTREMITIES: Status: ACTIVE | Noted: 2022-09-15

## 2022-09-15 PROBLEM — M85.80 OSTEOPENIA: Status: ACTIVE | Noted: 2022-09-15

## 2022-09-15 NOTE — ASSESSMENT & PLAN NOTE
Worsening numbness and tingling in her bilateral feet  She states it started with her ankles now  Will start gabapentin 100 milligrams t i d  p r n  she will follow-up by phone to let me know if her symptoms improve with this  We counseled on vitamin B 6  Will repeat vitamin B6 level prior to next visit

## 2022-09-15 NOTE — ASSESSMENT & PLAN NOTE
Patient reports she has been abstaining from alcohol since her last appointment with me  No issues or concerns

## 2022-09-15 NOTE — TELEPHONE ENCOUNTER
----- Message from [address] sent at 9/15/2022  8:14 AM EDT -----  Please call the patient and let her know her labs look good  Will repeat them when she comes to her next appointment   Take Vitamin D3 supplement and B6 vitamin as we discussed yesterday

## 2022-09-15 NOTE — ASSESSMENT & PLAN NOTE
DEXA scan completed today shows osteopenia  Patient counseled on calcium and vitamin-D supplementation

## 2022-11-01 ENCOUNTER — OFFICE VISIT (OUTPATIENT)
Dept: INTERNAL MEDICINE CLINIC | Facility: CLINIC | Age: 62
End: 2022-11-01

## 2022-11-01 VITALS
HEIGHT: 62 IN | HEART RATE: 87 BPM | TEMPERATURE: 97.6 F | SYSTOLIC BLOOD PRESSURE: 144 MMHG | WEIGHT: 180.8 LBS | BODY MASS INDEX: 33.27 KG/M2 | DIASTOLIC BLOOD PRESSURE: 86 MMHG | RESPIRATION RATE: 18 BRPM | OXYGEN SATURATION: 97 %

## 2022-11-01 DIAGNOSIS — G25.2 RESTING TREMOR: ICD-10-CM

## 2022-11-01 DIAGNOSIS — G62.9 NEUROPATHY: ICD-10-CM

## 2022-11-01 DIAGNOSIS — Z23 ENCOUNTER FOR ADMINISTRATION OF VACCINE: Primary | ICD-10-CM
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
## 2022-11-01 RX ORDER — GABAPENTIN 100 MG/1
100 CAPSULE ORAL 2 TIMES DAILY
Qty: 30 CAPSULE | Refills: 3 | Status: SHIPPED | OUTPATIENT
Start: 2022-11-01

## 2022-11-01 RX ORDER — GABAPENTIN 300 MG/1
300 CAPSULE ORAL
Qty: 60 CAPSULE | Refills: 2 | Status: SHIPPED | OUTPATIENT
Start: 2022-11-01

## 2022-11-01 NOTE — PROGRESS NOTES
Name: Omari Florez      : 1960      MRN: [id number]  Encounter Provider: FRANCI Felix  Encounter Date: 2022   [facility]  [address]     1  Encounter for administration of vaccine  -     influenza vaccine, quadrivalent, recombinant, PF, 0 5 mL, for patients 18 yr+ (FLUBLOK)    2  Fall, initial encounter  -     Ambulatory Referral to Physical Therapy; Future  -     Ambulatory Referral to Neurology; Future    3  Resting tremor  -     Ambulatory Referral to Neurology; Future    4  Neuropathy  -     Ambulatory Referral to Physical Therapy; Future  -     Ambulatory Referral to Neurology; Future  -     gabapentin (Neurontin) 300 mg capsule; Take 1 capsule (300 mg total) by mouth daily at bedtime  -     gabapentin (Neurontin) 100 mg capsule; Take 1 capsule (100 mg total) by mouth 2 (two) times a day           Subjective      78-year-old female with a history of paresthesia of bilateral lower extremity presents with multiple falls  She states that she is unable to feel her feet and her knees are weak prior to her falls  She reported 2 falls in the last week without injury  She denies problems with vision or falls resulting from syncopal episode denies loss of bladder or bowel function  She reports that she has cut down on her alcohol intake, she does not smoke or use street drugs  She was started on gabapentin at her last visit for neuropathy, the patient reports minimal relief of symptoms  Review of Systems   Constitutional: Negative  HENT: Negative  Eyes: Negative  Respiratory: Negative  Cardiovascular: Negative  Gastrointestinal: Negative  Endocrine: Negative  Genitourinary: Negative  Musculoskeletal: Negative for back pain, gait problem and joint swelling (Weakness bilateral knees)          Bilateral lower extremity numbness and tingling   Neurological: Positive for weakness ( Bilateral lower extremity)  Psychiatric/Behavioral: Negative  Current Outpatient Medications on File Prior to Visit   Medication Sig   • albuterol (2 5 mg/3 mL) 0 083 % nebulizer solution Take 3 mL (2 5 mg total) by nebulization every 6 (six) hours as needed for wheezing or shortness of breath (Patient taking differently: Take 2 5 mg by nebulization every 6 (six) hours as needed for wheezing or shortness of breath As needed)   • albuterol (ProAir HFA) 90 mcg/act inhaler Inhale 2 puffs every 6 (six) hours as needed for wheezing or shortness of breath   • amLODIPine-atorvastatin (CADUET) 5-20 MG per tablet Take 1 tablet by mouth daily   • cholecalciferol (VITAMIN D3) 400 units tablet Take 400 Units by mouth daily   • fluticasone-vilanterol (Breo Ellipta) 100-25 mcg/inh inhaler Inhale 1 puff daily Rinse mouth after use     • hydrocortisone (ANUSOL-HC) 25 mg suppository Insert 1 suppository (25 mg total) into the rectum 2 (two) times a day   • Icosapent Ethyl (Vascepa) 1 g CAPS Take 1 capsule (1 g total) by mouth 2 (two) times a day   • pantoprazole (PROTONIX) 40 mg tablet TAKE 1 TABLET BY MOUTH EVERY DAY (Patient taking differently: as needed)   • [DISCONTINUED] gabapentin (Neurontin) 100 mg capsule Take 1 capsule (100 mg total) by mouth 3 (three) times a day   • albuterol (ACCUNEB) 0 63 MG/3ML nebulizer solution albuterol sulfate   • amlodipine (NORVASC) 1 mg/ml SUSP oral suspension amlodipine (Patient not taking: Reported on 11/1/2022)   • Atorvastatin Calcium (LIPITOR PO) atorvastatin (Patient not taking: Reported on 11/1/2022)   • fluticasone-vilanterol (BREO ELLIPTA) 100-25 mcg/inh inhaler Breo Ellipta   • potassium chloride (MICRO-K) 10 MEQ CR capsule Take 1 capsule (10 mEq total) by mouth 2 (two) times a day (Patient not taking: Reported on 11/1/2022)   • [DISCONTINUED] montelukast (SINGULAIR) 10 mg tablet Take 1 tablet (10 mg total) by mouth daily at bedtime (Patient not taking: Reported on 11/1/2022)       Objective /86 (BP Location: Left arm, Patient Position: Sitting, Cuff Size: Standard)   Pulse 87   Temp 97 6 °F (36 4 °C) (Temporal)   Resp 18   Ht 5' 2" (1 575 m)   Wt 82 kg (180 lb 12 8 oz)   SpO2 97%   BMI 33 07 kg/m²     Physical Exam  Constitutional:       Appearance: Normal appearance  Eyes:      General: No visual field deficit  Extraocular Movements: Extraocular movements intact  Pupils: Pupils are equal, round, and reactive to light  Cardiovascular:      Rate and Rhythm: Normal rate and regular rhythm  Pulmonary:      Effort: Pulmonary effort is normal       Breath sounds: Normal breath sounds  Musculoskeletal:         General: No swelling, tenderness or signs of injury  Cervical back: Normal range of motion  Neurological:      General: No focal deficit present  Mental Status: She is alert and oriented to person, place, and time  Cranial Nerves: No facial asymmetry  Motor: Tremor (Bilateral hands and face) present  No weakness        Coordination: Coordination normal       Gait: Gait normal        FRANCI Zepeda

## 2023-02-08 ENCOUNTER — TELEPHONE (OUTPATIENT)
Dept: NEUROLOGY | Facility: CLINIC | Age: 63
End: 2023-02-08

## 2023-02-08 NOTE — TELEPHONE ENCOUNTER
Called and left voicemail for patient for the 3rd and final time  Informed patient that unfortunately we have to cancel patients appointment so we haven't heard back from them and asked them to please call the office back at their earliest convenience to reschedule their appointment  Called patient and left 2nd voicemail to inform them that their appointment with Dr Yonis Alanis on 5/5/23 will have to be rescheduled because Dr Yonis Alains will not be in the office that day and asked that they please call back at their earliest convenience so we can reschedule them    Called patient and left voicemail to inform them that their appointment with Dr Yonis Alanis on 5/5/23 will have to be rescheduled because Dr oYnis Alanis will not be in the office that day and asked that they please call back at their earliest convenience so we can reschedule them

## 2023-04-19 PROBLEM — G62.9 NEUROPATHY: Status: ACTIVE | Noted: 2023-04-19

## 2023-04-19 PROBLEM — E53.1 VITAMIN B6 DEFICIENCY: Status: ACTIVE | Noted: 2023-04-19

## 2023-04-24 DIAGNOSIS — E55.9 VITAMIN D DEFICIENCY: Primary | ICD-10-CM

## 2023-04-24 DIAGNOSIS — D53.9 MACROCYTIC ANEMIA: ICD-10-CM

## 2023-04-24 RX ORDER — ERGOCALCIFEROL 1.25 MG/1
50000 CAPSULE ORAL WEEKLY
Qty: 12 CAPSULE | Refills: 0 | Status: SHIPPED | OUTPATIENT
Start: 2023-04-24

## 2023-05-11 DIAGNOSIS — I10 ESSENTIAL HYPERTENSION: ICD-10-CM

## 2023-05-11 RX ORDER — AMLODIPINE BESYLATE AND ATORVASTATIN CALCIUM 5; 20 MG/1; MG/1
TABLET, FILM COATED ORAL
Qty: 30 TABLET | Refills: 0 | Status: SHIPPED | OUTPATIENT
Start: 2023-05-11

## 2023-06-06 DIAGNOSIS — I10 ESSENTIAL HYPERTENSION: ICD-10-CM

## 2023-06-06 RX ORDER — AMLODIPINE BESYLATE AND ATORVASTATIN CALCIUM 5; 20 MG/1; MG/1
TABLET, FILM COATED ORAL
Qty: 90 TABLET | Refills: 1 | Status: SHIPPED | OUTPATIENT
Start: 2023-06-06

## 2023-08-07 ENCOUNTER — TELEPHONE (OUTPATIENT)
Age: 63
End: 2023-08-07

## 2023-09-19 ENCOUNTER — OFFICE VISIT (OUTPATIENT)
Age: 63
End: 2023-09-19
Payer: MEDICARE

## 2023-09-19 ENCOUNTER — TELEPHONE (OUTPATIENT)
Age: 63
End: 2023-09-19

## 2023-09-19 VITALS
HEART RATE: 100 BPM | SYSTOLIC BLOOD PRESSURE: 150 MMHG | BODY MASS INDEX: 33.53 KG/M2 | WEIGHT: 182.2 LBS | RESPIRATION RATE: 16 BRPM | HEIGHT: 62 IN | OXYGEN SATURATION: 98 % | DIASTOLIC BLOOD PRESSURE: 94 MMHG | TEMPERATURE: 98.6 F

## 2023-09-19 DIAGNOSIS — Z00.00 MEDICARE ANNUAL WELLNESS VISIT, SUBSEQUENT: Primary | ICD-10-CM

## 2023-09-19 DIAGNOSIS — R73.03 PREDIABETES: Primary | ICD-10-CM

## 2023-09-19 DIAGNOSIS — Z12.31 ENCOUNTER FOR SCREENING MAMMOGRAM FOR BREAST CANCER: ICD-10-CM

## 2023-09-19 DIAGNOSIS — I10 ESSENTIAL HYPERTENSION: ICD-10-CM

## 2023-09-19 DIAGNOSIS — G62.9 NEUROPATHY: ICD-10-CM

## 2023-09-19 DIAGNOSIS — E56.9 VITAMIN DEFICIENCY: ICD-10-CM

## 2023-09-19 PROBLEM — K59.04 CHRONIC IDIOPATHIC CONSTIPATION: Status: ACTIVE | Noted: 2023-09-19

## 2023-09-19 PROBLEM — K59.04 CHRONIC IDIOPATHIC CONSTIPATION: Status: RESOLVED | Noted: 2023-09-19 | Resolved: 2023-09-19

## 2023-09-19 PROCEDURE — 99213 OFFICE O/P EST LOW 20 MIN: CPT | Performed by: FAMILY MEDICINE

## 2023-09-19 PROCEDURE — G0439 PPPS, SUBSEQ VISIT: HCPCS | Performed by: FAMILY MEDICINE

## 2023-09-19 RX ORDER — MELATONIN
2000 DAILY
Qty: 180 TABLET | Refills: 1 | Status: SHIPPED | OUTPATIENT
Start: 2023-09-19 | End: 2023-12-18

## 2023-09-19 RX ORDER — GABAPENTIN 300 MG/1
CAPSULE ORAL
Qty: 120 CAPSULE | Refills: 2
Start: 2023-09-19

## 2023-09-19 NOTE — ASSESSMENT & PLAN NOTE
Requested for tapering off of gabapentin. Currently on 300 in the morning, 300 in the afternoon, 600 at bedtime. We will start with discontinuing afternoon dose and continuing 300 in the morning and 600 at bedtime. Taper plan discussed with patient. Follow-up in 4 weeks.

## 2023-09-19 NOTE — ASSESSMENT & PLAN NOTE
Slightly elevated today. We will follow-up at next appointment. Currently on Vascepa 1 g capsule 2 times a day, amlodipine 5 mg daily. Management will likely need to be adjusted due to room for better control of blood pressure.

## 2023-09-19 NOTE — PROGRESS NOTES
Assessment and Plan:     Problem List Items Addressed This Visit        Cardiovascular and Mediastinum    Essential hypertension     Slightly elevated today. We will follow-up at next appointment. Currently on Vascepa 1 g capsule 2 times a day, amlodipine 5 mg daily. Management will likely need to be adjusted due to room for better control of blood pressure. Nervous and Auditory    Neuropathy     Requested for tapering off of gabapentin. Currently on 300 in the morning, 300 in the afternoon, 600 at bedtime. We will start with discontinuing afternoon dose and continuing 300 in the morning and 600 at bedtime. Taper plan discussed with patient. Follow-up in 4 weeks. Relevant Medications    gabapentin (Neurontin) 300 mg capsule   Other Visit Diagnoses     Medicare annual wellness visit, subsequent    -  Primary    Encounter for screening mammogram for breast cancer        Relevant Orders    Mammo screening bilateral w 3d & cad    Vitamin deficiency        Relevant Medications    cholecalciferol (VITAMIN D3) 1,000 units tablet        BMI Counseling: Body mass index is 33.32 kg/m². The BMI is above normal. Nutrition recommendations include decreasing portion sizes, encouraging healthy choices of fruits and vegetables, decreasing fast food intake, consuming healthier snacks, limiting drinks that contain sugar, moderation in carbohydrate intake, increasing intake of lean protein, reducing intake of saturated and trans fat and reducing intake of cholesterol. Exercise recommendations include moderate physical activity 150 minutes/week. Rationale for BMI follow-up plan is due to patient being overweight or obese. Depression Screening and Follow-up Plan: Patient was screened for depression during today's encounter. They screened negative with a PHQ-2 score of 0.       Preventive health issues were discussed with patient, and age appropriate screening tests were ordered as noted in patient's After Visit Summary. Personalized health advice and appropriate referrals for health education or preventive services given if needed, as noted in patient's After Visit Summary. History of Present Illness:     Patient presents for a Medicare Wellness Visit    Laurel Officer is here for medicare annual wellness with concerns of constipation. Patient Care Team:  Grabiel Urbina MD as PCP - General (Family Medicine)  Madina Blackwell PA-C (Gastroenterology)     Review of Systems:     Review of Systems   Constitutional: Negative for chills, fever and unexpected weight change. HENT: Negative for congestion, rhinorrhea and sore throat. Eyes: Negative for visual disturbance. Respiratory: Negative for chest tightness, shortness of breath and wheezing. Cardiovascular: Negative for chest pain. Gastrointestinal: Positive for constipation. Negative for abdominal pain, diarrhea, nausea and vomiting. Endocrine: Negative for polyuria. Genitourinary: Negative for frequency. Skin: Negative for color change and rash. Neurological: Negative for dizziness, weakness, light-headedness and headaches. Psychiatric/Behavioral: Negative for confusion, self-injury and suicidal ideas.         Problem List:     Patient Active Problem List   Diagnosis   • Tibial plateau fracture, left   • History of lung cancer   • Essential hypertension   • Hyperlipidemia   • Hypokalemia   • Prediabetes   • COPD, mild ([address])   • Post-menopausal   • Encounter for gynecological examination without abnormal finding   • Macrocytic anemia   • Alcohol use   • Paresthesia of both lower extremities   • Osteopenia   • Neuropathy   • Vitamin B6 deficiency      Past Medical and Surgical History:     Past Medical History:   Diagnosis Date   • Cancer ([address]) 2011    lung, upper left lung lobectomy   • Hyperlipidemia    • Hypertension      Past Surgical History:   Procedure Laterality Date   • HYSTERECTOMY     • KNEE SURGERY Left    • LUNG LOBECTOMY • ORIF TIBIAL PLATEAU Left     Procedure: OPEN REDUCTION W/ INTERNAL FIXATION (ORIF) TIBIAL PLATEAU;  Surgeon: Elizabeth Wilkins MD;  Location: [facility];  Service: Orthopedics   • WRIST SURGERY        Family History:     Family History   Problem Relation Age of Onset   • Diabetes Mother    • Diabetes Father    • Breast cancer Sister    • No Known Problems Sister    • No Known Problems Sister    • No Known Problems Sister    • No Known Problems Maternal Grandmother    • No Known Problems Paternal Grandmother    • Diabetes Brother    • No Known Problems Son    • No Known Problems Maternal Aunt    • No Known Problems Maternal Aunt    • No Known Problems Maternal Aunt    • No Known Problems Maternal Aunt    • No Known Problems Maternal Aunt    • No Known Problems Maternal Aunt    • No Known Problems Maternal Aunt    • No Known Problems Paternal Aunt    • No Known Problems Paternal Aunt    • No Known Problems Paternal Aunt    • Ovarian cancer Neg Hx    • Cervical cancer Neg Hx    • Uterine cancer Neg Hx       Social History:     Social History     Socioeconomic History   • Marital status: Single     Spouse name: None   • Number of children: None   • Years of education: None   • Highest education level: None   Occupational History   • None   Tobacco Use   • Smoking status: Former     Types: Cigarettes     Quit date:      Years since quittin.7   • Smokeless tobacco: Never   Vaping Use   • Vaping Use: Never used   Substance and Sexual Activity   • Alcohol use: Not Currently     Comment: weekends   • Drug use: Never   • Sexual activity: None   Other Topics Concern   • None   Social History Narrative   • None     Social Determinants of Health     Financial Resource Strain: Low Risk  (2023)    Overall Financial Resource Strain (CARDIA)    • Difficulty of Paying Living Expenses: Not hard at all   Food Insecurity: Not on file   Transportation Needs: No Transportation Needs (2023)    PRAPARE - Transportation    • Lack of Transportation (Medical): No    • Lack of Transportation (Non-Medical): No   Physical Activity: Inactive (9/19/2023)    Exercise Vital Sign    • Days of Exercise per Week: 0 days    • Minutes of Exercise per Session: 0 min   Stress: Stress Concern Present (11/1/2022)    [facility]    • Feeling of Stress : Rather much   Social Connections: Not on file   Intimate Partner Violence: Not on file   Housing Stability: Not on file      Medications and Allergies:     Current Outpatient Medications   Medication Sig Dispense Refill   • albuterol (2.5 mg/3 mL) 0.083 % nebulizer solution Take 3 mL (2.5 mg total) by nebulization every 6 (six) hours as needed for wheezing or shortness of breath As needed 300 mL 1   • albuterol (ProAir HFA) 90 mcg/act inhaler Inhale 2 puffs every 6 (six) hours as needed for wheezing or shortness of breath 18 g 1   • amLODIPine-atorvastatin (CADUET) 5-20 MG per tablet TAKE 1 TABLET BY MOUTH EVERY DAY 90 tablet 1   • cholecalciferol (VITAMIN D3) 1,000 units tablet Take 2 tablets (2,000 Units total) by mouth daily 180 tablet 1   • Fluticasone Furoate-Vilanterol (Breo Ellipta) 100-25 mcg/actuation inhaler Inhale 1 puff daily Rinse mouth after use. 60 each 1   • gabapentin (Neurontin) 300 mg capsule Take 300mg in am, 600mg at bedtime 120 capsule 2   • hydrocortisone (ANUSOL-HC) 25 mg suppository Insert 1 suppository (25 mg total) into the rectum 2 (two) times a day 12 suppository 0   • Icosapent Ethyl (Vascepa) 1 g CAPS Take 1 capsule (1 g total) by mouth 2 (two) times a day 180 capsule 1   • pantoprazole (PROTONIX) 40 mg tablet Take 1 tablet (40 mg total) by mouth daily 90 tablet 1   • ergocalciferol (VITAMIN D2) 50,000 units Take 1 capsule (50,000 Units total) by mouth once a week (Patient not taking: Reported on 9/19/2023) 12 capsule 0     No current facility-administered medications for this visit. Allergies   Allergen Reactions   • Pollen Extract       Immunizations:     Immunization History   Administered Date(s) Administered   • COVID-19 MODERNA VACC 0.5 ML IM 04/07/2021, 05/05/2021   • INFLUENZA 08/15/2019, 12/30/2019, 12/30/2019, 11/01/2022   • Influenza, recombinant, quadrivalent,injectable, preservative free 11/04/2019, 11/01/2022   • Influenza, seasonal, injectable 08/15/2019   • Pneumococcal Conjugate Vaccine 20-valent (Pcv20), Polysace 05/15/2023   • Tdap 12/30/2019   • Zoster Vaccine Recombinant 12/30/2019, 04/24/2023, 08/28/2023      Health Maintenance:         Topic Date Due   • Breast Cancer Screening: Mammogram  05/25/2023   • Colorectal Cancer Screening  06/28/2025   • Cervical Cancer Screening  07/22/2025   • HIV Screening  Completed   • Hepatitis C Screening  Completed         Topic Date Due   • COVID-19 Vaccine (3 - Lonne Cleaves series) 06/30/2021      Medicare Screening Tests and Risk Assessments:     Tiff Akins is here for her Subsequent Wellness visit. Health Risk Assessment:   Patient rates overall health as good. Patient feels that their physical health rating is same. Patient is satisfied with their life. Eyesight was rated as same. Hearing was rated as same. Patient feels that their emotional and mental health rating is same. Patients states they are never, rarely angry. Patient states they are never, rarely unusually tired/fatigued. Pain experienced in the last 7 days has been some. Patient's pain rating has been 2/10. Patient states that she has experienced no weight loss or gain in last 6 months. Depression Screening:   PHQ-2 Score: 0      Fall Risk Screening: In the past year, patient has experienced: history of falling in past year    Number of falls: 2 or more  Injured during fall?: Yes    Feels unsteady when standing or walking?: No    Worried about falling?: No      Urinary Incontinence Screening:   Patient has not leaked urine accidently in the last six months.      Home Safety:  Patient has trouble with stairs inside or outside of their home. Patient has working smoke alarms and has working carbon monoxide detector. Home safety hazards include: none. Nutrition:   Current diet is Regular. Medications:   Patient is currently taking over-the-counter supplements. OTC medications include: see medication list. Patient is able to manage medications. Activities of Daily Living (ADLs)/Instrumental Activities of Daily Living (IADLs):   Walk and transfer into and out of bed and chair?: Yes  Dress and groom yourself?: Yes    Bathe or shower yourself?: Yes    Feed yourself?  Yes  Do your laundry/housekeeping?: Yes  Manage your money, pay your bills and track your expenses?: Yes  Make your own meals?: Yes    Do your own shopping?: Yes    Previous Hospitalizations:   Any hospitalizations or ED visits within the last 12 months?: No      Advance Care Planning:   Living will: No      PREVENTIVE SCREENINGS      Cardiovascular Screening:    General: Screening Not Indicated and History Lipid Disorder      Diabetes Screening:     General: Screening Current      Colorectal Cancer Screening:     General: Screening Current      Breast Cancer Screening:     General: Risks and Benefits Discussed    Due for: Mammogram        Osteoporosis Screening:    General: Screening Current      Abdominal Aortic Aneurysm (AAA) Screening:        General: Screening Not Indicated      Lung Cancer Screening:     General: Screening Not Indicated and History Lung Cancer      Hepatitis C Screening:    General: Screening Current      Preventive Screening Comments: Lung cancer 2011 s/p lobectomy in 2011, quit smoking [id number]    Screening, Brief Intervention, and Referral to Treatment (SBIRT)    Screening      Single Item Drug Screening:  How often have you used an illegal drug (including marijuana) or a prescription medication for non-medical reasons in the past year? never    Single Item Drug Screen Score: 0  Interpretation: Negative screen for possible drug use disorder    No results found. Physical Exam:     /94 (BP Location: Left arm, Patient Position: Sitting, Cuff Size: Standard)   Pulse 100   Temp 98.6 °F (37 °C) (Tympanic)   Resp 16   Ht 5' 2" (1.575 m)   Wt 82.6 kg (182 lb 3.2 oz)   SpO2 98%   BMI 33.32 kg/m²     Physical Exam  Vitals reviewed. Constitutional:       General: She is not in acute distress. Appearance: Normal appearance. She is not ill-appearing, toxic-appearing or diaphoretic. HENT:      Head: Normocephalic and atraumatic. Right Ear: External ear normal.      Left Ear: External ear normal.      Nose: Nose normal.      Mouth/Throat:      Mouth: Mucous membranes are moist.   Eyes:      General: No scleral icterus. Right eye: No discharge. Left eye: No discharge. Extraocular Movements: Extraocular movements intact. Conjunctiva/sclera: Conjunctivae normal.   Cardiovascular:      Rate and Rhythm: Normal rate and regular rhythm. Pulses: Normal pulses. Pulmonary:      Effort: Pulmonary effort is normal. No respiratory distress. Abdominal:      Palpations: Abdomen is soft. Tenderness: There is no abdominal tenderness. Musculoskeletal:         General: No swelling. Normal range of motion. Cervical back: Normal range of motion and neck supple. Skin:     General: Skin is warm. Capillary Refill: Capillary refill takes less than 2 seconds. Neurological:      General: No focal deficit present. Mental Status: She is alert and oriented to person, place, and time. Psychiatric:         Mood and Affect: Mood normal.         Behavior: Behavior normal.         Thought Content:  Thought content normal.          Gia Sellers MD

## 2023-09-19 NOTE — PATIENT INSTRUCTIONS
Medicare Preventive Visit Patient Instructions  Thank you for completing your Welcome to Medicare Visit or Medicare Annual Wellness Visit today. Your next wellness visit will be due in one year (9/19/2024). The screening/preventive services that you may require over the next 5-10 years are detailed below. Some tests may not apply to you based off risk factors and/or age. Screening tests ordered at today's visit but not completed yet may show as past due. Also, please note that scanned in results may not display below. Preventive Screenings:  Service Recommendations Previous Testing/Comments   Colorectal Cancer Screening  * Colonoscopy    * Fecal Occult Blood Test (FOBT)/Fecal Immunochemical Test (FIT)  * Fecal DNA/Cologuard Test  * Flexible Sigmoidoscopy Age: 43-73 years old   Colonoscopy: every 10 years (may be performed more frequently if at higher risk)  OR  FOBT/FIT: every 1 year  OR  Cologuard: every 3 years  OR  Sigmoidoscopy: every 5 years  Screening may be recommended earlier than age 39 if at higher risk for colorectal cancer. Also, an individualized decision between you and your healthcare provider will decide whether screening between the ages of 77-80 would be appropriate. Colonoscopy: 06/29/2022  FOBT/FIT: Not on file  Cologuard: Not on file  Sigmoidoscopy: Not on file    Screening Current     Breast Cancer Screening Age: 36 years old  Frequency: every 1-2 years  Not required if history of left and right mastectomy Mammogram: 05/25/2022    Screening Current   Cervical Cancer Screening Between the ages of 21-29, pap smear recommended once every 3 years. Between the ages of 32-69, can perform pap smear with HPV co-testing every 5 years.    Recommendations may differ for women with a history of total hysterectomy, cervical cancer, or abnormal pap smears in past. Pap Smear: 07/22/2020        Hepatitis C Screening Once for adults born between 1945 and 1965  More frequently in patients at high risk for Hepatitis C Hep C Antibody: 03/14/2022    Screening Current   Diabetes Screening 1-2 times per year if you're at risk for diabetes or have pre-diabetes Fasting glucose: 100 mg/dL (4/18/2023)  A1C: 4.7 % (4/18/2023)  Screening Current   Cholesterol Screening Once every 5 years if you don't have a lipid disorder. May order more often based on risk factors. Lipid panel: 04/18/2023    Screening Not Indicated  History Lipid Disorder     Other Preventive Screenings Covered by Medicare:  1. Abdominal Aortic Aneurysm (AAA) Screening: covered once if your at risk. You're considered to be at risk if you have a family history of AAA. 2. Lung Cancer Screening: covers low dose CT scan once per year if you meet all of the following conditions: (1) Age 48-67; (2) No signs or symptoms of lung cancer; (3) Current smoker or have quit smoking within the last 15 years; (4) You have a tobacco smoking history of at least 20 pack years (packs per day multiplied by number of years you smoked); (5) You get a written order from a healthcare provider. 3. Glaucoma Screening: covered annually if you're considered high risk: (1) You have diabetes OR (2) Family history of glaucoma OR (3)  aged 48 and older OR (3)  American aged 72 and older  3. Osteoporosis Screening: covered every 2 years if you meet one of the following conditions: (1) You're estrogen deficient and at risk for osteoporosis based off medical history and other findings; (2) Have a vertebral abnormality; (3) On glucocorticoid therapy for more than 3 months; (4) Have primary hyperparathyroidism; (5) On osteoporosis medications and need to assess response to drug therapy. · Last bone density test (DXA Scan): 09/14/2022.  5. HIV Screening: covered annually if you're between the age of 15-65. Also covered annually if you are younger than 13 and older than 72 with risk factors for HIV infection.  For pregnant patients, it is covered up to 3 times per pregnancy. Immunizations:  Immunization Recommendations   Influenza Vaccine Annual influenza vaccination during flu season is recommended for all persons aged >= 6 months who do not have contraindications   Pneumococcal Vaccine   * Pneumococcal conjugate vaccine = PCV13 (Prevnar 13), PCV15 (Vaxneuvance), PCV20 (Prevnar 20)  * Pneumococcal polysaccharide vaccine = PPSV23 (Pneumovax) Adults 20-63 years old: 1-3 doses may be recommended based on certain risk factors  Adults 72 years old: 1-2 doses may be recommended based off what pneumonia vaccine you previously received   Hepatitis B Vaccine 3 dose series if at intermediate or high risk (ex: diabetes, end stage renal disease, liver disease)   Tetanus (Td) Vaccine - COST NOT COVERED BY MEDICARE PART B Following completion of primary series, a booster dose should be given every 10 years to maintain immunity against tetanus. Td may also be given as tetanus wound prophylaxis. Tdap Vaccine - COST NOT COVERED BY MEDICARE PART B Recommended at least once for all adults. For pregnant patients, recommended with each pregnancy. Shingles Vaccine (Shingrix) - COST NOT COVERED BY MEDICARE PART B  2 shot series recommended in those aged 48 and above     Health Maintenance Due:      Topic Date Due   • Breast Cancer Screening: Mammogram  05/25/2023   • Colorectal Cancer Screening  06/28/2025   • Cervical Cancer Screening  07/22/2025   • HIV Screening  Completed   • Hepatitis C Screening  Completed     Immunizations Due:      Topic Date Due   • COVID-19 Vaccine (3 - Virgia Lamer series) 06/30/2021     Advance Directives   What are advance directives? Advance directives are legal documents that state your wishes and plans for medical care. These plans are made ahead of time in case you lose your ability to make decisions for yourself. Advance directives can apply to any medical decision, such as the treatments you want, and if you want to donate organs.    What are the types of advance directives? There are many types of advance directives, and each state has rules about how to use them. You may choose a combination of any of the following:  · Living will: This is a written record of the treatment you want. You can also choose which treatments you do not want, which to limit, and which to stop at a certain time. This includes surgery, medicine, IV fluid, and tube feedings. · Durable power of  for healthcare [facility]): This is a written record that states who you want to make healthcare choices for you when you are unable to make them for yourself. This person, called a proxy, is usually a family member or a friend. You may choose more than 1 proxy. · Do not resuscitate (DNR) order:  A DNR order is used in case your heart stops beating or you stop breathing. It is a request not to have certain forms of treatment, such as CPR. A DNR order may be included in other types of advance directives. · Medical directive: This covers the care that you want if you are in a coma, near death, or unable to make decisions for yourself. You can list the treatments you want for each condition. Treatment may include pain medicine, surgery, blood transfusions, dialysis, IV or tube feedings, and a ventilator (breathing machine). · Values history: This document has questions about your views, beliefs, and how you feel and think about life. This information can help others choose the care that you would choose. Why are advance directives important? An advance directive helps you control your care. Although spoken wishes may be used, it is better to have your wishes written down. Spoken wishes can be misunderstood, or not followed. Treatments may be given even if you do not want them. An advance directive may make it easier for your family to make difficult choices about your care.    Weight Management   Why it is important to manage your weight:  Being overweight increases your risk of health conditions such as heart disease, high blood pressure, type 2 diabetes, and certain types of cancer. It can also increase your risk for osteoarthritis, sleep apnea, and other respiratory problems. Aim for a slow, steady weight loss. Even a small amount of weight loss can lower your risk of health problems. How to lose weight safely:  A safe and healthy way to lose weight is to eat fewer calories and get regular exercise. You can lose up about 1 pound a week by decreasing the number of calories you eat by 500 calories each day. Healthy meal plan for weight management:  A healthy meal plan includes a variety of foods, contains fewer calories, and helps you stay healthy. A healthy meal plan includes the following:  · Eat whole-grain foods more often. A healthy meal plan should contain fiber. Fiber is the part of grains, fruits, and vegetables that is not broken down by your body. Whole-grain foods are healthy and provide extra fiber in your diet. Some examples of whole-grain foods are whole-wheat breads and pastas, oatmeal, brown rice, and bulgur. · Eat a variety of vegetables every day. Include dark, leafy greens such as spinach, kale, kalie greens, and mustard greens. Eat yellow and orange vegetables such as carrots, sweet potatoes, and winter squash. · Eat a variety of fruits every day. Choose fresh or canned fruit (canned in its own juice or light syrup) instead of juice. Fruit juice has very little or no fiber. · Eat low-fat dairy foods. Drink fat-free (skim) milk or 1% milk. Eat fat-free yogurt and low-fat cottage cheese. Try low-fat cheeses such as mozzarella and other reduced-fat cheeses. · Choose meat and other protein foods that are low in fat. Choose beans or other legumes such as split peas or lentils. Choose fish, skinless poultry (chicken or turkey), or lean cuts of red meat (beef or pork). Before you cook meat or poultry, cut off any visible fat. · Use less fat and oil.   Try baking foods instead of frying them. Add less fat, such as margarine, sour cream, regular salad dressing and mayonnaise to foods. Eat fewer high-fat foods. Some examples of high-fat foods include french fries, doughnuts, ice cream, and cakes. · Eat fewer sweets. Limit foods and drinks that are high in sugar. This includes candy, cookies, regular soda, and sweetened drinks. Exercise:  Exercise at least 30 minutes per day on most days of the week. Some examples of exercise include walking, biking, dancing, and swimming. You can also fit in more physical activity by taking the stairs instead of the elevator or parking farther away from stores. Ask your healthcare provider about the best exercise plan for you. © Copyright [employer] 2018 Information is for End User's use only and may not be sold, redistributed or otherwise used for commercial purposes.  All illustrations and images included in CareNotes® are the copyrighted property of [employer] Inc[employer] or [address] [place] [address]

## 2023-10-23 ENCOUNTER — APPOINTMENT (OUTPATIENT)
Age: 63
End: 2023-10-23
Payer: MEDICARE

## 2023-10-23 ENCOUNTER — OFFICE VISIT (OUTPATIENT)
Age: 63
End: 2023-10-23
Payer: MEDICARE

## 2023-10-23 VITALS
OXYGEN SATURATION: 97 % | BODY MASS INDEX: 34.08 KG/M2 | WEIGHT: 185.2 LBS | RESPIRATION RATE: 16 BRPM | TEMPERATURE: 98 F | SYSTOLIC BLOOD PRESSURE: 128 MMHG | DIASTOLIC BLOOD PRESSURE: 72 MMHG | HEART RATE: 78 BPM | HEIGHT: 62 IN

## 2023-10-23 DIAGNOSIS — E53.1 VITAMIN B6 DEFICIENCY: ICD-10-CM

## 2023-10-23 DIAGNOSIS — D53.9 MACROCYTIC ANEMIA: ICD-10-CM

## 2023-10-23 DIAGNOSIS — M85.80 OSTEOPENIA, UNSPECIFIED LOCATION: ICD-10-CM

## 2023-10-23 DIAGNOSIS — G62.9 NEUROPATHY: ICD-10-CM

## 2023-10-23 DIAGNOSIS — I10 ESSENTIAL HYPERTENSION: Primary | ICD-10-CM

## 2023-10-23 DIAGNOSIS — E55.9 VITAMIN D DEFICIENCY: ICD-10-CM

## 2023-10-23 DIAGNOSIS — R73.03 PREDIABETES: ICD-10-CM

## 2023-10-23 LAB
ERYTHROCYTE [DISTWIDTH] IN BLOOD BY AUTOMATED COUNT: 15.7 % (ref 11.6–15.1)
HCT VFR BLD AUTO: 29.8 % (ref 34.8–46.1)
HGB BLD-MCNC: 9.7 G/DL (ref 11.5–15.4)
MCH RBC QN AUTO: 30.2 PG (ref 26.8–34.3)
MCHC RBC AUTO-ENTMCNC: 32.6 G/DL (ref 31.4–37.4)
MCV RBC AUTO: 93 FL (ref 82–98)
PLATELET # BLD AUTO: 354 THOUSANDS/UL (ref 149–390)
PMV BLD AUTO: 12.2 FL (ref 8.9–12.7)
RBC # BLD AUTO: 3.21 MILLION/UL (ref 3.81–5.12)
WBC # BLD AUTO: 7.45 THOUSAND/UL (ref 4.31–10.16)

## 2023-10-23 PROCEDURE — 83036 HEMOGLOBIN GLYCOSYLATED A1C: CPT

## 2023-10-23 PROCEDURE — 82306 VITAMIN D 25 HYDROXY: CPT

## 2023-10-23 PROCEDURE — 36415 COLL VENOUS BLD VENIPUNCTURE: CPT

## 2023-10-23 PROCEDURE — 99214 OFFICE O/P EST MOD 30 MIN: CPT | Performed by: FAMILY MEDICINE

## 2023-10-23 PROCEDURE — 85027 COMPLETE CBC AUTOMATED: CPT

## 2023-10-23 RX ORDER — MULTIVITAMIN WITH IRON
100 TABLET ORAL DAILY
Qty: 90 TABLET | Refills: 1 | Status: SHIPPED | OUTPATIENT
Start: 2023-10-23 | End: 2024-01-21

## 2023-10-23 NOTE — ASSESSMENT & PLAN NOTE
Continue to taper off as patient request.  Reduced to 600 in the morning from 900 for 4 weeks, followed by 300 at bedtime for 3-4 weeks, to half a tablet at bedtime for 2 wks    Start her vitamin B6 100 mg daily

## 2023-10-23 NOTE — PROGRESS NOTES
[address]    Name: Jihan Rizvi      YOB: 1960      MRN: [id number]  Encounter Provider: Jazmin Treviño MD      Encounter Date: 10/23/23    Encounter Dept: [address] [facility]     1. Essential hypertension  Assessment & Plan:  Currently on amlodipine 5 mg daily. Blood Pressure: 128/72   Continue current regimen. 2. Neuropathy  Assessment & Plan:  Continue to taper off as patient request.  Reduced to 600 in the morning from 900 for 4 weeks, followed by 300 at bedtime for 3-4 weeks, to half a tablet at bedtime for 2 wks    Start her vitamin B6 100 mg daily    Orders:  -     pyridoxine (VITAMIN B6) 100 mg tablet; Take 1 tablet (100 mg total) by mouth daily    3. Osteopenia, unspecified location  -     Vitamin D 25 hydroxy; Future    4. Vitamin D deficiency  Assessment & Plan:  Continue vitamin D high-dose prescription      Orders:  -     Vitamin D 25 hydroxy; Future    5. Macrocytic anemia  Assessment & Plan:  Chronic history of anemia likely secondary to iron deficiency, previously on iron infusion, oral iron supplements caused significant constipation. Follow-up with CBC    Orders:  -     CBC and Platelet; Future    6. Vitamin B6 deficiency  Assessment & Plan:  Low vitamin B6 with history of alcohol abuse. Currently denying significant or large quantity of alcohol use. Last drink in August-2 small drinks at a wedding. Start vitamin B6 daily which will also improve peripheral neuropathy. Orders:  -     pyridoxine (VITAMIN B6) 100 mg tablet; Take 1 tablet (100 mg total) by mouth daily          Depression Screening and Follow-up Plan: Patient was screened for depression during today's encounter. They screened negative with a PHQ-2 score of 0. Pertinent labs were evaluated and discussed with patient today.        Follow-Up Plans Return in about 24 days (around 11/16/2023) for (schedule for 2PM for short visit follow up apt). Chief Complaint    Follow-up      Subjective   Murray Martinez is a 60 y.o. with  has a past medical history of Cancer ([address]), Hyperlipidemia, and Hypertension. Today patient reports about blood pressure. Review of Systems   Constitutional:  Negative for chills, fever and unexpected weight change. HENT:  Negative for congestion, rhinorrhea and sore throat. Eyes:  Negative for visual disturbance. Respiratory:  Negative for chest tightness, shortness of breath and wheezing. Cardiovascular:  Negative for chest pain. Gastrointestinal:  Negative for abdominal pain, constipation, diarrhea, nausea and vomiting. Endocrine: Negative for polyuria. Genitourinary:  Negative for dysuria and hematuria. Skin:  Negative for rash. Neurological:  Negative for dizziness, weakness, light-headedness and headaches. Psychiatric/Behavioral:  Negative for confusion.           Current Medication List     Current Outpatient Medications:     albuterol (2.5 mg/3 mL) 0.083 % nebulizer solution, Take 3 mL (2.5 mg total) by nebulization every 6 (six) hours as needed for wheezing or shortness of breath As needed, Disp: 300 mL, Rfl: 1    albuterol (ProAir HFA) 90 mcg/act inhaler, Inhale 2 puffs every 6 (six) hours as needed for wheezing or shortness of breath, Disp: 18 g, Rfl: 1    amLODIPine-atorvastatin (CADUET) 5-20 MG per tablet, TAKE 1 TABLET BY MOUTH EVERY DAY, Disp: 90 tablet, Rfl: 1    cholecalciferol (VITAMIN D3) 1,000 units tablet, Take 2 tablets (2,000 Units total) by mouth daily, Disp: 180 tablet, Rfl: 1    ergocalciferol (VITAMIN D2) 50,000 units, Take 1 capsule (50,000 Units total) by mouth once a week, Disp: 12 capsule, Rfl: 0    Fluticasone Furoate-Vilanterol (Breo Ellipta) 100-25 mcg/actuation inhaler, Inhale 1 puff daily Rinse mouth after use., Disp: 60 each, Rfl: 1    gabapentin (Neurontin) 300 mg capsule, Take 300mg in am, 600mg at bedtime, Disp: 120 capsule, Rfl: 2    hydrocortisone (ANUSOL-HC) 25 mg suppository, Insert 1 suppository (25 mg total) into the rectum 2 (two) times a day, Disp: 12 suppository, Rfl: 0    Icosapent Ethyl (Vascepa) 1 g CAPS, Take 1 capsule (1 g total) by mouth 2 (two) times a day, Disp: 180 capsule, Rfl: 1    pantoprazole (PROTONIX) 40 mg tablet, Take 1 tablet (40 mg total) by mouth daily, Disp: 90 tablet, Rfl: 1    pyridoxine (VITAMIN B6) 100 mg tablet, Take 1 tablet (100 mg total) by mouth daily, Disp: 90 tablet, Rfl: 1      Objective     /72 (BP Location: Left arm, Patient Position: Sitting, Cuff Size: Standard)   Pulse 78   Temp 98 °F (36.7 °C) (Tympanic)   Resp 16   Ht 5' 2" (1.575 m)   Wt 84 kg (185 lb 3.2 oz)   SpO2 97%   BMI 33.87 kg/m²      Physical Exam  Vitals reviewed. Constitutional:       General: She is not in acute distress. Appearance: Normal appearance. She is not ill-appearing, toxic-appearing or diaphoretic. HENT:      Head: Normocephalic and atraumatic. Right Ear: External ear normal.      Left Ear: External ear normal.      Nose: Nose normal.      Mouth/Throat:      Mouth: Mucous membranes are moist.   Eyes:      General: No scleral icterus. Right eye: No discharge. Left eye: No discharge. Extraocular Movements: Extraocular movements intact. Conjunctiva/sclera: Conjunctivae normal.   Cardiovascular:      Rate and Rhythm: Normal rate and regular rhythm. Pulses: Normal pulses. Heart sounds: Murmur heard. Pulmonary:      Effort: Pulmonary effort is normal. No respiratory distress. Breath sounds: Normal breath sounds. Abdominal:      Palpations: Abdomen is soft. Tenderness: There is no abdominal tenderness. Musculoskeletal:         General: No swelling. Normal range of motion. Cervical back: Normal range of motion. Skin:     General: Skin is warm and dry. Neurological:      General: No focal deficit present. Mental Status: She is alert and oriented to person, place, and time. Psychiatric:         Mood and Affect: Mood normal.         Behavior: Behavior normal.         Thought Content:  Thought content normal.           Osito Sutherland MD  Family Medicine Physician   [facility]

## 2023-10-23 NOTE — ASSESSMENT & PLAN NOTE
Chronic history of anemia likely secondary to iron deficiency, previously on iron infusion, oral iron supplements caused significant constipation.     Follow-up with CBC

## 2023-10-23 NOTE — ASSESSMENT & PLAN NOTE
Low vitamin B6 with history of alcohol abuse. Currently denying significant or large quantity of alcohol use. Last drink in August-2 small drinks at a wedding. Start vitamin B6 daily which will also improve peripheral neuropathy.

## 2023-10-24 LAB
25(OH)D3 SERPL-MCNC: 14.1 NG/ML (ref 30–100)
EST. AVERAGE GLUCOSE BLD GHB EST-MCNC: 105 MG/DL
HBA1C MFR BLD: 5.3 %

## 2023-10-25 DIAGNOSIS — E78.00 PURE HYPERCHOLESTEROLEMIA: ICD-10-CM

## 2023-10-25 DIAGNOSIS — D53.9 MACROCYTIC ANEMIA: ICD-10-CM

## 2023-10-25 RX ORDER — PANTOPRAZOLE SODIUM 40 MG/1
40 TABLET, DELAYED RELEASE ORAL DAILY
Qty: 90 TABLET | Refills: 1 | Status: SHIPPED | OUTPATIENT
Start: 2023-10-25

## 2023-10-27 RX ORDER — ICOSAPENT ETHYL 1000 MG/1
1 CAPSULE ORAL 2 TIMES DAILY
Qty: 180 CAPSULE | Refills: 1 | Status: SHIPPED | OUTPATIENT
Start: 2023-10-27

## 2023-10-30 ENCOUNTER — TELEPHONE (OUTPATIENT)
Age: 63
End: 2023-10-30

## 2023-11-16 ENCOUNTER — TELEPHONE (OUTPATIENT)
Age: 63
End: 2023-11-16

## 2023-11-16 ENCOUNTER — HOSPITAL ENCOUNTER (OUTPATIENT)
Age: 63
Discharge: HOME | End: 2023-11-16
Payer: MEDICARE

## 2023-11-16 VITALS — BODY MASS INDEX: 34.04 KG/M2 | HEIGHT: 62 IN | WEIGHT: 185 LBS

## 2023-11-16 DIAGNOSIS — D50.8 OTHER IRON DEFICIENCY ANEMIA: Primary | ICD-10-CM

## 2023-11-16 DIAGNOSIS — E55.9 VITAMIN D DEFICIENCY: ICD-10-CM

## 2023-11-16 DIAGNOSIS — Z12.31 ENCOUNTER FOR SCREENING MAMMOGRAM FOR BREAST CANCER: ICD-10-CM

## 2023-11-16 PROCEDURE — 77063 BREAST TOMOSYNTHESIS BI: CPT

## 2023-11-16 PROCEDURE — 77067 SCR MAMMO BI INCL CAD: CPT

## 2023-11-16 RX ORDER — CHOLECALCIFEROL (VITAMIN D3) 1250 MCG
50000 CAPSULE ORAL WEEKLY
Qty: 12 CAPSULE | Refills: 0 | Status: SHIPPED | OUTPATIENT
Start: 2023-11-16 | End: 2023-12-11

## 2023-11-16 NOTE — TELEPHONE ENCOUNTER
THOUGHT  SHE  HAD  AN APPT  TODAY    BUT  NOT  ON  SCHEDULE    ASKING   FOR   RX   HAS   RASH  UNDER  HER  BREAST  AND  WANTS  AN  RX      ALSO     WANTED  TO  CHECK  ABOUT  HER  LABS   DONE  LAST  MONTH      [facility]

## 2023-11-16 NOTE — TELEPHONE ENCOUNTER
Labs show iron def anemia. Hbg continues to drop.   I have placed a referral for GI for further evaluation and management of anemia.  Her vitamin D levels are low, will start on vitamin D supplements.  Patient should stop the daily supplements while taking the weekly supplement.   We will discuss further labs at next appointment for more details.

## 2023-11-17 NOTE — TELEPHONE ENCOUNTER
Patient possibly might have yeast infection under her breast, can you send something to the pharmacy.

## 2023-11-29 ENCOUNTER — OFFICE VISIT (OUTPATIENT)
Dept: GASTROENTEROLOGY | Facility: CLINIC | Age: 63
End: 2023-11-29
Payer: MEDICARE

## 2023-11-29 ENCOUNTER — APPOINTMENT (OUTPATIENT)
Dept: LAB | Facility: HOSPITAL | Age: 63
End: 2023-11-29
Payer: MEDICARE

## 2023-11-29 VITALS
SYSTOLIC BLOOD PRESSURE: 142 MMHG | BODY MASS INDEX: 33.86 KG/M2 | DIASTOLIC BLOOD PRESSURE: 80 MMHG | HEART RATE: 84 BPM | WEIGHT: 184 LBS | OXYGEN SATURATION: 99 % | HEIGHT: 62 IN

## 2023-11-29 DIAGNOSIS — D50.8 OTHER IRON DEFICIENCY ANEMIA: ICD-10-CM

## 2023-11-29 LAB
BASOPHILS # BLD AUTO: 0.04 THOUSANDS/ÂΜL (ref 0–0.1)
BASOPHILS NFR BLD AUTO: 1 % (ref 0–1)
EOSINOPHIL # BLD AUTO: 0.22 THOUSAND/ÂΜL (ref 0–0.61)
EOSINOPHIL NFR BLD AUTO: 3 % (ref 0–6)
ERYTHROCYTE [DISTWIDTH] IN BLOOD BY AUTOMATED COUNT: 17.8 % (ref 11.6–15.1)
FERRITIN SERPL-MCNC: 136 NG/ML (ref 11–307)
FOLATE SERPL-MCNC: 4.3 NG/ML
HCT VFR BLD AUTO: 32.5 % (ref 34.8–46.1)
HGB BLD-MCNC: 10.8 G/DL (ref 11.5–15.4)
IMM GRANULOCYTES # BLD AUTO: 0.04 THOUSAND/UL (ref 0–0.2)
IMM GRANULOCYTES NFR BLD AUTO: 1 % (ref 0–2)
IRON SATN MFR SERPL: 9 % (ref 15–50)
IRON SERPL-MCNC: 20 UG/DL (ref 50–212)
LYMPHOCYTES # BLD AUTO: 1.91 THOUSANDS/ÂΜL (ref 0.6–4.47)
LYMPHOCYTES NFR BLD AUTO: 22 % (ref 14–44)
MCH RBC QN AUTO: 29.8 PG (ref 26.8–34.3)
MCHC RBC AUTO-ENTMCNC: 33.2 G/DL (ref 31.4–37.4)
MCV RBC AUTO: 90 FL (ref 82–98)
MONOCYTES # BLD AUTO: 0.89 THOUSAND/ÂΜL (ref 0.17–1.22)
MONOCYTES NFR BLD AUTO: 10 % (ref 4–12)
NEUTROPHILS # BLD AUTO: 5.64 THOUSANDS/ÂΜL (ref 1.85–7.62)
NEUTS SEG NFR BLD AUTO: 63 % (ref 43–75)
NRBC BLD AUTO-RTO: 0 /100 WBCS
PLATELET # BLD AUTO: 365 THOUSANDS/UL (ref 149–390)
PMV BLD AUTO: 10.8 FL (ref 8.9–12.7)
RBC # BLD AUTO: 3.62 MILLION/UL (ref 3.81–5.12)
TIBC SERPL-MCNC: 235 UG/DL (ref 250–450)
UIBC SERPL-MCNC: 215 UG/DL (ref 155–355)
VIT B12 SERPL-MCNC: 170 PG/ML (ref 180–914)
WBC # BLD AUTO: 8.74 THOUSAND/UL (ref 4.31–10.16)

## 2023-11-29 PROCEDURE — 83540 ASSAY OF IRON: CPT

## 2023-11-29 PROCEDURE — 83550 IRON BINDING TEST: CPT

## 2023-11-29 PROCEDURE — 85025 COMPLETE CBC W/AUTO DIFF WBC: CPT

## 2023-11-29 PROCEDURE — 82728 ASSAY OF FERRITIN: CPT

## 2023-11-29 PROCEDURE — 99213 OFFICE O/P EST LOW 20 MIN: CPT | Performed by: PHYSICIAN ASSISTANT

## 2023-11-29 PROCEDURE — 82607 VITAMIN B-12: CPT

## 2023-11-29 PROCEDURE — 36415 COLL VENOUS BLD VENIPUNCTURE: CPT

## 2023-11-29 PROCEDURE — 82746 ASSAY OF FOLIC ACID SERUM: CPT

## 2023-11-29 NOTE — PATIENT INSTRUCTIONS
Anemia   WHAT YOU NEED TO KNOW:   Anemia is a low number of red blood cells or a low amount of hemoglobin in your red blood cells. Hemoglobin is a protein that helps carry oxygen throughout your body. Red blood cells use iron to create hemoglobin. Anemia may develop if your body does not have enough iron. It may also develop if your body does not make enough red blood cells or they die faster than your body can make them. DISCHARGE INSTRUCTIONS:   Call your local emergency number (911 in the [place]), or have someone call if:   You lose consciousness. You have severe chest pain. Return to the emergency department if:   You have dark or bloody bowel movements. Call your doctor if:   Your symptoms are worse, even after treatment. You have questions or concerns about your condition or care. Medicines:   Iron or folic acid supplements  help increase your red blood cell and hemoglobin levels. Vitamin B12 injections  may help boost your red blood cell level and decrease your symptoms. Ask your healthcare provider how to inject B12. Take your medicine as directed. Contact your healthcare provider if you think your medicine is not helping or if you have side effects. Tell your provider if you are allergic to any medicine. Keep a list of the medicines, vitamins, and herbs you take. Include the amounts, and when and why you take them. Bring the list or the pill bottles to follow-up visits. Carry your medicine list with you in case of an emergency. Prevent anemia:  Eat healthy foods rich in iron and vitamin C. Nuts, meat, dark leafy green vegetables, and beans are high in iron and protein. Vitamin C helps your body absorb iron. Foods rich in vitamin C include oranges and other citrus fruits. Ask your healthcare provider for a list of other foods that are high in iron or vitamin C. Ask if you need to be on a special diet.           Follow up with your doctor as directed:  Write down your questions so you remember to ask them during your visits. © Copyright [employer] 2023 Information is for End User's use only and may not be sold, redistributed or otherwise used for commercial purposes. The above information is an  only. It is not intended as medical advice for individual conditions or treatments. Talk to your doctor, nurse or pharmacist before following any medical regimen to see if it is safe and effective for you.

## 2023-11-29 NOTE — PROGRESS NOTES
[facility] - Outpatient Follow-up Note  Katiuska Mtz 60 y.o. female MRN: [id number]  Encounter: [id number]          ASSESSMENT AND PLAN:      1. Other iron deficiency anemia  -Patient denies any evidence of GI bleeding.    -Will check laboratories and stool Hemoccult x3.    -Patient's EGD and colonoscopy reviewed. -Continue B12 supplementation.    -Further recommendations be made after patient's laboratories and Hemoccults complete.  ______________________________________________________________________    SUBJECTIVE:    24-year-old female with a past medical history significant for iron deficiency anemia, history of lung cancer, hyperlipidemia, hypertension, and neuropathy who presents for follow-up. Patient reports that she was referred by her primary care doctor for anemia. Patient reports that her last hemoglobin level was 9.7 down from 10.7. Patient denies any melena or rectal bleeding. Patient denies any hematemesis. Patient denies any abdominal pain, nausea, vomiting. Patient denies any diarrhea or constipation. Patient does have a vitamin B12 deficiency and she is maintained on B12 supplementation. Of note patient's anemia is normocytic. Patient did have an EGD and colonoscopy performed last year. EGD from 6/29/2022 shows esophagitis and gastritis. Colonoscopy from 6/29/2022 showed a normal colon and terminal ileum. 2 polyps were removed and there was evidence of internal hemorrhoids. REVIEW OF SYSTEMS IS OTHERWISE NEGATIVE.       Historical Information   Past Medical History:   Diagnosis Date    Cancer ([address]) 2011    lung, upper left lung lobectomy    Hyperlipidemia     Hypertension     IBS (irritable bowel syndrome)      Past Surgical History:   Procedure Laterality Date    HYSTERECTOMY      KNEE SURGERY Left     LUNG LOBECTOMY      ORIF TIBIAL PLATEAU Left 31/66/6235    Procedure: OPEN REDUCTION W/ INTERNAL FIXATION (ORIF) TIBIAL PLATEAU;  Surgeon: Charmaine Cheatham MD;  Location: [facility];  Service: Orthopedics    WRIST SURGERY       Social History   Social History     Substance and Sexual Activity   Alcohol Use Not Currently    Comment: weekends     Social History     Substance and Sexual Activity   Drug Use Never     Social History     Tobacco Use   Smoking Status Former    Types: Cigarettes    Quit date:     Years since quittin.9   Smokeless Tobacco Never     Family History   Problem Relation Age of Onset    Diabetes Mother     Diabetes Father     Breast cancer Sister     Sarcoidosis Sister     No Known Problems Sister     No Known Problems Sister     No Known Problems Maternal Grandmother     No Known Problems Paternal Grandmother     Diabetes Brother     Sarcoidosis Brother     No Known Problems Son     No Known Problems Maternal Aunt     No Known Problems Maternal Aunt     No Known Problems Maternal Aunt     No Known Problems Maternal Aunt     No Known Problems Maternal Aunt     No Known Problems Maternal Aunt     No Known Problems Maternal Aunt     No Known Problems Paternal Aunt     No Known Problems Paternal Aunt     No Known Problems Paternal Aunt     Ovarian cancer Neg Hx     Cervical cancer Neg Hx     Uterine cancer Neg Hx        Meds/Allergies       Current Outpatient Medications:     albuterol (2.5 mg/3 mL) 0.083 % nebulizer solution    albuterol (ProAir HFA) 90 mcg/act inhaler    amLODIPine-atorvastatin (CADUET) 5-20 MG per tablet    Cholecalciferol (Vitamin D3) 1.25 MG (28079 UT) CAPS    ergocalciferol (VITAMIN D2) 50,000 units    Fluticasone Furoate-Vilanterol (Breo Ellipta) 100-25 mcg/actuation inhaler    gabapentin (Neurontin) 300 mg capsule    hydrocortisone (ANUSOL-HC) 25 mg suppository    Icosapent Ethyl 1 g CAPS    pantoprazole (PROTONIX) 40 mg tablet    cholecalciferol (VITAMIN D3) 1,000 units tablet    pyridoxine (VITAMIN B6) 100 mg tablet    Allergies   Allergen Reactions    Pollen Extract            Objective     Blood pressure 142/80, pulse 84, height 5' 2" (1.575 m), weight 83.5 kg (184 lb), SpO2 99 %. Body mass index is 33.65 kg/m². PHYSICAL EXAM:      General Appearance:   Alert, cooperative, no distress   HEENT:   Normocephalic, atraumatic, anicteric. Neck:  Supple, symmetrical, trachea midline   Lungs:   Clear to auscultation bilaterally; no rales, rhonchi or wheezing; respirations unlabored    Heart[de-identified]   Regular rate and rhythm; no murmur, rub, or gallop. Abdomen:   Soft, non-tender, non-distended; normal bowel sounds; no masses, no organomegaly    Genitalia:   Deferred    Rectal:   Deferred    Extremities:  No cyanosis, clubbing or edema    Pulses:  2+ and symmetric    Skin:  No jaundice, rashes, or lesions    Lymph nodes:  No palpable cervical lymphadenopathy        Lab Results:   No visits with results within 1 Day(s) from this visit. Latest known visit with results is:   Appointment on 10/23/2023   Component Date Value    Hemoglobin A1C 10/23/2023 5.3     EAG 10/23/2023 105     Vit D, 25-Hydroxy 10/23/2023 14.1 (L)     WBC 10/23/2023 7.45     RBC 10/23/2023 3.21 (L)     Hemoglobin 10/23/2023 9.7 (L)     Hematocrit 10/23/2023 29.8 (L)     MCV 10/23/2023 93     [address] 10/23/2023 30.2     MCHC 10/23/2023 32.6     RDW 10/23/2023 15.7 (H)     Platelets 00/91/2683 354     MPV 10/23/2023 12.2          Radiology Results:   Mammo screening bilateral w 3d & cad    Result Date: 11/17/2023  Narrative: DIAGNOSIS: Encounter for screening mammogram for breast cancer TECHNIQUE: Digital screening mammography was performed. Computer Aided Detection (CAD) analyzed all applicable images. COMPARISONS: Multiple prior exams dated between 10/24/2009 and 5/25/2022. RELEVANT HISTORY: Family Breast Cancer History: History of breast cancer in Sister. Family Medical History: Family medical history includes breast cancer in sister. Personal History: Hormone history includes birth control. Surgical history includes hysterectomy.  No known relevant medical history. The patient is scheduled in a reminder system for screening mammography. 8-10% of cancers will be missed on mammography. Management of a palpable abnormality must be based on clinical grounds. Patients will be notified of their results via letter from our facility. Accredited by [employer] of [employer] and FDA. RISK ASSESSMENT: 5 Year Tyrer-Cuzick: 1.01 % 10 Year Tyrer-Cuzick: 1.88 % Lifetime Tyrer-Cuzick: 4.25 % TISSUE DENSITY: The breasts are almost entirely fatty. INDICATION: Vinicio Whatley is a 60 y.o. female presenting for screening mammography. FINDINGS: Bilateral There are no suspicious masses, grouped microcalcifications or areas of unexplained architectural distortion. The skin and nipple areolar complex are unremarkable. Benign-appearing calcifications are noted in the right breast.     Impression: No mammographic evidence of malignancy. ASSESSMENT/BI-RADS CATEGORY: Left: 2 - Benign Right: 2 - Benign Overall: 2 - Benign RECOMMENDATION:      - Routine screening mammogram in 1 year for both breasts.  Workstation ID: [id number]

## 2023-12-03 ENCOUNTER — RA CDI HCC (OUTPATIENT)
Dept: OTHER | Facility: HOSPITAL | Age: 63
End: 2023-12-03

## 2023-12-04 NOTE — PROGRESS NOTES
[address] [facility] coding opportunities       Chart reviewed, no opportunity found: CHART REVIEWED, NO OPPORTUNITY FOUND        Patients Insurance     Medicare Insurance: Medicare

## 2023-12-06 ENCOUNTER — APPOINTMENT (OUTPATIENT)
Dept: LAB | Facility: HOSPITAL | Age: 63
End: 2023-12-06
Payer: MEDICARE

## 2023-12-06 DIAGNOSIS — D50.8 OTHER IRON DEFICIENCY ANEMIA: Primary | ICD-10-CM

## 2023-12-06 LAB — HEMOCCULT STL QL IA: NEGATIVE

## 2023-12-06 PROCEDURE — G0328 FECAL BLOOD SCRN IMMUNOASSAY: HCPCS

## 2023-12-07 ENCOUNTER — TELEPHONE (OUTPATIENT)
Dept: GASTROENTEROLOGY | Facility: CLINIC | Age: 63
End: 2023-12-07

## 2023-12-07 DIAGNOSIS — I10 ESSENTIAL HYPERTENSION: ICD-10-CM

## 2023-12-07 RX ORDER — AMLODIPINE BESYLATE AND ATORVASTATIN CALCIUM 5; 20 MG/1; MG/1
1 TABLET, FILM COATED ORAL DAILY
Qty: 90 TABLET | Refills: 3 | Status: SHIPPED | OUTPATIENT
Start: 2023-12-07

## 2023-12-07 NOTE — TELEPHONE ENCOUNTER
Pt called and states she spoke with GI earlier but had been sleeping and wanted to confirm no other testing needed. Per chart review, pt completed occult blood lab. No further questions.

## 2023-12-07 NOTE — TELEPHONE ENCOUNTER
----- Message from Violetta Meek PA-C sent at 12/6/2023 11:21 AM EST -----  Please inform patient that her B12, folate, and iron were all low. Please also find out if she has submitted her stool Hemoccults. I do think she needs a follow-up with her primary care.   Thank you

## 2023-12-07 NOTE — TELEPHONE ENCOUNTER
Called patient back and got  . LMOM with message as per Miguel Ángel Mcfarland. Left office # as well.

## 2023-12-07 NOTE — TELEPHONE ENCOUNTER
Called and spoke to patient. Gave patient test results. Patient stated that she did submit there stool hemoccult. Anant Marin and that she has appointment with PCP on 12/11/2023. Will let Dorian Sousa know.

## 2023-12-07 NOTE — TELEPHONE ENCOUNTER
----- Message from Anastasiia Waddell PA-C sent at 12/7/2023  1:03 PM EST -----  Please inform patient that her stool hemocult is negative. Thank you!

## 2023-12-11 ENCOUNTER — OFFICE VISIT (OUTPATIENT)
Age: 63
End: 2023-12-11
Payer: MEDICARE

## 2023-12-11 VITALS
TEMPERATURE: 96.7 F | OXYGEN SATURATION: 100 % | WEIGHT: 178.4 LBS | SYSTOLIC BLOOD PRESSURE: 130 MMHG | RESPIRATION RATE: 18 BRPM | HEART RATE: 78 BPM | BODY MASS INDEX: 32.63 KG/M2 | DIASTOLIC BLOOD PRESSURE: 80 MMHG

## 2023-12-11 DIAGNOSIS — I10 ESSENTIAL HYPERTENSION: Primary | ICD-10-CM

## 2023-12-11 DIAGNOSIS — R73.03 PREDIABETES: ICD-10-CM

## 2023-12-11 DIAGNOSIS — E53.8 VITAMIN B12 DEFICIENCY: ICD-10-CM

## 2023-12-11 DIAGNOSIS — E53.1 VITAMIN B6 DEFICIENCY: ICD-10-CM

## 2023-12-11 DIAGNOSIS — R41.3 MEMORY CHANGE: ICD-10-CM

## 2023-12-11 DIAGNOSIS — G62.9 NEUROPATHY: ICD-10-CM

## 2023-12-11 DIAGNOSIS — E55.9 VITAMIN D DEFICIENCY: ICD-10-CM

## 2023-12-11 PROCEDURE — 99214 OFFICE O/P EST MOD 30 MIN: CPT | Performed by: FAMILY MEDICINE

## 2023-12-11 RX ORDER — MULTIVITAMIN WITH IRON
100 TABLET ORAL DAILY
Qty: 90 TABLET | Refills: 0 | Status: SHIPPED | OUTPATIENT
Start: 2023-12-11 | End: 2024-03-10

## 2023-12-11 NOTE — ASSESSMENT & PLAN NOTE
Significant paresthesia and memory deficiency, short and long-term. Niece in the room. Start vitamin B12 sublingual 1000 mcg daily for 7 days then take 1000 mcg once a week. Follow-up with B12 in 3 months  Refer to neurology for further evaluation of memory issues, previously told patient had may have Parkinson's by a physician in the past per patient.    Return in 6 weeks for close monitoring

## 2023-12-11 NOTE — ASSESSMENT & PLAN NOTE
Significant memory decline over the past year with history of alcohol abuse. Vitamin B12 low.   Will start on vitamin B-12 supplements and refer to neurology for further evaluation

## 2023-12-11 NOTE — ASSESSMENT & PLAN NOTE
Continue to taper off of gabapentin as patient request.  Reduced to 600 in the morning from 900 for 4 weeks, followed by 300 at bedtime for 3-4 weeks, to half a tablet at bedtime for 2 wks    Continue vitamin B6 100 mg daily  Start vitamin B12 sublingual 1000 mcg daily for 7 days then take 1000 mcg once a week. Follow-up with B12 in 3 months  Refer to neurology for further evaluation of memory issues, previously told patient had may have Parkinson's by a physician in the past per patient.

## 2023-12-11 NOTE — ASSESSMENT & PLAN NOTE
Continue vitamin D high-dose prescription  10,000 vitamin D3 units daily  Follow-up with vitamin D levels in 3 months  Return in 6 weeks for close monitoring of memory.

## 2023-12-11 NOTE — ASSESSMENT & PLAN NOTE
Low vitamin B6 with history of alcohol abuse. Currently denying significant or large quantity of alcohol use. Last drink in August-2 small drinks at a wedding. Continue vitamin B6 daily which will also improve peripheral neuropathy.

## 2023-12-11 NOTE — PATIENT INSTRUCTIONS
Recommendation   Take vitamin D3 10,000 units daily  Start vitamin K2 100-200 mcg daily with food  Start calcium 1000 mg daily with food and vitamin K2  Folic acid 8 mg daily   Vitamin B12 sublingual 1000 mcg daily for one week, then 1000 mcg once a week

## 2023-12-11 NOTE — ASSESSMENT & PLAN NOTE
Department of [place] [address]  Monie Vyas MD   7/29/8415  Board Certified Medical Oncologist

## 2023-12-11 NOTE — PROGRESS NOTES
[address]    Name: Carlos Medrano      YOB: 1960      MRN: [id number]  Encounter Provider: Rich Cardona MD      Encounter Date: 12/11/23      Encounter Dept: [address] [facility]     1. Essential hypertension  Assessment & Plan:  Currently on amlodipine 5 mg daily. Continue current regimen. 2. Neuropathy  Assessment & Plan:  Continue to taper off of gabapentin as patient request.  Reduced to 600 in the morning from 900 for 4 weeks, followed by 300 at bedtime for 3-4 weeks, to half a tablet at bedtime for 2 wks    Continue vitamin B6 100 mg daily  Start vitamin B12 sublingual 1000 mcg daily for 7 days then take 1000 mcg once a week. Follow-up with B12 in 3 months  Refer to neurology for further evaluation of memory issues, previously told patient had may have Parkinson's by a physician in the past per patient. Orders:  -     pyridoxine (VITAMIN B6) 100 mg tablet; Take 1 tablet (100 mg total) by mouth daily    3. Vitamin D deficiency  Assessment & Plan:  Continue vitamin D high-dose prescription  10,000 vitamin D3 units daily  Follow-up with vitamin D levels in 3 months  Return in 6 weeks for close monitoring of memory. Orders:  -     Vitamin D 25 hydroxy; Future; Expected date: 03/11/2024  -     cholecalciferol (VITAMIN D3) 250 MCG (24213 UT) capsule; Take 1 capsule (10,000 Units total) by mouth daily    4. Vitamin B6 deficiency  Assessment & Plan:  Low vitamin B6 with history of alcohol abuse. Currently denying significant or large quantity of alcohol use. Last drink in August-2 small drinks at a wedding. Continue vitamin B6 daily which will also improve peripheral neuropathy. Orders:  -     pyridoxine (VITAMIN B6) 100 mg tablet; Take 1 tablet (100 mg total) by mouth daily    5.  Prediabetes  Assessment & Plan:  Discussed low-carb diet      6. Vitamin B12 deficiency  Assessment & Plan:  Significant paresthesia and memory deficiency, short and long-term. Niece in the room. Start vitamin B12 sublingual 1000 mcg daily for 7 days then take 1000 mcg once a week. Follow-up with B12 in 3 months  Refer to neurology for further evaluation of memory issues, previously told patient had may have Parkinson's by a physician in the past per patient. Return in 6 weeks for close monitoring    Orders:  -     Vitamin B12; Future; Expected date: 03/11/2024    7. Memory change  Assessment & Plan:  Significant memory decline over the past year with history of alcohol abuse. Vitamin B12 low. Will start on vitamin B-12 supplements and refer to neurology for further evaluation    Orders:  -     Ambulatory Referral to Neurology; Future          Depression Screening and Follow-up Plan: Patient was screened for depression during today's encounter. They screened negative with a PHQ-2 score of 0. Pertinent labs were evaluated and discussed with patient today. Follow-Up Plans   Return in about 6 weeks (around 1/22/2024) for memory. _______________________________________________________________________  Reason For Visit    Follow-up      Subjective   Bernell E Doroteo Favre is a 58 y.o. with  has a past medical history of Cancer ([address]) (2011), Hyperlipidemia, Hypertension, and IBS (irritable bowel syndrome). Today patient reports about continues to struggle with memory brought in her niece to help her write down recommendations so she does not forget. Anson Ivan HPI      Review of Systems   Constitutional:  Negative for chills and fever. Neurological:  Negative for dizziness. Psychiatric/Behavioral:  Positive for confusion.           Current Medication List     Current Outpatient Medications:     albuterol (2.5 mg/3 mL) 0.083 % nebulizer solution, Take 3 mL (2.5 mg total) by nebulization every 6 (six) hours as needed for wheezing or shortness of breath As needed, Disp: 300 mL, Rfl: 1    albuterol (ProAir HFA) 90 mcg/act inhaler, Inhale 2 puffs every 6 (six) hours as needed for wheezing or shortness of breath, Disp: 18 g, Rfl: 1    amLODIPine-atorvastatin (CADUET) 5-20 MG per tablet, Take 1 tablet by mouth daily, Disp: 90 tablet, Rfl: 3    cholecalciferol (VITAMIN D3) 250 MCG (73156 UT) capsule, Take 1 capsule (10,000 Units total) by mouth daily, Disp: 90 capsule, Rfl: 3    Fluticasone Furoate-Vilanterol (Breo Ellipta) 100-25 mcg/actuation inhaler, Inhale 1 puff daily Rinse mouth after use., Disp: 60 each, Rfl: 1    gabapentin (Neurontin) 300 mg capsule, Take 300mg in am, 600mg at bedtime (Patient taking differently: Take 300mg in am, 600mg at bedtime/prn), Disp: 120 capsule, Rfl: 2    Icosapent Ethyl 1 g CAPS, TAKE 1 CAPSULE BY MOUTH 2 TIMES A DAY., Disp: 180 capsule, Rfl: 1    pantoprazole (PROTONIX) 40 mg tablet, TAKE 1 TABLET BY MOUTH EVERY DAY, Disp: 90 tablet, Rfl: 1    pyridoxine (VITAMIN B6) 100 mg tablet, Take 1 tablet (100 mg total) by mouth daily, Disp: 90 tablet, Rfl: 0    hydrocortisone (ANUSOL-HC) 25 mg suppository, Insert 1 suppository (25 mg total) into the rectum 2 (two) times a day, Disp: 12 suppository, Rfl: 0      Objective     /80 (BP Location: Left arm, Patient Position: Sitting, Cuff Size: Standard)   Pulse 78   Temp (!) 96.7 °F (35.9 °C) (Tympanic)   Resp 18   Wt 80.9 kg (178 lb 6.4 oz)   SpO2 100%   BMI 32.63 kg/m²      Physical Exam  Vitals reviewed. Constitutional:       General: She is not in acute distress. Appearance: Normal appearance. She is not ill-appearing, toxic-appearing or diaphoretic. HENT:      Head: Normocephalic and atraumatic. Right Ear: External ear normal.      Left Ear: External ear normal.      Nose: Nose normal.      Mouth/Throat:      Mouth: Mucous membranes are moist.   Eyes:      General: No scleral icterus. Right eye: No discharge. Left eye: No discharge. Conjunctiva/sclera: Conjunctivae normal.   Cardiovascular:      Rate and Rhythm: Normal rate. Pulmonary:      Effort: Pulmonary effort is normal. No respiratory distress. Skin:     General: Skin is warm. Neurological:      General: No focal deficit present. Mental Status: She is alert and oriented to person, place, and time.    Psychiatric:         Mood and Affect: Mood normal.           Grabiel Urbina MD  Family Medicine Physician   formerly [facility]

## 2023-12-20 ENCOUNTER — TELEPHONE (OUTPATIENT)
Age: 63
End: 2023-12-20

## 2023-12-20 NOTE — TELEPHONE ENCOUNTER
Patient called asking for script to get vit BM5 she is not sure where she would get this.Please let her know when this is done so she know where to pick it up.

## 2023-12-26 DIAGNOSIS — J44.9 COPD, MILD (HCC): ICD-10-CM

## 2023-12-26 RX ORDER — FLUTICASONE FUROATE AND VILANTEROL 100; 25 UG/1; UG/1
1 POWDER RESPIRATORY (INHALATION) DAILY
Qty: 60 EACH | Refills: 1 | Status: CANCELLED | OUTPATIENT
Start: 2023-12-26

## 2023-12-27 DIAGNOSIS — J44.9 COPD, MILD (HCC): ICD-10-CM

## 2023-12-27 RX ORDER — FLUTICASONE FUROATE AND VILANTEROL 100; 25 UG/1; UG/1
1 POWDER RESPIRATORY (INHALATION) DAILY
Qty: 60 EACH | Refills: 5 | Status: SHIPPED | OUTPATIENT
Start: 2023-12-27

## 2024-01-15 ENCOUNTER — RA CDI HCC (OUTPATIENT)
Dept: OTHER | Facility: HOSPITAL | Age: 64
End: 2024-01-15

## 2024-02-15 ENCOUNTER — OFFICE VISIT (OUTPATIENT)
Age: 64
End: 2024-02-15
Payer: MEDICARE

## 2024-02-15 VITALS
WEIGHT: 179.9 LBS | OXYGEN SATURATION: 98 % | TEMPERATURE: 97.8 F | DIASTOLIC BLOOD PRESSURE: 72 MMHG | SYSTOLIC BLOOD PRESSURE: 138 MMHG | RESPIRATION RATE: 16 BRPM | HEART RATE: 98 BPM | HEIGHT: 62 IN | BODY MASS INDEX: 33.1 KG/M2

## 2024-02-15 DIAGNOSIS — J44.9 COPD, MILD (HCC): ICD-10-CM

## 2024-02-15 DIAGNOSIS — Z78.0 ASYMPTOMATIC UNCOMPLICATED MENOPAUSE: ICD-10-CM

## 2024-02-15 DIAGNOSIS — G62.9 NEUROPATHY: ICD-10-CM

## 2024-02-15 DIAGNOSIS — E53.8 VITAMIN B12 DEFICIENCY: ICD-10-CM

## 2024-02-15 DIAGNOSIS — E55.9 VITAMIN D DEFICIENCY: ICD-10-CM

## 2024-02-15 DIAGNOSIS — E53.8 FOLATE DEFICIENCY: ICD-10-CM

## 2024-02-15 DIAGNOSIS — E53.1 VITAMIN B6 DEFICIENCY: ICD-10-CM

## 2024-02-15 DIAGNOSIS — I10 ESSENTIAL HYPERTENSION: Primary | ICD-10-CM

## 2024-02-15 DIAGNOSIS — R41.3 MEMORY CHANGE: ICD-10-CM

## 2024-02-15 PROCEDURE — 99214 OFFICE O/P EST MOD 30 MIN: CPT | Performed by: FAMILY MEDICINE

## 2024-02-15 RX ORDER — LANOLIN ALCOHOL/MO/W.PET/CERES
1000 CREAM (GRAM) TOPICAL WEEKLY
Qty: 12 TABLET | Refills: 0 | Status: SHIPPED | OUTPATIENT
Start: 2024-02-15 | End: 2024-05-15

## 2024-02-15 RX ORDER — FOLIC ACID 1 MG/1
1 TABLET ORAL DAILY
Qty: 90 TABLET | Refills: 3 | Status: SHIPPED | OUTPATIENT
Start: 2024-02-15 | End: 2025-02-09

## 2024-02-15 RX ORDER — FLUTICASONE PROPIONATE AND SALMETEROL XINAFOATE 115; 21 UG/1; UG/1
2 AEROSOL, METERED RESPIRATORY (INHALATION) 2 TIMES DAILY
Qty: 12 G | Refills: 3 | Status: SHIPPED | OUTPATIENT
Start: 2024-02-15 | End: 2024-05-15

## 2024-02-15 NOTE — ASSESSMENT & PLAN NOTE
Currently on gabapentin 300 in the morning, 600 at bedtime.  Continues to have significant neuropathy.    Unsure if patient is taking dosage as discussed.  Bring in all medications including supplements to next appointment, provided written instructions reminding patient.  Start vitamin B12 1000 mcg weekly  Vitamin B6 100 mg daily  Vitamin B 9 1 mg daily  Refer to neurology for Parkinson's evaluation and management, ASAP referral in place due to worsening symptoms.

## 2024-02-15 NOTE — ASSESSMENT & PLAN NOTE
Continue vitamin D high-dose prescription  10,000 vitamin D3 units daily  Follow-up with vitamin D levels in 3 months    Patient today reports taking once a week.  Provided written instructions for daily take.  Return in 2 weeks with all supplements and medication bottles.

## 2024-02-15 NOTE — PATIENT INSTRUCTIONS
Recommendations  Continue taking vitamin B6 100 mg daily  Start folic acid (vitamin B9) 1 mg daily  Start taking vitamin B12 1000 mcg once a week.  Continue taking vitamin D3 10,000 units take daily with food.  Bring all of your medications to the office in 2 weeks.

## 2024-02-15 NOTE — PROGRESS NOTES
[facility] PRIMARY [facility]  [address] [place] [address] [place]    Name: Jacquelyn Fritz      YOB: 1960      MRN: [id number]  Encounter Provider: Zander Feliciano MD      Encounter Date: 02/15/24      Encounter Dept: [facility]    Assessment & Plan     1. Essential hypertension  Assessment & Plan:  Currently on amlodipine 5 mg daily.   Blood Pressure: 138/72   Continue current regimen.       2. Neuropathy  Assessment & Plan:  Currently on gabapentin 300 in the morning, 600 at bedtime.  Continues to have significant neuropathy.    Unsure if patient is taking dosage as discussed.  Bring in all medications including supplements to next appointment, provided written instructions reminding patient.  Start vitamin B12 1000 mcg weekly  Vitamin B6 100 mg daily  Vitamin B 9 1 mg daily  Refer to neurology for Parkinson's evaluation and management, ASAP referral in place due to worsening symptoms.        3. Vitamin D deficiency  Assessment & Plan:  Continue vitamin D high-dose prescription  10,000 vitamin D3 units daily  Follow-up with vitamin D levels in 3 months    Patient today reports taking once a week.  Provided written instructions for daily take.  Return in 2 weeks with all supplements and medication bottles.        4. Vitamin B6 deficiency  Assessment & Plan:  Refer to neuropathy problem list.      5. Vitamin B12 deficiency  Assessment & Plan:  Refer to neuropathy problem list.    Orders:  -     vitamin B-12 (VITAMIN B-12) 1,000 mcg tablet; Take 1 tablet (1,000 mcg total) by mouth once a week    6. Memory change  Assessment & Plan:  Refer to neuropathy problem list.    Orders:  -     Ambulatory Referral to Neurology; Future    7. Asymptomatic uncomplicated menopause  -     DXA bone density spine hip and pelvis; Future; Expected date: 02/15/2024    8. COPD, mild (HCC)  -     fluticasone-salmeterol (Advair HFA) 115-21 MCG/ACT  inhaler; Inhale 2 puffs 2 (two) times a day Rinse mouth after use.    9. Folate deficiency  -     folic acid (KP Folic Acid) 1 mg tablet; Take 1 tablet (1 mg total) by mouth daily          Depression Screening and Follow-up Plan: Patient was screened for depression during today's encounter. They screened negative with a PHQ-2 score of 0.        Pertinent labs were evaluated and discussed with patient today.       Follow-Up Plans   Return in about 2 weeks (around 2/29/2024) for chronic disease management.      ______________________________________________________________________    Subjective   Jacquelyn Fritz is a 63 y.o. with  has a past medical history of Cancer (HCC) (2011), Hyperlipidemia, Hypertension, and IBS (irritable bowel syndrome).      HPI      Review of Systems      Current Medication List     Current Outpatient Medications:     albuterol (2.5 mg/3 mL) 0.083 % nebulizer solution, Take 3 mL (2.5 mg total) by nebulization every 6 (six) hours as needed for wheezing or shortness of breath As needed, Disp: 300 mL, Rfl: 1    albuterol (ProAir HFA) 90 mcg/act inhaler, Inhale 2 puffs every 6 (six) hours as needed for wheezing or shortness of breath, Disp: 18 g, Rfl: 1    amLODIPine-atorvastatin (CADUET) 5-20 MG per tablet, Take 1 tablet by mouth daily, Disp: 90 tablet, Rfl: 3    cholecalciferol (VITAMIN D3) 250 MCG (31369 UT) capsule, Take 1 capsule (10,000 Units total) by mouth daily, Disp: 90 capsule, Rfl: 3    fluticasone-salmeterol (Advair HFA) 115-21 MCG/ACT inhaler, Inhale 2 puffs 2 (two) times a day Rinse mouth after use., Disp: 12 g, Rfl: 3    folic acid (KP Folic Acid) 1 mg tablet, Take 1 tablet (1 mg total) by mouth daily, Disp: 90 tablet, Rfl: 3    gabapentin (Neurontin) 300 mg capsule, Take 300mg in am, 600mg at bedtime (Patient taking differently: Take 300mg in am, 600mg at bedtime/prn), Disp: 120 capsule, Rfl: 2    hydrocortisone (ANUSOL-HC) 25 mg suppository, Insert 1 suppository (25 mg total)  "into the rectum 2 (two) times a day, Disp: 12 suppository, Rfl: 0    Icosapent Ethyl 1 g CAPS, TAKE 1 CAPSULE BY MOUTH 2 TIMES A DAY., Disp: 180 capsule, Rfl: 1    pantoprazole (PROTONIX) 40 mg tablet, TAKE 1 TABLET BY MOUTH EVERY DAY, Disp: 90 tablet, Rfl: 1    pyridoxine (VITAMIN B6) 100 mg tablet, Take 1 tablet (100 mg total) by mouth daily, Disp: 90 tablet, Rfl: 0    vitamin B-12 (VITAMIN B-12) 1,000 mcg tablet, Take 1 tablet (1,000 mcg total) by mouth once a week, Disp: 12 tablet, Rfl: 0      Objective     /72 (BP Location: Left arm, Patient Position: Sitting, Cuff Size: Large)   Pulse 98   Temp 97.8 °F (36.6 °C) (Tympanic)   Resp 16   Ht 5' 2\" (1.575 m)   Wt 81.6 kg (179 lb 14.4 oz)   SpO2 98%   BMI 32.90 kg/m²      Physical Exam      Zander Feliciano MD  Family Medicine Physician   [facility] PRIMARY CARE [place]    "

## 2024-02-21 PROBLEM — Z01.419 ENCOUNTER FOR GYNECOLOGICAL EXAMINATION WITHOUT ABNORMAL FINDING: Status: RESOLVED | Noted: 2020-07-22 | Resolved: 2024-02-21

## 2024-02-23 ENCOUNTER — RA CDI HCC (OUTPATIENT)
Dept: OTHER | Facility: HOSPITAL | Age: 64
End: 2024-02-23

## 2024-02-27 ENCOUNTER — TELEPHONE (OUTPATIENT)
Dept: NEUROLOGY | Facility: CLINIC | Age: 64
End: 2024-02-27

## 2024-03-01 ENCOUNTER — TELEPHONE (OUTPATIENT)
Age: 64
End: 2024-03-01

## 2024-03-01 ENCOUNTER — OFFICE VISIT (OUTPATIENT)
Age: 64
End: 2024-03-01
Payer: MEDICARE

## 2024-03-01 VITALS
BODY MASS INDEX: 31.8 KG/M2 | TEMPERATURE: 97.9 F | RESPIRATION RATE: 16 BRPM | DIASTOLIC BLOOD PRESSURE: 74 MMHG | HEIGHT: 62 IN | OXYGEN SATURATION: 98 % | SYSTOLIC BLOOD PRESSURE: 128 MMHG | HEART RATE: 93 BPM | WEIGHT: 172.8 LBS

## 2024-03-01 DIAGNOSIS — R41.3 MEMORY CHANGE: ICD-10-CM

## 2024-03-01 DIAGNOSIS — G62.9 NEUROPATHY: ICD-10-CM

## 2024-03-01 DIAGNOSIS — D53.9 MACROCYTIC ANEMIA: ICD-10-CM

## 2024-03-01 DIAGNOSIS — I10 ESSENTIAL HYPERTENSION: Primary | ICD-10-CM

## 2024-03-01 DIAGNOSIS — E53.1 VITAMIN B6 DEFICIENCY: ICD-10-CM

## 2024-03-01 DIAGNOSIS — E53.8 VITAMIN B12 DEFICIENCY: ICD-10-CM

## 2024-03-01 DIAGNOSIS — E55.9 VITAMIN D DEFICIENCY: ICD-10-CM

## 2024-03-01 DIAGNOSIS — J44.9 COPD, MILD (HCC): ICD-10-CM

## 2024-03-01 DIAGNOSIS — J45.909 ASTHMA DUE TO ENVIRONMENTAL ALLERGIES: ICD-10-CM

## 2024-03-01 PROCEDURE — G2211 COMPLEX E/M VISIT ADD ON: HCPCS | Performed by: FAMILY MEDICINE

## 2024-03-01 PROCEDURE — 99214 OFFICE O/P EST MOD 30 MIN: CPT | Performed by: FAMILY MEDICINE

## 2024-03-01 RX ORDER — MULTIVITAMIN WITH IRON
TABLET ORAL
COMMUNITY

## 2024-03-01 RX ORDER — ALBUTEROL SULFATE 2.5 MG/3ML
2.5 SOLUTION RESPIRATORY (INHALATION) EVERY 6 HOURS PRN
Status: SHIPPED
Start: 2024-03-01

## 2024-03-01 RX ORDER — ALBUTEROL SULFATE 90 UG/1
2 AEROSOL, METERED RESPIRATORY (INHALATION) EVERY 6 HOURS PRN
Status: SHIPPED
Start: 2024-03-01

## 2024-03-01 RX ORDER — CHOLECALCIFEROL (VITAMIN D3) 1250 MCG
CAPSULE ORAL
Qty: 24 CAPSULE | Refills: 0 | Status: SHIPPED | OUTPATIENT
Start: 2024-03-01

## 2024-03-01 RX ORDER — MULTIVITAMIN WITH IRON
100 TABLET ORAL DAILY
Qty: 90 TABLET | Refills: 0 | Status: SHIPPED | OUTPATIENT
Start: 2024-03-01 | End: 2024-05-30

## 2024-03-01 RX ORDER — PANTOPRAZOLE SODIUM 40 MG/1
40 TABLET, DELAYED RELEASE ORAL DAILY
Qty: 90 TABLET | Refills: 1 | Status: SHIPPED | OUTPATIENT
Start: 2024-03-01

## 2024-03-01 NOTE — ASSESSMENT & PLAN NOTE
Worsening memory changes and gait problems.  Previously told patient has Parkinson's disease.  Patient very hesitant with that diagnosis, afraid of being sent to a nursing home.  Patient currently lives on her own.  Discussed importance of evaluation for Parkinson's by neurology, and starting treatment to be able to stay on her own longer.  Declines physical therapy for strength training and improving gait.  Scheduled appointment with neurology on 4/12.  Follow-up in 6 weeks.

## 2024-03-01 NOTE — ASSESSMENT & PLAN NOTE
Patient stopped taking gabapentin altogether.  Tapered herself off.  Reviewed all medication patient is taking.  Updated the medication list.    Patient not taking vitamin B12 that was prescribed, only taking B complex  Provided her with vitamin B12 5000 mcg to be taken once a week for 6 weeks  Follow-up with vitamin B12 blood work  Continue vitamin B complex, folic acid.  Start vitamin B6  Follow-up with neurology, scheduled for 4/12.  Return in 6 weeks

## 2024-03-01 NOTE — ASSESSMENT & PLAN NOTE
Reviewed & Discussed labs  Deficient vitamin D levels  Goal , preventative therapy     Start vitamin D3 50,000 units two times a week for 12 weeks  Follow-up with vitamin D level after completion of 12-week course   After weekly prescription dose, start vitamin D3 5,000 units daily supplements from over-the-counter

## 2024-03-01 NOTE — TELEPHONE ENCOUNTER
LISETHI:    Patient was seen today  forgot to let Dr. Feliciano know , she received her third COVID shot & RSV from  [facility] on Dec 28,2023

## 2024-03-01 NOTE — PROGRESS NOTES
[facility] PRIMARY [facility]  [address] [place]    Name: Jacquelyn Fritz      YOB: 1960      MRN: [id number]  Encounter Provider: Zander Feliciano MD      Encounter Date: 03/01/24      Encounter Dept: [facility]    Assessment & Plan     1. Essential hypertension  Assessment & Plan:  Currently on amlodipine 5 mg daily.   Blood Pressure: 128/74   Continue current regimen.       2. Neuropathy  Assessment & Plan:  Patient stopped taking gabapentin altogether.  Tapered herself off.  Reviewed all medication patient is taking.  Updated the medication list.    Patient not taking vitamin B12 that was prescribed, only taking B complex  Provided her with vitamin B12 5000 mcg to be taken once a week for 6 weeks  Follow-up with vitamin B12 blood work  Continue vitamin B complex, folic acid.  Start vitamin B6  Follow-up with neurology, scheduled for 4/12.  Return in 6 weeks      Orders:  -     pyridoxine (VITAMIN B6) 100 mg tablet; Take 1 tablet (100 mg total) by mouth daily    3. Vitamin B6 deficiency  -     pyridoxine (VITAMIN B6) 100 mg tablet; Take 1 tablet (100 mg total) by mouth daily    4. Vitamin B12 deficiency  Assessment & Plan:  Refer to neuropathy problem list.      5. Memory change  Assessment & Plan:  Worsening memory changes and gait problems.  Previously told patient has Parkinson's disease.  Patient very hesitant with that diagnosis, afraid of being sent to a nursing home.  Patient currently lives on her own.  Discussed importance of evaluation for Parkinson's by neurology, and starting treatment to be able to stay on her own longer.  Declines physical therapy for strength training and improving gait.  Scheduled appointment with neurology on 4/12.  Follow-up in 6 weeks.      6. Macrocytic anemia  -     pantoprazole (PROTONIX) 40 mg tablet; Take 1 tablet (40 mg total) by mouth daily    7. Asthma due to  environmental allergies  -     albuterol (2.5 mg/3 mL) 0.083 % nebulizer solution; Take 3 mL (2.5 mg total) by nebulization every 6 (six) hours as needed for wheezing or shortness of breath As needed    8. COPD, mild (HCC)  -     albuterol (ProAir HFA) 90 mcg/act inhaler; Inhale 2 puffs every 6 (six) hours as needed for wheezing or shortness of breath    9. Vitamin D deficiency  Assessment & Plan:  Reviewed & Discussed labs  Deficient vitamin D levels  Goal , preventative therapy     Start vitamin D3 50,000 units two times a week for 12 weeks  Follow-up with vitamin D level after completion of 12-week course   After weekly prescription dose, start vitamin D3 5,000 units daily supplements from over-the-counter       Orders:  -     Cholecalciferol (Vitamin D3) 1.25 MG (70449 UT) capsule; 2 times a week for 12 weeks.             I have spent a total time of >60 minutes on 03/01/24 in caring for this patient including Diagnostic results, Prognosis, Risks and benefits of tx options, Instructions for management, Patient and family education, Importance of tx compliance, Risk factor reductions, Impressions, Counseling / Coordination of care, Documenting in the medical record, Reviewing / ordering tests, medicine, procedures  , and Obtaining or reviewing history  .        Follow-Up Plans   Return in about 6 weeks (around 4/15/2024) for chronic disease management.      Milli Fritz is a 63 y.o. with  has a past medical history of Cancer (HCC) (2011), Hyperlipidemia, Hypertension, and IBS (irritable bowel syndrome).      HPI      Review of Systems      Current Medication List     Current Outpatient Medications:     albuterol (2.5 mg/3 mL) 0.083 % nebulizer solution, Take 3 mL (2.5 mg total) by nebulization every 6 (six) hours as needed for wheezing or shortness of breath As needed, Disp: , Rfl:     albuterol (ProAir HFA) 90 mcg/act inhaler, Inhale 2 puffs every 6 (six) hours as needed for wheezing or  "shortness of breath, Disp: , Rfl:     amLODIPine-atorvastatin (CADUET) 5-20 MG per tablet, Take 1 tablet by mouth daily, Disp: 90 tablet, Rfl: 3    Cholecalciferol (Vitamin D3) 1.25 MG (25262 UT) capsule, 2 times a week for 12 weeks., Disp: 24 capsule, Rfl: 0    fluticasone-salmeterol (Advair HFA) 115-21 MCG/ACT inhaler, Inhale 2 puffs 2 (two) times a day Rinse mouth after use., Disp: 12 g, Rfl: 3    folic acid (KP Folic Acid) 1 mg tablet, Take 1 tablet (1 mg total) by mouth daily, Disp: 90 tablet, Rfl: 3    hydrocortisone (ANUSOL-HC) 25 mg suppository, Insert 1 suppository (25 mg total) into the rectum 2 (two) times a day, Disp: 12 suppository, Rfl: 0    Icosapent Ethyl 1 g CAPS, TAKE 1 CAPSULE BY MOUTH 2 TIMES A DAY., Disp: 180 capsule, Rfl: 1    Magnesium 250 MG TABS, Take by mouth, Disp: , Rfl:     pantoprazole (PROTONIX) 40 mg tablet, Take 1 tablet (40 mg total) by mouth daily, Disp: 90 tablet, Rfl: 1    pyridoxine (VITAMIN B6) 100 mg tablet, Take 1 tablet (100 mg total) by mouth daily, Disp: 90 tablet, Rfl: 0    vitamin B-12 (VITAMIN B-12) 1,000 mcg tablet, Take 1 tablet (1,000 mcg total) by mouth once a week, Disp: 12 tablet, Rfl: 0      Objective     /74 (BP Location: Left arm, Patient Position: Sitting, Cuff Size: Large)   Pulse 93   Temp 97.9 °F (36.6 °C) (Tympanic)   Resp 16   Ht 5' 2\" (1.575 m)   Wt 78.4 kg (172 lb 12.8 oz)   SpO2 98%   BMI 31.61 kg/m²      Physical Exam  Vitals reviewed.   Constitutional:       General: She is not in acute distress.     Appearance: Normal appearance. She is not ill-appearing, toxic-appearing or diaphoretic.   HENT:      Head: Normocephalic and atraumatic.      Right Ear: External ear normal.      Left Ear: External ear normal.      Nose: Nose normal.      Mouth/Throat:      Mouth: Mucous membranes are moist.   Eyes:      General: No scleral icterus.        Right eye: No discharge.         Left eye: No discharge.      Conjunctiva/sclera: Conjunctivae normal. "   Cardiovascular:      Rate and Rhythm: Normal rate.   Pulmonary:      Effort: Pulmonary effort is normal. No respiratory distress.   Skin:     General: Skin is warm.   Neurological:      General: No focal deficit present.      Mental Status: She is alert and oriented to person, place, and time.   Psychiatric:         Mood and Affect: Mood normal.         Behavior: Behavior normal.         Thought Content: Thought content normal.           Zander Feliciano MD  Family Medicine Physician   [facility] PRIMARY [facility]

## 2024-03-25 ENCOUNTER — TELEPHONE (OUTPATIENT)
Age: 64
End: 2024-03-25

## 2024-03-25 NOTE — TELEPHONE ENCOUNTER
Dr Feliciano wanted Jacquelyn to do a bone density before April.  She spoke to Central Sched[employer] and she can't do one until Sept 16, 2024

## 2024-04-11 ENCOUNTER — TELEPHONE (OUTPATIENT)
Dept: NEUROLOGY | Facility: CLINIC | Age: 64
End: 2024-04-11

## 2024-04-11 NOTE — TELEPHONE ENCOUNTER
4/9 4:20    Pt left a VM re: appt time and length.    Transcribed VM:   My name is Jacquelyn Fritz. I have an appointment Friday. April 12th with Dr. Norris. Because I take the [employer], I know what time I need to be there, but I don't know what kind of test he is going to give me. So can someone call me back and let me know how long it's going to take, so I can tell them when to pick me up. Thank you. [phone]. Thank you.    Called back and spoke with pt, and informed her that she has an hour long appt tomorrow and she should have the [employer] pick her up around noon. She verbalized an understanding, but stated that she changed her ride arrangements and her brother, Mars will be driving her to and from the appt.

## 2024-04-12 ENCOUNTER — OFFICE VISIT (OUTPATIENT)
Dept: NEUROLOGY | Facility: CLINIC | Age: 64
End: 2024-04-12
Payer: COMMERCIAL

## 2024-04-12 VITALS
BODY MASS INDEX: 32.65 KG/M2 | SYSTOLIC BLOOD PRESSURE: 150 MMHG | HEIGHT: 62 IN | WEIGHT: 177.4 LBS | DIASTOLIC BLOOD PRESSURE: 80 MMHG | HEART RATE: 108 BPM

## 2024-04-12 DIAGNOSIS — E53.8 VITAMIN B12 DEFICIENCY: ICD-10-CM

## 2024-04-12 DIAGNOSIS — Z78.9 ALCOHOL USE: ICD-10-CM

## 2024-04-12 DIAGNOSIS — R41.3 MEMORY DIFFICULTIES: ICD-10-CM

## 2024-04-12 DIAGNOSIS — R25.1 TREMOR: ICD-10-CM

## 2024-04-12 DIAGNOSIS — R47.9 SPEECH ABNORMALITY: ICD-10-CM

## 2024-04-12 DIAGNOSIS — E55.9 VITAMIN D DEFICIENCY: ICD-10-CM
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
## 2024-04-12 DIAGNOSIS — G62.9 NEUROPATHY: ICD-10-CM

## 2024-04-12 DIAGNOSIS — M25.512 ACUTE PAIN OF LEFT SHOULDER: ICD-10-CM

## 2024-04-12 DIAGNOSIS — E53.1 VITAMIN B6 DEFICIENCY: ICD-10-CM

## 2024-04-12 PROBLEM — R29.6 FALLS: Status: ACTIVE | Noted: 2024-04-12

## 2024-04-12 PROBLEM — R47.1 DYSARTHRIA: Status: ACTIVE | Noted: 2024-04-12

## 2024-04-12 PROCEDURE — 99205 OFFICE O/P NEW HI 60 MIN: CPT | Performed by: PSYCHIATRY & NEUROLOGY

## 2024-04-12 NOTE — PROGRESS NOTES
Jacquelyn Fritz is a 63 y.o. female.   Chief Complaint   Patient presents with    Memory Loss       Assessment:  1. Memory difficulties    2. Neuropathy    3. Tremor    4. Falls    5. Alcohol use    6. Vitamin B12 deficiency    7. Vitamin B6 deficiency    8. Vitamin D deficiency    9. Acute pain of left shoulder    10. Speech abnormality         Plan:  Differential diagnosis of her speech difficulty discussed with the patient, primary progressive aphasia is in the differential diagnosis versus other etiologies, she has mild cognitive impairment with a MoCA of 25/30, would recommend an MRI scan of the brain carotid ultrasound and blood work to evaluate for her symptoms.    Her falls could be multifactorial given her neuropathy and history of alcohol use and vitamin B12 deficiency she is on vitamin B12 B6 and vitamin D replacement and is in follow-up with the family physicians also would recommend an EMG of bilateral lower extremity to evaluate for her neuropathy, I advised the patient and her family the importance of she stopping alcohol completely patient is in denial, she was advised to go for physical therapy evaluation and gait safety evaluation and use a cane to ambulate and also to go for occupational therapy and speech therapy.    Given her recent fall and left shoulder pain I advised her to follow-up with the family physician and also a referral for an orthopedic surgeon was made.    She was advised to live with somebody and be under constant supervision family understands the importance of the situation, to keep a blood pressure cholesterol sugar and weight under control to do mentally stimulating exercises to go to the hospital if has any worsening symptoms and call me otherwise to see me back in 3 months or sooner if needed and follow-up with her other physicians.          Subjective:    HPI     63-year-old female with history of hypertension hypercholesterolemia prior history of lung cancer, history of  "heavy alcohol use, vitamin B12 B6 and vitamin D deficiency who is here accompanied with her brother and sister-in-law for evaluation of speech abnormality memory difficulty and frequent falls she has prior history of neuropathy and was told at some point that she may have Parkinson's disease she does complain of tremor but it is not all the time and she is not a great historian so she cannot tell me if this tremor is intentional or resting tremor she has not had any seizures.    According to the brother she is a heavy drinker she has slowed down now but still she drinks heavily but she lives by herself but patient denies that she drinks every day so it is not clear exactly how much the patient is drinking, she has been progressively having speech difficulty, she denies any focal weakness she has some issues with her balance and has had a fall recently without any head injury but she does have some left shoulder pain, she is on vitamin B12 B6 and vitamin D replacements which is being given to her by her family physician, she denies any vision difficulties, at baseline she had some numbness and tingling in her feet she was on Neurontin that has been discontinued but currently tells me that her symptoms in the feet are reasonably well-controlled, no bowel and bladder incontinence, no headaches, her brother helps her out with driving but she is able to do her ADLs, the symptoms have been going on at least for a year except that the recent worsening has happened in the last couple of months, no other complaints.  Vitals:    04/12/24 1100   BP: 150/80   BP Location: Right arm   Patient Position: Sitting   Cuff Size: Large   Pulse: (!) 108   Height: 5' 2\" (1.575 m)       Current Medications    Current Outpatient Medications:     albuterol (2.5 mg/3 mL) 0.083 % nebulizer solution, Take 3 mL (2.5 mg total) by nebulization every 6 (six) hours as needed for wheezing or shortness of breath As needed, Disp: , Rfl:     albuterol " (ProAir HFA) 90 mcg/act inhaler, Inhale 2 puffs every 6 (six) hours as needed for wheezing or shortness of breath, Disp: , Rfl:     amLODIPine-atorvastatin (CADUET) 5-20 MG per tablet, Take 1 tablet by mouth daily, Disp: 90 tablet, Rfl: 3    Cholecalciferol (Vitamin D3) 1.25 MG (88026 UT) capsule, 2 times a week for 12 weeks., Disp: 24 capsule, Rfl: 0    fluticasone-salmeterol (Advair HFA) 115-21 MCG/ACT inhaler, Inhale 2 puffs 2 (two) times a day Rinse mouth after use., Disp: 12 g, Rfl: 3    folic acid (KP Folic Acid) 1 mg tablet, Take 1 tablet (1 mg total) by mouth daily, Disp: 90 tablet, Rfl: 3    hydrocortisone (ANUSOL-HC) 25 mg suppository, Insert 1 suppository (25 mg total) into the rectum 2 (two) times a day, Disp: 12 suppository, Rfl: 0    Icosapent Ethyl 1 g CAPS, TAKE 1 CAPSULE BY MOUTH 2 TIMES A DAY., Disp: 180 capsule, Rfl: 1    pantoprazole (PROTONIX) 40 mg tablet, Take 1 tablet (40 mg total) by mouth daily, Disp: 90 tablet, Rfl: 1    pyridoxine (VITAMIN B6) 100 mg tablet, Take 1 tablet (100 mg total) by mouth daily, Disp: 90 tablet, Rfl: 0    vitamin B-12 (VITAMIN B-12) 1,000 mcg tablet, Take 1 tablet (1,000 mcg total) by mouth once a week, Disp: 12 tablet, Rfl: 0    Magnesium 250 MG TABS, Take by mouth (Patient not taking: Reported on 4/12/2024), Disp: , Rfl:       Allergies  Pollen extract    Past Medical History  Past Medical History:   Diagnosis Date    Cancer (HCC) 2011    lung, upper left lung lobectomy    Hyperlipidemia     Hypertension     IBS (irritable bowel syndrome)          Past Surgical History:  Past Surgical History:   Procedure Laterality Date    HYSTERECTOMY      KNEE SURGERY Left     LUNG LOBECTOMY      ORIF TIBIAL PLATEAU Left 11/07/2019    Procedure: OPEN REDUCTION W/ INTERNAL FIXATION (ORIF) TIBIAL PLATEAU;  Surgeon: Marie Deras MD;  Location: [facility];  Service: Orthopedics    WRIST SURGERY           Family History:  Family History   Problem Relation Age of Onset     Diabetes Mother     Diabetes Father     Breast cancer Sister     Sarcoidosis Sister     No Known Problems Sister     No Known Problems Sister     No Known Problems Maternal Grandmother     No Known Problems Paternal Grandmother     Diabetes Brother     Sarcoidosis Brother     No Known Problems Son     No Known Problems Maternal Aunt     No Known Problems Maternal Aunt     No Known Problems Maternal Aunt     No Known Problems Maternal Aunt     No Known Problems Maternal Aunt     No Known Problems Maternal Aunt     No Known Problems Maternal Aunt     No Known Problems Paternal Aunt     No Known Problems Paternal Aunt     No Known Problems Paternal Aunt     Ovarian cancer Neg Hx     Cervical cancer Neg Hx     Uterine cancer Neg Hx        Social History:   reports that she quit smoking about 15 years ago. Her smoking use included cigarettes. She has never used smokeless tobacco. She reports that she does not currently use alcohol. She reports that she does not use drugs.    I have reviewed the past medical history, surgical history, social and family history, current medications, allergies vitals, review of systems, and updated this information as appropriate today.   Objective:    Physical Exam    Neurological Exam     GENERAL:  Cooperative in no acute distress. Well-developed and well-nourished     HEAD and NECK   Head is atraumatic normocephalic with no lesions or masses. Neck is supple with full range of motion     CARDIOVASCULAR  Carotid Arteries-no carotid bruits.     NEUROLOGIC:  Mental Status-the patient is awake alert and oriented without aphasia or apraxia  Cranial Nerves: Visual fields are full to confrontation.   Extraocular movements are full without nystagmus. Pupils are 2-1/2 mm and reactive. Face is symmetrical to light touch. Movements of facial expression move symmetrically. Hearing is normal to finger rub bilaterally. Soft palate lifts symmetrically. Shoulder shrug is symmetrical. Tongue is midline  without atrophy.  Motor: No drift is noted on arm extension. Strength is full in the upper and lower extremities with normal bulk and tone.  With poor effort in the left upper extremity secondary to left shoulder pain  Sensory: Decreased light touch pinprick temperature sensation in a stocking distribution cortical function is intact.  Coordination: Finger to nose testing is performed accurately. Romberg is negative. Gait reveals a slightly wide-based  Reflexes:      1+ and symmetrical toes are downgoing.  No spine tenderness    ROS:  Review of Systems   Constitutional:  Negative for appetite change, fatigue and fever.   HENT: Negative.  Negative for hearing loss, tinnitus, trouble swallowing and voice change.    Eyes: Negative.  Negative for photophobia, pain and visual disturbance.   Respiratory: Negative.  Negative for shortness of breath.    Cardiovascular: Negative.  Negative for palpitations.   Gastrointestinal: Negative.  Negative for nausea and vomiting.   Endocrine: Negative.  Negative for cold intolerance.   Genitourinary: Negative.  Negative for dysuria, frequency and urgency.   Musculoskeletal:  Negative for back pain, gait problem, myalgias, neck pain and neck stiffness.   Skin: Negative.  Negative for rash.   Allergic/Immunologic: Negative.    Neurological:  Positive for speech difficulty and numbness. Negative for dizziness, tremors, seizures, syncope, facial asymmetry, weakness, light-headedness and headaches.        Pt states neuropathy in both feet.   Hematological: Negative.  Does not bruise/bleed easily.   Psychiatric/Behavioral:  Negative for confusion, hallucinations and sleep disturbance.

## 2024-04-12 NOTE — TELEPHONE ENCOUNTER
4/10/24, 0924    I can't even remember what I want to say so pls call me back at me, [phone]. I have appointment, but I don't remember what I want to ask you.    Already addressed. See below

## 2024-04-16 ENCOUNTER — TELEPHONE (OUTPATIENT)
Dept: NEUROLOGY | Facility: CLINIC | Age: 64
End: 2024-04-16

## 2024-04-16 NOTE — TELEPHONE ENCOUNTER
LVM to give information about the open MRI and instructing her that I will be mailing the referral to her and info to [facility].

## 2024-04-24 ENCOUNTER — HOSPITAL ENCOUNTER (OUTPATIENT)
Dept: CT IMAGING | Facility: HOSPITAL | Age: 64
Discharge: HOME | End: 2024-04-24
Attending: FAMILY MEDICINE
Payer: COMMERCIAL

## 2024-04-24 ENCOUNTER — OFFICE VISIT (OUTPATIENT)
Dept: OBGYN CLINIC | Facility: CLINIC | Age: 64
End: 2024-04-24
Payer: COMMERCIAL

## 2024-04-24 ENCOUNTER — APPOINTMENT (OUTPATIENT)
Dept: RADIOLOGY | Facility: CLINIC | Age: 64
End: 2024-04-24
Payer: COMMERCIAL

## 2024-04-24 VITALS
BODY MASS INDEX: 32.57 KG/M2 | DIASTOLIC BLOOD PRESSURE: 89 MMHG | SYSTOLIC BLOOD PRESSURE: 151 MMHG | WEIGHT: 177 LBS | HEART RATE: 96 BPM | HEIGHT: 62 IN

## 2024-04-24 DIAGNOSIS — S49.92XA INJURY OF LEFT SHOULDER, INITIAL ENCOUNTER: Primary | ICD-10-CM

## 2024-04-24 DIAGNOSIS — S49.92XA INJURY OF LEFT SHOULDER, INITIAL ENCOUNTER: ICD-10-CM

## 2024-04-24 DIAGNOSIS — M25.512 LEFT SHOULDER PAIN, UNSPECIFIED CHRONICITY: ICD-10-CM

## 2024-04-24 PROCEDURE — 73030 X-RAY EXAM OF SHOULDER: CPT

## 2024-04-24 PROCEDURE — 73200 CT UPPER EXTREMITY W/O DYE: CPT

## 2024-04-24 PROCEDURE — 99203 OFFICE O/P NEW LOW 30 MIN: CPT | Performed by: FAMILY MEDICINE

## 2024-04-24 NOTE — PROGRESS NOTES
Assessment/Plan:  Assessment/Plan   Diagnoses and all orders for this visit:    Injury of left shoulder, initial encounter  -     XR shoulder 2+ vw left; Future  -     Ambulatory Referral to Orthopedic Surgery  -     CT upper extremity wo contrast left; Future      63-year-old right-hand-dominant female with onset left shoulder pain limited range of motion from fall injury at home 2 weeks ago.  Discussed with patient physical exam, radiographs, impression, and plan.  X-rays of the left shoulder noted for cortical irregularity at the inferior glenoid.  Physical exam cervical spine unremarkable for midline or paraspinal tenderness.  She has intact range of motion cervical spine.  Axial load and Spurling's are unremarkable.  Left shoulder for tenderness upper trapezius, anterior, and lateral aspects.  She has range of motion limited actively to forward flexion 30 degrees and passively to 60 degrees with abduction limited to 30 degrees and internal rotation to the sacrum.  She has 4/5 strength abduction, external rotation, and supraspinatus.  There is positive empty can test, positive Levy, positive drop arm test.  She has normal sensation, bicep reflex, and radial pulse both upper extremities.  Clinical impression is that she may have symptoms from combination of osseous and soft tissue injury.  Patient unable to have MRI done even with prescribed anxiolytic so I will refer for CT scan to eval for occult fracture as invasive management may be warranted.  She will return after having CT scan done.        Subjective:   Patient ID: Jacquelyn Fritz is a 63 y.o. female.  Chief Complaint   Patient presents with    Left Shoulder - Pain        63-year-old right-hand-dominant female presents evaluation of left shoulder pain 2 weeks duration.  She reports having had a fall injury at home.  She does not recall exact mechanism of injury.  She had pain described as sudden in onset, generally to the shoulder, rating distally to  "the elbow, throbbing and sharp, worse with moving the arm, associated with limited range of motion and weakness, and improved with resting.  She reports being unable to actively elevate the arm, but is able to have more range of motion with passive movement of the arm.  She has not been taking any medication for symptoms.  She was seen by neurologist and she was referred to orthopedic care.      Shoulder Pain  The current episode started 1 to 4 weeks ago. The problem occurs daily. The problem has been unchanged. Associated symptoms include arthralgias and weakness. Pertinent negatives include no joint swelling, neck pain or numbness. Exacerbated by: Arm movement. She has tried rest and position changes for the symptoms. The treatment provided mild relief.           The following portions of the patient's history were reviewed and updated as appropriate: She  has a past medical history of Cancer (HCC) (2011), Hyperlipidemia, Hypertension, and IBS (irritable bowel syndrome).  She is allergic to pollen extract..    Review of Systems   Musculoskeletal:  Positive for arthralgias. Negative for joint swelling and neck pain.   Neurological:  Positive for weakness. Negative for numbness.       Objective:  Vitals:    04/24/24 1316   BP: 151/89   Pulse: 96   Weight: 80.3 kg (177 lb)   Height: 5' 2\" (1.575 m)      Back Exam     Comments:    Cervical spine  - No tenderness  - Normal range of motion  - Negative axial loading  - Negative Spurling's  - Normal sensation both upper extremities      Right Hand Exam     Muscle Strength   Wrist extension: 5/5   Wrist flexion: 5/5   : 5/5     Other   Sensation: normal  Pulse: present      Left Hand Exam     Muscle Strength   Wrist extension: 5/5   Wrist flexion: 5/5   :  5/5     Other   Sensation: normal  Pulse: present      Right Elbow Exam     Muscle Strength   The patient has normal right elbow strength (5/5 flexion and extension).    Other   Sensation: normal      Left Elbow " Exam     Muscle Strength   The patient has normal left elbow strength (5/5 flexion and extension).    Other   Sensation: normal      Right Shoulder Exam     Other   Sensation: normal      Left Shoulder Exam     Tenderness   Left shoulder tenderness location: Trapezius, anterior, lateral.    Range of Motion   Active abduction:  30   Forward flexion:  30 (Passive 60)   Internal rotation 0 degrees:  Sacrum     Muscle Strength   Abduction: 4/5   Internal rotation: 5/5   External rotation: 4/5   Supraspinatus: 4/5     Tests   Levy test: positive  Drop arm: positive    Other   Sensation: normal     Comments:  Positive empty can          Strength/Myotome Testing     Left Wrist/Hand   Wrist extension: 5  Wrist flexion: 5    Right Wrist/Hand   Wrist extension: 5  Wrist flexion: 5      Physical Exam  Vitals and nursing note reviewed.   Constitutional:       Appearance: Normal appearance. She is well-developed. She is not ill-appearing or diaphoretic.   HENT:      Head: Normocephalic and atraumatic.      Right Ear: External ear normal.      Left Ear: External ear normal.   Eyes:      Conjunctiva/sclera: Conjunctivae normal.   Neck:      Trachea: No tracheal deviation.   Cardiovascular:      Rate and Rhythm: Normal rate.   Pulmonary:      Effort: Pulmonary effort is normal. No respiratory distress.   Abdominal:      General: There is no distension.   Musculoskeletal:         General: Tenderness present. No swelling.   Skin:     General: Skin is warm and dry.      Coloration: Skin is not jaundiced or pale.   Neurological:      Mental Status: She is alert and oriented to person, place, and time.   Psychiatric:         Mood and Affect: Mood normal.         Behavior: Behavior normal.         Thought Content: Thought content normal.         Judgment: Judgment normal.         I have personally reviewed pertinent films in PACS and my interpretation is  .  X-rays of the left shoulder noted for cortical irregularity at the inferior  glenoid.

## 2024-04-24 NOTE — LETTER
April 24, 2024     Lorena Norris MD  [address]  [facility] [address]    Patient: Jacquelyn Fritz   YOB: 1960   Date of Visit: 4/24/2024       Dear Dr. Norris:    Thank you for referring Jacquelyn Fritz to me for evaluation. Below are my notes for this consultation.    If you have questions, please do not hesitate to call me. I look forward to following your patient along with you.         Sincerely,        Ren Caballero DO        CC: No Recipients    Ren Caballero DO  4/24/2024  4:37 PM  Sign when Signing Visit  Assessment/Plan:  Assessment/Plan  Diagnoses and all orders for this visit:    Injury of left shoulder, initial encounter  -     XR shoulder 2+ vw left; Future  -     Ambulatory Referral to Orthopedic Surgery  -     CT upper extremity wo contrast left; Future      63-year-old right-hand-dominant female with onset left shoulder pain limited range of motion from fall injury at home 2 weeks ago.  Discussed with patient physical exam, radiographs, impression, and plan.  X-rays of the left shoulder noted for cortical irregularity at the inferior glenoid.  Physical exam cervical spine unremarkable for midline or paraspinal tenderness.  She has intact range of motion cervical spine.  Axial load and Spurling's are unremarkable.  Left shoulder for tenderness upper trapezius, anterior, and lateral aspects.  She has range of motion limited actively to forward flexion 30 degrees and passively to 60 degrees with abduction limited to 30 degrees and internal rotation to the sacrum.  She has 4/5 strength abduction, external rotation, and supraspinatus.  There is positive empty can test, positive Levy, positive drop arm test.  She has normal sensation, bicep reflex, and radial pulse both upper extremities.  Clinical impression is that she may have symptoms from combination of osseous and soft tissue injury.  Patient unable to have MRI done even with  "prescribed anxiolytic so I will refer for CT scan to eval for occult fracture as invasive management may be warranted.  She will return after having CT scan done.        Subjective:   Patient ID: Jacquelyn Fritz is a 63 y.o. female.  Chief Complaint   Patient presents with   • Left Shoulder - Pain        63-year-old right-hand-dominant female presents evaluation of left shoulder pain 2 weeks duration.  She reports having had a fall injury at home.  She does not recall exact mechanism of injury.  She had pain described as sudden in onset, generally to the shoulder, rating distally to the elbow, throbbing and sharp, worse with moving the arm, associated with limited range of motion and weakness, and improved with resting.  She reports being unable to actively elevate the arm, but is able to have more range of motion with passive movement of the arm.  She has not been taking any medication for symptoms.  She was seen by neurologist and she was referred to orthopedic care.      Shoulder Pain  The current episode started 1 to 4 weeks ago. The problem occurs daily. The problem has been unchanged. Associated symptoms include arthralgias and weakness. Pertinent negatives include no joint swelling, neck pain or numbness. Exacerbated by: Arm movement. She has tried rest and position changes for the symptoms. The treatment provided mild relief.           The following portions of the patient's history were reviewed and updated as appropriate: She  has a past medical history of Cancer (HCC) (2011), Hyperlipidemia, Hypertension, and IBS (irritable bowel syndrome).  She is allergic to pollen extract..    Review of Systems   Musculoskeletal:  Positive for arthralgias. Negative for joint swelling and neck pain.   Neurological:  Positive for weakness. Negative for numbness.       Objective:  Vitals:    04/24/24 1316   BP: 151/89   Pulse: 96   Weight: 80.3 kg (177 lb)   Height: 5' 2\" (1.575 m)      Back Exam     Comments:  "   Cervical spine  - No tenderness  - Normal range of motion  - Negative axial loading  - Negative Spurling's  - Normal sensation both upper extremities      Right Hand Exam     Muscle Strength   Wrist extension: 5/5   Wrist flexion: 5/5   : 5/5     Other   Sensation: normal  Pulse: present      Left Hand Exam     Muscle Strength   Wrist extension: 5/5   Wrist flexion: 5/5   :  5/5     Other   Sensation: normal  Pulse: present      Right Elbow Exam     Muscle Strength   The patient has normal right elbow strength (5/5 flexion and extension).    Other   Sensation: normal      Left Elbow Exam     Muscle Strength   The patient has normal left elbow strength (5/5 flexion and extension).    Other   Sensation: normal      Right Shoulder Exam     Other   Sensation: normal      Left Shoulder Exam     Tenderness   Left shoulder tenderness location: Trapezius, anterior, lateral.    Range of Motion   Active abduction:  30   Forward flexion:  30 (Passive 60)   Internal rotation 0 degrees:  Sacrum     Muscle Strength   Abduction: 4/5   Internal rotation: 5/5   External rotation: 4/5   Supraspinatus: 4/5     Tests   Levy test: positive  Drop arm: positive    Other   Sensation: normal     Comments:  Positive empty can          Strength/Myotome Testing     Left Wrist/Hand   Wrist extension: 5  Wrist flexion: 5    Right Wrist/Hand   Wrist extension: 5  Wrist flexion: 5      Physical Exam  Vitals and nursing note reviewed.   Constitutional:       Appearance: Normal appearance. She is well-developed. She is not ill-appearing or diaphoretic.   HENT:      Head: Normocephalic and atraumatic.      Right Ear: External ear normal.      Left Ear: External ear normal.   Eyes:      Conjunctiva/sclera: Conjunctivae normal.   Neck:      Trachea: No tracheal deviation.   Cardiovascular:      Rate and Rhythm: Normal rate.   Pulmonary:      Effort: Pulmonary effort is normal. No respiratory distress.   Abdominal:      General: There is no  distension.   Musculoskeletal:         General: Tenderness present. No swelling.   Skin:     General: Skin is warm and dry.      Coloration: Skin is not jaundiced or pale.   Neurological:      Mental Status: She is alert and oriented to person, place, and time.   Psychiatric:         Mood and Affect: Mood normal.         Behavior: Behavior normal.         Thought Content: Thought content normal.         Judgment: Judgment normal.         I have personally reviewed pertinent films in PACS and my interpretation is  .  X-rays of the left shoulder noted for cortical irregularity at the inferior glenoid.

## 2024-05-02 ENCOUNTER — OFFICE VISIT (OUTPATIENT)
Dept: OBGYN CLINIC | Facility: CLINIC | Age: 64
End: 2024-05-02
Payer: COMMERCIAL

## 2024-05-02 VITALS
HEIGHT: 62 IN | BODY MASS INDEX: 33.13 KG/M2 | WEIGHT: 180 LBS | SYSTOLIC BLOOD PRESSURE: 147 MMHG | HEART RATE: 76 BPM | DIASTOLIC BLOOD PRESSURE: 90 MMHG

## 2024-05-02 DIAGNOSIS — Z98.890 S/P ORIF (OPEN REDUCTION INTERNAL FIXATION) FRACTURE: ICD-10-CM

## 2024-05-02 DIAGNOSIS — Z87.81 S/P ORIF (OPEN REDUCTION INTERNAL FIXATION) FRACTURE: ICD-10-CM

## 2024-05-02 DIAGNOSIS — S42.142A: Primary | ICD-10-CM

## 2024-05-02 DIAGNOSIS — M62.838 TRAPEZIUS MUSCLE SPASM: ICD-10-CM

## 2024-05-02 DIAGNOSIS — M25.612 STIFFNESS OF LEFT SHOULDER JOINT: ICD-10-CM

## 2024-05-02 PROCEDURE — 3080F DIAST BP >= 90 MM HG: CPT | Performed by: FAMILY MEDICINE

## 2024-05-02 PROCEDURE — 3077F SYST BP >= 140 MM HG: CPT | Performed by: FAMILY MEDICINE

## 2024-05-02 PROCEDURE — 99214 OFFICE O/P EST MOD 30 MIN: CPT | Performed by: FAMILY MEDICINE

## 2024-05-02 RX ORDER — TIZANIDINE 4 MG/1
4 TABLET ORAL 2 TIMES DAILY PRN
Qty: 60 TABLET | Refills: 0 | Status: SHIPPED | OUTPATIENT
Start: 2024-05-02

## 2024-05-02 NOTE — PATIENT INSTRUCTIONS
Rx: Tizanidine 4 mg  - twice daily as needed  - not before driving    Physical therapy and home exercises

## 2024-05-02 NOTE — PROGRESS NOTES
Assessment/Plan:  Assessment/Plan   Diagnoses and all orders for this visit:    Closed fracture of rim of glenoid fossa, left, initial encounter  -     Ambulatory Referral to Physical Therapy; Future    Stiffness of left shoulder joint  -     Ambulatory Referral to Physical Therapy; Future    Trapezius muscle spasm  -     tiZANidine (ZANAFLEX) 4 mg tablet; Take 1 tablet (4 mg total) by mouth 2 (two) times a day as needed for muscle spasms    S/P ORIF (open reduction internal fixation) fracture  -     Ambulatory Referral to Orthopedic Surgery; Future        63-year-old right-hand-dominant female with onset left shoulder pain and limited range of motion from fall injury at home 3 weeks ago.  Discussed with patient CT findings, impression, plan.  CT scan of the left shoulder noted for displaced fracture anterior-inferior glenoid rim with 1.7 mm gap, mild degenerative changes glenohumeral and AC joints.  Left shoulder has range of motion forward flexion actively to 50 degrees and abduction to 50 degrees.  There is passive forward flexion to 80 degrees.  Clinical impression is that she has symptoms from acute glenoid fracture, however rotator cuff injury is not excluded.  I discussed the patient importance of early mobilization to prevent adhesive capsulitis.  I will refer her to formal therapy which she is to start as soon as possible and do home exercises as directed.  She may take tizanidine 4 mg twice daily as needed but not before driving.  She will follow-up me in 4 weeks.  In regards to her left knee pain she is status post ORIF for tibial plateau fracture with plate and screws.  Recommend she follow-up with orthopedic surgeon for further evaluation and treatment this regard.            Subjective:   Patient ID: Jacquelyn Fritz is a 63 y.o. female.  Chief Complaint   Patient presents with    Left Shoulder - Follow-up        63-year-old right-hand-dominant female following for onset left shoulder pain and limited  "range of motion from fall injury at home 3 weeks ago.  She was last seen by me 1 week ago at which point she was referred for CT scan of the shoulder due to concern for fracture.  At home she has been doing light range of motion exercises.  She reports having better range of motion and less pain, but symptoms are still bothersome.  She has pain described generalized to the shoulder, worse with moving the arm, associated with limited range of motion, and improved with resting.  She also reports worsening pain of the left knee.  She is status post ORIF for tibial plateau fracture with Dr. Deras in 2020.  She has been experiencing pain at the medial aspect of the knee.    Shoulder Pain  This is a new problem. The current episode started 1 to 4 weeks ago. The problem occurs daily. The problem has been gradually improving. Associated symptoms include arthralgias and weakness. Pertinent negatives include no joint swelling or numbness. The symptoms are aggravated by twisting (Arm movement). She has tried rest and position changes for the symptoms. The treatment provided mild relief.             Review of Systems   Musculoskeletal:  Positive for arthralgias. Negative for joint swelling.   Neurological:  Positive for weakness. Negative for numbness.       Objective:  Vitals:    05/02/24 1036   BP: 147/90   Pulse: 76   Weight: 81.6 kg (180 lb)   Height: 5' 2\" (1.575 m)      Left Knee Exam     Muscle Strength   The patient has normal left knee strength.    Tenderness   The patient is experiencing tenderness in the medial joint line.    Range of Motion   Extension:  -5     Other   Swelling: moderate      Left Shoulder Exam     Range of Motion   Active abduction:  50   Forward flexion:  50 (Passive 80)              Physical Exam  Vitals and nursing note reviewed.   Constitutional:       General: She is not in acute distress.     Appearance: Normal appearance. She is well-developed. She is not ill-appearing or diaphoretic. "   HENT:      Head: Normocephalic and atraumatic.      Right Ear: External ear normal.      Left Ear: External ear normal.   Eyes:      Conjunctiva/sclera: Conjunctivae normal.   Neck:      Trachea: No tracheal deviation.   Cardiovascular:      Rate and Rhythm: Normal rate.   Pulmonary:      Effort: Pulmonary effort is normal. No respiratory distress.   Abdominal:      General: There is no distension.   Musculoskeletal:         General: Swelling and tenderness present.   Skin:     General: Skin is warm and dry.      Coloration: Skin is not jaundiced or pale.   Neurological:      Mental Status: She is alert and oriented to person, place, and time.   Psychiatric:         Mood and Affect: Mood normal.         Behavior: Behavior normal.         Thought Content: Thought content normal.         Judgment: Judgment normal.         I have personally reviewed pertinent films in PACS and my interpretation is  .  Small minimally displaced fracture at the inferior glenoid rim.      More than 31 minutes were spent with reviewing patient chart, reviewing and interpreting imaging studies, obtaining patient history, examining patient, and discussing and implementing treatment plan.

## 2024-05-05 DIAGNOSIS — Z98.890 S/P ORIF (OPEN REDUCTION INTERNAL FIXATION) FRACTURE: Primary | ICD-10-CM

## 2024-05-05 DIAGNOSIS — Z87.81 S/P ORIF (OPEN REDUCTION INTERNAL FIXATION) FRACTURE: Primary | ICD-10-CM

## 2024-05-09 ENCOUNTER — OFFICE VISIT (OUTPATIENT)
Dept: OBGYN CLINIC | Facility: CLINIC | Age: 64
End: 2024-05-09
Payer: COMMERCIAL

## 2024-05-09 ENCOUNTER — APPOINTMENT (OUTPATIENT)
Dept: RADIOLOGY | Facility: CLINIC | Age: 64
End: 2024-05-09
Payer: COMMERCIAL

## 2024-05-09 ENCOUNTER — HOSPITAL ENCOUNTER (OUTPATIENT)
Dept: VASCULAR ULTRASOUND | Facility: HOSPITAL | Age: 64
Discharge: HOME | End: 2024-05-09
Attending: PSYCHIATRY & NEUROLOGY
Payer: COMMERCIAL

## 2024-05-09 VITALS
SYSTOLIC BLOOD PRESSURE: 138 MMHG | DIASTOLIC BLOOD PRESSURE: 93 MMHG | BODY MASS INDEX: 32.17 KG/M2 | HEART RATE: 121 BPM | HEIGHT: 62 IN | WEIGHT: 174.8 LBS

## 2024-05-09 DIAGNOSIS — Z87.81 S/P ORIF (OPEN REDUCTION INTERNAL FIXATION) FRACTURE: ICD-10-CM

## 2024-05-09 DIAGNOSIS — Z98.890 S/P ORIF (OPEN REDUCTION INTERNAL FIXATION) FRACTURE: ICD-10-CM

## 2024-05-09 DIAGNOSIS — M17.12 PRIMARY OSTEOARTHRITIS OF LEFT KNEE: Primary | ICD-10-CM

## 2024-05-09 DIAGNOSIS — R47.9 SPEECH ABNORMALITY: ICD-10-CM

## 2024-05-09 PROCEDURE — 93880 EXTRACRANIAL BILAT STUDY: CPT

## 2024-05-09 PROCEDURE — 93880 EXTRACRANIAL BILAT STUDY: CPT | Performed by: SURGERY

## 2024-05-09 PROCEDURE — 99213 OFFICE O/P EST LOW 20 MIN: CPT | Performed by: ORTHOPAEDIC SURGERY

## 2024-05-09 PROCEDURE — 73560 X-RAY EXAM OF KNEE 1 OR 2: CPT

## 2024-05-09 NOTE — PROGRESS NOTES
Orthopaedics Office Visit - follow up Patient Visit    ASSESSMENT/PLAN:    Assessment:   4.5 years s/p left ORIF tibial plateau fracture  Post traumatic arthritis    Plan:   X-rays reviewed in the office today  She was provided a hinge knee brace in the office today  She was provided a home exercise program  OTC analgesics as needed for symptom relief  Follow up in 3 weeks for reevaluation    To Do Next Visit:  reevaluation    _____________________________________________________  CHIEF COMPLAINT:  Chief Complaint   Patient presents with    Left Knee - Pain         SUBJECTIVE:  Jacquelyn Fritz is a 63 y.o. female who presents for initial evaluation of left knee pain, referred to me by Dr. Caballero.  She has had pain for approximately three weeks. She fell approximately three weeks ago and caused an increase in pain and swelling. The pain and swelling are improving. She admits that she falls a lot. She has a history of lower extrema edema. She does not take any medications for pain.     PAST MEDICAL HISTORY:  Past Medical History:   Diagnosis Date    Cancer (HCC) 2011    lung, upper left lung lobectomy    Hyperlipidemia     Hypertension     IBS (irritable bowel syndrome)        PAST SURGICAL HISTORY:  Past Surgical History:   Procedure Laterality Date    HYSTERECTOMY      KNEE SURGERY Left     LUNG LOBECTOMY      ORIF TIBIAL PLATEAU Left 11/07/2019    Procedure: OPEN REDUCTION W/ INTERNAL FIXATION (ORIF) TIBIAL PLATEAU;  Surgeon: Marie Deras MD;  Location: [facility];  Service: Orthopedics    WRIST SURGERY         FAMILY HISTORY:  Family History   Problem Relation Age of Onset    Diabetes Mother     Diabetes Father     Breast cancer Sister     Sarcoidosis Sister     No Known Problems Sister     No Known Problems Sister     No Known Problems Maternal Grandmother     No Known Problems Paternal Grandmother     Diabetes Brother     Sarcoidosis Brother     No Known Problems Son     No Known Problems Maternal Aunt      No Known Problems Maternal Aunt     No Known Problems Maternal Aunt     No Known Problems Maternal Aunt     No Known Problems Maternal Aunt     No Known Problems Maternal Aunt     No Known Problems Maternal Aunt     No Known Problems Paternal Aunt     No Known Problems Paternal Aunt     No Known Problems Paternal Aunt     Ovarian cancer Neg Hx     Cervical cancer Neg Hx     Uterine cancer Neg Hx        SOCIAL HISTORY:  Social History     Tobacco Use    Smoking status: Former     Current packs/day: 0.00     Types: Cigarettes     Quit date: 2009     Years since quitting: 15.3    Smokeless tobacco: Never   Vaping Use    Vaping status: Never Used   Substance Use Topics    Alcohol use: Not Currently     Comment: weekends    Drug use: Never       MEDICATIONS:    Current Outpatient Medications:     albuterol (2.5 mg/3 mL) 0.083 % nebulizer solution, Take 3 mL (2.5 mg total) by nebulization every 6 (six) hours as needed for wheezing or shortness of breath As needed, Disp: , Rfl:     albuterol (ProAir HFA) 90 mcg/act inhaler, Inhale 2 puffs every 6 (six) hours as needed for wheezing or shortness of breath, Disp: , Rfl:     amLODIPine-atorvastatin (CADUET) 5-20 MG per tablet, Take 1 tablet by mouth daily, Disp: 90 tablet, Rfl: 3    Cholecalciferol (Vitamin D3) 1.25 MG (91400 UT) capsule, 2 times a week for 12 weeks., Disp: 24 capsule, Rfl: 0    fluticasone-salmeterol (Advair HFA) 115-21 MCG/ACT inhaler, Inhale 2 puffs 2 (two) times a day Rinse mouth after use., Disp: 12 g, Rfl: 3    folic acid (KP Folic Acid) 1 mg tablet, Take 1 tablet (1 mg total) by mouth daily, Disp: 90 tablet, Rfl: 3    hydrocortisone (ANUSOL-HC) 25 mg suppository, Insert 1 suppository (25 mg total) into the rectum 2 (two) times a day, Disp: 12 suppository, Rfl: 0    Icosapent Ethyl 1 g CAPS, TAKE 1 CAPSULE BY MOUTH 2 TIMES A DAY., Disp: 180 capsule, Rfl: 1    Magnesium 250 MG TABS, Take by mouth, Disp: , Rfl:     pantoprazole (PROTONIX) 40 mg tablet,  "Take 1 tablet (40 mg total) by mouth daily, Disp: 90 tablet, Rfl: 1    pyridoxine (VITAMIN B6) 100 mg tablet, Take 1 tablet (100 mg total) by mouth daily, Disp: 90 tablet, Rfl: 0    tiZANidine (ZANAFLEX) 4 mg tablet, Take 1 tablet (4 mg total) by mouth 2 (two) times a day as needed for muscle spasms, Disp: 60 tablet, Rfl: 0    vitamin B-12 (VITAMIN B-12) 1,000 mcg tablet, Take 1 tablet (1,000 mcg total) by mouth once a week, Disp: 12 tablet, Rfl: 0    ALLERGIES:  Allergies   Allergen Reactions    Pollen Extract        REVIEW OF SYSTEMS:  MSK: as noted in HPI  Neuro: WNL  Pertinent items are otherwise noted in HPI.  A comprehensive review of systems was otherwise negative.    LABS:  HgA1c:   Lab Results   Component Value Date    HGBA1C 5.3 10/23/2023     BMP:   Lab Results   Component Value Date    CALCIUM 9.1 04/18/2023    K 3.8 04/18/2023    CO2 29 04/18/2023     04/18/2023    BUN 7 04/18/2023    CREATININE 0.70 04/18/2023     CBC: No components found for: \"CBC\"    _____________________________________________________  PHYSICAL EXAMINATION:  Vital signs: /93   Pulse (!) 121   Ht 5' 2\" (1.575 m)   Wt 79.3 kg (174 lb 12.8 oz)   BMI 31.97 kg/m²   General: No acute distress, awake and alert  Psychiatric: Mood and affect appear appropriate  HEENT: Trachea Midline, No torticollis, no apparent facial trauma  Cardiovascular: No audible murmurs; Extremities appear perfused  Pulmonary: No audible wheezing or stridor  Skin: No open lesions; see further details (if any) below    MUSCULOSKELETAL EXAMINATION:  Extremities:    Left Knee  Range of motion from 0 to 110.    There is mild crepitus with range of motion.   There is trace effusion.    There is tenderness over the medial joint line.    There is 5/5 quadriceps strength and equal tone.    The patient is able to perform a straight leg raise.    Varus stress testing reveals no instability at 0 and 30 degrees   Valgus stress testing reveals no instability at 0 " and 30 degrees  The patient is neurovascular intact distally.        _____________________________________________________  STUDIES REVIEWED:  I personally reviewed the images and interpretation is as follows:  X-rays of the left knee demonstrate orthopedic hardware in stable alignment without evidence of loosening or failure. Moderate degenerative changes.    PROCEDURES PERFORMED:  Procedures  None.    Scribe Attestation      I,:  Renee Chacon am acting as a scribe while in the presence of the attending physician.:       I,:  Kavin Lo MD personally performed the services described in this documentation    as scribed in my presence.:

## 2024-05-14 ENCOUNTER — EVALUATION (OUTPATIENT)
Age: 64
End: 2024-05-14
Payer: COMMERCIAL
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
## 2024-05-14 DIAGNOSIS — R47.9 SPEECH ABNORMALITY: ICD-10-CM

## 2024-05-14 DIAGNOSIS — R26.89 BALANCE PROBLEM: Primary | ICD-10-CM

## 2024-05-14 DIAGNOSIS — R47.01 APHASIA: Primary | ICD-10-CM
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
## 2024-05-14 DIAGNOSIS — R41.3 MEMORY DIFFICULTIES: ICD-10-CM

## 2024-05-14 DIAGNOSIS — R53.1 WEAKNESS: ICD-10-CM

## 2024-05-14 PROCEDURE — 97530 THERAPEUTIC ACTIVITIES: CPT | Performed by: PHYSICAL THERAPIST

## 2024-05-14 PROCEDURE — 92523 SPEECH SOUND LANG COMPREHEN: CPT

## 2024-05-14 PROCEDURE — 97165 OT EVAL LOW COMPLEX 30 MIN: CPT | Performed by: PHYSICAL THERAPIST

## 2024-05-14 NOTE — PROGRESS NOTES
PT Evaluation          POC expires Unit limit Auth Expiration date PT/OT + Visit Limit? Co-Insurance   24 BOMN  BOMN No                               Visit/Unit Tracking  AUTH Status:  Date                Used 1               Remaining                           Today's date: 2024  Patient name: Jacquelyn Fritz  : 1960  MRN: [id number]  Referring provider: Lorena Norris MD  Dx:   Encounter Diagnosis     ICD-10-CM    1. Balance problem  R26.89       2. Falls  W19.XXXA Ambulatory Referral to Physical Therapy      3. Weakness  R53.1             Assessment  Assessment details: Patient is a 63 y.o. Female who presents to skilled outpatient PT with history of frequent falls. Upon examination, notable weakness of lower extremities, particularly on left side.  Functional tests and measures reveal 5xSTS score of 15.46 sec, TUG of 18.92 sec with cognitive component of 37.18 sec, and CONNELL of 26/56.  Results of aforementioned tests and measures suggesting patient is at increased risk for falls with decreased postural stability as compared to her peers.  As such, pt would benefit from interventions focused on restoring functional strength to lower extremities, as well as balance training to improve static and dynamic postural stability with hopes of maximizing safety during ambulation, decreasing risk for falls, and improving overall activity tolerance both at home and within the community.  Significant amount of time spent on education regarding continued activity level in order to prevent regression as well as HEP technique, dosage, and frequency.  PT will perform FGA and 10MWT next visit to further evaluate level of function.        Impairments: Abnormal coordination, Abnormal gait, Abnormal muscle tone, Abnormal or restricted ROM, Activity intolerance, Impaired balance, Impaired physical strength, Lacks appropriate HEP, Poor  posture, Poor body mechanics, Pain with function, Safety issue, Weight-bearing intolerance, Abnormal movement, Difficulty understanding, Abnormal muscle firing  Understanding of Dx/Px/POC: Good  Prognosis: Good    Patient verbalized understanding of POC.         Please contact me if you have any questions or recommendations. Thank you for the referral and the opportunity to share in Jacquelyn Fritz's care.        Plan  Patient would benefit from: PT Eval  Planned modality interventions: Biofeedback, Cryotherapy, TENS, Thermotherapy  Planned therapy interventions: Abdominal trunk stabilization, ADL training, Balance, Balance/WB training, Breathing training, Body mechanics training, Coordination, Functional ROM exercises, Gait training, HEP, Joint Mobilization, Manual Therapy, Price taping, Motor coordination training, Neuromuscular re-education, Patient education, Postural training, Strengthening, Stretching, Therapeutic activities, Therapeutic exercises, Therapeutic training, Transfer training, Activity modification, Work reintegration  Frequency: 2x/wk  Duration in weeks: 3 months  Plan of Care beginning date: 5/14/2024  Plan of Care expiration date: 3 months - 8/14/2024  Treatment plan discussed with: Patient       Goals  Short Term Goals (4 weeks):    - Patient will improve time on TUG by 2.9 seconds from 18.92 sec to 16 sec facilitate improved safety in all ambulation  - Patient will be independent in basic HEP 2-3 weeks  - Patient will improve 5xSTS score by 2.3 seconds from 15.46 sec to 13.16 sec to promote improved LE functional strength needed for ADLs  - Patient will complete FGA  - Patient will complete 10MWT    Long Term Goals (12 weeks):  - Patient will be independent in a comprehensive home exercise program  - Patient will improve gait speed by 0.18 m/s to improve safety with community ambulation  - Patient will improve CONNELL by 6 points from 26/56 to 32/56 in order to improve static balance and  reduce risk for falls  - Patient will improve scoring on FGA by 4 points to progress safety with dynamic tasks  - Patient will be able to demonstrate HT in gait without veering  - Patient will report 50% reduction in near falls in order to improve safety with functional tasks and reduce his risk for falls  - Patient will report going on walks at least 3 days per week to promote independence and improved cardiovascular endurance  - Patient will be able to ascend/descend stairs reciprocally with 1 UE assist to promote independence and safety with ADLs  - Patient will report 50% reduction in near falls when ambulating on uneven terrain      Cut off score    All date taken from APTA Neuro Section or Rehab Measures      [place]/56  MDC: 6 pts  Age Norms:  60-69: M - 55   F - 55  70-79: M - 54   F - 53  80-89: M - 53   F - 50 5xSTS: Fidelia et al 2010  MDC: 2.3 sec  Age Norms:  60-69: 11.4 sec  70-79: 12.6 sec  80-89: 14.8 sec   TUG  MDC: 4.14 sec  Cut off score:  >13.5 sec community dwelling adults  >32.2 frail elderly  <20 I for basic transfers  >30 dependent on transfers 10 Meter Walk Test: Radha and Wilder et al 2011  MDC: 0.18 m/s  20-29: M - 1.35 m   F - 1.34 m  30-39: M - 1.43 m   F - 1.34 m  40-49: M - 1.43 m   F - 1.39 m  50-59: M - 1.43 m   F - 1.31 m  60-69: M - 1.34 m   F - 1.24 m  70-79: M - 1.26 m   F - 1.13 m  80-89: M - 0.97 m   F - 0.94 m    Household Ambulator < 0.4 m/s  Limited Community Ambulator 0.4 - 0.8 m/s  Community Ambulator 0.8 - 1.2 m/s  Safely cross the street > 1.2 m/s   FGA  MCID: 4 pts  Geriatrics/community < 22/30 fall risk  Geriatrics/community < 20/30 unexplained falls    DGI  MDC: vestibular - 4 pts  MDC: geriatric/community - 3 pts  Falls risk <19/24 mCTSIB  Norm: 20-60 yrs  Eyes open firm: norm sway 0.21-0.48  Eyes closed firm: norm sway 0.48-0.99  Eyes open foam: norm sway 0.38-0.71  Eyes closed foam: norm sway 0.70-2.22   6 Minute Walk Test  MDC: 190.98 ft  MCID: 164 ft    Age  Norms  60-69: M - 1876 ft (571.80 m)  F - 1765 ft (537.98 m)  70-79: M - 1729 ft (527.00 m)  F - 1545 ft (470.92 m)  80-89: M - 1368 ft (416.97 m)  F - 1286 ft (391.97 m) ABC: Cynthia & Marcos, 2003  <67% increased risk for falls   [employer]  Evaluator Size:        Force (grams):          Hand/Dorsal Thresholds:        Plantar Thresholds:  - 1.65                       - 0.008                       - Normal                                 - Normal  - 2.36                       - 0.02                         - Normal                                 - Normal  - 2.44                       - 0.04                         - Normal                                 - Normal  - 2.83                       - 0.07                         - Normal                                 - Normal  - 3.22                       - 0.16                         - Diminished light touch          - Normal  - 3.61                       - 0.40                         - Diminished light touch          - Normal  - 3.84                       - 0.60                         - Diminished protective           - Diminished light touch  - 4.08                       - 1.00                         - Diminished protective           - Diminished light touch  - 4.17                       - 1.40                         - Diminished protective           - Diminished light touch  - 4.31                       - 2.00                         - Diminished protective           - Diminished light touch  - 4.56                       - 4.00                         - Loss of protective sense      - Diminished protective  - 4.74                       - 6.00                         - Loss of protective sense      - Diminished protective  - 4.93                       - 8.00                         - Loss of protective sense      - Diminished protective  - 5.07                       - 10.0                         - Loss of protective sense     - Loss of  protective sense  - 5.18                       - 15.0                         - Loss of protective sense     - Loss of protective sense  - 5.46                       - 26.0                         - Loss of protective sense     - Loss of protective sense  - 5.88                       - 60.0                         - Loss of protective sense     - Loss of protective sense  - 6.10                       - 100                          - Loss of protective sense     - Loss of protective sense  - 6.45                       - 180                          - Loss of protective sense     - Loss of protective sense  - 6.65                       - 300                          - Deep pressure sense only  - Deep pressure sense only         Subjective    History of Present Illness  - Mechanism of injury: Pt reported recent surgery on left knee   - Primary AD: None  - Assist level at home: Independent  - Decreased fine motor tasks: No    Patient goal: Improve balance    Pain  - Current pain ratin/10  - At best pain ratin/10  - At worst pain ratin/10  - Location: Left shoulder  - Aggravating factors: Activity    Social Support  - Steps to enter house: 3 steps  - Stairs in house: full flight, does not use   - Lives in: Two-story home first floor setup  - Lives with: lives alone    - Employment status: retired  - Hand dominance: right    Treatments  - Previous treatment: physical therapy  - Current treatment: none      Objective       LE MMT  - R Hip Flexion: 3+/5   L Hip Flexion: 3-/5   - R Hip Abduction: 4-/5  L Hip Abduction: 4-/5  - R Hip Adduction: 3+/5  L Hip Adduction: 3+/5  - R Knee Extension: 4/5  L Knee Extension: 4-/5  - R Knee Flexion: 3+/5  L Knee Flexion: 3/5  - R Ankle DF: 4-/5   L Ankle DF: 3+/5  - R Ankle PF: 4/5   L Ankle PF: 4/5    Sensation  - Light touch: Impaired at feet  - Deep pressure: WNL  - Temperature: Inconsistent    Coordination  - Heel to Shin: WNL  - Alternate Toe Taps:  WNL      Reflexes/Clonus  - Clonus: No,       Myelopathy Screen (>3/5 +)  - Louis's Reflex: -          Gait  - Abnormalities: NV           Outcome Measures Initial Eval  5/14        5xSTS 15.46 sec        TUG  - Regular  - Cognitive   18.92 sec   Sec  37.18 sec          10 meter   NV         CONNELL 26/56        FGA NV        DGI         mCTSIB  - FTEO (firm)  - FTEC (firm)  - FTEO (foam)  - FTEC (foam)   defer        6MWT NV ft                                     Precautions: Standard fall risk  Past Medical History:   Diagnosis Date    Cancer (HCC) 2011    lung, upper left lung lobectomy    Hyperlipidemia     Hypertension     IBS (irritable bowel syndrome)

## 2024-05-14 NOTE — PROGRESS NOTES
Speech-Language Pathology Initial Evaluation    Today's date: 2024   Patient’s name: Jacquelyn Fritz  : 1960  MRN: [id number]  Safety measures: aphasia, fall risk  Referring provider: Lorena Norris MD    Encounter Diagnosis     ICD-10-CM    1. Aphasia  R47.01       2. Memory difficulties  R41.3 Ambulatory Referral to Speech Therapy      3. Speech abnormality  R47.9 Ambulatory Referral to Speech Therapy          Assessment:  -Patient presents with moderate aphasia secondary to undiagnosed cause (differential diagnosis required for potential PPA). Verbal expression deficits can c/b difficulty with word retrieval (verbal fluency, confrontational naming, and informal/formal conversation), production of grammatical short descriptions, and slower speech. Patient has moments of word blocks and pauses in language output. Due to time constraints, extensive testing could not be completed for today's session. However, informal assessment indicates patient has with expressive language. From BNT, patient scored in the 60-70th%ile.Patient has awareness of verbal fluency deficits.Patient would benefit from outpatient skilled Speech Therapy services to support the highest level of independence with her current speech-language skills, promote positive communication interactions with both familiar & unfamiliar listeners, further education/training compensatory strategies, increase communication of wants/needs, expressive/receptive language within functional activities, participate in meaningful activities, allow for increased socialization, promote safety, facilitate overall improved quality of life, reduce caregiver burden and complete caregiver education/training. Recommended to patient to follow-up with Neurologist's order of MRI to aid with diagnosis. OP ST skilled services will rehabilitate expressive language skills.        Short-term goals: (6-8 weeks)  LISTENING  Patient will follow one-step verbal  directions (e.g., close your eyes) with 80% accuracy to facilitate increased auditory comprehension skills,    Patient will answer verbal Yes/No questions with 80% accuracy to facilitate increased auditory comprehension skills,       Patient will complete naming of automatic tasks (e.g., counting, listing days of the week/months of the year, etc.) with 100% accuracy to facilitate increased verbal expression skills,     Patient will imitate words/phrases using AMAN/tapping/pacing with 80% accuracy to facilitate increased verbal expression skills,    Patient will name opposite (e.g., hot and ___) with 80% accuracy to build expressive vocabulary for conversation,     Patient will name a synonym (e.g., street or ___) with 80% accuracy to build expressive vocabulary for conversation,    Patient will name appropriate category for three words (e.g., apple, banana, pear = fruits) with 80% accuracy to facilitate improved generative naming skills,     Patient will provide an appropriate word for sentence completion task (e.g., open the ___) with 80% accuracy to facilitate increased expressive language skills,     Patient will provide an adequate response to generative naming task (i.e., name as many…) with 80% accuracy to facilitate increased expressive language skills,     Patient will provide an adequate response to confrontation naming task (i.e., what is…) with 80% accuracy to facilitate increased expressive language skills,   Patient will name up to 5 features to describe a person/place/thing with 80% accuracy to facilitate improved utilization of circumlocution strategy,     Patient will complete picture description tasks using language organization strategies with 80% accuracy to facilitate improved utilization of circumlocution strategy    APHASIA (Low level)  Long Term    Patient will demonstrate improved expressive and receptive language skills during structured and unstructured tasks by discharge.      Patient will  "improve ability to facilitate communication to meet needs including use of compensatory strategies to promote meaningful interactions for improved quality of life and maximize level of independence.         Plan:  Patient would benefit from outpatient skilled Speech Therapy services: Speech-language therapy    Frequency: 2x weekly  Duration: 3 months    Intervention certification from: 5/14/2024  Intervention certification to: 8/14/2024      Subjective:  History of present illness: Patient is a 63 y.o. female who was referred to outpatient skilled Speech Therapy services for a aphasia and cognitive-linguistic evaluation.   PMH: hypertension hypercholesterolemia prior history of lung cancer, history of heavy alcohol use, vitamin B12 B6 and vitamin D deficiency. At some point that she may have Parkinson's disease she does complain of tremor but it is not all the time and she is not a great historian so she cannot tell me if this tremor is intentional or resting tremor she has not had any seizures according to neurologist report. Neurologist reported \"According to the brother she is a heavy drinker she has slowed down now but still she drinks heavily but she lives by herself but patient denies that she drinks every day so it is not clear exactly how much the patient is drinking, she has been progressively having speech difficulty, she denies any focal weakness she has some issues with her balance and has had a fall recently without any head injury but she does have some left shoulder pain, she is on vitamin B12 B6 and vitamin D replacements which is being given to her by her family physician, she denies any vision difficulties, at baseline she had some numbness and tingling in her feet she was on Neurontin that has been discontinued but currently tells me that her symptoms in the feet are reasonably well-controlled, no bowel and bladder incontinence, no headaches, her brother helps her out with driving but she is able " "to do her ADLs, the symptoms have been going on at least for a year except that the recent worsening has happened in the last couple of months, no other complaints.\"    Today's session, patient reported difficulties with the speed of her speech. Patient reports speech has been slower and she has difficulties getting the word out. Denies memory, cognitive, attention, writing, reading difficulties. Brother reports the past 6 months her speech has gotten worse. Discussed PPA and patient requires more testing.       Patient's goal(s): \"to talk regularly\"     Hearing: WFL  Vision: WFL for testing (bifocal present)    Home environment/lifestyle: lives independent  Highest level of education: High school  Vocational status: retired [place]       Objective (testing):  The [place] Naming Test-Second Edition (BNT-2) is a confrontational naming assessment that asks patients to provide the best name for a given picture. It was designed to detect word-finding impairments. The BNT-2 consists of 60 pictures, ordered from easiest to most difficult. Stimulus cues, including semantic, phonemic, and written information, are provided as necessary.      The following results were obtained during the administration of the assessment:     Summary of Scores: Score:   1. Number of spontaneously given correct response: 45       2. Number of stimulus cues given:  15   3. Number of correct responses following a stimulus cue:  1       *TOTAL SCORE: 46   Percentile Rank:  70-80%ile       Additional Cuein. Number of phonemic cues: 14   5. Number of correct responses following the phonemic cue: 5       6. Number of multiple choices given:  6   7. Number of correct choices: 5     *Patient named 16 concrete category members (fruits and vegetables) in 60 sec (norm=15+). --AVERAGE    Communication Confidence Rating Scale For Aphasia (CCRSA)  How confident are you about your ability to talk with people? 100/100  How confident are " you about your ability to stay in touch with family and friends? 100/100  How confident are you that people include you in conversations? 100/100  How confident are you about your ability to follow news and sports on TV? 100/100  How confident are you about your ability to follow movies on TV or in a theatre? 100/100  How confident are you about your ability to speak on the phone? 100/100  How confident are you that people understand you when you talk? 100/100  How confident are you that you can make your own decisions? 100/100  How confident are you about your ability to speak for yourself? 100/100  How confident are you that you can participate in conversation about your finances? 100/100     TOTAL: 100%     Due to time constraints, did not assess writing and reading.       Visit Tracking:  POC expires Unit limit Auth Expiration date PT/OT + Visit Limit?   8/14/24 N/a 12/31/24 BOMN                           Visit/Unit Tracking  AUTH Status:  Date 5/14  IE              no Used 1               Remaining                      Intervention Comments:  33 mins of speech language production evaluation

## 2024-05-16 ENCOUNTER — OFFICE VISIT (OUTPATIENT)
Age: 64
End: 2024-05-16
Payer: COMMERCIAL

## 2024-05-16 DIAGNOSIS — R26.89 BALANCE PROBLEM: Primary | ICD-10-CM

## 2024-05-16 DIAGNOSIS — R47.9 SPEECH DISTURBANCE, UNSPECIFIED TYPE: ICD-10-CM

## 2024-05-16 DIAGNOSIS — R53.1 WEAKNESS: ICD-10-CM

## 2024-05-16 DIAGNOSIS — R47.01 APHASIA: Primary | ICD-10-CM
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
## 2024-05-16 PROCEDURE — 92507 TX SP LANG VOICE COMM INDIV: CPT

## 2024-05-16 PROCEDURE — 97112 NEUROMUSCULAR REEDUCATION: CPT

## 2024-05-16 NOTE — PROGRESS NOTES
Daily Speech Treatment Note    Today's date: 2024   Patient’s name: Jacquelyn Fritz  : 1960  MRN: [id number]  Safety measures: aphasia, fall risk  Referring provider: Lorena Norris MD    Encounter Diagnosis     ICD-10-CM    1. Aphasia  R47.01       2. Speech disturbance, unspecified type  R47.9         Visit Tracking:  POC expires Unit limit Auth Expiration date PT/OT + Visit Limit?   24 N/a 24 BOMN                           Visit/Unit Tracking  AUTH Status:  Date   IE              no Used 1 2              Remaining                   Subjective/Behavioral:  -Patient reported no new concerns for today's session.      Objective/Assessment:  -Reviewed testing results and goals in plan care with patient. Patient is in agreement at this time. Conversational task: patient had difficulties with producing fluent speech and required extra effort to word-find.    Short-term goals: (6-8 weeks)  LISTENING  Patient will follow one-step verbal directions (e.g., close your eyes) with 80% accuracy to facilitate increased auditory comprehension skills,    Patient will answer verbal Yes/No questions with 80% accuracy to facilitate increased auditory comprehension skills,       Patient will complete naming of automatic tasks (e.g., counting, listing days of the week/months of the year, etc.) with 100% accuracy to facilitate increased verbal expression skills,     Patient will imitate words/phrases using AMAN/tapping/pacing with 80% accuracy to facilitate increased verbal expression skills,    Patient will name opposite (e.g., hot and ___) with 80% accuracy to build expressive vocabulary for conversation,     Targeting opposites: Task completed in  opp (87% acc) independently, increasing to / opp (% acc) from min-mod cueing. Patient benefited from verbal repetition of everything she writes and semantic/phonemic cues.        Patient will name a synonym (e.g., street or ___) with 80%  accuracy to build expressive vocabulary for conversation,    Patient will name appropriate category for three words (e.g., apple, banana, pear = fruits) with 80% accuracy to facilitate improved generative naming skills,     Patient will provide an appropriate word for sentence completion task (e.g., open the ___) with 80% accuracy to facilitate increased expressive language skills,     Patient will provide an adequate response to generative naming task (i.e., name as many…) with 80% accuracy to facilitate increased expressive language skills,         Patient will provide an adequate response to confrontation naming task (i.e., what is…) with 80% accuracy to facilitate increased expressive language skills,   Patient will name up to 5 features to describe a person/place/thing with 80% accuracy to facilitate improved utilization of circumlocution strategy,     Naming a word that matches definition: Task completed in 17/20 opp (85% acc) independently, increasing to 20/20 opp (100% acc) from min-mod cueing. Patient benefited from  verbal repetition of everything she writes and semantic/phonemic cues. Patient provided 2 words for each definition!      Patient will complete picture description tasks using language organization strategies with 80% accuracy to facilitate improved utilization of circumlocution strategy    Plan:  -Patient was provided with home exercises/activities to target goals in plan of care at the end of today's session.  -Continue with current plan of care.

## 2024-05-16 NOTE — PROGRESS NOTES
Daily Note     Today's date: 2024  Patient name: Jacquelyn Fritz  : 1960  MRN: [id number]  Referring provider: Lorena Norris MD  Dx:   Encounter Diagnosis     ICD-10-CM    1. Balance problem  R26.89       2. Weakness  R53.1       3. Fall, subsequent encounter  W19.XXXD                      Subjective: Patient reports knee swelling.      Objective: See treatment diary below  NMR:  Fwd ambulating in bars 6 laps   Backward walking in bar 6 laps   Sit to stands 10x   Lateral ambulating 4 laps  HKM 4 laps   Standing hip abduction 20x  Standing hip extension 20x     Assessment: Tolerated treatment well. Patient requires bilateral UE assist throughout session for safety. Patient had increased instability with standing hip exercises due to neuropathy in her feet. Patient required no rest breaks throughout session.  Patient demonstrated fatigue post treatment, exhibited good technique with therapeutic exercises, and would benefit from continued PT      Plan: Continue per plan of care.      POC expires Unit limit Auth Expiration date PT/OT + Visit Limit? Co-Insurance   24 BOMN  BOMN No                               Visit/Unit Tracking  AUTH Status:  Date               Used 1 2              Remaining

## 2024-05-21 DIAGNOSIS — E55.9 VITAMIN D DEFICIENCY: ICD-10-CM

## 2024-05-21 RX ORDER — CHOLECALCIFEROL (VITAMIN D3) 1250 MCG
CAPSULE ORAL
Qty: 12 CAPSULE | Refills: 0 | Status: SHIPPED | OUTPATIENT
Start: 2024-05-21

## 2024-05-23 ENCOUNTER — OFFICE VISIT (OUTPATIENT)
Age: 64
End: 2024-05-23
Payer: COMMERCIAL

## 2024-05-23 DIAGNOSIS — R47.01 APHASIA: Primary | ICD-10-CM

## 2024-05-23 DIAGNOSIS — R47.9 SPEECH DISTURBANCE, UNSPECIFIED TYPE: ICD-10-CM

## 2024-05-23 DIAGNOSIS — R53.1 WEAKNESS: ICD-10-CM
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
## 2024-05-23 DIAGNOSIS — R41.3 MEMORY DIFFICULTIES: ICD-10-CM

## 2024-05-23 DIAGNOSIS — R26.89 BALANCE PROBLEM: Primary | ICD-10-CM

## 2024-05-23 PROCEDURE — 92507 TX SP LANG VOICE COMM INDIV: CPT

## 2024-05-23 PROCEDURE — 97112 NEUROMUSCULAR REEDUCATION: CPT | Performed by: PHYSICAL THERAPIST

## 2024-05-23 NOTE — PROGRESS NOTES
"Daily Speech Treatment Note    Today's date: 2024   Patient’s name: Jacquelyn Fritz  : 1960  MRN: [id number]  Safety measures: aphasia, fall risk  Referring provider: Lorena Norris MD    Encounter Diagnosis     ICD-10-CM    1. Aphasia  R47.01       2. Speech disturbance, unspecified type  R47.9       3. Memory difficulties  R41.3           Visit Tracking:  POC expires Unit limit Auth Expiration date PT/OT + Visit Limit?   24 N/a 24 BOMN                           Visit/Unit Tracking  AUTH Status:  Date   IE             no Used 1 2 3             Remaining  17 16 15                Subjective/Behavioral:  -Patient reports she has a runny nose and is feeling \"under the weather\"      Objective/Assessment:    Short-term goals: (6-8 weeks)  LISTENING  Patient will follow one-step verbal directions (e.g., close your eyes) with 80% accuracy to facilitate increased auditory comprehension skills,      Following  written 1-step directions: Task completed in /10 opp (50% acc) independently, increasing to 10/10 opp (100% acc) from min cueing. Patient benefited from repetition of directions, gestures, semantic cues.         Patient will answer verbal Yes/No questions with 80% accuracy to facilitate increased auditory comprehension skills,       Patient will complete naming of automatic tasks (e.g., counting, listing days of the week/months of the year, etc.) with 100% accuracy to facilitate increased verbal expression skills,     Patient will imitate words/phrases using AMAN/tapping/pacing with 80% accuracy to facilitate increased verbal expression skills,    Patient will name opposite (e.g., hot and ___) with 80% accuracy to build expressive vocabulary for conversation,       Patient will name a synonym (e.g., street or ___) with 80% accuracy to build expressive vocabulary for conversation,    Patient will name appropriate category for three words (e.g., apple, banana, pear = fruits) " with 80% accuracy to facilitate improved generative naming skills,     Identifying category members: patient was given a grid of different categories and letters. For each cateogry, patient to identify a member of the category corresponding to the letter from the grid (Sport begin with b: baseball). Task completed in 25/32 opp (78% acc) independently, increasing to 32/32 opp (100% acc) from min-mod cueing. Patient benefited from semantic cues.      Patient will provide an appropriate word for sentence completion task (e.g., open the ___) with 80% accuracy to facilitate increased expressive language skills,     Patient will provide an adequate response to generative naming task (i.e., name as many…) with 80% accuracy to facilitate increased expressive language skills,         Patient will provide an adequate response to confrontation naming task (i.e., what is…) with 80% accuracy to facilitate increased expressive language skills,     Patient will name up to 5 features to describe a person/place/thing with 80% accuracy to facilitate improved utilization of circumlocution strategy,         Patient will complete picture description tasks using language organization strategies with 80% accuracy to facilitate improved utilization of circumlocution strategy    Plan:  -Patient was provided with home exercises/activities to target goals in plan of care at the end of today's session.  -Continue with current plan of care.

## 2024-05-23 NOTE — PROGRESS NOTES
"Daily Note     Today's date: 2024  Patient name: Jacquelyn Fritz  : 1960  MRN: [id number]  Referring provider: Lorena Norris MD  Dx:   Encounter Diagnosis     ICD-10-CM    1. Balance problem  R26.89       2. Weakness  R53.1       3. Fall, subsequent encounter  W19.XXXD                      Subjective: Patient reports knee swelling.      Objective: See treatment diary below  NMR:  Sit to stands 10x 2 sets   HKM 4 laps at long bar with 3 sec hold.   Fwd high knee stepping over 6\" hurdles (5 hurdles) at long bar with 1 UE support 4 laps   LAT high knee stepping over 6\" hurdles (5 hurdles) at long bar with 1 UE support 4 laps   Standing hip abduction 20x  Standing hip extension 20x   FTEC 10 sec x 10 reps     Assessment: Tolerated treatment well. Patient requires bilateral UE assist throughout session for safety. Patient had increased instability due to neuropathy in her feet which required 1 UE to maintain balance. Addition of hurdles with good foot clearance during session. Patient required no rest breaks throughout session.  Patient demonstrated fatigue post treatment, exhibited good technique with therapeutic exercises, and would benefit from continued PT      Plan: Continue per plan of care.      POC expires Unit limit Auth Expiration date PT/OT + Visit Limit? Co-Insurance   24 BOMN  BOMN No                               Visit/Unit Tracking  AUTH Status:  Date              Used 1 2 3             Remaining                         "

## 2024-05-24 ENCOUNTER — APPOINTMENT (OUTPATIENT)
Age: 64
End: 2024-05-24
Payer: COMMERCIAL

## 2024-05-28 ENCOUNTER — HOSPITAL ENCOUNTER (OUTPATIENT)
Dept: MRI IMAGING | Facility: CLINIC | Age: 64
Discharge: HOME | End: 2024-05-28
Payer: COMMERCIAL

## 2024-05-28 ENCOUNTER — APPOINTMENT (OUTPATIENT)
Age: 64
End: 2024-05-28
Payer: COMMERCIAL

## 2024-05-28 DIAGNOSIS — E53.8 VITAMIN B12 DEFICIENCY: ICD-10-CM

## 2024-05-28 DIAGNOSIS — R41.3 MEMORY DIFFICULTIES: ICD-10-CM

## 2024-05-28 DIAGNOSIS — E55.9 VITAMIN D DEFICIENCY: ICD-10-CM

## 2024-05-28 LAB
25(OH)D3 SERPL-MCNC: >120 NG/ML (ref 30–100)
VIT B12 SERPL-MCNC: 3927 PG/ML (ref 180–914)

## 2024-05-28 PROCEDURE — 82306 VITAMIN D 25 HYDROXY: CPT

## 2024-05-28 PROCEDURE — 36415 COLL VENOUS BLD VENIPUNCTURE: CPT

## 2024-05-28 PROCEDURE — 70551 MRI BRAIN STEM W/O DYE: CPT

## 2024-05-28 PROCEDURE — 82607 VITAMIN B-12: CPT

## 2024-05-29 ENCOUNTER — APPOINTMENT (OUTPATIENT)
Age: 64
End: 2024-05-29
Payer: COMMERCIAL

## 2024-05-29 ENCOUNTER — TELEPHONE (OUTPATIENT)
Age: 64
End: 2024-05-29

## 2024-05-29 DIAGNOSIS — E55.9 VITAMIN D INSUFFICIENCY: ICD-10-CM

## 2024-05-29 DIAGNOSIS — E55.9 INADEQUATE VITAMIN D AND VITAMIN D DERIVATIVE INTAKE: ICD-10-CM

## 2024-05-29 DIAGNOSIS — E53.8 VITAMIN B12 DEFICIENCY: Primary | ICD-10-CM

## 2024-05-29 NOTE — TELEPHONE ENCOUNTER
----- Message from Zander Feliciano MD sent at 5/29/2024  8:32 AM EDT -----  Please call patient for the following recommendation: Discontinue vitamin B12 sublingual and vitamin D supplements.  Will reevaluate the values in 3 months with blood work.  Patient has a hard time remembering, please reiterate a few times maybe even have her write it down.

## 2024-05-30 ENCOUNTER — OFFICE VISIT (OUTPATIENT)
Dept: OBGYN CLINIC | Facility: CLINIC | Age: 64
End: 2024-05-30
Payer: COMMERCIAL

## 2024-05-30 VITALS
BODY MASS INDEX: 31.69 KG/M2 | HEIGHT: 62 IN | SYSTOLIC BLOOD PRESSURE: 111 MMHG | WEIGHT: 172.2 LBS | DIASTOLIC BLOOD PRESSURE: 74 MMHG | HEART RATE: 55 BPM

## 2024-05-30 DIAGNOSIS — M75.02 ADHESIVE CAPSULITIS OF LEFT SHOULDER: ICD-10-CM

## 2024-05-30 DIAGNOSIS — S42.142A: Primary | ICD-10-CM

## 2024-05-30 PROCEDURE — 99213 OFFICE O/P EST LOW 20 MIN: CPT | Performed by: FAMILY MEDICINE

## 2024-05-30 RX ORDER — SODIUM PHOSPHATE,MONO-DIBASIC 19G-7G/118
ENEMA (ML) RECTAL
COMMUNITY

## 2024-05-30 NOTE — PROGRESS NOTES
Assessment/Plan:  Assessment & Plan   Diagnoses and all orders for this visit:    Closed fracture of rim of glenoid fossa, left, initial encounter    Adhesive capsulitis of left shoulder    Other orders  -     sodium phosphate-biphosphate 7-19 g 118 mL enema; 1 pr prn        63-year-old right-hand-dominant female with onset of left shoulder pain and limited range of motion from fall injury at home more than 7 weeks ago.  Discussed the patient physical exam, impression, and plan.  Left shoulder has range of motion limited to forward flexion of 60 degrees, abduction 90 degrees, and internal rotation to the lumbar spine.  Clinical impression is that she is improving regard to fracture.  She is developing adhesive capsulitis so advised on importance of having formal therapy done to the shoulder, and to do home exercises as directed.  She will follow-up in 4 weeks at which point she will be reevaluated.      Subjective:   Patient ID: Jacquelyn Fritz is a 63 y.o. female.  Chief Complaint   Patient presents with    Left Shoulder - Follow-up        63-year-old right-hand-dominant female following up for onset of left shoulder pain and limited range of motion from fall injury at home more than 7 weeks ago.  She was last seen by me 4 weeks ago at which point CT scan reviewed with noted for fracture of the glenoid rim.  She was prescribed tizanidine and referred to formal therapy.  She has not had formal therapy done under shoulder.  She states she has been trying to passively move the arm as tolerated.  She has pain described generalized to the shoulder, radiating distally to the elbow, worse with moving the arm particularly elevating above shoulder level, associated with limited range of motion, and improved with resting.  She has been managing symptoms with applying Salonpas and topical Aleve.    Shoulder Pain  This is a new problem. The current episode started more than 1 month ago. The problem occurs daily. The problem  "has been gradually improving. Associated symptoms include arthralgias and weakness. Pertinent negatives include no joint swelling or numbness. Exacerbated by: Arm movement. She has tried rest and position changes for the symptoms. The treatment provided mild relief.               Review of Systems   Musculoskeletal:  Positive for arthralgias. Negative for joint swelling.   Neurological:  Positive for weakness. Negative for numbness.       Objective:  Vitals:    05/30/24 1046   BP: 111/74   Pulse: 55   Weight: 78.1 kg (172 lb 3.2 oz)   Height: 5' 2\" (1.575 m)      Left Shoulder Exam     Range of Motion   Active abduction:  90   Forward flexion:  60   Internal rotation 0 degrees:  Lumbar              Physical Exam  Vitals and nursing note reviewed.   Constitutional:       Appearance: Normal appearance. She is well-developed. She is not ill-appearing or diaphoretic.   HENT:      Head: Normocephalic and atraumatic.      Right Ear: External ear normal.      Left Ear: External ear normal.   Eyes:      Conjunctiva/sclera: Conjunctivae normal.   Neck:      Trachea: No tracheal deviation.   Cardiovascular:      Rate and Rhythm: Normal rate.   Pulmonary:      Effort: Pulmonary effort is normal. No respiratory distress.   Abdominal:      General: There is no distension.   Skin:     General: Skin is warm and dry.      Coloration: Skin is not jaundiced or pale.   Neurological:      Mental Status: She is alert and oriented to person, place, and time.   Psychiatric:         Mood and Affect: Mood normal.         Behavior: Behavior normal.         Thought Content: Thought content normal.         Judgment: Judgment normal.                   "

## 2024-06-04 ENCOUNTER — OFFICE VISIT (OUTPATIENT)
Age: 64
End: 2024-06-04
Payer: COMMERCIAL

## 2024-06-04 ENCOUNTER — TELEPHONE (OUTPATIENT)
Age: 64
End: 2024-06-04

## 2024-06-04 DIAGNOSIS — R26.89 BALANCE PROBLEM: Primary | ICD-10-CM

## 2024-06-04 DIAGNOSIS — R47.01 APHASIA: Primary | ICD-10-CM

## 2024-06-04 DIAGNOSIS — R53.1 WEAKNESS: ICD-10-CM

## 2024-06-04 DIAGNOSIS — R47.9 SPEECH DISTURBANCE, UNSPECIFIED TYPE: ICD-10-CM
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
## 2024-06-04 PROCEDURE — 92507 TX SP LANG VOICE COMM INDIV: CPT

## 2024-06-04 PROCEDURE — 97112 NEUROMUSCULAR REEDUCATION: CPT

## 2024-06-04 NOTE — TELEPHONE ENCOUNTER
Pts brother - Mars - wants to speak to you about pt.    Pt has been falling a lot - wants to know what to do about all of this    Please call Mars

## 2024-06-04 NOTE — PROGRESS NOTES
"Daily Note     Today's date: 2024  Patient name: Jacquelyn Fritz  : 1960  MRN: [id number]  Referring provider: Lorena Norris MD  Dx:   Encounter Diagnosis     ICD-10-CM    1. Balance problem  R26.89       2. Weakness  R53.1       3. Fall, subsequent encounter  W19.XXXD                      Subjective: Patient noted neuropathy in feet is bothering her more.       Objective: See treatment diary below    NMR:  Sit to stands 10x 2 sets   HKM 4 laps at long bar with 3 sec hold.   Fwd high knee stepping over 6\" hurdles (5 hurdles) at long bar with 1 UE support 3 laps   LAT high knee stepping over 6\" hurdles (5 hurdles) at long bar with 1 UE support 3 laps   Standing hip abduction 20x  Standing hip extension 20x   FTEC 10 sec x 10 reps       Assessment: Tolerated treatment fair. Patient demonstrated fatigue post treatment, exhibited good technique with therapeutic exercises, and would benefit from continued PT. Patient took rest breaks when needed. Decreased laps with hurdles from 4 laps to 3 due to fatigue.       Plan: Continue per plan of care.      POC expires Unit limit Auth Expiration date PT/OT + Visit Limit? Co-Insurance   24 BOMN  BOMN No                               Visit/Unit Tracking  AUTH Status:  Date             Used 1 2 3 4            Remaining                           "

## 2024-06-04 NOTE — PROGRESS NOTES
Daily Speech Treatment Note    Today's date: 2024   Patient’s name: Jacquelyn Fritz  : 1960  MRN: [id number]  Safety measures: aphasia, fall risk  Referring provider: Lorena Norris MD    Encounter Diagnosis     ICD-10-CM    1. Aphasia  R47.01       2. Speech disturbance, unspecified type  R47.9             Visit Tracking:  POC expires Unit limit Auth Expiration date PT/OT + Visit Limit?   24 N/a 24 BOMN                           Visit/Unit Tracking  AUTH Status:  Date   IE            no Used 1 2 3 4            Remaining  17 16 15 14               Subjective/Behavioral:  Patient was able to adequately recall all the medication she currently takes!    Objective/Assessment: Noted improvement in verbal fluency from IE. However, patient still presents effortful and decrease fluency. Increased difficulty of word-finding tasks for today's sessions and future sessions.    Short-term goals: (6-8 weeks)  LISTENING  Patient will follow one-step verbal directions (e.g., close your eyes) with 80% accuracy to facilitate increased auditory comprehension skills,        Patient will answer verbal Yes/No questions with 80% accuracy to facilitate increased auditory comprehension skills,       Patient will complete naming of automatic tasks (e.g., counting, listing days of the week/months of the year, etc.) with 100% accuracy to facilitate increased verbal expression skills,     Patient will imitate words/phrases using AMAN/tapping/pacing with 80% accuracy to facilitate increased verbal expression skills,    Patient will name opposite (e.g., hot and ___) with 80% accuracy to build expressive vocabulary for conversation,       Patient will name a synonym (e.g., street or ___) with 80% accuracy to build expressive vocabulary for conversation,    Patient will name appropriate category for three words (e.g., apple, banana, pear = fruits) with 80% accuracy to facilitate improved generative  naming skills,     Naming items in category: patient was given 3 words and had to add a similar item to the category. Task completed in 11/20 opp (55% acc) independently, increasing to 20/20 opp (100% acc) from minimum-moderate cueing. Patient benefited from seamntic cues, category cues, phonemic cues, and carrier phrases.      Patient will provide an appropriate word for sentence completion task (e.g., open the ___) with 80% accuracy to facilitate increased expressive language skills,     Patient will provide an adequate response to generative naming task (i.e., name as many…) with 80% accuracy to facilitate increased expressive language skills,       Patient will provide an adequate response to confrontation naming task (i.e., what is…) with 80% accuracy to facilitate increased expressive language skills,     Patient was given a naming word by given letter task. Patient was given definitions and to identify the word associated with definition with given letter as additional cue. Task completed in 15/20 opp (75% acc) independently, increasing to 20/20 opp (100% acc) from min cueing. Patient benefited from semantic/phonemic cues.     Patient will name up to 5 features to describe a person/place/thing with 80% accuracy to facilitate improved utilization of circumlocution strategy,         Patient will complete picture description tasks using language organization strategies with 80% accuracy to facilitate improved utilization of circumlocution strategy    Plan:  -Patient was provided with home exercises/activities to target goals in plan of care at the end of today's session.  -Continue with current plan of care.

## 2024-06-06 ENCOUNTER — OFFICE VISIT (OUTPATIENT)
Age: 64
End: 2024-06-06
Payer: COMMERCIAL

## 2024-06-06 DIAGNOSIS — R47.9 SPEECH DISTURBANCE, UNSPECIFIED TYPE: ICD-10-CM

## 2024-06-06 DIAGNOSIS — R47.01 APHASIA: Primary | ICD-10-CM

## 2024-06-06 DIAGNOSIS — R53.1 WEAKNESS: ICD-10-CM

## 2024-06-06 DIAGNOSIS — R47.1 DYSARTHRIA: ICD-10-CM
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
## 2024-06-06 DIAGNOSIS — R26.89 BALANCE PROBLEM: Primary | ICD-10-CM

## 2024-06-06 DIAGNOSIS — R41.3 MEMORY DIFFICULTIES: ICD-10-CM

## 2024-06-06 PROCEDURE — 97112 NEUROMUSCULAR REEDUCATION: CPT | Performed by: PHYSICAL THERAPIST

## 2024-06-06 PROCEDURE — 92507 TX SP LANG VOICE COMM INDIV: CPT

## 2024-06-06 NOTE — PROGRESS NOTES
"Daily Speech Treatment Note    Today's date: 2024   Patient’s name: Jacquelyn Fritz  : 1960  MRN: [id number]  Safety measures: aphasia, fall risk  Referring provider: Lorena Norris MD    Encounter Diagnosis     ICD-10-CM    1. Aphasia  R47.01       2. Speech disturbance, unspecified type  R47.9       3. Memory difficulties  R41.3       4. Dysarthria  R47.1               Visit Trackin  POC expires Unit limit Auth Expiration date PT/OT + Visit Limit?   24 N/a 24 BOMN                           Visit/Unit Tracking  AUTH Status:  Date   IE           no Used 1 2 3 4 5           Remaining  17 16 15 14 13              Subjective/Behavioral: Patient presented w/ c/o dizziness and inability to ambulate. BP recorded as 119/80. Patient reported she is exhausted and stated, \"I want to go to sleep because I'm tired\".     Objective/Assessment: Patient reported she does not sleep at night - she watches T.V. all night long - going to bed at 6 a.m. and waking up around 2-3 in the afternoon. Patient  stated that if she wasn't here for therapy, she'd still be sleeping. Patient reported she did not have breakfast today. Patient observed falling asleep during tasks. Reviewed HEP given at last session: patient provided a synonym to a set of words given the first letter with 90% accuracy, IND. Also reviewed HEP where she completed a category matrix in which she provided a member to a given category, given the first letter of the category member (e.g. name a city that starts with \"F\"); she did so with 100% accuracy, IND.     Short-term goals: (6-8 weeks)  LISTENING  Patient will follow one-step verbal directions (e.g., close your eyes) with 80% accuracy to facilitate increased auditory comprehension skills,    Patient will answer verbal Yes/No questions with 80% accuracy to facilitate increased auditory comprehension skills,   24: Patient answered simple Y/N questions with 100% " accuracy, IND. Patient then answered moderately challenging Y/N questions with 80% accuracy, IND. Accuracy increased to 100% given mod verbal semantic cues. Patient then answered complex Y/N questions with 93% accuracy, IND. Accuracy increased to 100% given mod verbal semantic cues.     Patient will complete naming of automatic tasks (e.g., counting, listing days of the week/months of the year, etc.) with 100% accuracy to facilitate increased verbal expression skills,     Patient will imitate words/phrases using AMAN/tapping/pacing with 80% accuracy to facilitate increased verbal expression skills,    Patient will name opposite (e.g., hot and ___) with 80% accuracy to build expressive vocabulary for conversation,     Patient will name a synonym (e.g., street or ___) with 80% accuracy to build expressive vocabulary for conversation,    Patient will name appropriate category for three words (e.g., apple, banana, pear = fruits) with 80% accuracy to facilitate improved generative naming skills,     Patient will provide an appropriate word for sentence completion task (e.g., open the ___) with 80% accuracy to facilitate increased expressive language skills,     Patient will provide an adequate response to generative naming task (i.e., name as many…) with 80% accuracy to facilitate increased expressive language skills,     Patient will provide an adequate response to confrontation naming task (i.e., what is…) with 80% accuracy to facilitate increased expressive language skills,     Patient will name up to 5 features to describe a person/place/thing with 80% accuracy to facilitate improved utilization of circumlocution strategy,     Patient will complete picture description tasks using language organization strategies with 80% accuracy to facilitate improved utilization of circumlocution strategy    Plan:  -Patient was provided with home exercises/activities to target goals in plan of care at the end of today's  session.  -Continue with current plan of care.

## 2024-06-06 NOTE — PROGRESS NOTES
"Daily Note     Today's date: 2024  Patient name: Jacquelyn Fritz  : 1960  MRN: [id number]  Referring provider: Lorena Norris MD  Dx:   Encounter Diagnosis     ICD-10-CM    1. Balance problem  R26.89       2. Weakness  R53.1       3. Fall, subsequent encounter  W19.XXXD                      Subjective: Patient noted neuropathy in feet is bothering her more.       Objective: See treatment diary below    NMR:  Sit to stands 10x 2 sets   HKM 4 laps at long bar with 3 sec hold.   Fwd high knee stepping over 6\" hurdles (5 hurdles) at long bar with 1 UE support 3 laps   LAT high knee stepping over 6\" hurdles (5 hurdles) at long bar with 1 UE support 3 laps   Standing hip abduction 20x  Standing hip extension 20x   FTEC 10 sec x 10 reps   FWD walking in // bars holding 10# TT 2 laps         Assessment: Tolerated treatment fair. Patient demonstrated fatigue post treatment, exhibited good technique with therapeutic exercises, and would benefit from continued PT. Patient continued to require therapeutic rest to allow proper recovery before next rep.       Plan: Continue per plan of care.      POC expires Unit limit Auth Expiration date PT/OT + Visit Limit? Co-Insurance   24 BOMN  BOMN No                               Visit/Unit Tracking  AUTH Status:  Date             Used 1 2 3 4            Remaining                           "

## 2024-06-07 NOTE — TELEPHONE ENCOUNTER
Spoke to brother. He said he will wait until all testing is done and then get things all tied together. She has therapy and an emg scheduled for 07/02.     He is requesting handicap placard

## 2024-06-10 ENCOUNTER — OFFICE VISIT (OUTPATIENT)
Age: 64
End: 2024-06-10
Payer: COMMERCIAL

## 2024-06-10 ENCOUNTER — TELEPHONE (OUTPATIENT)
Age: 64
End: 2024-06-10

## 2024-06-10 DIAGNOSIS — R47.01 APHASIA: Primary | ICD-10-CM

## 2024-06-10 DIAGNOSIS — R47.9 SPEECH DISTURBANCE, UNSPECIFIED TYPE: ICD-10-CM

## 2024-06-10 DIAGNOSIS — R26.89 BALANCE PROBLEM: Primary | ICD-10-CM

## 2024-06-10 DIAGNOSIS — R53.1 WEAKNESS: ICD-10-CM
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
## 2024-06-10 PROCEDURE — 92507 TX SP LANG VOICE COMM INDIV: CPT

## 2024-06-10 PROCEDURE — 97112 NEUROMUSCULAR REEDUCATION: CPT

## 2024-06-10 NOTE — PROGRESS NOTES
Daily Speech Treatment Note    Today's date: 6/10/2024   Patient’s name: Jacquelyn Fritz  : 1960  MRN: [id number]  Safety measures: aphasia, fall risk  Referring provider: Lorena Norris MD    Encounter Diagnosis     ICD-10-CM    1. Aphasia  R47.01       2. Speech disturbance, unspecified type  R47.9           Visit Tracking:   POC expires Unit limit Auth Expiration date PT/OT + Visit Limit?   24 N/a 24 BOMN                           Visit/Unit Tracking  AUTH Status:  Date   IE 5/16 5/23 6/4 6/6 6/10         no Used 1 2 3 4 5 6          Remaining  17 16 15 14 13 12             Subjective/Behavioral:    Patient reported no new concerns for today's session.    Objective/Assessment:    Short-term goals: (6-8 weeks)  LISTENING  Patient will follow one-step verbal directions (e.g., close your eyes) with 80% accuracy to facilitate increased auditory comprehension skills,    Auditory comprehension task: patient was given 1-2 sentences orally and had to answer wh-questions regarding the task. Task completed in  opp (81% acc) independently, increasing to  opp (96% acc) from min-mod cueing. Patient benefited from semantic/phonemic cues.    Patient will answer verbal Yes/No questions with 80% accuracy to facilitate increased auditory comprehension skills,       Patient will complete naming of automatic tasks (e.g., counting, listing days of the week/months of the year, etc.) with 100% accuracy to facilitate increased verbal expression skills,     Patient will imitate words/phrases using AMAN/tapping/pacing with 80% accuracy to facilitate increased verbal expression skills,    Patient will name opposite (e.g., hot and ___) with 80% accuracy to build expressive vocabulary for conversation,     Unscrambling opposite pairs: patient was given scrambled opposite pairs and had to find the pair. First letter in each word was underlined. Task completed in 15/15 opp (100% acc)  independently!        Patient will name a synonym (e.g., street or ___) with 80% accuracy to build expressive vocabulary for conversation,    Patient will name appropriate category for three words (e.g., apple, banana, pear = fruits) with 80% accuracy to facilitate improved generative naming skills,     Which does not belong task: patient was given 5 words and had to identify the word that did not belong in the group. Task completed in 20/20 opp (100% acc) independently.    Patient will provide an appropriate word for sentence completion task (e.g., open the ___) with 80% accuracy to facilitate increased expressive language skills,     Patient will provide an adequate response to generative naming task (i.e., name as many…) with 80% accuracy to facilitate increased expressive language skills,     Patient will provide an adequate response to confrontation naming task (i.e., what is…) with 80% accuracy to facilitate increased expressive language skills,     Patient will name up to 5 features to describe a person/place/thing with 80% accuracy to facilitate improved utilization of circumlocution strategy,     Patient will complete picture description tasks using language organization strategies with 80% accuracy to facilitate improved utilization of circumlocution strategy    Plan:  -Patient was provided with home exercises/activities to target goals in plan of care at the end of today's session.  -Continue with current plan of care.

## 2024-06-10 NOTE — PROGRESS NOTES
"Daily Note     Today's date: 6/10/2024  Patient name: Jacquelyn Fritz  : 1960  MRN: [id number]  Referring provider: Lorena Norris MD  Dx:   Encounter Diagnosis     ICD-10-CM    1. Balance problem  R26.89       2. Weakness  R53.1       3. Fall, subsequent encounter  W19.XXXD                      Subjective: Patient seen in PT. Reports feeling \"a little better\". Denies falls or pain currently. Reports fatigued post last session.      Objective: See treatment diary below. PT verbal and tactile cues for technique and progression.    NMR:  Sit to stands 10x 2 sets   HKM 4 laps at long bar with 3 sec hold.   Fwd high knee stepping over 6\" - 8\" hurdles (4 hurdles) at long bar with 1 UE support 3 laps   LAT high knee stepping over 6-8\" hurdles (4 hurdles) at long bar with 1 UE support 3 laps   Standing hip abduction 20x  Standing hip extension 20x   FTEC 10 sec x 10 reps   FWD walking in // bars holding 7.5# TT 3laps         Assessment: Tolerated treatment well. Reports feeling \"very good\" post session as opposed to last session. 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 Patient demonstrated fatigue post treatment, exhibited good technique with therapeutic exercises, and would benefit from continued PT.     Plan: Continue per plan of care.      POC expires Unit limit Auth Expiration date PT/OT + Visit Limit? Co-Insurance   24 BOMN  BOMN No                               Visit/Unit Tracking  AUTH Status:  Date 5/14 5/16 5/23 6/4 6/10           Used 1 2 3 4 5           Remaining                           "

## 2024-06-11 ENCOUNTER — OFFICE VISIT (OUTPATIENT)
Dept: OBGYN CLINIC | Facility: CLINIC | Age: 64
End: 2024-06-11
Payer: COMMERCIAL

## 2024-06-11 VITALS
HEIGHT: 62 IN | BODY MASS INDEX: 31.5 KG/M2 | HEART RATE: 76 BPM | DIASTOLIC BLOOD PRESSURE: 73 MMHG | WEIGHT: 171.2 LBS | SYSTOLIC BLOOD PRESSURE: 110 MMHG

## 2024-06-11 DIAGNOSIS — S82.142D CLOSED FRACTURE OF LEFT TIBIAL PLATEAU WITH ROUTINE HEALING, SUBSEQUENT ENCOUNTER: Primary | ICD-10-CM

## 2024-06-11 PROCEDURE — 99213 OFFICE O/P EST LOW 20 MIN: CPT | Performed by: ORTHOPAEDIC SURGERY

## 2024-06-11 NOTE — PROGRESS NOTES
Orthopaedics Office Visit - Established Patient Visit    ASSESSMENT/PLAN:    Assessment:   4.5 years s/p left ORIF tibial plateau fracture  Post traumatic arthritis    Significantly improved symptoms from previous visit    Plan:   Overall Jacquelyn is doing well  She may continue the use of the hinged knee brace as needed for comfort and support  Continue PT until discharged then advised to be compliant with the HEP  We discussed the possibility of a CSI if symptoms worsen or fail to improve  I will see her back in 6 months time, she may cancel if she is doing well     To Do Next Visit:  Re-evaluation     _____________________________________________________  CHIEF COMPLAINT:  Chief Complaint   Patient presents with    Left Knee - Follow-up         SUBJECTIVE:  Jacquelyn Fritz is a 63 y.o. female who presents to the office for a follow up regarding her left knee. Overall Jacquelyn is doing well. She has been attending PT and using a hinged knee brace as needed. She feels knee pain has resolved at this time. Jacquelyn feels like she is walking better.     PAST MEDICAL HISTORY:  Past Medical History:   Diagnosis Date    Cancer (HCC) 2011    lung, upper left lung lobectomy    Hyperlipidemia     Hypertension     IBS (irritable bowel syndrome)        PAST SURGICAL HISTORY:  Past Surgical History:   Procedure Laterality Date    HYSTERECTOMY      KNEE SURGERY Left     LUNG LOBECTOMY      ORIF TIBIAL PLATEAU Left 11/07/2019    Procedure: OPEN REDUCTION W/ INTERNAL FIXATION (ORIF) TIBIAL PLATEAU;  Surgeon: Marie Deras MD;  Location: [facility];  Service: Orthopedics    WRIST SURGERY         FAMILY HISTORY:  Family History   Problem Relation Age of Onset    Diabetes Mother     Diabetes Father     Breast cancer Sister     Sarcoidosis Sister     No Known Problems Sister     No Known Problems Sister     No Known Problems Maternal Grandmother     No Known Problems Paternal Grandmother     Diabetes Brother     Sarcoidosis Brother      No Known Problems Son     No Known Problems Maternal Aunt     No Known Problems Maternal Aunt     No Known Problems Maternal Aunt     No Known Problems Maternal Aunt     No Known Problems Maternal Aunt     No Known Problems Maternal Aunt     No Known Problems Maternal Aunt     No Known Problems Paternal Aunt     No Known Problems Paternal Aunt     No Known Problems Paternal Aunt     Ovarian cancer Neg Hx     Cervical cancer Neg Hx     Uterine cancer Neg Hx        SOCIAL HISTORY:  Social History     Tobacco Use    Smoking status: Former     Current packs/day: 0.00     Types: Cigarettes     Quit date: 2009     Years since quitting: 15.4    Smokeless tobacco: Never   Vaping Use    Vaping status: Never Used   Substance Use Topics    Alcohol use: Not Currently     Comment: weekends    Drug use: Never       MEDICATIONS:    Current Outpatient Medications:     albuterol (2.5 mg/3 mL) 0.083 % nebulizer solution, Take 3 mL (2.5 mg total) by nebulization every 6 (six) hours as needed for wheezing or shortness of breath As needed, Disp: , Rfl:     albuterol (ProAir HFA) 90 mcg/act inhaler, Inhale 2 puffs every 6 (six) hours as needed for wheezing or shortness of breath, Disp: , Rfl:     amLODIPine-atorvastatin (CADUET) 5-20 MG per tablet, Take 1 tablet by mouth daily, Disp: 90 tablet, Rfl: 3    Cholecalciferol (Vitamin D3) 1.25 MG (38691 UT) CAPS, TAKE 1 CAPSULE BY MOUTH 2 TIMES A WEEK FOR 6 WEEKS., Disp: 12 capsule, Rfl: 0    folic acid (KP Folic Acid) 1 mg tablet, Take 1 tablet (1 mg total) by mouth daily, Disp: 90 tablet, Rfl: 3    hydrocortisone (ANUSOL-HC) 25 mg suppository, Insert 1 suppository (25 mg total) into the rectum 2 (two) times a day, Disp: 12 suppository, Rfl: 0    Magnesium 250 MG TABS, Take by mouth, Disp: , Rfl:     pantoprazole (PROTONIX) 40 mg tablet, Take 1 tablet (40 mg total) by mouth daily, Disp: 90 tablet, Rfl: 1    sodium phosphate-biphosphate 7-19 g 118 mL enema, 1 pr prn, Disp: , Rfl:      "tiZANidine (ZANAFLEX) 4 mg tablet, Take 1 tablet (4 mg total) by mouth 2 (two) times a day as needed for muscle spasms, Disp: 60 tablet, Rfl: 0    fluticasone-salmeterol (Advair HFA) 115-21 MCG/ACT inhaler, Inhale 2 puffs 2 (two) times a day Rinse mouth after use., Disp: 12 g, Rfl: 3    Icosapent Ethyl 1 g CAPS, TAKE 1 CAPSULE BY MOUTH 2 TIMES A DAY. (Patient not taking: Reported on 5/30/2024), Disp: 180 capsule, Rfl: 1    pyridoxine (VITAMIN B6) 100 mg tablet, Take 1 tablet (100 mg total) by mouth daily, Disp: 90 tablet, Rfl: 0    vitamin B-12 (VITAMIN B-12) 1,000 mcg tablet, Take 1 tablet (1,000 mcg total) by mouth once a week, Disp: 12 tablet, Rfl: 0    ALLERGIES:  Allergies   Allergen Reactions    Pollen Extract        REVIEW OF SYSTEMS:  MSK: as noted in HPI  Neuro: WNL's  Pertinent items are otherwise noted in HPI.  A comprehensive review of systems was otherwise negative.    LABS:  HgA1c:   Lab Results   Component Value Date    HGBA1C 5.3 10/23/2023     BMP:   Lab Results   Component Value Date    CALCIUM 9.1 04/18/2023    K 3.8 04/18/2023    CO2 29 04/18/2023     04/18/2023    BUN 7 04/18/2023    CREATININE 0.70 04/18/2023     CBC: No components found for: \"CBC\"    _____________________________________________________  PHYSICAL EXAMINATION:  Vital signs: /73   Pulse 76   Ht 5' 2\" (1.575 m)   Wt 77.7 kg (171 lb 3.2 oz)   BMI 31.31 kg/m²   General: No acute distress, awake and alert  Psychiatric: Mood and affect appear appropriate  HEENT: Trachea Midline, No torticollis, no apparent facial trauma  Cardiovascular: No audible murmurs; Extremities appear perfused  Pulmonary: No audible wheezing or stridor  Skin: No open lesions; see further details (if any) below    MUSCULOSKELETAL EXAMINATION:    Extremities:  Left knee     No erythema, ecchymosis or edema  No effusion   No medial or lateral joint line tenderness  ROM 0-120 degrees   Ambulates without assistance  Extremity appears warm and well " perfused     _____________________________________________________  STUDIES REVIEWED:  I personally reviewed the images and interpretation is as follows:  No new imaging to review       PROCEDURES PERFORMED:  Procedures    Scribe Attestation      I,:  Giovanna Mitchell am acting as a scribe while in the presence of the attending physician.:       I,:  Kavin Lo MD personally performed the services described in this documentation    as scribed in my presence.:

## 2024-06-13 ENCOUNTER — OFFICE VISIT (OUTPATIENT)
Age: 64
End: 2024-06-13
Payer: COMMERCIAL

## 2024-06-13 ENCOUNTER — TELEPHONE (OUTPATIENT)
Age: 64
End: 2024-06-13

## 2024-06-13 ENCOUNTER — EVALUATION (OUTPATIENT)
Age: 64
End: 2024-06-13
Payer: COMMERCIAL

## 2024-06-13 DIAGNOSIS — R26.89 BALANCE PROBLEM: Primary | ICD-10-CM

## 2024-06-13 DIAGNOSIS — R53.1 WEAKNESS: ICD-10-CM

## 2024-06-13 DIAGNOSIS — R47.9 SPEECH DISTURBANCE, UNSPECIFIED TYPE: ICD-10-CM
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
## 2024-06-13 DIAGNOSIS — R47.01 APHASIA: Primary | ICD-10-CM

## 2024-06-13 PROCEDURE — 97112 NEUROMUSCULAR REEDUCATION: CPT | Performed by: PHYSICAL THERAPIST

## 2024-06-13 PROCEDURE — 92507 TX SP LANG VOICE COMM INDIV: CPT

## 2024-06-13 NOTE — TELEPHONE ENCOUNTER
Pt  dropped  from  off     for  handicap   plate     paperwork  in  [facility]  bin

## 2024-06-13 NOTE — PROGRESS NOTES
Speech-Language Pathology Progress Update    Today's date: 2024   Patient’s name: Jacquelyn rFitz  : 1960  MRN: [id number]  Safety measures: aphasia, fall risk  Referring provider: Lorena Norris MD    Encounter Diagnosis     ICD-10-CM    1. Aphasia  R47.01       2. Speech disturbance, unspecified type  R47.9           Assessment:   Patient presents with mild aphasia secondary to undiagnosed cause. Patient is f/u with neurologist and has received Mri since IE. Verbal expression deficits can c/b difficulty with word retrieval (verbal fluency, confrontational naming, and informal/formal conversation), production of grammatical short descriptions, and slower speech. Patient has moments of word blocks and pauses in language output. Minor auditory comprehension deficits c/b reduced comprehension of information at paragraph level, multiple steps, and conversational level. Reading comprehension is decreased at the paragraph level. Patient is tolerating treatment well and is benefiting from increased difficulty of tasks. Patient would benefit from continued outpatient skilled Speech Therapy services to support the highest level of independence with her current speech-language skills, promote positive communication interactions with both familiar & unfamiliar listeners, further education/training compensatory strategies, increase communication of wants/needs, expressive/receptive language within functional activities, participate in meaningful activities, allow for increased socialization, promote safety, facilitate overall improved quality of life, reduce caregiver burden and complete caregiver education/training        Short-term goals: (6-8 weeks)  LISTENING  Patient will follow one-step verbal directions (e.g., close your eyes) with 80% accuracy to facilitate increased auditory comprehension skills,MET    Patient will answer verbal Yes/No questions with 80% accuracy to facilitate increased auditory  comprehension skills, PARTIALLY MET      Patient will complete naming of automatic tasks (e.g., counting, listing days of the week/months of the year, etc.) with 100% accuracy to facilitate increased verbal expression skills, ONGOING    Patient will imitate words/phrases using AMAN/tapping/pacing with 80% accuracy to facilitate increased verbal expression skills, ONGOING    Patient will name opposite (e.g., hot and ___) with 80% accuracy to build expressive vocabulary for conversation, MET    Patient will name a synonym (e.g., street or ___) with 80% accuracy to build expressive vocabulary for conversation, MET    Patient will name appropriate category for three words (e.g., apple, banana, pear = fruits) with 80% accuracy to facilitate improved generative naming skills,  ONGOING    Patient will provide an appropriate word for sentence completion task (e.g., open the ___) with 80% accuracy to facilitate increased expressive language skills,  ONGOING    Patient will provide an adequate response to generative naming task (i.e., name as many…) with 80% accuracy to facilitate increased expressive language skills, ONGOING    Patient will provide an adequate response to confrontation naming task (i.e., what is…) with 80% accuracy to facilitate increased expressive language skills,  ONGOING  Patient will name up to 5 features to describe a person/place/thing with 80% accuracy to facilitate improved utilization of circumlocution strategy,  ONGOING    Patient will complete picture description tasks using language organization strategies with 80% accuracy to facilitate improved utilization of circumlocution strategy ONGOING    New STG: (to be achieved in 6-8 weeks)    Patient will be educated on word finding strategies (i.e., circumlocution) for improved generative naming and verbal expression skills.    To target mental manipulation and working memory, patient will participate in word finding activity (i.e., anagrams) with 80%  "accuracy      Patient will complete concrete and abstract categorization tasks to 80% accuracy to facilitate improved generative naming skills and working memory    Patient will name an average of 15+ items in a category in 60 seconds over 5 trials using compensatory strategies and min cues to facilitate improved word retrieval skills    Patient will name an average of 10+ words that begin with a specific letter in 60 seconds over 5 trials using compensatory strategies and min cues to facilitate improved word retrieval skills      Long Term    Patient will demonstrate improved expressive and receptive language skills during structured and unstructured tasks by discharge.  OINGOING    Patient will improve ability to facilitate communication to meet needs including use of compensatory strategies to promote meaningful interactions for improved quality of life and maximize level of independence. ONGOING      Plan:  Patient would benefit from outpatient skilled Speech Therapy services: Speech-language therapy  Continue with current POC. Frequency: 2x a week 45 min sessions until expiration on 8/14/24      Subjective:  Patient was late to today's session due to traffic. Patient was in agreement to shortened session.     Patient's goal(s): \"to talk regularly\"      Objective (testing):  No testing for today's session. Serves as progress update.      Treatment:  Patient was given completing analogies task where she was given 1 half of an analogy and had to finish the missing part (Ex. Happy is to glad as sad is to unhappy). Task completed in 11/20 opp (55% acc) independently, increasing to 20/20 opp (100% acc) from min-mod cueing. Patient benefited from semantic/phonemic cues.    Semantic Fluency task: patient was given a category and had to lsit as many items as possible.  Fruits and vegetables: 15 items  Zoo animals: 7 items  \"M\" words: 11 items          Visit Tracking:   POC expires Unit limit Auth Expiration date PT/OT + " Visit Limit?   8/14/24 N/a 12/31/24 BOMN                           Visit/Unit Tracking  AUTH Status:  Date 5/14  IE 5/16 5/23 6/4 6/6 6/10 6/13  PU        no Used 1 2 3 4 5 6 7         Remaining  17 16 15 14 13 12 11        Intervention comments:  27 mins of speech language warren

## 2024-06-13 NOTE — PROGRESS NOTES
"Daily Note     Today's date: 2024  Patient name: Jacquelyn Fritz  : 1960  MRN: [id number]  Referring provider: Lorena Norris MD  Dx:   Encounter Diagnosis     ICD-10-CM    1. Balance problem  R26.89       2. Weakness  R53.1       3. Fall, subsequent encounter  W19.XXXD         POC expires Unit limit Auth Expiration date PT/OT + Visit Limit? Co-Insurance   24 BOMN  BOMN No                               Visit/Unit Tracking  AUTH Status:  Date 5/14 5/16 5/23 6/4 6/10 6/13          Used 1 2 3 4 5 6          Remaining                               Subjective: Patient seen in PT. Reports feeling \"a little better\". Denies falls or pain currently. Reports fatigued post last session.      Objective: See treatment diary below. PT verbal and tactile cues for technique and progression.    NMR:  Sit to stands 10x 2 sets holding 10# TT  HKM 4 laps at long bar with 3 sec hold.   Fwd high knee stepping over 6\" - 8\" hurdles (4 hurdles) at long bar with 1 UE support 3 laps hold, 7.5# TT  LAT high knee stepping over 6-8\" hurdles (4 hurdles) at long bar with 1 UE support 3 laps, hold 7.5# TT  FTEC 30 sec x 4 reps   BACKWARDS walking in // bars holding 7.5# TT 3laps   FWD lunge step R and L 2 sets x 10 reps   LAT side stepping onto BOSU ball 1 UE 20 reps       Assessment: Tolerated treatment well with noting increase in strength in LEs. Progressed program with bosu and tidal tank.  Patient demonstrated fatigue post treatment, exhibited good technique with therapeutic exercises, and would benefit from continued PT.     Plan: Continue per plan of care.               "

## 2024-06-17 ENCOUNTER — OFFICE VISIT (OUTPATIENT)
Age: 64
End: 2024-06-17
Payer: COMMERCIAL

## 2024-06-17 DIAGNOSIS — R47.1 DYSARTHRIA: ICD-10-CM

## 2024-06-17 DIAGNOSIS — R53.1 WEAKNESS: ICD-10-CM
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
## 2024-06-17 DIAGNOSIS — R47.9 SPEECH DISTURBANCE, UNSPECIFIED TYPE: ICD-10-CM

## 2024-06-17 DIAGNOSIS — R26.89 BALANCE PROBLEM: Primary | ICD-10-CM

## 2024-06-17 DIAGNOSIS — R47.01 APHASIA: Primary | ICD-10-CM

## 2024-06-17 DIAGNOSIS — R41.3 MEMORY DIFFICULTIES: ICD-10-CM

## 2024-06-17 PROCEDURE — 97112 NEUROMUSCULAR REEDUCATION: CPT | Performed by: PHYSICAL THERAPIST

## 2024-06-17 PROCEDURE — 92507 TX SP LANG VOICE COMM INDIV: CPT

## 2024-06-17 NOTE — PROGRESS NOTES
"Daily Note     Today's date: 2024  Patient name: Jacquelyn Fritz  : 1960  MRN: [id number]  Referring provider: Lorena Norris MD  Dx:   Encounter Diagnosis     ICD-10-CM    1. Balance problem  R26.89       2. Weakness  R53.1       3. Fall, subsequent encounter  W19.XXXD         POC expires Unit limit Auth Expiration date PT/OT + Visit Limit? Co-Insurance   24 BOMN  BOMN No                               Visit/Unit Tracking  AUTH Status:  Date 5/14 5/16 5/23 6/4 6/10 6/13 6/17         Used 1 2 3 4 5 6 7         Remaining                               Subjective: Patient denies falls or pain currently. Reports fatigued post last session.      Objective: See treatment diary below. PT verbal and tactile cues for technique and progression.    NMR:  Sit to stands 10x 2 sets holding 10# TT  HKM 4 laps at long bar with 3 sec hold w 7.5 TT  Fwd high knee stepping over 6\" - 8\" hurdles (4 hurdles) at long bar with 1 UE support 3 laps hold, 7.5# TT  FTEC 30 sec x 4 reps foam pad  BACKWARDS walking in // bars holding 7.5# TT 3laps   FWD lunge step onto 2 foam pads alt R and L 2 sets x 10 reps   LAT side stepping onto 2 foam pads alt 1 UE 20 reps   Tandem walking 3 sec pause down and back 1 UE support 3 laps in long // bars   [place] in long // bar x 3 laps     Assessment: Tolerated treatment well with noting increase in strength in LEs. Progressed program which challenged balance which required tapping as needed and 1 UE support for self corrected loss of balance. Challenged with grapevine activity with stepping on own feet. .  Patient demonstrated fatigue post treatment, exhibited good technique with therapeutic exercises, and would benefit from continued PT.     Plan: Continue per plan of care.               "

## 2024-06-17 NOTE — PROGRESS NOTES
Daily Speech Treatment Note    Today's date: 2024   Patient’s name: Jacquelyn Fritz  : 1960  MRN: [id number]  Safety measures: Aphasia, fall risk   Referring provider: Lorena Norris MD    Encounter Diagnosis     ICD-10-CM    1. Aphasia  R47.01       2. Speech disturbance, unspecified type  R47.9       3. Memory difficulties  R41.3       4. Dysarthria  R47.1           Visit Tracking:   POC expires Unit limit Auth Expiration date PT/OT + Visit Limit?   24 N/a 24 BOMN                                           Visit/Unit Tracking  AUTH Status:  Date   IE  6/4 6/6 6/10 6/13  PU             no Used 1 2 3 4 5 6 7  1             Remaining  17 16 15 14 13 12 11  9               Subjective/Behavioral:  -Patient pleasant, engaged, and cooperative. No new concerns reported for today's session.    Objective/Assessment:  -Reviewed testing results and goals in plan care with patient. Patient is in agreement at this time.  -Reviewed patient's home exercises/activities completed since last appointment. Patient completed a word finding activity with 90% accuracy, IND.      Short-term goals: (6-8 weeks)  LISTENING   Patient will answer verbal Yes/No questions with 80% accuracy to facilitate increased auditory comprehension skills,       Patient will complete naming of automatic tasks (e.g., counting, listing days of the week/months of the year, etc.) with 100% accuracy to facilitate increased verbal expression skills,   24: Patient counted from 1-50, stated the days of the week, and the months of the year, all with 100% accuracy, IND. Patient was 100% intelligible, no paraphasias noted; fluent speech observed.     Patient will imitate words/phrases using AMAN/tapping/pacing with 80% accuracy to facilitate increased verbal expression skills,      Patient will name appropriate category for three words (e.g., apple, banana, pear = fruits) with 80% accuracy to facilitate improved  generative naming skills,  6/17/24: Patient named the correct category for three given words with 70% accuracy, IND. Accuracy increased to 80% with mod verbal semantic cues, and increased to 100% given max cues including models.        Patient will provide an appropriate word for sentence completion task (e.g., open the ___) with 80% accuracy to facilitate increased expressive language skills,       Patient will provide an adequate response to generative naming task (i.e., name as many…) with 80% accuracy to facilitate increased expressive language skills,      Patient will provide an adequate response to confrontation naming task (i.e., what is…) with 80% accuracy to facilitate increased expressive language skills,      Patient will name up to 5 features to describe a person/place/thing with 80% accuracy to facilitate improved utilization of circumlocution strategy,       Patient will complete picture description tasks using language organization strategies with 80% accuracy to facilitate improved utilization of circumlocution strategy      Patient will be educated on word finding strategies (i.e., circumlocution) for improved generative naming and verbal expression skills.     To target mental manipulation and working memory, patient will participate in word finding activity (i.e., anagrams) with 80% accuracy        Patient will complete concrete and abstract categorization tasks to 80% accuracy to facilitate improved generative naming skills and working memory  6/17/24: Patient named 5 members for a given concrete category with 80% accuracy, IND. Accuracy increased to 100% given mod verbal semantic cues. Patient then named 5 members to a given abstract category in 0/2 trials, IND. Accuracy increased to 2/2 given mod verbal semantic cues and choice of two. Limited trials completed d/t time constraints.      Patient will name an average of 15+ items in a category in 60 seconds over 5 trials using compensatory  strategies and min cues to facilitate improved word retrieval skills     Patient will name an average of 10+ words that begin with a specific letter in 60 seconds over 5 trials using compensatory strategies and min cues to facilitate improved word retrieval skills       Plan:  -Patient was provided with home exercises/activities to target goals in plan of care at the end of today's session.  -Continue with current plan of care.

## 2024-06-21 ENCOUNTER — OFFICE VISIT (OUTPATIENT)
Age: 64
End: 2024-06-21
Payer: COMMERCIAL

## 2024-06-21 DIAGNOSIS — R47.01 APHASIA: Primary | ICD-10-CM

## 2024-06-21 DIAGNOSIS — R41.3 MEMORY DIFFICULTIES: ICD-10-CM

## 2024-06-21 DIAGNOSIS — R47.1 DYSARTHRIA: ICD-10-CM

## 2024-06-21 DIAGNOSIS — R47.9 SPEECH DISTURBANCE, UNSPECIFIED TYPE: ICD-10-CM

## 2024-06-21 PROCEDURE — 92507 TX SP LANG VOICE COMM INDIV: CPT

## 2024-06-21 NOTE — PROGRESS NOTES
"Daily Speech Treatment Note    Today's date: 2024   Patient’s name: Jacquelyn Fritz  : 1960  MRN: [id number]  Safety measures: Aphasia, fall risk   Referring provider: Lorena Norris MD    Encounter Diagnosis     ICD-10-CM    1. Aphasia  R47.01       2. Speech disturbance, unspecified type  R47.9       3. Memory difficulties  R41.3       4. Dysarthria  R47.1             Visit Tracking:   POC expires Unit limit Auth Expiration date PT/OT + Visit Limit?   24 N/a 24 BOMN                                           Visit/Unit Tracking  AUTH Status:  Date   IE  6/4 6/6 6/10 6/13  PU           no Used 1 2 3 4 5 6 7  1  2           Remaining  17 16 15 14 13 12 11  9  8         Progress Update: 24    Subjective/Behavioral:  -Patient pleasant, engaged, and cooperative. Patient reported not having slept yet because she had an early doctor's appointment and typically gets to bed very late.     Objective/Assessment: Reviewed HEP given last session. Patient named one member of a category given the initial letter with 88% accuracy, IND. Accuracy increased to 100% given mod verbal semantic cues x3. Patient demonstrated difficulty with general category names today, repeating \"I just don't get it\" after verbal rephrasing/restating and clinician models. HEP for general category given.       Short-term goals: (6-8 weeks)  LISTENING   Patient will answer verbal Yes/No questions with 80% accuracy to facilitate increased auditory comprehension skills,       Patient will complete naming of automatic tasks (e.g., counting, listing days of the week/months of the year, etc.) with 100% accuracy to facilitate increased verbal expression skills,       Patient will imitate words/phrases using AMAN/tapping/pacing with 80% accuracy to facilitate increased verbal expression skills,      Patient will name appropriate category for three words (e.g., apple, banana, pear = fruits) with 80% accuracy " to facilitate improved generative naming skills,     Patient will provide an appropriate word for sentence completion task (e.g., open the ___) with 80% accuracy to facilitate increased expressive language skills,       Patient will provide an adequate response to generative naming task (i.e., name as many…) with 80% accuracy to facilitate increased expressive language skills,      Patient will provide an adequate response to confrontation naming task (i.e., what is…) with 80% accuracy to facilitate increased expressive language skills,      Patient will name up to 5 features to describe a person/place/thing with 80% accuracy to facilitate improved utilization of circumlocution strategy,       Patient will complete picture description tasks using language organization strategies with 80% accuracy to facilitate improved utilization of circumlocution strategy      Patient will be educated on word finding strategies (i.e., circumlocution) for improved generative naming and verbal expression skills.     To target mental manipulation and working memory, patient will participate in word finding activity (i.e., anagrams) with 80% accuracy        Patient will complete concrete and abstract categorization tasks to 80% accuracy to facilitate improved generative naming skills and working memory  6/21/24: Patient was given a grid with various answers given for general category, subcategory, and specific category member; patient had to complete the grid and fill-in missing category/member. Patient completed task with the following accuracy: general category: 20%  accuracy, IND; subcategory with 0% accuracy, and specific category member with 50% accuracy, IND. Accuracy across all groups increased to 100% given mod verbal semantic cues. HEP given for general category.      Patient will name an average of 15+ items in a category in 60 seconds over 5 trials using compensatory strategies and min cues to facilitate improved word  retrieval skills     Patient will name an average of 10+ words that begin with a specific letter in 60 seconds over 5 trials using compensatory strategies and min cues to facilitate improved word retrieval skills       Plan:  -Patient was provided with home exercises/activities to target goals in plan of care at the end of today's session.  -Continue with current plan of care.

## 2024-06-26 ENCOUNTER — HOSPITAL ENCOUNTER (EMERGENCY)
Facility: HOSPITAL | Age: 64
Discharge: HOME | End: 2024-06-26
Attending: EMERGENCY MEDICINE | Admitting: EMERGENCY MEDICINE
Payer: COMMERCIAL

## 2024-06-26 VITALS
WEIGHT: 172.18 LBS | SYSTOLIC BLOOD PRESSURE: 136 MMHG | OXYGEN SATURATION: 96 % | RESPIRATION RATE: 18 BRPM | HEART RATE: 98 BPM | BODY MASS INDEX: 31.49 KG/M2 | DIASTOLIC BLOOD PRESSURE: 81 MMHG | TEMPERATURE: 96.4 F

## 2024-06-26 DIAGNOSIS — R04.0 EPISTAXIS: Primary | ICD-10-CM

## 2024-06-26 LAB
BASOPHILS # BLD AUTO: 0.04 THOUSANDS/ÂΜL (ref 0–0.1)
BASOPHILS NFR BLD AUTO: 1 % (ref 0–1)
EOSINOPHIL # BLD AUTO: 0.08 THOUSAND/ÂΜL (ref 0–0.61)
EOSINOPHIL NFR BLD AUTO: 1 % (ref 0–6)
ERYTHROCYTE [DISTWIDTH] IN BLOOD BY AUTOMATED COUNT: 16 % (ref 11.6–15.1)
HCT VFR BLD AUTO: 34.4 % (ref 34.8–46.1)
HGB BLD-MCNC: 11.4 G/DL (ref 11.5–15.4)
IMM GRANULOCYTES # BLD AUTO: 0.02 THOUSAND/UL (ref 0–0.2)
IMM GRANULOCYTES NFR BLD AUTO: 0 % (ref 0–2)
LYMPHOCYTES # BLD AUTO: 1.51 THOUSANDS/ÂΜL (ref 0.6–4.47)
LYMPHOCYTES NFR BLD AUTO: 26 % (ref 14–44)
MCH RBC QN AUTO: 31.2 PG (ref 26.8–34.3)
MCHC RBC AUTO-ENTMCNC: 33.1 G/DL (ref 31.4–37.4)
MCV RBC AUTO: 94 FL (ref 82–98)
MONOCYTES # BLD AUTO: 0.58 THOUSAND/ÂΜL (ref 0.17–1.22)
MONOCYTES NFR BLD AUTO: 10 % (ref 4–12)
NEUTROPHILS # BLD AUTO: 3.55 THOUSANDS/ÂΜL (ref 1.85–7.62)
NEUTS SEG NFR BLD AUTO: 62 % (ref 43–75)
NRBC BLD AUTO-RTO: 0 /100 WBCS
PLATELET # BLD AUTO: 209 THOUSANDS/UL (ref 149–390)
PMV BLD AUTO: 10.3 FL (ref 8.9–12.7)
RBC # BLD AUTO: 3.65 MILLION/UL (ref 3.81–5.12)
WBC # BLD AUTO: 5.78 THOUSAND/UL (ref 4.31–10.16)

## 2024-06-26 PROCEDURE — 36415 COLL VENOUS BLD VENIPUNCTURE: CPT | Performed by: EMERGENCY MEDICINE

## 2024-06-26 PROCEDURE — 99284 EMERGENCY DEPT VISIT MOD MDM: CPT | Performed by: EMERGENCY MEDICINE

## 2024-06-26 PROCEDURE — 85025 COMPLETE CBC W/AUTO DIFF WBC: CPT | Performed by: EMERGENCY MEDICINE

## 2024-06-26 RX ORDER — OXYMETAZOLINE HYDROCHLORIDE 0.05 G/100ML
2 SPRAY NASAL ONCE
Status: COMPLETED | OUTPATIENT
Start: 2024-06-26 | End: 2024-06-26

## 2024-06-26 RX ADMIN — OXYMETAZOLINE HYDROCHLORIDE 2 SPRAY: 0.05 SPRAY NASAL at 20:48

## 2024-06-27 ENCOUNTER — OFFICE VISIT (OUTPATIENT)
Age: 64
End: 2024-06-27
Payer: COMMERCIAL

## 2024-06-27 ENCOUNTER — OFFICE VISIT (OUTPATIENT)
Dept: OBGYN CLINIC | Facility: CLINIC | Age: 64
End: 2024-06-27
Payer: COMMERCIAL

## 2024-06-27 VITALS
WEIGHT: 172 LBS | HEART RATE: 101 BPM | HEIGHT: 62 IN | DIASTOLIC BLOOD PRESSURE: 95 MMHG | BODY MASS INDEX: 31.65 KG/M2 | SYSTOLIC BLOOD PRESSURE: 161 MMHG

## 2024-06-27 DIAGNOSIS — R47.1 DYSARTHRIA: ICD-10-CM

## 2024-06-27 DIAGNOSIS — R26.89 BALANCE PROBLEM: Primary | ICD-10-CM

## 2024-06-27 DIAGNOSIS — R47.01 APHASIA: Primary | ICD-10-CM
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
## 2024-06-27 DIAGNOSIS — S82.142D CLOSED FRACTURE OF LEFT TIBIAL PLATEAU WITH ROUTINE HEALING, SUBSEQUENT ENCOUNTER: Primary | ICD-10-CM

## 2024-06-27 DIAGNOSIS — R47.9 SPEECH DISTURBANCE, UNSPECIFIED TYPE: ICD-10-CM

## 2024-06-27 DIAGNOSIS — R53.1 WEAKNESS: ICD-10-CM

## 2024-06-27 DIAGNOSIS — M75.02 ADHESIVE CAPSULITIS OF LEFT SHOULDER: ICD-10-CM

## 2024-06-27 DIAGNOSIS — R41.3 MEMORY DIFFICULTIES: ICD-10-CM

## 2024-06-27 PROCEDURE — 92507 TX SP LANG VOICE COMM INDIV: CPT

## 2024-06-27 PROCEDURE — 97112 NEUROMUSCULAR REEDUCATION: CPT

## 2024-06-27 PROCEDURE — 99213 OFFICE O/P EST LOW 20 MIN: CPT | Performed by: FAMILY MEDICINE

## 2024-06-27 PROCEDURE — 97530 THERAPEUTIC ACTIVITIES: CPT

## 2024-06-27 RX ORDER — OXYMETAZOLINE HYDROCHLORIDE 0.05 G/100ML
2 SPRAY NASAL 2 TIMES DAILY
COMMUNITY

## 2024-06-27 NOTE — PROGRESS NOTES
Daily Speech Treatment Note    Today's date: 2024   Patient’s name: Jacquelyn Fritz  : 1960  MRN: [id number]  Safety measures: Aphasia, fall risk   Referring provider: Lorena Norris MD    Encounter Diagnosis     ICD-10-CM    1. Aphasia  R47.01       2. Dysarthria  R47.1       3. Speech disturbance, unspecified type  R47.9       4. Memory difficulties  R41.3           Visit Tracking: 10  POC expires Unit limit Auth Expiration date PT/OT + Visit Limit?   24 N/a 24 BOMN                                           Visit/Unit Tracking  AUTH Status:  Date   IE  6/4 6/6 6/10 6/13  PU         no Used 1 2 3 4 5 6 7  1  2  3         Remaining  17 16 15 14 13 12 11  9  8  7       Progress Update: 24    Subjective/Behavioral: Patient pleasant, engaged, and cooperative. Patient reported attending ED last night d/t severe epistaxis. Patient reported feeling tired and very wobbly today; increased BP taken at orthopedist's office this morning; BP rechecked at the end of PT (prior to ST) and reading 162/92.    Objective/Assessment:  Patient demonstrated improved processing speed across all tasks today.       Short-term goals: (6-8 weeks)  LISTENING   Patient will answer verbal Yes/No questions with 80% accuracy to facilitate increased auditory comprehension skills,    24: Patient answered complex Y/N questions with 80% accuracy, IND! Accuracy increased to 100% given mod-max cues including verbal semantic cues and models.    Patient will complete naming of automatic tasks (e.g., counting, listing days of the week/months of the year, etc.) with 100% accuracy to facilitate increased verbal expression skills,   24: Patient counted to 100 by 5's, recited the days of the week and the months of the year with 100% accuracy, IND.     Patient will imitate words/phrases using AMAN/tapping/pacing with 80% accuracy to facilitate increased verbal expression skills,       Patient will name appropriate category for three words (e.g., apple, banana, pear = fruits) with 80% accuracy to facilitate improved generative naming skills,     Patient will provide an appropriate word for sentence completion task (e.g., open the ___) with 80% accuracy to facilitate increased expressive language skills,       Patient will provide an adequate response to generative naming task (i.e., name as many…) with 80% accuracy to facilitate increased expressive language skills,      Patient will provide an adequate response to confrontation naming task (i.e., what is…) with 80% accuracy to facilitate increased expressive language skills,      Patient will name up to 5 features to describe a person/place/thing with 80% accuracy to facilitate improved utilization of circumlocution strategy,       Patient will complete picture description tasks using language organization strategies with 80% accuracy to facilitate improved utilization of circumlocution strategy      Patient will be educated on word finding strategies (i.e., circumlocution) for improved generative naming and verbal expression skills.     To target mental manipulation and working memory, patient will participate in word finding activity (i.e., anagrams) with 80% accuracy  6/27/24: Patient matched a given word to an associated word (given a word bank) with 100% accuracy, IND. Patient then completed a word search activity in which she needed to search out the antonym to a given word. Patient completed this task with 89% accuracy, IND. Given as HEP.      Patient will complete concrete and abstract categorization tasks to 80% accuracy to facilitate improved generative naming skills and working memory        Patient will name an average of 15+ items in a category in 60 seconds over 5 trials using compensatory strategies and min cues to facilitate improved word retrieval skills     Patient will name an average of 10+ words that begin with a specific  letter in 60 seconds over 5 trials using compensatory strategies and min cues to facilitate improved word retrieval skills       Plan:  -Patient was provided with home exercises/activities to target goals in plan of care at the end of today's session.  -Continue with current plan of care.

## 2024-06-27 NOTE — PROGRESS NOTES
PT Re-Evaluation / Progress Note         POC expires Unit limit Auth Expiration date PT/OT + Visit Limit? Co-Insurance   24 BOMN  BOMN No                               Visit/Unit Tracking  AUTH Status:  Date 5/14 5/16 5/23 6/4 6/10 6/13 6/17 6/27 - PN        Used 1 2 3 4 5 6 7 8        Remaining                     Today's date: 2024  Patient name: Jacquelyn Fritz  : 1960  MRN: [id number]  Referring provider: Lorena Norris MD  Dx:   Encounter Diagnosis     ICD-10-CM    1. Balance problem  R26.89       2. Weakness  R53.1       3. Fall, subsequent encounter  W19.XXXD             Assessment  Assessment details: Patient is a 63 y.o. Female who presents to skilled outpatient PT with history of frequent falls. She presents today for a progress report. Functional tests and measures reveal improvements in the followinxSTS score, TUG, Monroe. She is a fall risk as noted by her scores for the TUG, Monroe, FGA, and 10mWT. She is below age-matched norms for the 5xSTS, 6MWT/2MWT, and Monroe. Due to continued balance impairments and functional strength limitations, she would continue to benefit from interventions focused on restoring functional strength to lower extremities, as well as balance training to improve static and dynamic postural stability with hopes of maximizing safety during ambulation, decreasing risk for falls, and improving overall activity tolerance both at home and within the community. She is a good candidate to continue skilled PT services.    Outcome Measures Initial Eval      5xSTS 15.46 sec 11.58 sec no hands    TUG  - Regular  - Cognitive   18.92 sec   Sec  37.18 sec     13.95 sec no AD  16.67 sec no AD (ice cream flavors)    10 meter   NV  10/14.62 = 0.68 m/s    MONROE 26/56 42/56    FGA NV 15/30    6MWT NV ft 2MWT: 250 ft no AD (gait belt) able to walk for 2.5 mins total        Impairments: Abnormal  coordination, Abnormal gait, Abnormal muscle tone, Abnormal or restricted ROM, Activity intolerance, Impaired balance, Impaired physical strength, Lacks appropriate HEP, Poor posture, Poor body mechanics, Pain with function, Safety issue, Weight-bearing intolerance, Abnormal movement, Difficulty understanding, Abnormal muscle firing  Understanding of Dx/Px/POC: Good  Prognosis: Good    Patient verbalized understanding of POC.      Please contact me if you have any questions or recommendations. Thank you for the referral and the opportunity to share in Jacquelyn Fritz's care.      Plan  Patient would benefit from: PT Eval  Planned modality interventions: Biofeedback, Cryotherapy, TENS, Thermotherapy  Planned therapy interventions: Abdominal trunk stabilization, ADL training, Balance, Balance/WB training, Breathing training, Body mechanics training, Coordination, Functional ROM exercises, Gait training, HEP, Joint Mobilization, Manual Therapy, Price taping, Motor coordination training, Neuromuscular re-education, Patient education, Postural training, Strengthening, Stretching, Therapeutic activities, Therapeutic exercises, Therapeutic training, Transfer training, Activity modification, Work reintegration  Frequency: 2x/wk  Duration in weeks: 3 months  Plan of Care beginning date: 5/14/2024  Plan of Care expiration date: 3 months - 8/14/2024  Treatment plan discussed with: Patient       Goals  Short Term Goals (4 weeks):    - Patient will improve time on TUG by 2.9 seconds from 18.92 sec to 16 sec facilitate improved safety in all ambulation - Met  - Patient will be independent in basic HEP 2-3 weeks - Met  - Patient will improve 5xSTS score by 2.3 seconds from 15.46 sec to 13.16 sec to promote improved LE functional strength needed for ADLs - Met  - Patient will complete FGA - Met  - Patient will complete 10MWT - Met    Long Term Goals (12 weeks):  - Patient will be independent in a comprehensive home exercise  program  - Patient will improve gait speed by 0.18 m/s to improve safety with community ambulation  - Patient will improve CONNELL by 6 points from  to  in order to improve static balance and reduce risk for falls  - Patient will improve scoring on FGA by 4 points to progress safety with dynamic tasks  - Patient will be able to demonstrate HT in gait without veering  - Patient will report 50% reduction in near falls in order to improve safety with functional tasks and reduce his risk for falls  - Patient will report going on walks at least 3 days per week to promote independence and improved cardiovascular endurance  - Patient will be able to ascend/descend stairs reciprocally with 1 UE assist to promote independence and safety with ADLs  - Patient will report 50% reduction in near falls when ambulating on uneven terrain      Cut off score    All date taken from [facility] Neuro Section or Rehab Measures      Connell/56  MDC: 6 pts  Age Norms:  60-69: M - 55   F - 55  70-79: M - 54   F - 53  80-89: M - 53   F - 50 5xSTS: Fidelia et al 2010  MDC: 2.3 sec  Age Norms:  60-69: 11.4 sec  70-79: 12.6 sec  80-89: 14.8 sec   TUG  MDC: 4.14 sec  Cut off score:  >13.5 sec community dwelling adults  >32.2 frail elderly  <20 I for basic transfers  >30 dependent on transfers 10 Meter Walk Test: Colette et al 2011  MDC: 0.18 m/s  20-29: M - 1.35 m   F - 1.34 m  30-39: M - 1.43 m   F - 1.34 m  40-49: M - 1.43 m   F - 1.39 m  50-59: M - 1.43 m   F - 1.31 m  60-69: M - 1.34 m   F - 1.24 m  70-79: M - 1.26 m   F - 1.13 m  80-89: M - 0.97 m   F - 0.94 m    Household Ambulator < 0.4 m/s  Limited Community Ambulator 0.4 - 0.8 m/s  Community Ambulator 0.8 - 1.2 m/s  Safely cross the street > 1.2 m/s   FGA  MCID: 4 pts  Geriatrics/community < 22/30 fall risk  Geriatrics/community < 20/30 unexplained falls    DGI  MDC: vestibular - 4 pts  MDC: geriatric/community - 3 pts  Falls risk <19/24 mCTSIB  Norm: 20-60 yrs  Eyes open  firm: norm sway 0.21-0.48  Eyes closed firm: norm sway 0.48-0.99  Eyes open foam: norm sway 0.38-0.71  Eyes closed foam: norm sway 0.70-2.22   6 Minute Walk Test  MDC: 190.98 ft  MCID: 164 ft    Age Norms  60-69: M - 1876 ft (571.80 m)  F - 1765 ft (537.98 m)  70-79: M - 1729 ft (527.00 m)  F - 1545 ft (470.92 m)  80-89: M - 1368 ft (416.97 m)  F - 1286 ft (391.97 m) ABC: Cynthia & Marcos, 2003  <67% increased risk for falls   [place] Monofilaments  Evaluator Size:        Force (grams):          Hand/Dorsal Thresholds:        Plantar Thresholds:  - 1.65                       - 0.008                       - Normal                                 - Normal  - 2.36                       - 0.02                         - Normal                                 - Normal  - 2.44                       - 0.04                         - Normal                                 - Normal  - 2.83                       - 0.07                         - Normal                                 - Normal  - 3.22                       - 0.16                         - Diminished light touch          - Normal  - 3.61                       - 0.40                         - Diminished light touch          - Normal  - 3.84                       - 0.60                         - Diminished protective           - Diminished light touch  - 4.08                       - 1.00                         - Diminished protective           - Diminished light touch  - 4.17                       - 1.40                         - Diminished protective           - Diminished light touch  - 4.31                       - 2.00                         - Diminished protective           - Diminished light touch  - 4.56                       - 4.00                         - Loss of protective sense      - Diminished protective  - 4.74                       - 6.00                         - Loss of protective sense      - Diminished protective  - 4.93                        - 8.00                         - Loss of protective sense      - Diminished protective  - 5.07                       - 10.0                         - Loss of protective sense     - Loss of protective sense  - 5.18                       - 15.0                         - Loss of protective sense     - Loss of protective sense  - 5.46                       - 26.0                         - Loss of protective sense     - Loss of protective sense  - 5.88                       - 60.0                         - Loss of protective sense     - Loss of protective sense  - 6.10                       - 100                          - Loss of protective sense     - Loss of protective sense  - 6.45                       - 180                          - Loss of protective sense     - Loss of protective sense  - 6.65                       - 300                          - Deep pressure sense only  - Deep pressure sense only         Subjective    History of Present Illness  - Mechanism of injury: Pt reported recent surgery on left knee     UPDATE 24: The surgery on her knee went well. She was in the ED recently for a nose bleed that would not stop. She reports the bleeding stopped without intervention. Reports that she fell x2 in the last month and her neighbors had to help her up. She denies head strike.    - Primary AD: None  - Assist level at home: Independent  - Decreased fine motor tasks: No    Patient goal: Improve balance    Pain  - Current pain ratin/10  - At best pain ratin/10  - At worst pain ratin/10  - Location: Left shoulder  - Aggravating factors: Activity    Vitals  BP: 162/92 mmHg, seated L, manual  HR: 97 bpm  SpO2: 97%    Social Support  - Steps to enter house: 3 steps  - Stairs in house: full flight, does not use   - Lives in: Two-story home first floor setup  - Lives with: lives alone    - Employment status: retired  - Hand dominance: right    Treatments  - Previous treatment: physical  therapy  - Current treatment: none      Objective       LE MMT  - R Hip Flexion: 4-/5   L Hip Flexion: 3+/5   - R Hip Abduction: 4-/5  L Hip Abduction: 4-/5  - R Hip Adduction: 4-/5  L Hip Adduction: 4-/5  - R Knee Extension: 4/5  L Knee Extension: 4/5  - R Knee Flexion: 4-/5   L Knee Flexion: 4-/5  - R Ankle DF: 4-/5   L Ankle DF: 4-/5  - R Ankle PF: 4/5   L Ankle PF: 4/5    Sensation  - Light touch: Impaired at feet  - Deep pressure: WNL  - Temperature: Inconsistent    Coordination  - Heel to Shin: WNL  - Alternate Toe Taps: WNL      Reflexes/Clonus  - Clonus: No,       Myelopathy Screen (>3/5 +)  - Louis's Reflex: -    Gait  - Abnormalities: wide SHRUTHI, decreased step length bilaterally, slight shuffle       Outcome Measures Initial Eval  5/14 6/27    5xSTS 15.46 sec 11.58 sec no hands    TUG  - Regular  - Cognitive   18.92 sec   Sec  37.18 sec     13.95 sec no AD  16.67 sec no AD (ice cream flavors)    10 meter   NV  10/14.62 = 0.68 m/s    CONNELL 26/56 42/56    FGA NV 15/30    6MWT NV ft 2MWT: 250 ft no AD (gait belt) able to walk for 2.5 mins total       Daily Interventions:  - Amb with verbal cues for speed 200'x2  - Fwd/bwd amb with cues for step length 40'x4 each way        Precautions: Standard fall risk  Past Medical History:   Diagnosis Date    Cancer (HCC) 2011    lung, upper left lung lobectomy    Hyperlipidemia     Hypertension     IBS (irritable bowel syndrome)

## 2024-06-27 NOTE — ED PROVIDER NOTES
History  Chief Complaint   Patient presents with   • Nose Bleed     Pt reports R sided nose bleed x 1 hour. + hx of nose bleeds. Denies blood thinners. Bleeding controlled in triage.      Patient is 63-year-old female past medical history of hypertension, hyperlipidemia, lung cancer, COPD, alcohol abuse presenting for epistaxis.  Patient states 4 to 5 hours ago she had right-sided epistaxis that lasted roughly 1 hour.  States that she applied pressure, tilted her head back, stuff the area with paper towels but nothing resolved to the nosebleed.  States he has a history of same but never this profuse.  Denies any trauma to the nose and does not take any blood thinners.  Denies any chest pain, shortness breath, dizziness.        Prior to Admission Medications   Prescriptions Last Dose Informant Patient Reported? Taking?   Cholecalciferol (Vitamin D3) 1.25 MG (75345 UT) CAPS  Self No No   Sig: TAKE 1 CAPSULE BY MOUTH 2 TIMES A WEEK FOR 6 WEEKS.   Icosapent Ethyl 1 g CAPS  Self No No   Sig: TAKE 1 CAPSULE BY MOUTH 2 TIMES A DAY.   Patient not taking: Reported on 5/30/2024   Magnesium 250 MG TABS  Self Yes No   Sig: Take by mouth   albuterol (2.5 mg/3 mL) 0.083 % nebulizer solution  Self No No   Sig: Take 3 mL (2.5 mg total) by nebulization every 6 (six) hours as needed for wheezing or shortness of breath As needed   albuterol (ProAir HFA) 90 mcg/act inhaler  Self No No   Sig: Inhale 2 puffs every 6 (six) hours as needed for wheezing or shortness of breath   amLODIPine-atorvastatin (CADUET) 5-20 MG per tablet  Self No No   Sig: Take 1 tablet by mouth daily   fluticasone-salmeterol (Advair HFA) 115-21 MCG/ACT inhaler  Self No No   Sig: Inhale 2 puffs 2 (two) times a day Rinse mouth after use.   folic acid (KP Folic Acid) 1 mg tablet  Self No No   Sig: Take 1 tablet (1 mg total) by mouth daily   hydrocortisone (ANUSOL-HC) 25 mg suppository  Self No No   Sig: Insert 1 suppository (25 mg total) into the rectum 2 (two) times  a day   pantoprazole (PROTONIX) 40 mg tablet  Self No No   Sig: Take 1 tablet (40 mg total) by mouth daily   pyridoxine (VITAMIN B6) 100 mg tablet  Self No No   Sig: Take 1 tablet (100 mg total) by mouth daily   sodium phosphate-biphosphate 7-19 g 118 mL enema  Self Yes No   Si pr prn   tiZANidine (ZANAFLEX) 4 mg tablet  Self No No   Sig: Take 1 tablet (4 mg total) by mouth 2 (two) times a day as needed for muscle spasms   vitamin B-12 (VITAMIN B-12) 1,000 mcg tablet  Self No No   Sig: Take 1 tablet (1,000 mcg total) by mouth once a week      Facility-Administered Medications: None       Past Medical History:   Diagnosis Date   • Cancer (HCC)     lung, upper left lung lobectomy   • Hyperlipidemia    • Hypertension    • IBS (irritable bowel syndrome)        Past Surgical History:   Procedure Laterality Date   • HYSTERECTOMY     • KNEE SURGERY Left    • LUNG LOBECTOMY     • ORIF TIBIAL PLATEAU Left 2019    Procedure: OPEN REDUCTION W/ INTERNAL FIXATION (ORIF) TIBIAL PLATEAU;  Surgeon: Marie Deras MD;  Location: [facility] [place];  Service: Orthopedics   • WRIST SURGERY         Family History   Problem Relation Age of Onset   • Diabetes Mother    • Diabetes Father    • Breast cancer Sister    • Sarcoidosis Sister    • No Known Problems Sister    • No Known Problems Sister    • No Known Problems Maternal Grandmother    • No Known Problems Paternal Grandmother    • Diabetes Brother    • Sarcoidosis Brother    • No Known Problems Son    • No Known Problems Maternal Aunt    • No Known Problems Maternal Aunt    • No Known Problems Maternal Aunt    • No Known Problems Maternal Aunt    • No Known Problems Maternal Aunt    • No Known Problems Maternal Aunt    • No Known Problems Maternal Aunt    • No Known Problems Paternal Aunt    • No Known Problems Paternal Aunt    • No Known Problems Paternal Aunt    • Ovarian cancer Neg Hx    • Cervical cancer Neg Hx    • Uterine cancer Neg Hx      I have reviewed and agree  with the history as documented.    E-Cigarette/Vaping   • E-Cigarette Use Never User      E-Cigarette/Vaping Substances   • Nicotine No    • THC No    • CBD No    • Flavoring No    • Other No    • Unknown No      Social History     Tobacco Use   • Smoking status: Former     Current packs/day: 0.00     Types: Cigarettes     Quit date: 2009     Years since quitting: 15.4   • Smokeless tobacco: Never   Vaping Use   • Vaping status: Never Used   Substance Use Topics   • Alcohol use: Yes     Comment: occ   • Drug use: Never       Review of Systems   All other systems reviewed and are negative.      Physical Exam  Physical Exam  Vitals reviewed.   Constitutional:       General: She is not in acute distress.     Appearance: Normal appearance. She is not ill-appearing.   HENT:      Nose:      Comments: Dried blood in the right nare, no active bleeding, septum midline, otherwise unremarkable     Mouth/Throat:      Mouth: Mucous membranes are moist.   Eyes:      Conjunctiva/sclera: Conjunctivae normal.   Cardiovascular:      Rate and Rhythm: Normal rate.   Pulmonary:      Effort: Pulmonary effort is normal.   Musculoskeletal:         General: Normal range of motion.      Cervical back: Neck supple.   Skin:     General: Skin is warm and dry.   Neurological:      General: No focal deficit present.      Mental Status: She is alert.   Psychiatric:         Mood and Affect: Mood normal.         Vital Signs  ED Triage Vitals [06/26/24 1921]   Temperature Pulse Respirations Blood Pressure SpO2   (!) 96.4 °F (35.8 °C) 98 18 136/81 96 %      Temp Source Heart Rate Source Patient Position - Orthostatic VS BP Location FiO2 (%)   Temporal Monitor Sitting Left arm --      Pain Score       --           Vitals:    06/26/24 1921   BP: 136/81   Pulse: 98   Patient Position - Orthostatic VS: Sitting         Visual Acuity      ED Medications  Medications   oxymetazoline (AFRIN) 0.05 % nasal spray 2 spray (has no administration in time range)        Diagnostic Studies  Results Reviewed       None                   No orders to display              Procedures  Procedures         ED Course  ED Course as of 06/26/24 2119 Wed Jun 26, 2024 2103 Have discussed return precautions and outpatient follow-up and patient states understands.                                             Medical Decision Making  Patient is 63-year-old female past medical history of hypertension, hyperlipidemia, COPD, lung cancer, alcohol abuse presenting with right-sided epistaxis.  Patient is well-appearing at bedside with stable vitals and in no acute distress.  She has dried blood in the right nare but no active bleeding.  Will give oxymetazoline for home, have discussed applying nasal clip which I will provide, setting timer for 15 minutes and applying pressure after instilling oxymetazoline into the bilateral nares.  Will check CBC to assess for anemia and likely discharge with outpatient ENT follow-up.    Amount and/or Complexity of Data Reviewed  Labs: ordered.    Risk  OTC drugs.             Disposition  Final diagnoses:   None     ED Disposition       None          Follow-up Information    None         Patient's Medications   Discharge Prescriptions    No medications on file       No discharge procedures on file.    PDMP Review         Value Time User    PDMP Reviewed  Yes 11/10/2019  3:26 PM Katherine Umanzor MD            ED Provider  Electronically Signed by             Dai Sanz DO  06/26/24 2119

## 2024-06-27 NOTE — PROGRESS NOTES
Assessment/Plan:  Assessment & Plan   Diagnoses and all orders for this visit:    Closed fracture of left tibial plateau with routine healing, subsequent encounter  -     Ambulatory Referral to Physical Therapy; Future    Adhesive capsulitis of left shoulder  -     Ambulatory Referral to Physical Therapy; Future    Other orders  -     oxymetazoline (AFRIN) 0.05 % nasal spray; 2 sprays by Each Nare route 2 (two) times a day        63-year-old right-hand-dominant female with onset left shoulder pain limited range of motion from fall injury 12 weeks ago.  Physical exam, impression, and plan.  Left shoulder has range of motion forward flexion to 80 degrees, abduction 80 degrees, and internal rotation to the lumbar spine.  She has 4/5 strength external rotation and 4+/5 strength supraspinatus.  Clinical impression is that she is improving regard to fracture.  She likely has rotator cuff tendinopathy/pathology and adhesive capsulitis.  I placed referral to formal therapy to focus on shoulder range of motion.  She will follow-up in 8 weeks at which point she will be reevaluated.            Subjective:   Patient ID: Jacquelyn Fritz is a 63 y.o. female.  Chief Complaint   Patient presents with    Left Shoulder - Follow-up        63-year-old right-hand-dominant female following up on the left shoulder pain and limited range of motion from fall injury 12 weeks ago.  She was last seen by me 4 weeks ago at which point she was advised to initiate formal therapy and do home exercises.  She has been having neuro therapy done however limited therapy to focus on shoulder.  She states shoulder pain has improved, but still has limited range of motion.        Shoulder Pain  This is a new problem. The current episode started more than 1 month ago. The problem occurs daily. The problem has been gradually improving. Associated symptoms include arthralgias and weakness. Pertinent negatives include no joint swelling or numbness.  "Exacerbated by: Arm abduction. She has tried rest and position changes for the symptoms. The treatment provided mild relief.               Review of Systems   Musculoskeletal:  Positive for arthralgias. Negative for joint swelling.   Neurological:  Positive for weakness. Negative for numbness.       Objective:  Vitals:    06/27/24 1022   BP: 161/95   Pulse: 101   Weight: 78 kg (172 lb)   Height: 5' 2\" (1.575 m)      Left Shoulder Exam     Range of Motion   Active abduction:  80   Forward flexion:  80   Internal rotation 0 degrees:  Lumbar     Muscle Strength   Abduction: 5/5   Internal rotation: 4/5   External rotation: 4/5      Comments:  4+/5 supraspinatus            Physical Exam  Vitals and nursing note reviewed.   Constitutional:       Appearance: Normal appearance. She is well-developed. She is not ill-appearing or diaphoretic.   HENT:      Head: Normocephalic and atraumatic.      Right Ear: External ear normal.      Left Ear: External ear normal.   Eyes:      Conjunctiva/sclera: Conjunctivae normal.   Neck:      Trachea: No tracheal deviation.   Cardiovascular:      Rate and Rhythm: Normal rate.   Pulmonary:      Effort: Pulmonary effort is normal. No respiratory distress.   Abdominal:      General: There is no distension.   Skin:     General: Skin is warm and dry.      Coloration: Skin is not jaundiced or pale.   Neurological:      Mental Status: She is alert and oriented to person, place, and time.   Psychiatric:         Mood and Affect: Mood normal.         Behavior: Behavior normal.         Thought Content: Thought content normal.         Judgment: Judgment normal.                   "

## 2024-07-01 ENCOUNTER — TELEPHONE (OUTPATIENT)
Dept: NEUROLOGY | Facility: CLINIC | Age: 64
End: 2024-07-01

## 2024-07-02 ENCOUNTER — TELEPHONE (OUTPATIENT)
Age: 64
End: 2024-07-02

## 2024-07-02 ENCOUNTER — PROCEDURE VISIT (OUTPATIENT)
Dept: NEUROLOGY | Facility: CLINIC | Age: 64
End: 2024-07-02
Payer: COMMERCIAL

## 2024-07-02 DIAGNOSIS — G62.9 NEUROPATHY: ICD-10-CM

## 2024-07-02 PROCEDURE — 95911 NRV CNDJ TEST 9-10 STUDIES: CPT | Performed by: PHYSICAL MEDICINE & REHABILITATION

## 2024-07-02 PROCEDURE — 95886 MUSC TEST DONE W/N TEST COMP: CPT | Performed by: PHYSICAL MEDICINE & REHABILITATION

## 2024-07-02 NOTE — TELEPHONE ENCOUNTER
Pt called to follow up on apt today with DR. Norris. 7/2/24.    Pt is requesting results and next appt.       Please assist      Thank you

## 2024-07-09 ENCOUNTER — OFFICE VISIT (OUTPATIENT)
Age: 64
End: 2024-07-09
Payer: COMMERCIAL
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
## 2024-07-09 DIAGNOSIS — R26.89 BALANCE PROBLEM: Primary | ICD-10-CM

## 2024-07-09 DIAGNOSIS — R53.1 WEAKNESS: ICD-10-CM

## 2024-07-09 PROCEDURE — 97112 NEUROMUSCULAR REEDUCATION: CPT

## 2024-07-09 NOTE — PROGRESS NOTES
"Daily Note     Today's date: 2024  Patient name: Jacquelyn Fritz  : 1960  MRN: [id number]  Referring provider: Lorena Norris MD  Dx:   Encounter Diagnosis     ICD-10-CM    1. Balance problem  R26.89       2. Weakness  R53.1       3. Fall, subsequent encounter  W19.XXXD         POC expires Unit limit Auth Expiration date PT/OT + Visit Limit? Co-Insurance   24 BOMN  BOMN No                               Visit/Unit Tracking  5/14 5/16 5/23 6/4 6/10 6/13 6/17 6/27- [place]                                                  Subjective: Patient denies falls or pain currently. Reports fatigued post last session.      Objective: See treatment diary below. PT verbal and tactile cues for technique and progression.    NMR:  Sit to stands 10x 2 sets holding 7.5# TT  HKM 4 laps at long bar with 3 sec hold w 7.5 TT    Fwd high knee stepping over 6\" - 8\" hurdles (4 hurdles) at long bar with 1 UE support 3 laps hold, 7.5# TT patient declined exercise  FTEC 30 sec x 4 reps foam pad  BACKWARDS walking in // bars holding 7.5# TT 3 laps     FWD lunge step onto 2 foam pads alt R and L 2 sets x 10 reps nv  LAT side stepping onto 2 foam pads alt 1 UE 20 reps TT 7.5#   Tandem walking 3 sec pause down and back 1 UE support 3 laps in long // bars TT 7.5#   Masury in long // bar x 3 laps     Assessment: Tolerated treatment well. Patient was able to perform listed exercises declined hurdles this session. Not all exercises performed due to time. Resume full treatment nv. Patient used TT for tandem ambulation and lateral side stepping on foams. Patient demonstrated fatigue post treatment, exhibited good technique with therapeutic exercises, and would benefit from continued PT.     Plan: Continue per plan of care.               "

## 2024-07-16 ENCOUNTER — EVALUATION (OUTPATIENT)
Age: 64
End: 2024-07-16
Payer: COMMERCIAL

## 2024-07-16 ENCOUNTER — OFFICE VISIT (OUTPATIENT)
Age: 64
End: 2024-07-16
Payer: COMMERCIAL

## 2024-07-16 DIAGNOSIS — R47.01 APHASIA: Primary | ICD-10-CM

## 2024-07-16 DIAGNOSIS — R53.1 WEAKNESS: ICD-10-CM
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
## 2024-07-16 DIAGNOSIS — R26.89 BALANCE PROBLEM: Primary | ICD-10-CM

## 2024-07-16 DIAGNOSIS — R47.9 SPEECH DISTURBANCE, UNSPECIFIED TYPE: ICD-10-CM

## 2024-07-16 PROCEDURE — 96105 ASSESSMENT OF APHASIA: CPT

## 2024-07-16 PROCEDURE — 97530 THERAPEUTIC ACTIVITIES: CPT

## 2024-07-16 NOTE — PROGRESS NOTES
Note  Contacted PCP office regarding elevated BP reading at PT/ST sessions at 1450. Communicated with provider's office about patient status; provider unavailable but PT left message with FD. Advised patient to monitor s/s and BP out of clinic, and to contact PCP if BP remains abnormal despite appropriate medication intake/management.

## 2024-07-16 NOTE — PROGRESS NOTES
Speech-Language Pathology Re-Evaluation    Today's date: 2024   Patient’s name: Jacquelyn Fritz  : 1960  MRN: [id number]  Safety measures:   Referring provider: Lorena Norris MD    Encounter Diagnosis     ICD-10-CM    1. Aphasia  R47.01       2. Speech disturbance, unspecified type  R47.9           Assessment:     Patient continues to present improving with mild aphasia secondary to unspecified diagnosis. Patient has completed MRI and provided education to f/u with physician regarding results. Verbal expression deficits can c/b difficulty with word retrieval (verbal fluency, confrontational naming, and informal/formal conversation), production of grammatical short descriptions, and slower speech. Patient has moments of word blocks and pauses in language output. Patient is tolerating treatment well and has reported some improvement of verbal fluency. Patient continues to have awareness of verbal fluency deficits. Patient was administered the Western Aphasia Battery and was noted to have deficits in multi-step directions, repetition, and word fluency tasks. Patient would benefit from continued outpatient skilled Speech Therapy services to support the highest level of independence with her current speech-language skills, promote positive communication interactions with both familiar & unfamiliar listeners, further education/training compensatory strategies, increase communication of wants/needs, expressive/receptive language within functional activities, participate in meaningful activities, allow for increased socialization, promote safety, facilitate overall improved quality of life, reduce caregiver burden and complete caregiver education/training.      Short-term goals: (6-8 weeks)  LISTENING    Patient will answer verbal Yes/No questions with 80% accuracy to facilitate increased auditory comprehension skills, PARTIALLY MET      Patient will complete naming of automatic tasks (e.g., counting,  listing days of the week/months of the year, etc.) with 100% accuracy to facilitate increased verbal expression skills, ONGOING    Patient will imitate words/phrases using AMAN/tapping/pacing with 80% accuracy to facilitate increased verbal expression skills, ONGOING    Patient will name appropriate category for three words (e.g., apple, banana, pear = fruits) with 80% accuracy to facilitate improved generative naming skills,  ONGOING    Patient will provide an appropriate word for sentence completion task (e.g., open the ___) with 80% accuracy to facilitate increased expressive language skills,  ONGOING    Patient will provide an adequate response to generative naming task (i.e., name as many…) with 80% accuracy to facilitate increased expressive language skills, ONGOING    Patient will provide an adequate response to confrontation naming task (i.e., what is…) with 80% accuracy to facilitate increased expressive language skills,  ONGOING  Patient will name up to 5 features to describe a person/place/thing with 80% accuracy to facilitate improved utilization of circumlocution strategy,  ONGOING    Patient will complete picture description tasks using language organization strategies with 80% accuracy to facilitate improved utilization of circumlocution strategy ONGOING      Patient will be educated on word finding strategies (i.e., circumlocution) for improved generative naming and verbal expression skills. DID NOT ADDRESS YET    To target mental manipulation and working memory, patient will participate in word finding activity (i.e., anagrams) with 80% accuracy  ONGOING      Patient will complete concrete and abstract categorization tasks to 80% accuracy to facilitate improved generative naming skills and working memory ONGOING    Patient will name an average of 15+ items in a category in 60 seconds over 5 trials using compensatory strategies and min cues to facilitate improved word retrieval skills  "ONGOING    Patient will name an average of 10+ words that begin with a specific letter in 60 seconds over 5 trials using compensatory strategies and min cues to facilitate improved word retrieval skills   ONGOING    Long Term Goal    Patient will demonstrate improved expressive and receptive language skills during structured and unstructured tasks by discharge.  ONGOING    Patient will improve ability to facilitate communication to meet needs including use of compensatory strategies to promote meaningful interactions for improved quality of life and maximize level of independence. ONGOING    Plan:  Patient would benefit from outpatient skilled Speech Therapy services: Speech-language therapy    Frequency: 1-2x weekly  Duration: 2 months    Intervention certification from: 7/16/2024  Intervention certification to: 9/16/2024      Subjective:   PT Deferred PT session (prior to speech) at this date 2/2 elevated BP. Pt completed vital assessment with significant elevation in BP; manual measure read 172/104.  Per PT  \"Attempted STS, no weight, x10 with inc of BP to 192/104. Able to return to baseline with 10 min seated rest. Patient reported appropriate BP medication intake this morning. Patient denied symptoms of dizziness, nausea, HA, lightheadedness or change in status during time in clinic. Provided extensive patient education on hypertensive crisis (sig elevation of BP which increases risk of another MI, CVA, and/or other life-threatening problems) the symptoms of MI (chest pain, SOB), and the symptoms of stroke (numbness, tingling, trouble speaking, or vision changes). Emphasized patient education to seek higher medical care, such as ED if symptomatic. Patient verbalized understanding. Deferred remainder of PT session; patient agreeable to continue to ST. Verbalized asymptomatic and denied s/s at end of session.\"    At the end of ST, session /98. Patient re-confirmed she took BP medications. Provided education " "on risk of stroke and heart attack from elevated BP. Patient continued to denied symptoms of dizziness, nausea, HA, lightheadness, or changed status in duration of session. Recommended to monitor BP and call physician if still elevated. Verbal agreement noted. PT contacted PCP office regarding elevated BP reading at PT/ST sessions at 1450. Communicated with provider's office about patient status; provider unavailable but PT left message with FD.     Patient's goal(s): \"to speak better\"      Objective (testing):  The Western Aphasia Battery-Revised (WAB-R) is designed to evaluate a patient's language function following CVA, dementia, or other acquired neurological disorder. It measures a patient’s linguistic skills, such as speech content, fluency, auditory comprehension, repetition, naming, reading, and writing. The WAB-R also measures a patient’s nonlinguistic skills, including drawing, calculation, block design, and apraxia. The purpose of this standardized assessment is to (1) determine the presence, severity, and type of aphasia; (2) measure the patient's level of performance to provide a baseline for detecting change over time; (3) provide a comprehensive assessment of the patient's language assets and deficits in order to guide treatment and management; and (4) infer the location and etiology of the lesion causing aphasia. 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    Due to time constraints, only portions of the standardized assessment were administered on this date of service.    PART 2: Score:   -Reading Score: 19.6/20   -Writin/20        Score:   *Aphasia Quotient (AQ): 92.9/100   *Language Quotient (LQ): 93.8/100         Visit Tracking:   POC expires Unit " limit Auth Expiration date PT/OT + Visit Limit?   8/14/24 N/a 12/31/24 BOMN                                           Visit/Unit Tracking  AUTH Status:  Date 5/14  IE 5/16 5/23 6/4 6/6 6/10 6/13  PU  6/17 6/21 6/27 7/16  RE     no Used 1 2 3 4 5 6 7  1  2  3  4       Remaining  17 16 15 14 13 12 11  9  8  7  6         Intervention comments:  45 mins of aphasia testing, 55 mins of scoring, write-up

## 2024-07-16 NOTE — PROGRESS NOTES
Daily Note     Today's date: 2024  Patient name: Jacquelyn Fritz  : 1960  MRN: [id number]  Referring provider: Lorena Norris MD  Dx:   Encounter Diagnosis     ICD-10-CM    1. Balance problem  R26.89       2. Weakness  R53.1       3. Fall, subsequent encounter  W19.XXXD         POC expires Unit limit Auth Expiration date PT/OT + Visit Limit? Co-Insurance   24 BOMN  BOMN No                               Visit/Unit Tracking  5/14 5/16 5/23 6/4 6/10 6/13 6/17 6/27- [place]                                                 Subjective: Patient denies falls or pain currently. Reports fatigued post last session. Reports taking BP medication as prescribed      Objective: See treatment diary below. PT verbal and tactile cues for technique and progression.    Pre-treatment: 172/104 seated L arm, manual   Pt denies s/s      NMR:  Sit to stands 10x no weight     BP: 192/104 seated L arm     Seated 5 min rest    182/104 seated L arm    Seated 5 min rest    176/106 seated L arm    Assessment: Deferred remainder session at this date 2/ elevated BP. Completed vital assessment with significant elevation in BP; manual measure read 172/104. Attempted STS, no weight, x10 with inc of BP to 192/104. Able to return to baseline with 10 min seated rest. Patient reported appropriate BP medication intake this morning. Patient denied symptoms of dizziness, nausea, HA, lightheadedness or change in status during time in clinic. Provided extensive patient education on hypertensive crisis (sig elevation of BP which increases risk of another MI, CVA, and/or other life-threatening problems) the symptoms of MI (chest pain, SOB), and the symptoms of stroke (numbness, tingling, trouble speaking, or vision changes). Emphasized patient education to seek higher medical care, such as ED if symptomatic. Patient verbalized understanding. Deferred remainder of PT session; patient agreeable to continue to ST. Verbalized asymptomatic  and denied s/s at end of session.  Recommended to ST and patient to seek help and to call 911 if experiencing symptoms of MI or stroke; patient verbalized understanding of consequences and with no questions at end of session. In future session, will continue to assess vital measurements.       Plan: Continue per plan of care.

## 2024-07-19 ENCOUNTER — OFFICE VISIT (OUTPATIENT)
Age: 64
End: 2024-07-19
Payer: COMMERCIAL

## 2024-07-19 DIAGNOSIS — R26.89 BALANCE PROBLEM: Primary | ICD-10-CM

## 2024-07-19 DIAGNOSIS — R53.1 WEAKNESS: ICD-10-CM
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
## 2024-07-19 DIAGNOSIS — R47.01 APHASIA: Primary | ICD-10-CM

## 2024-07-19 DIAGNOSIS — R41.3 MEMORY DIFFICULTIES: ICD-10-CM

## 2024-07-19 DIAGNOSIS — R47.9 SPEECH DISTURBANCE, UNSPECIFIED TYPE: ICD-10-CM

## 2024-07-19 DIAGNOSIS — R47.1 DYSARTHRIA: ICD-10-CM

## 2024-07-19 PROCEDURE — 97112 NEUROMUSCULAR REEDUCATION: CPT

## 2024-07-19 PROCEDURE — 92507 TX SP LANG VOICE COMM INDIV: CPT

## 2024-07-19 NOTE — PROGRESS NOTES
"Daily Note     Today's date: 2024  Patient name: Jacquelyn Fritz  : 1960  MRN: [id number]  Referring provider: Lorena Norris MD  Dx:   Encounter Diagnosis     ICD-10-CM    1. Balance problem  R26.89       2. Weakness  R53.1       3. Fall, subsequent encounter  W19.XXXD         POC expires Unit limit Auth Expiration date PT/OT + Visit Limit? Co-Insurance   24 BOMN  BOMN No                               Visit/Unit Tracking  5/14 5/16 5/23 6/4 6/10 6/13 6/17 6/27- [place]                                   Start Time: 1100  Stop Time: 1140  Total time in clinic (min): 40 minutes    Subjective: Patient denies falls or pain currently. Reports fatigued post last session. Reports taking BP medication as prescribed.      Objective: See treatment diary below. PT verbal and tactile cues for technique and progression.    Pre-treatment: 138/92 seated L arm, manual   Pt denies s/s      NMR:  Sit to stands 2x10  HKM 30'x4 with CG  Bwd amb 30'x4  Fwd/bwd with 5# TT 30'x4  Semi-tandem amb with 1 UE support, 30'x4  Lateral amb with 5# TT 40'x4   FTEO firm 30\"x2      Assessment: Patient tolerated treatment well as noted by no adverse effects or provocation of any symptoms. She does have some difficulty with SLS conditions and needs to take corrective steps. She does lose her balance to the L and R, but she does use a stepping strategy to recover with min A. She can continue to benefit from skilled PT.      Plan: Continue per plan of care.           "

## 2024-07-19 NOTE — PROGRESS NOTES
Daily Speech Treatment Note    Today's date: 2024   Patient’s name: Jacquelyn Fritz  : 1960  MRN: [id number]  Safety measures: aphasia, fall risk   Referring provider: Lorena Norris MD    Encounter Diagnosis     ICD-10-CM    1. Aphasia  R47.01       2. Dysarthria  R47.1       3. Speech disturbance, unspecified type  R47.9       4. Memory difficulties  R41.3           Visit Tracking:   POC expires Unit limit Auth Expiration date PT/OT + Visit Limit?   24 N/A   NO COG CODES 24 BOMN                                           Visit/Unit Tracking  AUTH Status:  Date   IE 5/16 5/23 6/4 6/6 6/10 6/13  PU    RE      no Used 1 2 3 4 5 6 7  1  2  3  4 1      Remaining  17 16 15 14 13 12 11  9  8  7  6 9        Subjective/Behavioral:  -Patient pleasant, engaged, and cooperative. Patient reported nosebleed occurred this morning, yesterday, and Tuesday afternoon. Patient reported high BP yesterday when measured before PT. Patient recommended to monitor symptoms and go to ED if experiencing significant nose bleed that sent her to ED a few weeks ago.     Objective/Assessment:  -Reviewed testing results and goals in plan care with patient. Patient is in agreement at this time.  -Reviewed patient's home exercises/activities completed since last appointment. Patient stated antonyms and completed a crossword puzzle with 95% accuracy, IND. Accuracy increased to 100% given mod verbal semantic cues x4. Increased dysfluencies noted during spontaneous conversation.     Short-term goals:  LISTENING     Patient will answer verbal Yes/No questions with 80% accuracy to facilitate increased auditory comprehension skills        Patient will complete naming of automatic tasks (e.g., counting, listing days of the week/months of the year, etc.) with 100% accuracy to facilitate increased verbal expression skills     Patient will imitate words/phrases using AMAN/tapping/pacing with 80%  accuracy to facilitate increased verbal expression skills,      Patient will name appropriate category for three words (e.g., apple, banana, pear = fruits) with 80% accuracy to facilitate improved generative naming skills,       Patient will provide an appropriate word for sentence completion task (e.g., open the ___) with 80% accuracy to facilitate increased expressive language skills,       Patient will provide an adequate response to generative naming task (i.e., name as many…) with 80% accuracy to facilitate increased expressive language skills,      Patient will provide an adequate response to confrontation naming task (i.e., what is…) with 80% accuracy to facilitate increased expressive language skills,    Patient will name up to 5 features to describe a person/place/thing with 80% accuracy to facilitate improved utilization of circumlocution strategy,       Patient will complete picture description tasks using language organization strategies with 80% accuracy to facilitate improved utilization of circumlocution strategy         Patient will be educated on word finding strategies (i.e., circumlocution) for improved generative naming and verbal expression skills.      To target mental manipulation and working memory, patient will participate in word finding activity (i.e., anagrams) with 80% accuracy          Patient will complete concrete and abstract categorization tasks to 80% accuracy to facilitate improved generative naming skills and working memory     Patient will name an average of 15+ items in a category in 60 seconds over 5 trials using compensatory strategies and min cues to facilitate improved word retrieval skills      Patient will name an average of 10+ words that begin with a specific letter in 60 seconds over 5 trials using compensatory strategies and min cues to facilitate improved word retrieval skills       Plan:  -Patient was provided with home exercises/activities to target goals in plan  of care at the end of today's session.  -Continue with current plan of care.

## 2024-07-23 ENCOUNTER — OFFICE VISIT (OUTPATIENT)
Age: 64
End: 2024-07-23
Payer: COMMERCIAL

## 2024-07-23 DIAGNOSIS — R53.1 WEAKNESS: ICD-10-CM

## 2024-07-23 DIAGNOSIS — R26.89 BALANCE PROBLEM: Primary | ICD-10-CM
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
## 2024-07-23 PROCEDURE — 97112 NEUROMUSCULAR REEDUCATION: CPT | Performed by: PHYSICAL THERAPIST

## 2024-07-23 NOTE — PROGRESS NOTES
"Daily Note     Today's date: 2024  Patient name: Jacquelyn Fritz  : 1960  MRN: [id number]  Referring provider: Lorena Norris MD  Dx:   Encounter Diagnosis     ICD-10-CM    1. Balance problem  R26.89       2. Weakness  R53.1       3. Fall, subsequent encounter  W19.XXXD         POC expires Unit limit Auth Expiration date PT/OT + Visit Limit? Co-Insurance   24 BOMN  BOMN No                               Visit/Unit Tracking  5/14 5/16 5/23 6/4 6/10 6/13 6/17 6/27- [place]                                             Subjective: Patient denies falls or pain currently. Reports taking BP medication as prescribed.      Objective: See treatment diary below. PT verbal and tactile cues for technique and progression.    Pre-treatment: 145/82 seated L arm, automatic   Pt denies s/s      NMR:  Sit to stands 2x10  HKM 30'x4 with CG holding 7.5 small TT with 1 UE as needed.   Bwd amb 30'x4 holding 7.5 small TT with 1 UE as needed.   Fwd/bwd 3 laps holding 7.5 small TT with 1 UE as needed.   Semi-tandem amb with 1 UE support, 30'x4  Lateral amb 30'x4 holding 7.5 small TT with 1 UE as needed.    FTEO firm 30\"x2      Assessment: Patient tolerated treatment well. Takes corrective step during SLS due to instability. Encouraged no UE support with exercises performed outside of // bars. She can continue to benefit from skilled PT.      Plan: Continue per plan of care.           "

## 2024-07-25 ENCOUNTER — OFFICE VISIT (OUTPATIENT)
Age: 64
End: 2024-07-25
Payer: COMMERCIAL
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
## 2024-07-25 DIAGNOSIS — R26.89 BALANCE PROBLEM: Primary | ICD-10-CM

## 2024-07-25 DIAGNOSIS — R53.1 WEAKNESS: ICD-10-CM

## 2024-07-25 PROCEDURE — 97112 NEUROMUSCULAR REEDUCATION: CPT

## 2024-07-25 NOTE — PROGRESS NOTES
"Daily Note     Today's date: 2024  Patient name: Jacquelyn Fritz  : 1960  MRN: [id number]  Referring provider: Lorena Norris MD  Dx:   Encounter Diagnosis     ICD-10-CM    1. Balance problem  R26.89       2. Weakness  R53.1       3. Fall, subsequent encounter  W19.XXXD         POC expires Unit limit Auth Expiration date PT/OT + Visit Limit? Co-Insurance   24 BOMN  BOMN No                               Visit/Unit Tracking  5/14 5/16 5/23 6/4 6/10 6/13 6/17 6/27- [place]                               Start Time: 1145  Stop Time: 1225  Total time in clinic (min): 40 minutes    Subjective: Patient denies falls or pain currently. Reports taking BP medication as prescribed.      Objective: See treatment diary below. PT verbal and tactile cues for technique and progression.    Pre-treatment: 142/98 seated L arm, manual   Pt denies s/s      NMR:  - Sit to stands with 15# KB 2x10  - HKM 4 laps at long bar holding 7.5# TT with gentle UE support  - Bwd amb 4 laps at long bar holding 7.5# TT with 1 UE as needed   - Fwd/bwd 5 laps at long bar holding 7.5# TT with gentle UE support   - Semi-tandem amb, 5 laps with 1 UE support  - Lateral amb, 5 laps  holding 7.5# TT with 1 UE as needed  - FTEO firm 30\"X2  - FTEC firm 30\"x2  - Stool taps x20, 1 UE support    Assessment: Patient tolerated treatment well. She has some difficulty with SLS, but she takes corrective steps. Less reliance on UE support throughout session. She can continue to benefit from skilled PT.      Plan: Continue per plan of care.           "

## 2024-07-30 ENCOUNTER — RA CDI HCC (OUTPATIENT)
Dept: OTHER | Facility: HOSPITAL | Age: 64
End: 2024-07-30

## 2024-08-01 DIAGNOSIS — J44.9 COPD, MILD (HCC): ICD-10-CM

## 2024-08-01 RX ORDER — FLUTICASONE PROPIONATE AND SALMETEROL XINAFOATE 115; 21 UG/1; UG/1
AEROSOL, METERED RESPIRATORY (INHALATION)
Qty: 12 G | Refills: 3 | Status: SHIPPED | OUTPATIENT
Start: 2024-08-01

## 2024-08-06 ENCOUNTER — OFFICE VISIT (OUTPATIENT)
Age: 64
End: 2024-08-06
Payer: COMMERCIAL

## 2024-08-06 ENCOUNTER — EVALUATION (OUTPATIENT)
Age: 64
End: 2024-08-06
Payer: COMMERCIAL

## 2024-08-06 DIAGNOSIS — R47.01 APHASIA: Primary | ICD-10-CM

## 2024-08-06 DIAGNOSIS — R47.9 SPEECH DISTURBANCE, UNSPECIFIED TYPE: ICD-10-CM

## 2024-08-06 DIAGNOSIS — R26.89 BALANCE PROBLEM: Primary | ICD-10-CM
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
## 2024-08-06 DIAGNOSIS — R53.1 WEAKNESS: ICD-10-CM

## 2024-08-06 PROCEDURE — 97164 PT RE-EVAL EST PLAN CARE: CPT

## 2024-08-06 PROCEDURE — 92507 TX SP LANG VOICE COMM INDIV: CPT

## 2024-08-06 PROCEDURE — 97530 THERAPEUTIC ACTIVITIES: CPT

## 2024-08-06 NOTE — PROGRESS NOTES
PT Re-Evaluation          POC expires Unit limit Auth Expiration date PT/OT + Visit Limit? Co-Insurance   24 BOMN  BOMN No                               Visit/Unit Tracking  5/14 5/16 5/23 6/4 6/10 6/13 6/17 6/27- OR                                   Today's date: 2024  Patient name: Jacquelyn Fritz  : 1960  MRN: [id number]  Referring provider: Lorena Norris MD  Dx:   Encounter Diagnosis     ICD-10-CM    1. Balance problem  R26.89       2. Weakness  R53.1       3. Fall, subsequent encounter  W19.XXXD             Assessment  Assessment details: Patient is a 63 y.o. Female who presents to skilled outpatient PT with history of frequent falls. She presents today for a re-evaluation. She did have a slight regression in her 5xSTS. She is a fall risk as noted by her scores for the Monroe, FGA, and 10mWT. She is below age-matched norms for the 5xSTS, 6MWT/2MWT, FGA, and Monroe. Due to continued balance impairments and functional strength limitations, she would continue to benefit from interventions focused on restoring functional strength to lower extremities, as well as balance training to improve static and dynamic postural stability with hopes of maximizing safety during ambulation, decreasing risk for falls, and improving overall activity tolerance both at home and within the community. She is a good candidate to continue skilled PT services.    Outcome Measures Initial Eval     5xSTS 15.46 sec 11.58 sec no hands 18.84 sec no hands   TUG  - Regular  - Cognitive   18.92 sec   Sec  37.18 sec     13.95 sec no AD  16.67 sec no AD (ice cream flavors)   10.85 sec no AD  13.09 sec no AD (naming animals)     10 meter   NV  10/14.62 = 0.68 m/s 0.72 m/s   MONROE  4256 45/56   FGA NV 15/30 18/30   6MWT NV ft 2MWT: 250 ft no AD (gait belt) able to walk for 2.5 mins total 2MWT: 260' no AD        Impairments: Abnormal coordination, Abnormal gait, Abnormal muscle tone, Abnormal or restricted ROM, Activity intolerance, Impaired balance, Impaired physical strength, Lacks appropriate HEP, Poor posture, Poor body mechanics, Pain with function, Safety issue, Weight-bearing intolerance, Abnormal movement, Difficulty understanding, Abnormal muscle firing  Understanding of Dx/Px/POC: Good  Prognosis: Good    Patient verbalized understanding of POC.      Please contact me if you have any questions or recommendations. Thank you for the referral and the opportunity to share in Jacquelyn Fritz's care.      Plan  Patient would benefit from: PT Eval  Planned modality interventions: Biofeedback, Cryotherapy, TENS, Thermotherapy  Planned therapy interventions: Abdominal trunk stabilization, ADL training, Balance, Balance/WB training, Breathing training, Body mechanics training, Coordination, Functional ROM exercises, Gait training, HEP, Joint Mobilization, Manual Therapy, Price taping, Motor coordination training, Neuromuscular re-education, Patient education, Postural training, Strengthening, Stretching, Therapeutic activities, Therapeutic exercises, Therapeutic training, Transfer training, Activity modification, Work reintegration  Frequency: 1-3x/wk  Duration in weeks: 3 months  Plan of Care beginning date: 8/6/2024  Plan of Care expiration date: 3 months - 11/6/2024  Treatment plan discussed with: Patient       Goals  Short Term Goals (4 weeks):    - Patient will improve time on TUG by 2.9 seconds to facilitate improved safety in all ambulation   - Patient will be independent in basic HEP 2-3 weeks  - Patient will improve 5xSTS score by 2.3 seconds to promote improved LE functional strength needed for ADLs    Long Term Goals (12 weeks):  - Patient will be independent in a comprehensive home exercise program  - Patient will improve gait speed by 0.18 m/s to improve safety with community ambulation  - Patient  will improve CONNELL by 6 points in order to improve static balance and reduce risk for falls  - Patient will improve scoring on FGA by 4 points to progress safety with dynamic tasks  - Patient will be able to demonstrate HT in gait without veering  - Patient will report 50% reduction in near falls in order to improve safety with functional tasks and reduce his risk for falls  - Patient will report going on walks at least 3 days per week to promote independence and improved cardiovascular endurance  - Patient will be able to ascend/descend stairs reciprocally with 1 UE assist to promote independence and safety with ADLs  - Patient will report 50% reduction in near falls when ambulating on uneven terrain      Cut off score    All date taken from APTA Neuro Section or Rehab Measures      Connell/56  MDC: 6 pts  Age Norms:  60-69: M - 55   F - 55  70-79: M - 54   F - 53  80-89: M - 53   F - 50 5xSTS: Fidelia et al 2010  MDC: 2.3 sec  Age Norms:  60-69: 11.4 sec  70-79: 12.6 sec  80-89: 14.8 sec   TUG  MDC: 4.14 sec  Cut off score:  >13.5 sec community dwelling adults  >32.2 frail elderly  <20 I for basic transfers  >30 dependent on transfers 10 Meter Walk Test: Radha and Wilder et al 2011  MDC: 0.18 m/s  20-29: M - 1.35 m   F - 1.34 m  30-39: M - 1.43 m   F - 1.34 m  40-49: M - 1.43 m   F - 1.39 m  50-59: M - 1.43 m   F - 1.31 m  60-69: M - 1.34 m   F - 1.24 m  70-79: M - 1.26 m   F - 1.13 m  80-89: M - 0.97 m   F - 0.94 m    Household Ambulator < 0.4 m/s  Limited Community Ambulator 0.4 - 0.8 m/s  Community Ambulator 0.8 - 1.2 m/s  Safely cross the street > 1.2 m/s   FGA  MCID: 4 pts  Geriatrics/community < 22/30 fall risk  Geriatrics/community < 20/30 unexplained falls    DGI  MDC: vestibular - 4 pts  MDC: geriatric/community - 3 pts  Falls risk <19/24 mCTSIB  Norm: 20-60 yrs  Eyes open firm: norm sway 0.21-0.48  Eyes closed firm: norm sway 0.48-0.99  Eyes open foam: norm sway 0.38-0.71  Eyes closed foam: norm sway  0.70-2.22   6 Minute Walk Test  MDC: 190.98 ft  MCID: 164 ft    Age Norms  60-69: M - 1876 ft (571.80 m)  F - 1765 ft (537.98 m)  70-79: M - 1729 ft (527.00 m)  F - 1545 ft (470.92 m)  80-89: M - 1368 ft (416.97 m)  F - 1286 ft (391.97 m) ABC: Cynthia & Marcos, 2003  <67% increased risk for falls   Trona-Michel Monofilaments  Evaluator Size:        Force (grams):          Hand/Dorsal Thresholds:        Plantar Thresholds:  - 1.65                       - 0.008                       - Normal                                 - Normal  - 2.36                       - 0.02                         - Normal                                 - Normal  - 2.44                       - 0.04                         - Normal                                 - Normal  - 2.83                       - 0.07                         - Normal                                 - Normal  - 3.22                       - 0.16                         - Diminished light touch          - Normal  - 3.61                       - 0.40                         - Diminished light touch          - Normal  - 3.84                       - 0.60                         - Diminished protective           - Diminished light touch  - 4.08                       - 1.00                         - Diminished protective           - Diminished light touch  - 4.17                       - 1.40                         - Diminished protective           - Diminished light touch  - 4.31                       - 2.00                         - Diminished protective           - Diminished light touch  - 4.56                       - 4.00                         - Loss of protective sense      - Diminished protective  - 4.74                       - 6.00                         - Loss of protective sense      - Diminished protective  - 4.93                       - 8.00                         - Loss of protective sense      - Diminished protective  - 5.07                       -  10.0                         - Loss of protective sense     - Loss of protective sense  - 5.18                       - 15.0                         - Loss of protective sense     - Loss of protective sense  - 5.46                       - 26.0                         - Loss of protective sense     - Loss of protective sense  - 5.88                       - 60.0                         - Loss of protective sense     - Loss of protective sense  - 6.10                       - 100                          - Loss of protective sense     - Loss of protective sense  - 6.45                       - 180                          - Loss of protective sense     - Loss of protective sense  - 6.65                       - 300                          - Deep pressure sense only  - Deep pressure sense only         Subjective    History of Present Illness  - Mechanism of injury: Pt reported recent surgery on left knee     UPDATE 24: The surgery on her knee went well. She was in the ED recently for a nose bleed that would not stop. She reports the bleeding stopped without intervention. Reports that she fell x2 in the last month and her neighbors had to help her up. She denies head strike.    UPDATE 24: She reports she has been doing well. She reports a fall 3 weeks ago and believes she hit her head with possible LOC.    - Primary AD: None  - Assist level at home: Independent  - Decreased fine motor tasks: No    Patient goal: Improve balance    Pain  - Current pain ratin/10    Social Support  - Steps to enter house: 3 steps  - Stairs in house: full flight, does not use   - Lives in: Two-story home first floor setup  - Lives with: lives alone    - Employment status: retired  - Hand dominance: right    Treatments  - Previous treatment: physical therapy  - Current treatment: none      Objective     LE MMT  - R Hip Flexion: 4-/5   L Hip Flexion: 3+/5   - R Hip Abduction: 4-/5  L Hip Abduction: 4-/5  - R Hip Adduction: 4-/5  L Hip  Adduction: 4-/5  - R Knee Extension: 4/5  L Knee Extension: 4/5  - R Knee Flexion: 4-/5   L Knee Flexion: 4-/5  - R Ankle DF: 4-/5   L Ankle DF: 4-/5  - R Ankle PF: 4/5   L Ankle PF: 4/5    Sensation  - Light touch: Impaired at feet  - Deep pressure: WNL  - Temperature: Inconsistent    Gait  - Abnormalities: wide SHRUTHI, decreased step length bilaterally, slight shuffle       Outcome Measures Initial Eval  5/14 6/27 8/6   5xSTS 15.46 sec 11.58 sec no hands 18.84 sec no hands   TUG  - Regular  - Cognitive   18.92 sec   Sec  37.18 sec     13.95 sec no AD  16.67 sec no AD (ice cream flavors)   10.85 sec no AD  13.09 sec no AD (naming animals)     10 meter   NV  10/14.62 = 0.68 m/s 0.72 m/s   CONNELL 26/56 42/56 45/56   FGA NV 15/30 18/30   6MWT NV ft 2MWT: 250 ft no AD (gait belt) able to walk for 2.5 mins total 2MWT: 260' no AD          Precautions: Standard fall risk  Past Medical History:   Diagnosis Date    Cancer (HCC) 2011    lung, upper left lung lobectomy    Hyperlipidemia     Hypertension     IBS (irritable bowel syndrome)

## 2024-08-06 NOTE — PROGRESS NOTES
"Daily Speech Treatment Note    Today's date: 2024   Patient’s name: Jacquelyn Fritz  : 1960  MRN: [id number]  Safety measures:   Referring provider: Lorena Norris MD    Encounter Diagnosis     ICD-10-CM    1. Aphasia  R47.01       2. Speech disturbance, unspecified type  R47.9         Visit Tracking:   POC expires Unit limit Auth Expiration date PT/OT + Visit Limit?   24 N/A   NO COG CODES 24 BOMN    24                                       Visit/Unit Tracking  AUTH Status:  Date   IE  6/4 6/6 6/10 6/13  PU    RE     no Used 1 2 3 4 5 6 7  1  2  3  4 1 2     Remaining  17 16 15 14 13 12 11  9  8  7  6 9 8       Subjective/Behavioral:  -Patient reports last week she was \"dealing with a stomach bug\"     Objective/Assessment:      Short-term goals:  LISTENING    Patient will answer verbal Yes/No questions with 80% accuracy to facilitate increased auditory comprehension skills,       Patient will complete naming of automatic tasks (e.g., counting, listing days of the week/months of the year, etc.) with 100% accuracy to facilitate increased verbal expression skills,     Patient will imitate words/phrases using AMAN/tapping/pacing with 80% accuracy to facilitate increased verbal expression skills,     Patient will name appropriate category for three words (e.g., apple, banana, pear = fruits) with 80% accuracy to facilitate improved generative naming skills,      Patient will provide an appropriate word for sentence completion task (e.g., open the ___) with 80% accuracy to facilitate increased expressive language skills,      Patient will provide an adequate response to generative naming task (i.e., name as many…) with 80% accuracy to facilitate increased expressive language skills,     Patient will provide an adequate response to confrontation naming task (i.e., what is…) with 80% accuracy to facilitate increased expressive language skills,  "   Patient will name up to 5 features to describe a person/place/thing with 80% accuracy to facilitate improved utilization of circumlocution strategy,      Patient will complete picture description tasks using language organization strategies with 80% accuracy to facilitate improved utilization of circumlocution strategy       Patient will be educated on word finding strategies (i.e., circumlocution) for improved generative naming and verbal expression skills.   To target mental manipulation and working memory, patient will participate in word finding activity (i.e., anagrams) with 80% accuracy      Completing Words with the last 2 letters of the word missing and patient to complete the word (ex. Promi _ _= promise) Task completed in 37/40 opp (92% acc) independently, increasing to 40/40 opp (100% acc) from min cueing. Patient benefited from visual model to correct errors and semantic cues for words.    Patient will complete concrete and abstract categorization tasks to 80% accuracy to facilitate improved generative naming skills and working memory     Patient will name an average of 15+ items in a category in 60 seconds over 5 trials using compensatory strategies and min cues to facilitate improved word retrieval skills       *Patient named 13 concrete category members (fruits) in 60 sec (norm=15+). -- BELOW AVERAGE  *Patient named 11 concrete category members (vegetables) in 60 sec (norm=15+). -- BELOW AVERAGE  *Patient named 17 concrete category members (animals) in 60 sec (norm=15+). -- AVERAGE      Patient independently visualized the  grocery store to aid with fruits/vegetables and visualizing the jungle to aid with animal generation.    Patient will name an average of 10+ words that begin with a specific letter in 60 seconds over 5 trials using compensatory strategies and min cues to facilitate improved word retrieval skills      *Patient named 11 abstract category members (words beginning with letter 'm') in  60 sec (norm=10+). -- AVERAGE    *Patient named 12 abstract category members (words beginning with letter 'b') in 60 sec (norm=10+). -- AVERAGE    Patient independently utilizes strategies word roots to aid with word generation!    Plan:  -Patient was provided with home exercises/activities to target goals in plan of care at the end of today's session.  -Continue with current plan of care.

## 2024-08-07 ENCOUNTER — OFFICE VISIT (OUTPATIENT)
Age: 64
End: 2024-08-07
Payer: COMMERCIAL

## 2024-08-07 VITALS
TEMPERATURE: 98.1 F | DIASTOLIC BLOOD PRESSURE: 80 MMHG | BODY MASS INDEX: 28.74 KG/M2 | WEIGHT: 156.2 LBS | OXYGEN SATURATION: 97 % | SYSTOLIC BLOOD PRESSURE: 114 MMHG | HEART RATE: 96 BPM | RESPIRATION RATE: 16 BRPM | HEIGHT: 62 IN

## 2024-08-07 DIAGNOSIS — Z78.9 ALCOHOL USE: ICD-10-CM

## 2024-08-07 DIAGNOSIS — E55.9 VITAMIN D INSUFFICIENCY: ICD-10-CM

## 2024-08-07 DIAGNOSIS — R45.89 FEELING LONELY: ICD-10-CM

## 2024-08-07 DIAGNOSIS — E78.2 MIXED HYPERLIPIDEMIA: ICD-10-CM

## 2024-08-07 DIAGNOSIS — I10 ESSENTIAL HYPERTENSION: Primary | ICD-10-CM

## 2024-08-07 DIAGNOSIS — R41.3 MEMORY CHANGE: ICD-10-CM
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
## 2024-08-07 DIAGNOSIS — Z13.6 ENCOUNTER FOR LIPID SCREENING FOR CARDIOVASCULAR DISEASE: ICD-10-CM

## 2024-08-07 DIAGNOSIS — M25.512 ACUTE PAIN OF LEFT SHOULDER: ICD-10-CM

## 2024-08-07 DIAGNOSIS — R25.1 TREMOR: ICD-10-CM

## 2024-08-07 DIAGNOSIS — Z13.220 ENCOUNTER FOR LIPID SCREENING FOR CARDIOVASCULAR DISEASE: ICD-10-CM

## 2024-08-07 DIAGNOSIS — G62.9 NEUROPATHY: ICD-10-CM

## 2024-08-07 PROBLEM — E87.6 HYPOKALEMIA: Status: RESOLVED | Noted: 2019-11-09 | Resolved: 2024-08-07

## 2024-08-07 PROCEDURE — G2211 COMPLEX E/M VISIT ADD ON: HCPCS | Performed by: FAMILY MEDICINE

## 2024-08-07 PROCEDURE — 99214 OFFICE O/P EST MOD 30 MIN: CPT | Performed by: FAMILY MEDICINE

## 2024-08-07 RX ORDER — PREGABALIN 75 MG/1
75 CAPSULE ORAL
Qty: 100 CAPSULE | Refills: 1 | Status: SHIPPED | OUTPATIENT
Start: 2024-08-07

## 2024-08-07 NOTE — PROGRESS NOTES
On license of [facility] PRIMARY CARE  Ambulatory visit     Name: Jacquelyn Fritz   YOB: 1960   MRN: [id number]  Encounter Provider: Zander Feliciano MD    Encounter Date: 8/7/2024    ASSESSMENT & PLAN    Assessment & Plan   Essential hypertension/mixed hyperlipidemia  Blood Pressure: 114/80    Currently on amlodipine 5 mg atorvastatin 20 mg combination pill  Advise low-sodium, low-carb diet     Alcohol use/memory changes/neuropathy/falls  Following neurology for memory difficulties, correlates symptoms with alcohol use including vitamin deficiencies.  Today discussed patient's alcohol use and recommendations with avoiding alcohol and its risks and complications.  Will continue to monitor, continue following neurology.  Continue to work with PT and speech therapy.  Reports has not been drinking for about 4-6 months ago.   Started Pregabalin 75 mg at night time to help with neuropathy, discussed side effects.     Adhesive capsulitis of left shoulder/closed fracture of left tibial plateau/falls  Currently working with physical therapy and following sports medicine.    Continue care with specialist.    Vitamin B12, D   Discontinue and follow up with blood work as discussed.     Feeling lonely  Feels very lonely.  Has family members in the area and in [place], [place].  Does not like to bother them.  Does not have any friends, would like to make friends.  Reports does not like the churches around the area.  Has tried going to a few different ones.  Discussed getting involved with community nearby, looked at community centers near patient in [place].  [facility], provided address and phone number for patient to call in a printout.  Follow-up in 2 months for Medicare annual wellness.  Follow-up with blood work prior to next ointment.              DIAGNOSIS & ORDERS   1. Essential hypertension  -     Comprehensive metabolic panel; Future; Expected date: 08/07/2024  -     CBC and  differential; Future; Expected date: 08/07/2024  -     TSH + Free T4; Future  2. Mixed hyperlipidemia  -     TSH + Free T4; Future  3. Memory change  -     TSH + Free T4; Future  4. Tremor  5. Acute pain of left shoulder  6. Alcohol use  7. Neuropathy  -     pregabalin (LYRICA) 75 mg capsule; Take 1 capsule (75 mg total) by mouth daily at bedtime  8. Fall, sequela  9. Feeling lonely  10. Vitamin D insufficiency  11. Encounter for lipid screening for cardiovascular disease  -     Lipid Panel with Direct LDL reflex; Future; Expected date: 08/07/2024    FOLLOW-UP PLANS   Return in about 2 months (around 10/7/2024) for Medicare Annual Wellness.    Current Medication List:     Current Outpatient Medications:   •  albuterol (2.5 mg/3 mL) 0.083 % nebulizer solution, Take 3 mL (2.5 mg total) by nebulization every 6 (six) hours as needed for wheezing or shortness of breath As needed, Disp: , Rfl:   •  albuterol (ProAir HFA) 90 mcg/act inhaler, Inhale 2 puffs every 6 (six) hours as needed for wheezing or shortness of breath, Disp: , Rfl:   •  amLODIPine-atorvastatin (CADUET) 5-20 MG per tablet, Take 1 tablet by mouth daily, Disp: 90 tablet, Rfl: 3  •  fluticasone-salmeterol (ADVAIR HFA) 115-21 MCG/ACT inhaler, INHALE 2 PUFFS BY MOUTH 2 TIMES A DAY RINSE MOUTH AFTER USE., Disp: 12 g, Rfl: 3  •  folic acid (KP Folic Acid) 1 mg tablet, Take 1 tablet (1 mg total) by mouth daily, Disp: 90 tablet, Rfl: 3  •  hydrocortisone (ANUSOL-HC) 25 mg suppository, Insert 1 suppository (25 mg total) into the rectum 2 (two) times a day, Disp: 12 suppository, Rfl: 0  •  Icosapent Ethyl 1 g CAPS, TAKE 1 CAPSULE BY MOUTH 2 TIMES A DAY., Disp: 180 capsule, Rfl: 1  •  pantoprazole (PROTONIX) 40 mg tablet, Take 1 tablet (40 mg total) by mouth daily, Disp: 90 tablet, Rfl: 1  •  pregabalin (LYRICA) 75 mg capsule, Take 1 capsule (75 mg total) by mouth daily at bedtime, Disp: 100 capsule, Rfl: 1  •  pyridoxine (VITAMIN B6) 100 mg tablet, Take 1 tablet  "(100 mg total) by mouth daily, Disp: 90 tablet, Rfl: 0  •  tiZANidine (ZANAFLEX) 4 mg tablet, Take 1 tablet (4 mg total) by mouth 2 (two) times a day as needed for muscle spasms, Disp: 60 tablet, Rfl: 0  •  Magnesium 250 MG TABS, Take by mouth (Patient not taking: Reported on 8/7/2024), Disp: , Rfl:   Subjective   History of Present Illness       Jacquelyn Fritz is here for an office visit.   HPI    Review of Systems    Objective:     /80 (BP Location: Left arm, Patient Position: Sitting, Cuff Size: Standard)   Pulse 96   Temp 98.1 °F (36.7 °C) (Tympanic)   Resp 16   Ht 5' 2\" (1.575 m)   Wt 70.9 kg (156 lb 3.2 oz)   SpO2 97%   BMI 28.57 kg/m²      Physical Exam  Vitals reviewed.   Constitutional:       General: She is not in acute distress.     Appearance: Normal appearance. She is not ill-appearing, toxic-appearing or diaphoretic.   HENT:      Head: Normocephalic and atraumatic.      Right Ear: External ear normal.      Left Ear: External ear normal.      Nose: No congestion or rhinorrhea.   Eyes:      General: No scleral icterus.        Right eye: No discharge.         Left eye: No discharge.      Conjunctiva/sclera: Conjunctivae normal.   Cardiovascular:      Rate and Rhythm: Normal rate.   Pulmonary:      Effort: Pulmonary effort is normal. No respiratory distress.   Neurological:      General: No focal deficit present.      Mental Status: She is alert and oriented to person, place, and time.   Psychiatric:         Mood and Affect: Mood normal.         Behavior: Behavior normal.         Thought Content: Thought content normal.           Zander Feliciano MD  Family Medicine Physician   [facility] [place]        Administrative Statements     "

## 2024-08-08 ENCOUNTER — OFFICE VISIT (OUTPATIENT)
Age: 64
End: 2024-08-08
Payer: COMMERCIAL
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
## 2024-08-08 DIAGNOSIS — R26.89 BALANCE PROBLEM: Primary | ICD-10-CM

## 2024-08-08 DIAGNOSIS — R53.1 WEAKNESS: ICD-10-CM

## 2024-08-08 PROCEDURE — 97112 NEUROMUSCULAR REEDUCATION: CPT

## 2024-08-08 NOTE — PROGRESS NOTES
"Daily Note     Today's date: 2024  Patient name: Jacquelyn Fritz  : 1960  MRN: [id number]  Referring provider: Lorena Norris MD  Dx:   Encounter Diagnosis     ICD-10-CM    1. Balance problem  R26.89       2. Weakness  R53.1       3. Fall, subsequent encounter  W19.XXXD         POC expires Unit limit Auth Expiration date PT/OT + Visit Limit? Co-Insurance   24 BOMN  BOMN No                               Visit/Unit Tracking  5/14 5/16 5/23 6/4 6/10 6/13 6/17 6/27- [place]                                        Subjective: Patient denies falls or pain currently. Reports taking BP medication as prescribed. Saw PCP last night and BP was good.      Objective: See treatment diary below. PT verbal and tactile cues for technique and progression.    Pre-treatment: 116/86 seated R arm, manual        NMR:   - Sit to stands with 15# TT 3x10  - HKM 4 laps at long bar holding 5# TT with gentle UE support   - Bwd amb 4 laps at long bar holding 5# TT with 1 UE as needed   - Fwd/bwd 5 laps at long bar holding 5# TT with gentle UE support   - Semi-tandem amb, 5 laps with 1 UE support and 5# TT  - Lateral amb, 5 laps  holding 5# TT with 1 UE as needed  - FTEC firm 60\" (foam NV)  - Stool taps x20, 0 UE support    Assessment: Patient tolerated treatment well. Requested 5# TT instead of 7.5# today. Less reliance on UE support throughout session; 0UE w/ toe taps . She can continue to benefit from skilled PT.       Plan: Continue per plan of care.           "

## 2024-08-13 ENCOUNTER — TELEPHONE (OUTPATIENT)
Age: 64
End: 2024-08-13

## 2024-08-13 ENCOUNTER — OFFICE VISIT (OUTPATIENT)
Age: 64
End: 2024-08-13
Payer: COMMERCIAL

## 2024-08-13 DIAGNOSIS — R53.1 WEAKNESS: ICD-10-CM

## 2024-08-13 DIAGNOSIS — R26.89 BALANCE PROBLEM: Primary | ICD-10-CM

## 2024-08-13 DIAGNOSIS — R47.9 SPEECH DISTURBANCE, UNSPECIFIED TYPE: ICD-10-CM
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
## 2024-08-13 DIAGNOSIS — R47.01 APHASIA: Primary | ICD-10-CM

## 2024-08-13 PROCEDURE — 92507 TX SP LANG VOICE COMM INDIV: CPT

## 2024-08-13 PROCEDURE — 97112 NEUROMUSCULAR REEDUCATION: CPT | Performed by: PHYSICAL THERAPIST

## 2024-08-13 NOTE — PROGRESS NOTES
Daily Speech Treatment Note    Today's date: 2024   Patient’s name: Jacquelyn Fritz  : 1960  MRN: [id number]  Safety measures:   Referring provider: Lorena Norris MD    Encounter Diagnosis     ICD-10-CM    1. Aphasia  R47.01       2. Speech disturbance, unspecified type  R47.9           Visit Tracking:   POC expires Unit limit Auth Expiration date PT/OT + Visit Limit?   24 N/A   NO COG CODES 24 BOMN    24                                       Visit/Unit Tracking  AUTH Status:  Date   IE 5/16 5/23 6/4 6/6 6/10 6/13  PU    RE     no Used 1 2 3 4 5 6 7  1  2  3  4 1 2 3     Remaining  17 16 15 14 13 12 11  9  8  7  6 9 8 7       Subjective/Behavioral:  Patient reported no new concerns for today's session.      Objective/Assessment:  Short-term goals:  LISTENING    Patient will answer verbal Yes/No questions with 80% accuracy to facilitate increased auditory comprehension skills,       Patient will complete naming of automatic tasks (e.g., counting, listing days of the week/months of the year, etc.) with 100% accuracy to facilitate increased verbal expression skills,     Patient will imitate words/phrases using AMAN/tapping/pacing with 80% accuracy to facilitate increased verbal expression skills,     Patient will name appropriate category for three words (e.g., apple, banana, pear = fruits) with 80% accuracy to facilitate improved generative naming skills,      Patient will provide an appropriate word for sentence completion task (e.g., open the ___) with 80% accuracy to facilitate increased expressive language skills,      Patient will provide an adequate response to generative naming task (i.e., name as many…) with 80% accuracy to facilitate increased expressive language skills,     Patient will provide an adequate response to confrontation naming task (i.e., what is…) with 80% accuracy to facilitate increased expressive language skills,       Patient will name up to 5 features to describe a person/place/thing with 80% accuracy to facilitate improved utilization of circumlocution strategy,          Patient will complete picture description tasks using language organization strategies with 80% accuracy to facilitate improved utilization of circumlocution strategy     Utilizing SFA strategy: patient was given pictures and had to describe the word. Task completed in 4/9 opp (44% acc) from min cueing, increasing to 8/9 opp (88% acc) from mod-max cueing. Patient benefited from visual aid of SFA, verbal prompting to utilize SFA strategy and to utilize more specific decriptors.      Patient will be educated on word finding strategies (i.e., circumlocution) for improved generative naming and verbal expression skills.     Patient was educated on word-finding strategies such as the Semantic Feature Analysis (SFA). Provided patient a handout of SFA and provided verbal model of how to utilize SFA. Provided education of the purpose of SFA which is to aid with word-finding and decrease communication breakdowns. Provided education to utilize SFA strategy in instances of anomia. Reciprocal comprehension noted.        To target mental manipulation and working memory, patient will participate in word finding activity (i.e., anagrams) with 80% accuracy      Anagrams: Task completed in 28/30 opp (93% acc) independently, increasing to 30/30 opp (100% acc) from min cueing. Patient benefited from phonemic cues.    Patient will complete concrete and abstract categorization tasks to 80% accuracy to facilitate improved generative naming skills and working memory     Patient will name an average of 15+ items in a category in 60 seconds over 5 trials using compensatory strategies and min cues to facilitate improved word retrieval skills         Patient will name an average of 10+ words that begin with a specific letter in 60 seconds over 5 trials using compensatory strategies  and min cues to facilitate improved word retrieval skills      Plan:  -Patient was provided with home exercises/activities to target goals in plan of care at the end of today's session.  -Continue with current plan of care.

## 2024-08-13 NOTE — PROGRESS NOTES
"Daily Note     Today's date: 2024  Patient name: Jacquelyn Fritz  : 1960  MRN: [id number]  Referring provider: Lorena Norris MD  Dx:   No diagnosis found.    POC expires Unit limit Auth Expiration date PT/OT + Visit Limit? Co-Insurance   24 BOMN  BOMN No                               Visit/Unit Tracking   /6/4 6/10 6/13 6/17 6/27- [place]                                       Subjective: Patient denies falls or pain currently. Reports taking BP medication as prescribed.       Objective: See treatment diary below. PT verbal and tactile cues for technique and progression.    Pre-treatment: 118/84 seated R arm, manual        NMR:   Exercises completed with 2# ankle wts on both legs.   - Sit to stands with 15# TT 3x10  - HKM 4 laps at long bar holding 5# TT with gentle UE support   - Bwd amb 4 laps at long bar holding 5# TT with 1 UE as needed   - Fwd/bwd 5 laps at long bar holding 5# TT with gentle UE support   - Semi-tandem amb, 5 laps with 1 UE support and 5# TT  - Lateral amb, 5 laps  holding 5# TT with 1 UE as needed  - FTEC firm 60\" , 2 sets (foam)  - Stool taps x20, 0 UE support    Assessment: Patient tolerated treatment well. Requested 5# TT instead of 7.5# today. Addition of 2# ankle wts to BL legs. Continues to require cuing to reduce reliance on UE support throughout session. She can continue to benefit from skilled PT.       Plan: Continue per plan of care.           "

## 2024-08-15 ENCOUNTER — OFFICE VISIT (OUTPATIENT)
Age: 64
End: 2024-08-15
Payer: COMMERCIAL

## 2024-08-15 DIAGNOSIS — R47.01 APHASIA: Primary | ICD-10-CM

## 2024-08-15 DIAGNOSIS — R26.89 BALANCE PROBLEM: Primary | ICD-10-CM
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
## 2024-08-15 DIAGNOSIS — R53.1 WEAKNESS: ICD-10-CM

## 2024-08-15 DIAGNOSIS — R47.9 SPEECH DISTURBANCE, UNSPECIFIED TYPE: ICD-10-CM

## 2024-08-15 PROCEDURE — 92507 TX SP LANG VOICE COMM INDIV: CPT

## 2024-08-15 PROCEDURE — 97112 NEUROMUSCULAR REEDUCATION: CPT

## 2024-08-15 NOTE — PROGRESS NOTES
Daily Speech Treatment Note    Today's date: 8/15/2024   Patient’s name: Jacquelyn Fritz  : 1960  MRN: [id number]  Safety measures:   Referring provider: Lorena Norris MD    Encounter Diagnosis     ICD-10-CM    1. Aphasia  R47.01       2. Speech disturbance, unspecified type  R47.9             Visit Tracking:   POC expires Unit limit Auth Expiration date PT/OT + Visit Limit?   24 N/A   NO COG CODES 24 BOMN    24                                       Visit/Unit Tracking  AUTH Status:  Date   IE  6/4 6/6 6/10 6/13  PU    RE  7/19 8/6 8/13 8/15   no Used 1 2 3 4 5 6 7  1  2  3  4 1 2 3      Remaining  17 16 15 14 13 12 11  9  8  7  6 9 8 7        Subjective/Behavioral:  Patient reported no new concerns for today's session.      Objective/Assessment:  Short-term goals:  LISTENING    Patient will answer verbal Yes/No questions with 80% accuracy to facilitate increased auditory comprehension skills,       Patient will complete naming of automatic tasks (e.g., counting, listing days of the week/months of the year, etc.) with 100% accuracy to facilitate increased verbal expression skills,     Patient will imitate words/phrases using AMAN/tapping/pacing with 80% accuracy to facilitate increased verbal expression skills,     Patient will name appropriate category for three words (e.g., apple, banana, pear = fruits) with 80% accuracy to facilitate improved generative naming skills,      Patient will provide an appropriate word for sentence completion task (e.g., open the ___) with 80% accuracy to facilitate increased expressive language skills,      Patient will provide an adequate response to generative naming task (i.e., name as many…) with 80% accuracy to facilitate increased expressive language skills,     Patient will provide an adequate response to confrontation naming task (i.e., what is…) with 80% accuracy to facilitate increased expressive language  "skills,      Patient will name up to 5 features to describe a person/place/thing with 80% accuracy to facilitate improved utilization of circumlocution strategy,          Patient will complete picture description tasks using language organization strategies with 80% accuracy to facilitate improved utilization of circumlocution strategy     Utilizing SFA strategy: patient was given pictures and had to describe the word. Task completed in 6/9 opp (66% acc) from min cueing, increasing to 9/9 opp 100% acc) from mod-max cueing. Patient benefited from visual aid of SFA, verbal prompting to utilize SFA strategy and to utilize more specific decriptors. Improvement from previous session.      Patient will be educated on word finding strategies (i.e., circumlocution) for improved generative naming and verbal expression skills.             To target mental manipulation and working memory, patient will participate in word finding activity (i.e., anagrams) with 80% accuracy          Patient will complete concrete and abstract categorization tasks to 80% accuracy to facilitate improved generative naming skills and working memory     To target word-retrieval and language processing- patient completed the game \"anomia.\" Patient was given cards with abstract and concrete categories and had to identify items in that category. Patient began at level 7 and was given 1 minute for each trial.  Trial 1--> 9/11 opp (81% acc) independently increasing to 100% acc from min semantic & phonemic cues  Trial 2--> 10/10 opp (100% acc) independently.        Patient will name an average of 15+ items in a category in 60 seconds over 5 trials using compensatory strategies and min cues to facilitate improved word retrieval skills         Patient will name an average of 10+ words that begin with a specific letter in 60 seconds over 5 trials using compensatory strategies and min cues to facilitate improved word retrieval skills      Plan:  -Patient was " provided with home exercises/activities to target goals in plan of care at the end of today's session.  -Continue with current plan of care.

## 2024-08-15 NOTE — PROGRESS NOTES
"Daily Note     Today's date: 8/15/2024  Patient name: Jacquelyn Fritz  : 1960  MRN: [id number]  Referring provider: Lorena Norris MD  Dx:   Encounter Diagnosis     ICD-10-CM    1. Balance problem  R26.89       2. Weakness  R53.1       3. Fall, subsequent encounter  W19.XXXD           POC expires Unit limit Auth Expiration date PT/OT + Visit Limit? Co-Insurance   24 BOMN  BOMN No                               Visit/Unit Tracking   /6/4 6/10 6/13 6/17 6/27- [place] 7/9 7/16 7/19 7/23   7/25 8/6 8/8 8/13 8/15                                     Subjective: Patient reports fall \"a couple days ago\" but unable to discern specifics upon questioning. Denies head impact and/or injury. Unsure of cause.      Objective: See treatment diary below. PT verbal and tactile cues for technique and progression.    Pre-treatment: 128/68 seated L arm, manual        NMR:   Exercises completed with 3# ankle wts on both legs.   - Sit to stands with 5# TT 3x10  - HKM 4 laps at long bar holding 5# TT with intermittent UE support   - Speed amb with 5# TT hold and L HHA and VC to inc speed 500' x 2  - Fwd/bwd 5 laps at long bar holding 5# TT with no UE support   - Bwd amb 4 laps at long bar holding 5# TT with 1 UE as needed   - Semi-tandem amb, 5 laps with 1 UE support and 5# TT    NOT TODAY -  - Lateral amb, 5 laps  holding 5# TT with 1 UE as needed  - FTEC firm 60\" , 2 sets (foam)  - Stool taps x20, 0 UE support    Assessment: Patient tolerated treatment well. Requested 5# TT instead of 7.5# today. Progressed aw from 2# to 3# with good tolerance. Continues to require cuing to reduce reliance on UE support throughout session. She can continue to benefit from skilled PT.       Plan: Continue per plan of care.           "

## 2024-08-19 NOTE — TELEPHONE ENCOUNTER
PA for Pregabalin 75 mg SUBMITTED     via    []M-KEY:   [x]Prashant-Case ID # [id number]   []Faxed to plan   []Other website   []Phone call Case ID #     Office notes sent, clinical questions answered. Awaiting determination    Turnaround time for your insurance to make a decision on your Prior Authorization can take 7-21 business days.

## 2024-08-20 NOTE — TELEPHONE ENCOUNTER
PA for Pregabalin 75 mg Denied    Reason:(Screenshot if applicable)        Message sent to office clinical pool Yes    Denial letter scanned into Media Yes    Appeal started No ( Provider will need to decide if appeal is warranted and send clinical documentation to Prior Authorization Team for initiation.)    **Please follow up with your patient regarding denial and next steps**

## 2024-08-24 DIAGNOSIS — D53.9 MACROCYTIC ANEMIA: ICD-10-CM

## 2024-08-25 RX ORDER — PANTOPRAZOLE SODIUM 40 MG/1
40 TABLET, DELAYED RELEASE ORAL DAILY
Qty: 90 TABLET | Refills: 1 | Status: SHIPPED | OUTPATIENT
Start: 2024-08-25

## 2024-08-27 ENCOUNTER — EVALUATION (OUTPATIENT)
Age: 64
End: 2024-08-27
Payer: COMMERCIAL

## 2024-08-27 ENCOUNTER — APPOINTMENT (OUTPATIENT)
Age: 64
End: 2024-08-27
Payer: COMMERCIAL

## 2024-08-27 DIAGNOSIS — R47.01 APHASIA: Primary | ICD-10-CM

## 2024-08-27 DIAGNOSIS — R47.9 SPEECH DISTURBANCE, UNSPECIFIED TYPE: ICD-10-CM

## 2024-08-27 PROCEDURE — 96125 COGNITIVE TEST BY HC PRO: CPT

## 2024-08-27 NOTE — PROGRESS NOTES
Speech-Language Pathology Re-Evaluation/Discharge    Today's date: 2024   Patient’s name: Jacquelyn Fritz  : 1960  MRN: [id number]  Safety measures: fall risk  Referring provider: Lorena Norris MD    Encounter Diagnosis     ICD-10-CM    1. Aphasia  R47.01       2. Speech disturbance, unspecified type  R47.9           Assessment:   Patient presents with improved expressive/receptive language deficits. Patient reports increased verbal fluency of speech since initiation o OP ST services.Patient consistently completed HEP and utilized word-finding strategies.Patient made and sustained good progress towards goals in plan of care and suspected to reach maximum potential for discharge. Patient to be discharged to an independent Home Exercise Program.     Short-term goals: (6-8 weeks)  LISTENING     Patient will answer verbal Yes/No questions with 80% accuracy to facilitate increased auditory comprehension skills,MAX POTENTIAL ACHIEVED/DC        Patient will complete naming of automatic tasks (e.g., counting, listing days of the week/months of the year, etc.) with 100% accuracy to facilitate increased verbal expression skills, MET     Patient will imitate words/phrases using AMAN/tapping/pacing with 80% accuracy to facilitate increased verbal expression skills, MET     Patient will name appropriate category for three words (e.g., apple, banana, pear = fruits) with 80% accuracy to facilitate improved generative naming skills, MET     Patient will provide an appropriate word for sentence completion task (e.g., open the ___) with 80% accuracy to facilitate increased expressive language skills,  MET     Patient will provide an adequate response to generative naming task (i.e., name as many…) with 80% accuracy to facilitate increased expressive language skills, MAX POTENTIAL ACHIEVED/DC     Patient will provide an adequate response to confrontation naming task (i.e., what is…) with 80% accuracy to facilitate  increased expressive language skills,  MET  Patient will name up to 5 features to describe a person/place/thing with 80% accuracy to facilitate improved utilization of circumlocution strategy,  MET  Patient will complete picture description tasks using language organization strategies with 80% accuracy to facilitate improved utilization of circumlocution strategy MET        Patient will be educated on word finding strategies (i.e., circumlocution) for improved generative naming and verbal expression skills. MET  To target mental manipulation and working memory, patient will participate in word finding activity (i.e., anagrams) with 80% accuracy  MET        Patient will complete concrete and abstract categorization tasks to 80% accuracy to facilitate improved generative naming skills and working memory MET     Patient will name an average of 15+ items in a category in 60 seconds over 5 trials using compensatory strategies and min cues to facilitate improved word retrieval skills MAX POTENTIAL ACHIEVED/DC     Patient will name an average of 10+ words that begin with a specific letter in 60 seconds over 5 trials using compensatory strategies and min cues to facilitate improved word retrieval skillsMAX POTENTIAL ACHIEVED/DC   ONGOING     Long Term Goal     Patient will demonstrate improved expressive and receptive language skills during structured and unstructured tasks by discharge. MAX POTENTIAL ACHIEVED/DC     Patient will improve ability to facilitate communication to meet needs including use of compensatory strategies to promote meaningful interactions for improved quality of life and maximize level of independence. MAX POTENTIAL ACHIEVED/DC      Plan:  Recommendations:  -Patient to be discharged from outpatient skilled Speech Therapy services: Patient made good progress toward goals in plan of care and has reached maximum potential. Patient to be discharged to an independent Home Exercise Program.    -Frequency:  "No treatment is warranted at this time.  -Duration: N/A        Subjective:  Patient reported no new concerns for today's session.     Patient's goal(s): \"to speak fluently\"      Objective (testing):  The Western Aphasia Battery-Revised (WAB-R) is designed to evaluate a patient's language function following CVA, dementia, or other acquired neurological disorder. It measures a patient’s linguistic skills, such as speech content, fluency, auditory comprehension, repetition, naming, reading, and writing. The WAB-R also measures a patient’s nonlinguistic skills, including drawing, calculation, block design, and apraxia. The purpose of this standardized assessment is to (1) determine the presence, severity, and type of aphasia; (2) measure the patient's level of performance to provide a baseline for detecting change over time; (3) provide a comprehensive assessment of the patient's language assets and deficits in order to guide treatment and management; and (4) infer the location and etiology of the lesion causing aphasia. 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    Due to time constraints, only portions of the standardized assessment were administered on this date of service.    PART 2: Score: RE: Status:   -Reading Score: 19.2 19.6 DECLINE   -Writin/20 18 IMPROVEMENT        Score: RE: Status:   *Aphasia Quotient (AQ): 96/100 92.9 IMPROVEMENT   *Language Quotient (LQ): 96/100 93.8 IMPROVEMENT     “RE” indicates the scores from the re-evaluation (2024).      Visit Tracking:   POC expires Unit limit Auth Expiration date PT/OT + Visit Limit?   24 N/A   NO COG CODES 24 BOMN    " 9/16/24                                       Visit/Unit Tracking  AUTH Status:  Date 5/14  IE 5/16 5/23 6/4 6/6 6/10 6/13  PU  6/17 6/21 6/27 7/16  RE  7/19 8/6 8/13 8/15 8/27  [facility]   no Used 1 2 3 4 5 6 7  1  2  3  4 1 2 3       Remaining  17 16 15 14 13 12 11  9  8  7  6 9 8 7         Intervention comments:  45 mins of aphasia testing, 50 mins of scoring,write up

## 2024-08-29 ENCOUNTER — APPOINTMENT (OUTPATIENT)
Age: 64
End: 2024-08-29
Payer: COMMERCIAL

## 2024-09-16 ENCOUNTER — TELEPHONE (OUTPATIENT)
Dept: NEUROLOGY | Facility: CLINIC | Age: 64
End: 2024-09-16

## 2024-09-17 ENCOUNTER — TELEPHONE (OUTPATIENT)
Dept: NEUROLOGY | Facility: CLINIC | Age: 64
End: 2024-09-17

## 2024-09-18 ENCOUNTER — OFFICE VISIT (OUTPATIENT)
Dept: NEUROLOGY | Facility: CLINIC | Age: 64
End: 2024-09-18
Payer: COMMERCIAL

## 2024-09-18 VITALS
SYSTOLIC BLOOD PRESSURE: 92 MMHG | WEIGHT: 156 LBS | DIASTOLIC BLOOD PRESSURE: 60 MMHG | HEIGHT: 62 IN | OXYGEN SATURATION: 95 % | HEART RATE: 80 BPM | BODY MASS INDEX: 28.71 KG/M2

## 2024-09-18 DIAGNOSIS — G62.9 NEUROPATHY: ICD-10-CM

## 2024-09-18 DIAGNOSIS — R47.1 DYSARTHRIA: ICD-10-CM
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
## 2024-09-18 DIAGNOSIS — R41.3 MEMORY DIFFICULTIES: Primary | ICD-10-CM

## 2024-09-18 PROCEDURE — 99214 OFFICE O/P EST MOD 30 MIN: CPT | Performed by: PSYCHIATRY & NEUROLOGY

## 2024-09-18 NOTE — PROGRESS NOTES
Jacquelyn Fritz is a 63 y.o. female.   Chief Complaint   Patient presents with    Tremors    Memory difficulties    Peripheral Neuropathy       Assessment:    1. Memory difficulties    2. Dysarthria    3. Neuropathy    4. Fall, subsequent encounter       Plan:  Neuropsych testing.  Follow-up in 4 months.    Discussion:  Patient's MRI scan of the brain results from 5/28/2024 were  reviewed with the patient hippocampal volume was within normal limits although there is abnormal hippocampal occupancy score and large inferior lateral ventricles recommend reevaluation with neuro quant imaging in 6 months.    EMG showed chronic L5-S1 radiculopathy without evidence of any peripheral neuropathy, rotted ultrasound less than 50% stenosis, MND was greater than 120 and vitamin B12 was 3927.    Patient was advised to stop vitamin B12 and vitamin D replacement and follow-up with PCP regarding that, she does have mild cognitive impairment with some stuttering of the speech I would formally recommend her to have a neuropsych testing I think she does have underlying cognitive issues I discussed with her regarding medications like Aricept and Exelon she would like to hold off on that.  She was advised to eat healthy nutritious diet and to be under constant supervision since she is at increased risk of fall and with cognitive issues the family understands that.  Patient was advised to use a cane for balance.    She was advised to follow-up with orthopedic surgery regarding her left shoulder injury and discomfort.    To keep her blood pressure, cholesterol and sugar under control.  To take fall and safety precautions.  Continue with mentally stimulating exercises and speech therapy to go to the hospital if has any worsening symptoms and call me otherwise to see me back in 4 months or sooner if needed and follow-up with the other physicians    Subjective:    HPI   Patient is here accompanied with her daughter in follow-up for her  "history of falls and memory difficulty, since her last visit according to the patient and the daughter she has been doing well she has not had any falls she did go for physical therapy and speech therapy that seems to have helped her she still continues to have some stuttering of her speech.    She still has short-term memory issues and lives by herself but does not drive there is a questionable history of heavy alcohol use but according to the patient she says that she used to drink socially and not heavy, she denies any numbness or tingling in her feet and also denies tremor, no history of seizures.  She does have some progressively speech difficulties but it has been stable no vision difficulties no bowel and bladder incontinence she denies any neck or low back pain she is able to do her ADLs sleep is good, no other complaints.    Vitals:    09/18/24 1358   BP: 92/60   BP Location: Left arm   Patient Position: Sitting   Cuff Size: Large   Pulse: 80   SpO2: 95%   Weight: 70.8 kg (156 lb)   Height: 5' 2\" (1.575 m)        Current Medications    Current Outpatient Medications:     albuterol (2.5 mg/3 mL) 0.083 % nebulizer solution, Take 3 mL (2.5 mg total) by nebulization every 6 (six) hours as needed for wheezing or shortness of breath As needed, Disp: , Rfl:     albuterol (ProAir HFA) 90 mcg/act inhaler, Inhale 2 puffs every 6 (six) hours as needed for wheezing or shortness of breath, Disp: , Rfl:     amLODIPine-atorvastatin (CADUET) 5-20 MG per tablet, Take 1 tablet by mouth daily, Disp: 90 tablet, Rfl: 3    fluticasone-salmeterol (ADVAIR HFA) 115-21 MCG/ACT inhaler, INHALE 2 PUFFS BY MOUTH 2 TIMES A DAY RINSE MOUTH AFTER USE., Disp: 12 g, Rfl: 3    folic acid (KP Folic Acid) 1 mg tablet, Take 1 tablet (1 mg total) by mouth daily, Disp: 90 tablet, Rfl: 3    hydrocortisone (ANUSOL-HC) 25 mg suppository, Insert 1 suppository (25 mg total) into the rectum 2 (two) times a day, Disp: 12 suppository, Rfl: 0    Icosapent " Ethyl 1 g CAPS, TAKE 1 CAPSULE BY MOUTH 2 TIMES A DAY., Disp: 180 capsule, Rfl: 1    pantoprazole (PROTONIX) 40 mg tablet, TAKE 1 TABLET BY MOUTH EVERY DAY, Disp: 90 tablet, Rfl: 1    Magnesium 250 MG TABS, Take by mouth (Patient not taking: Reported on 8/7/2024), Disp: , Rfl:     pregabalin (LYRICA) 75 mg capsule, Take 1 capsule (75 mg total) by mouth daily at bedtime (Patient not taking: Reported on 9/18/2024), Disp: 100 capsule, Rfl: 1    pyridoxine (VITAMIN B6) 100 mg tablet, Take 1 tablet (100 mg total) by mouth daily, Disp: 90 tablet, Rfl: 0    tiZANidine (ZANAFLEX) 4 mg tablet, Take 1 tablet (4 mg total) by mouth 2 (two) times a day as needed for muscle spasms (Patient not taking: Reported on 9/18/2024), Disp: 60 tablet, Rfl: 0      Allergies  Pollen extract    Past Medical History  Past Medical History:   Diagnosis Date    Cancer (HCC) 2011    lung, upper left lung lobectomy    Hyperlipidemia     Hypertension     IBS (irritable bowel syndrome)          Past Surgical History:  Past Surgical History:   Procedure Laterality Date    HYSTERECTOMY      KNEE SURGERY Left     LUNG LOBECTOMY      ORIF TIBIAL PLATEAU Left 11/07/2019    Procedure: OPEN REDUCTION W/ INTERNAL FIXATION (ORIF) TIBIAL PLATEAU;  Surgeon: Marie Deras MD;  Location: [facility];  Service: Orthopedics    WRIST SURGERY           Family History:  Family History   Problem Relation Age of Onset    Diabetes Mother     Diabetes Father     Breast cancer Sister     Sarcoidosis Sister     No Known Problems Sister     No Known Problems Sister     No Known Problems Maternal Grandmother     No Known Problems Paternal Grandmother     Diabetes Brother     Sarcoidosis Brother     No Known Problems Son     No Known Problems Maternal Aunt     No Known Problems Maternal Aunt     No Known Problems Maternal Aunt     No Known Problems Maternal Aunt     No Known Problems Maternal Aunt     No Known Problems Maternal Aunt     No Known Problems Maternal Aunt     No  Known Problems Paternal Aunt     No Known Problems Paternal Aunt     No Known Problems Paternal Aunt     Ovarian cancer Neg Hx     Cervical cancer Neg Hx     Uterine cancer Neg Hx        Social History:   reports that she quit smoking about 15 years ago. Her smoking use included cigarettes. She has never used smokeless tobacco. She reports current alcohol use. She reports that she does not use drugs.    I have reviewed the past medical history, surgical history, social and family history, current medications, allergies vitals, review of systems, and updated this information as appropriate today.   Objective:    Physical Exam    Neurological Exam     GENERAL:  Cooperative in no acute distress. Well-developed and well-nourished     HEAD and NECK   Head is atraumatic normocephalic with no lesions or masses. Neck is supple with full range of motion     CARDIOVASCULAR  Carotid Arteries-no carotid bruits.     NEUROLOGIC:  Mental Status-the patient is awake alert and oriented without aphasia or apraxia, MoCA is 20/30  Cranial Nerves: Visual fields are full to confrontation.  Visual acuity is 20/20 with hand-held chart extraocular movements are full without nystagmus. Pupils are 2-1/2 mm and reactive. Face is symmetrical to light touch. Movements of facial expression move symmetrically. Hearing is normal to finger rub bilaterally. Soft palate lifts symmetrically. Shoulder shrug is symmetrical. Tongue is midline without atrophy.  Motor: No drift is noted on arm extension. Strength is full in the upper and lower extremities with normal bulk and tone.  Sensory: Decreased light touch pinprick temperature sensation in a stocking distribution. Cortical function is intact.  Coordination: Finger to nose testing is performed accurately. Romberg is swaying gait reveals a normal base with symmetrical arm swing.  Reflexes:   1+ and symmetrical    ROS:  Review of Systems   Constitutional:  Negative for appetite change, fatigue and  fever.   HENT: Negative.  Negative for hearing loss, tinnitus, trouble swallowing and voice change.    Eyes: Negative.  Negative for photophobia, pain and visual disturbance.   Respiratory: Negative.  Negative for shortness of breath.    Cardiovascular: Negative.  Negative for palpitations.   Gastrointestinal: Negative.  Negative for nausea and vomiting.   Endocrine: Negative.  Negative for cold intolerance.   Genitourinary: Negative.  Negative for dysuria, frequency and urgency.   Musculoskeletal:  Negative for back pain, gait problem, myalgias, neck pain and neck stiffness.   Skin: Negative.  Negative for rash.   Allergic/Immunologic: Negative.    Neurological: Negative.  Negative for dizziness, tremors, seizures, syncope, facial asymmetry, speech difficulty, weakness, light-headedness, numbness and headaches.   Hematological: Negative.  Does not bruise/bleed easily.   Psychiatric/Behavioral: Negative.  Negative for confusion, hallucinations and sleep disturbance.

## 2024-09-19 ENCOUNTER — TELEPHONE (OUTPATIENT)
Age: 64
End: 2024-09-19

## 2024-09-19 NOTE — TELEPHONE ENCOUNTER
Writer was attempting to call in regards to ROF. Writer was not able to leave a message. Writer was going to let patient know that we can not accept the ROF. And was going to offer to send out outside resources.

## 2024-09-29 ENCOUNTER — HOSPITAL ENCOUNTER (EMERGENCY)
Facility: HOSPITAL | Age: 64
Discharge: HOME | End: 2024-09-29
Payer: COMMERCIAL

## 2024-09-29 VITALS
TEMPERATURE: 97.6 F | OXYGEN SATURATION: 94 % | HEART RATE: 103 BPM | DIASTOLIC BLOOD PRESSURE: 83 MMHG | SYSTOLIC BLOOD PRESSURE: 145 MMHG | RESPIRATION RATE: 18 BRPM

## 2024-09-29 DIAGNOSIS — R04.0 EPISTAXIS: Primary | ICD-10-CM

## 2024-09-29 PROCEDURE — 99283 EMERGENCY DEPT VISIT LOW MDM: CPT

## 2024-09-29 RX ORDER — OXYMETAZOLINE HYDROCHLORIDE 0.05 G/100ML
2 SPRAY NASAL ONCE
Status: COMPLETED | OUTPATIENT
Start: 2024-09-29 | End: 2024-09-29

## 2024-09-29 RX ADMIN — OXYMETAZOLINE HYDROCHLORIDE 2 SPRAY: 0.05 SPRAY NASAL at 07:56

## 2024-09-29 NOTE — DISCHARGE INSTRUCTIONS
Direct pressure for 15 minutes if nose bleeds again  May use Afrin (only for three days)  Add humidifer to bedroom/house  Follow up with ENT doctor  Return to ER if symptoms worsen

## 2024-09-29 NOTE — ED PROVIDER NOTES
Final diagnoses:   Epistaxis     ED Disposition       ED Disposition   Discharge    Condition   Stable    Date/Time   Sun Sep 29, 2024  7:49 AM    Comment   Jacquelyn Fritz discharge to home/self care.                   Assessment & Plan       Medical Decision Making  This patient presents with likely anterior epistasis, which appears to be resolved.  There are no risk factors for bleeding disorders and the patient is hemodynamically stable.  No evidence of anemia.  Afrin given and patient was observed with no active bleeding.  Recommended humidifier and nasal precautions.  Patient discharged with follow-up with ENT.  Return to the ER if symptoms worsens or questions or concerns arise at home.        Risk  OTC drugs.             Medications   oxymetazoline (AFRIN) 0.05 % nasal spray 2 spray (2 sprays Each Nare Given 9/29/24 0756)       ED Risk Strat Scores                                               History of Present Illness       Chief Complaint   Patient presents with    Nose Bleed     Pt arrives via EMS c/o nosebleed that started 1 hr ago, denies injury, -BT. Bleeding has stopped now       Past Medical History:   Diagnosis Date    Cancer (HCC) 2011    lung, upper left lung lobectomy    Hyperlipidemia     Hypertension     IBS (irritable bowel syndrome)       Past Surgical History:   Procedure Laterality Date    HYSTERECTOMY      KNEE SURGERY Left     LUNG LOBECTOMY      ORIF TIBIAL PLATEAU Left 11/07/2019    Procedure: OPEN REDUCTION W/ INTERNAL FIXATION (ORIF) TIBIAL PLATEAU;  Surgeon: Marie Deras MD;  Location: [facility];  Service: Orthopedics    WRIST SURGERY        Family History   Problem Relation Age of Onset    Diabetes Mother     Diabetes Father     Breast cancer Sister     Sarcoidosis Sister     No Known Problems Sister     No Known Problems Sister     No Known Problems Maternal Grandmother     No Known Problems Paternal Grandmother     Diabetes Brother     Sarcoidosis Brother     No Known  Problems Son     No Known Problems Maternal Aunt     No Known Problems Maternal Aunt     No Known Problems Maternal Aunt     No Known Problems Maternal Aunt     No Known Problems Maternal Aunt     No Known Problems Maternal Aunt     No Known Problems Maternal Aunt     No Known Problems Paternal Aunt     No Known Problems Paternal Aunt     No Known Problems Paternal Aunt     Ovarian cancer Neg Hx     Cervical cancer Neg Hx     Uterine cancer Neg Hx       Social History     Tobacco Use    Smoking status: Former     Current packs/day: 0.00     Types: Cigarettes     Quit date: 2009     Years since quitting: 15.7    Smokeless tobacco: Never   Vaping Use    Vaping status: Never Used   Substance Use Topics    Alcohol use: Yes     Comment: occ    Drug use: Never      E-Cigarette/Vaping    E-Cigarette Use Never User       E-Cigarette/Vaping Substances    Nicotine No     THC No     CBD No     Flavoring No     Other No     Unknown No       I have reviewed and agree with the history as documented.     64 y/o female patient presents to the ER for evaluation nose bleed. Patient stated that she started to have a nose bleed over the last two hours. Patient reports that she attempted to stop the bleeding and could not get the bleeding to stop from her right nostril. Patient called 911 due to uncontrolled bleeding. Patient arrived hemostatic. Patient does not take AC/AP daily. No noted trauma/injury to nose.         Review of Systems   Constitutional:  Negative for chills and fever.   HENT:  Positive for nosebleeds. Negative for ear pain and sore throat.    Eyes:  Negative for pain and visual disturbance.   Respiratory:  Negative for cough and shortness of breath.    Cardiovascular:  Negative for chest pain and palpitations.   Gastrointestinal:  Negative for abdominal pain and vomiting.   Genitourinary:  Negative for dysuria and hematuria.   Musculoskeletal:  Negative for arthralgias and back pain.   Skin:  Negative for color change  and rash.   Neurological:  Negative for seizures and syncope.   All other systems reviewed and are negative.          Objective       ED Triage Vitals   Temperature Pulse Blood Pressure Respirations SpO2 Patient Position - Orthostatic VS   09/29/24 0657 09/29/24 0654 09/29/24 0654 09/29/24 0654 09/29/24 0654 09/29/24 0654   97.6 °F (36.4 °C) 105 (!) 168/103 18 97 % Sitting      Temp src Heart Rate Source BP Location FiO2 (%) Pain Score    -- 09/29/24 0654 09/29/24 0654 -- --     Monitor Right arm        Vitals      Date and Time Temp Pulse SpO2 Resp BP Pain Score FACES Pain Rating User   09/29/24 0657 97.6 °F (36.4 °C) -- -- -- -- -- -- KM   09/29/24 0654 -- 105 97 % 18 168/103 -- -- KM            Physical Exam  Vitals and nursing note reviewed.   Constitutional:       General: She is not in acute distress.     Appearance: She is well-developed.   HENT:      Head: Normocephalic and atraumatic.      Nose:      Right Nostril: Epistaxis present.   Eyes:      Conjunctiva/sclera: Conjunctivae normal.   Cardiovascular:      Rate and Rhythm: Normal rate and regular rhythm.      Heart sounds: No murmur heard.  Pulmonary:      Effort: Pulmonary effort is normal. No respiratory distress.      Breath sounds: Normal breath sounds.   Abdominal:      Palpations: Abdomen is soft.      Tenderness: There is no abdominal tenderness.   Musculoskeletal:         General: No swelling.      Cervical back: Neck supple.   Skin:     General: Skin is warm and dry.      Capillary Refill: Capillary refill takes less than 2 seconds.   Neurological:      Mental Status: She is alert.   Psychiatric:         Mood and Affect: Mood normal.         Results Reviewed       None            No orders to display       Procedures    ED Medication and Procedure Management   Prior to Admission Medications   Prescriptions Last Dose Informant Patient Reported? Taking?   Icosapent Ethyl 1 g CAPS  Self No No   Sig: TAKE 1 CAPSULE BY MOUTH 2 TIMES A DAY.    Magnesium 250 MG TABS  Self Yes No   Sig: Take by mouth   Patient not taking: Reported on 8/7/2024   albuterol (2.5 mg/3 mL) 0.083 % nebulizer solution  Self No No   Sig: Take 3 mL (2.5 mg total) by nebulization every 6 (six) hours as needed for wheezing or shortness of breath As needed   albuterol (ProAir HFA) 90 mcg/act inhaler  Self No No   Sig: Inhale 2 puffs every 6 (six) hours as needed for wheezing or shortness of breath   amLODIPine-atorvastatin (CADUET) 5-20 MG per tablet  Self No No   Sig: Take 1 tablet by mouth daily   fluticasone-salmeterol (ADVAIR HFA) 115-21 MCG/ACT inhaler   No No   Sig: INHALE 2 PUFFS BY MOUTH 2 TIMES A DAY RINSE MOUTH AFTER USE.   folic acid (KP Folic Acid) 1 mg tablet  Self No No   Sig: Take 1 tablet (1 mg total) by mouth daily   hydrocortisone (ANUSOL-HC) 25 mg suppository  Self No No   Sig: Insert 1 suppository (25 mg total) into the rectum 2 (two) times a day   pantoprazole (PROTONIX) 40 mg tablet   No No   Sig: TAKE 1 TABLET BY MOUTH EVERY DAY   pregabalin (LYRICA) 75 mg capsule   No No   Sig: Take 1 capsule (75 mg total) by mouth daily at bedtime   Patient not taking: Reported on 9/18/2024   pyridoxine (VITAMIN B6) 100 mg tablet  Self No No   Sig: Take 1 tablet (100 mg total) by mouth daily   tiZANidine (ZANAFLEX) 4 mg tablet  Self No No   Sig: Take 1 tablet (4 mg total) by mouth 2 (two) times a day as needed for muscle spasms   Patient not taking: Reported on 9/18/2024      Facility-Administered Medications: None     Patient's Medications   Discharge Prescriptions    No medications on file     No discharge procedures on file.  ED SEPSIS DOCUMENTATION   Time reflects when diagnosis was documented in both MDM as applicable and the Disposition within this note       Time User Action Codes Description Comment    9/29/2024  7:49 AM Evelin Conner Add [R04.0] Epistaxis                  FRANCI Mathews  09/29/24 0824

## 2024-10-16 ENCOUNTER — RA CDI HCC (OUTPATIENT)
Dept: OTHER | Facility: HOSPITAL | Age: 64
End: 2024-10-16

## 2024-10-18 ENCOUNTER — APPOINTMENT (OUTPATIENT)
Age: 64
End: 2024-10-18
Payer: COMMERCIAL

## 2024-10-18 DIAGNOSIS — E78.2 MIXED HYPERLIPIDEMIA: ICD-10-CM

## 2024-10-18 DIAGNOSIS — R41.3 MEMORY CHANGE: ICD-10-CM

## 2024-10-18 DIAGNOSIS — E55.9 INADEQUATE VITAMIN D AND VITAMIN D DERIVATIVE INTAKE: ICD-10-CM

## 2024-10-18 DIAGNOSIS — Z13.6 ENCOUNTER FOR LIPID SCREENING FOR CARDIOVASCULAR DISEASE: ICD-10-CM

## 2024-10-18 DIAGNOSIS — Z13.220 ENCOUNTER FOR LIPID SCREENING FOR CARDIOVASCULAR DISEASE: ICD-10-CM

## 2024-10-18 DIAGNOSIS — E55.9 VITAMIN D INSUFFICIENCY: ICD-10-CM

## 2024-10-18 DIAGNOSIS — I10 ESSENTIAL HYPERTENSION: ICD-10-CM

## 2024-10-18 DIAGNOSIS — E53.8 VITAMIN B12 DEFICIENCY: ICD-10-CM

## 2024-10-18 LAB
ALBUMIN SERPL BCG-MCNC: 4.2 G/DL (ref 3.5–5)
ALP SERPL-CCNC: 68 U/L (ref 34–104)
ALT SERPL W P-5'-P-CCNC: 41 U/L (ref 7–52)
ANION GAP SERPL CALCULATED.3IONS-SCNC: 19 MMOL/L (ref 4–13)
AST SERPL W P-5'-P-CCNC: 93 U/L (ref 13–39)
BASOPHILS # BLD AUTO: 0.05 THOUSANDS/ΜL (ref 0–0.1)
BASOPHILS NFR BLD AUTO: 1 % (ref 0–1)
BILIRUB SERPL-MCNC: 0.59 MG/DL (ref 0.2–1)
BUN SERPL-MCNC: 11 MG/DL (ref 5–25)
CALCIUM SERPL-MCNC: 8.7 MG/DL (ref 8.4–10.2)
CHLORIDE SERPL-SCNC: 105 MMOL/L (ref 96–108)
CHOLEST SERPL-MCNC: 271 MG/DL
CO2 SERPL-SCNC: 24 MMOL/L (ref 21–32)
CREAT SERPL-MCNC: 0.7 MG/DL (ref 0.6–1.3)
EOSINOPHIL # BLD AUTO: 0.06 THOUSAND/ΜL (ref 0–0.61)
EOSINOPHIL NFR BLD AUTO: 1 % (ref 0–6)
ERYTHROCYTE [DISTWIDTH] IN BLOOD BY AUTOMATED COUNT: 15.7 % (ref 11.6–15.1)
GFR SERPL CREATININE-BSD FRML MDRD: 91 ML/MIN/1.73SQ M
GLUCOSE P FAST SERPL-MCNC: 76 MG/DL (ref 65–99)
HCT VFR BLD AUTO: 32 % (ref 34.8–46.1)
HDLC SERPL-MCNC: 146 MG/DL
HGB BLD-MCNC: 10.3 G/DL (ref 11.5–15.4)
IMM GRANULOCYTES # BLD AUTO: 0.08 THOUSAND/UL (ref 0–0.2)
IMM GRANULOCYTES NFR BLD AUTO: 2 % (ref 0–2)
LDLC SERPL CALC-MCNC: 109 MG/DL (ref 0–100)
LYMPHOCYTES # BLD AUTO: 1.12 THOUSANDS/ΜL (ref 0.6–4.47)
LYMPHOCYTES NFR BLD AUTO: 27 % (ref 14–44)
MCH RBC QN AUTO: 34 PG (ref 26.8–34.3)
MCHC RBC AUTO-ENTMCNC: 32.2 G/DL (ref 31.4–37.4)
MCV RBC AUTO: 106 FL (ref 82–98)
MONOCYTES # BLD AUTO: 0.53 THOUSAND/ΜL (ref 0.17–1.22)
MONOCYTES NFR BLD AUTO: 13 % (ref 4–12)
NEUTROPHILS # BLD AUTO: 2.3 THOUSANDS/ΜL (ref 1.85–7.62)
NEUTS SEG NFR BLD AUTO: 56 % (ref 43–75)
NRBC BLD AUTO-RTO: 1 /100 WBCS
PLATELET # BLD AUTO: 231 THOUSANDS/UL (ref 149–390)
PMV BLD AUTO: 10.5 FL (ref 8.9–12.7)
POTASSIUM SERPL-SCNC: 3.5 MMOL/L (ref 3.5–5.3)
PROT SERPL-MCNC: 7.7 G/DL (ref 6.4–8.4)
RBC # BLD AUTO: 3.03 MILLION/UL (ref 3.81–5.12)
SODIUM SERPL-SCNC: 148 MMOL/L (ref 135–147)
TRIGL SERPL-MCNC: 78 MG/DL
WBC # BLD AUTO: 4.14 THOUSAND/UL (ref 4.31–10.16)

## 2024-10-18 PROCEDURE — 82306 VITAMIN D 25 HYDROXY: CPT

## 2024-10-18 PROCEDURE — 85025 COMPLETE CBC W/AUTO DIFF WBC: CPT

## 2024-10-18 PROCEDURE — 36415 COLL VENOUS BLD VENIPUNCTURE: CPT

## 2024-10-18 PROCEDURE — 82607 VITAMIN B-12: CPT

## 2024-10-18 PROCEDURE — 84443 ASSAY THYROID STIM HORMONE: CPT

## 2024-10-18 PROCEDURE — 84439 ASSAY OF FREE THYROXINE: CPT

## 2024-10-18 PROCEDURE — 80053 COMPREHEN METABOLIC PANEL: CPT

## 2024-10-18 PROCEDURE — 80061 LIPID PANEL: CPT

## 2024-10-19 LAB
25(OH)D3 SERPL-MCNC: 77.5 NG/ML (ref 30–100)
T4 FREE SERPL-MCNC: 0.69 NG/DL (ref 0.61–1.12)
TSH SERPL DL<=0.05 MIU/L-ACNC: 0.92 UIU/ML (ref 0.45–4.5)
VIT B12 SERPL-MCNC: 499 PG/ML (ref 180–914)

## 2024-10-23 PROBLEM — R45.89 FEELING LONELY: Status: ACTIVE | Noted: 2024-10-23

## 2024-10-31 ENCOUNTER — TELEPHONE (OUTPATIENT)
Age: 64
End: 2024-10-31

## 2024-10-31 ENCOUNTER — OFFICE VISIT (OUTPATIENT)
Age: 64
End: 2024-10-31
Payer: COMMERCIAL

## 2024-10-31 VITALS
OXYGEN SATURATION: 99 % | WEIGHT: 168 LBS | SYSTOLIC BLOOD PRESSURE: 118 MMHG | TEMPERATURE: 96.9 F | DIASTOLIC BLOOD PRESSURE: 72 MMHG | HEIGHT: 62 IN | BODY MASS INDEX: 30.91 KG/M2 | HEART RATE: 97 BPM

## 2024-10-31 DIAGNOSIS — G62.9 NEUROPATHY: ICD-10-CM

## 2024-10-31 DIAGNOSIS — I10 ESSENTIAL HYPERTENSION: ICD-10-CM

## 2024-10-31 DIAGNOSIS — Z00.00 MEDICARE ANNUAL WELLNESS VISIT, SUBSEQUENT: Primary | ICD-10-CM

## 2024-10-31 DIAGNOSIS — R73.03 PREDIABETES: ICD-10-CM

## 2024-10-31 DIAGNOSIS — E53.8 VITAMIN B12 DEFICIENCY: ICD-10-CM

## 2024-10-31 DIAGNOSIS — Z12.31 BREAST CANCER SCREENING BY MAMMOGRAM: ICD-10-CM

## 2024-10-31 DIAGNOSIS — E87.0 SERUM SODIUM ELEVATED: ICD-10-CM

## 2024-10-31 DIAGNOSIS — J44.9 COPD, MILD (HCC): ICD-10-CM

## 2024-10-31 DIAGNOSIS — D53.9 MACROCYTIC ANEMIA: ICD-10-CM

## 2024-10-31 DIAGNOSIS — E78.2 MIXED HYPERLIPIDEMIA: ICD-10-CM

## 2024-10-31 PROCEDURE — 99214 OFFICE O/P EST MOD 30 MIN: CPT

## 2024-10-31 PROCEDURE — G0439 PPPS, SUBSEQ VISIT: HCPCS

## 2024-10-31 NOTE — PROGRESS NOTES
Ambulatory Visit  Name: Jacquelyn Fritz      : 1960      MRN: [id number]  Encounter Provider: Ada Lima PA-C  Encounter Date: 10/31/2024   Encounter department: [facility] PRIMARY CARE [facility]  Medicare annual wellness visit, subsequent         Breast cancer screening by mammogram  For mammogram in November.  Order placed and discussed with the patient.  Orders:    Mammo screening bilateral w 3d and cad; Future    Neuropathy  It appears that the prior authorization for Lyrica was denied. I have a message out to the staff to call the patient to ask if she would like to restart gabapentin for her neuropathy. Continue follow up with neuropathy.        COPD, mild (HCC)  Stable.  Continue use of inhalers.       Essential hypertension  Blood pressure well-controlled. Continue Caduet 5-20 mg daily.        Macrocytic anemia  Stable.  Continue folic acid 1 mg tablet daily and repeat blood counts in 3 months prior to follow-up in the office.  Orders:    CBC and differential; Future    Mixed hyperlipidemia  Mild elevation in cholesterol compared to prior. Recommend a low fat diet and continue Caduet 5-20 mg daily and we will continue to monitor.        Prediabetes  Fasting blood sugar normal. Recommend a diet low in carbohydrates and sugars and we will continue to monitor.        Vitamin B12 deficiency  Levels currently normal. Patient not on supplementation at this time due to levels being elevated in May. We will continue to monitor.        Serum sodium elevated  Mild elevation in serum sodium. Recommend a low sodium diet. We will recheck labs prior to follow up.   Orders:    Comprehensive metabolic panel; Future      Depression Screening and Follow-up Plan: Patient was screened for depression during today's encounter. They screened negative with a PHQ-2 score of 0.      Preventive health issues were discussed with patient, and age appropriate screening tests were ordered as noted in  patient's After Visit Summary. Personalized health advice and appropriate referrals for health education or preventive services given if needed, as noted in patient's After Visit Summary.    History of Present Illness     Jacquelyn is a 63-year-old female presenting to the office for Medicare annual wellness visit. She has no acute concerns today.   She follows with neurology for neuropathy and memory difficulties. Dr. Norris recommended stopping her B supplements and Vitamin D supplement because levels were previously high. Levels have since normalized. She is still taking a folate supplement.   She states Dr. Feliciano prescribed Lyrica, but she never got it from her pharmacy, so she hasn't started it.          Patient Care Team:  Zander Feliciano MD as PCP - General (Family Medicine)  Keli Chung PA-C (Gastroenterology)    Review of Systems   Constitutional:  Negative for chills and fever.   HENT:  Negative for congestion, ear pain and sore throat.    Eyes:  Negative for pain and visual disturbance.   Respiratory:  Negative for cough and shortness of breath.    Cardiovascular:  Negative for chest pain and palpitations.   Gastrointestinal:  Negative for abdominal pain, constipation, diarrhea and vomiting.   Genitourinary:  Negative for dysuria and hematuria.   Musculoskeletal:  Negative for arthralgias, back pain and myalgias.   Skin:  Negative for color change and rash.   Neurological:  Positive for numbness (peripheral neuropathy). Negative for dizziness, seizures, syncope and headaches.   All other systems reviewed and are negative.    Medical History Reviewed by provider this encounter:  Tobacco  Allergies  Meds  Problems  Med Hx  Surg Hx  Fam Hx       Annual Wellness Visit Questionnaire   Jacquelyn is here for her Subsequent Wellness visit.     Health Risk Assessment:   Patient rates overall health as good. Patient feels that their physical health rating is same. Patient is satisfied with their life.  Eyesight was rated as slightly worse. Hearing was rated as same. Patient feels that their emotional and mental health rating is same. Patients states they are never, rarely angry. Patient states they are never, rarely unusually tired/fatigued. Pain experienced in the last 7 days has been none. Patient states that she has experienced no weight loss or gain in last 6 months.     Depression Screening:   PHQ-2 Score: 0      Fall Risk Screening:   In the past year, patient has experienced: history of falling in past year    Number of falls: 2 or more  Injured during fall?: Yes    Feels unsteady when standing or walking?: No    Worried about falling?: No      Urinary Incontinence Screening:   Patient has not leaked urine accidently in the last six months.     Home Safety:  Patient has trouble with stairs inside or outside of their home. Patient has working smoke alarms and has working carbon monoxide detector. Home safety hazards include: none.     Nutrition:   Current diet is No Added Salt.     Medications:   Patient is currently taking over-the-counter supplements. OTC medications include: see medication list. Patient is able to manage medications.     Activities of Daily Living (ADLs)/Instrumental Activities of Daily Living (IADLs):   Walk and transfer into and out of bed and chair?: Yes  Dress and groom yourself?: Yes    Bathe or shower yourself?: Yes    Feed yourself? Yes  Do your laundry/housekeeping?: Yes  Manage your money, pay your bills and track your expenses?: Yes  Make your own meals?: Yes    Do your own shopping?: Yes    Previous Hospitalizations:   Any hospitalizations or ED visits within the last 12 months?: No      Advance Care Planning:   Living will: No    Durable POA for healthcare: No    Advanced directive: No    ACP document given: Yes      PREVENTIVE SCREENINGS      Cardiovascular Screening:    General: Screening Not Indicated and History Lipid Disorder      Diabetes Screening:     General:  "Screening Current      Colorectal Cancer Screening:     General: Screening Current      Breast Cancer Screening:     General: Screening Current    Due for: Mammogram        Osteoporosis Screening:    General: Screening Current      Abdominal Aortic Aneurysm (AAA) Screening:        General: Screening Not Indicated      Lung Cancer Screening:     General: Screening Not Indicated and History Lung Cancer      Hepatitis C Screening:    General: Screening Current      Preventive Screening Comments: Lung cancer 2011 s/p lobectomy in 2011, quit smoking [id number]    Screening, Brief Intervention, and Referral to Treatment (SBIRT)    Screening      AUDIT-C Screening:    3) How often did you have 6 or more drinks on one occasion in the past year? never    Single Item Drug Screening:  How often have you used an illegal drug (including marijuana) or a prescription medication for non-medical reasons in the past year? never    Single Item Drug Screen Score: 0  Interpretation: Negative screen for possible drug use disorder    Social Determinants of Health     Financial Resource Strain: Low Risk  (9/19/2023)    Overall Financial Resource Strain (CARDIA)     Difficulty of Paying Living Expenses: Not hard at all   Food Insecurity: No Food Insecurity (12/30/2019)    Received from [employer]    Hunger Vital Sign     Worried About Running Out of Food in the Last Year: Never true     Ran Out of Food in the Last Year: Never true   Transportation Needs: No Transportation Needs (9/19/2023)    PRAPARE - Transportation     Lack of Transportation (Medical): No     Lack of Transportation (Non-Medical): No    Received from [employer]     No results found.    Objective     /72 (BP Location: Right arm, Patient Position: Sitting, Cuff Size: Standard)   Pulse 97   Temp (!) 96.9 °F (36.1 °C) (Tympanic)   Ht 5' 2\" (1.575 m)   Wt 76.2 kg (168 lb)   SpO2 99%   BMI 30.73 kg/m²     Physical Exam  Vitals and nursing note " reviewed.   Constitutional:       General: She is not in acute distress.     Appearance: She is well-developed.   HENT:      Head: Normocephalic and atraumatic.      Right Ear: Tympanic membrane normal.      Left Ear: Tympanic membrane normal.      Nose: Nose normal.      Mouth/Throat:      Mouth: Mucous membranes are moist.      Pharynx: Oropharynx is clear. No oropharyngeal exudate.   Eyes:      Extraocular Movements: Extraocular movements intact.      Conjunctiva/sclera: Conjunctivae normal.   Cardiovascular:      Rate and Rhythm: Normal rate and regular rhythm.      Heart sounds: Normal heart sounds. No murmur heard.     No friction rub. No gallop.   Pulmonary:      Effort: Pulmonary effort is normal. No respiratory distress.      Breath sounds: Normal breath sounds. No wheezing, rhonchi or rales.   Musculoskeletal:         General: No swelling.      Cervical back: Neck supple.   Skin:     General: Skin is warm and dry.      Capillary Refill: Capillary refill takes less than 2 seconds.   Neurological:      General: No focal deficit present.      Mental Status: She is alert.   Psychiatric:         Mood and Affect: Mood normal.

## 2024-10-31 NOTE — ASSESSMENT & PLAN NOTE
Fasting blood sugar normal. Recommend a diet low in carbohydrates and sugars and we will continue to monitor.

## 2024-10-31 NOTE — ASSESSMENT & PLAN NOTE
Levels currently normal. Patient not on supplementation at this time due to levels being elevated in May. We will continue to monitor.

## 2024-10-31 NOTE — TELEPHONE ENCOUNTER
She does not want to go back to gabapentin.. she would like to try something else though if you know of anything

## 2024-10-31 NOTE — PATIENT INSTRUCTIONS
Central [phone]    Medicare Preventive Visit Patient Instructions  Thank you for completing your Welcome to Medicare Visit or Medicare Annual Wellness Visit today. Your next wellness visit will be due in one year (2025).  The screening/preventive services that you may require over the next 5-10 years are detailed below. Some tests may not apply to you based off risk factors and/or age. Screening tests ordered at today's visit but not completed yet may show as past due. Also, please note that scanned in results may not display below.  Preventive Screenings:  Service Recommendations Previous Testing/Comments   Colorectal Cancer Screening  * Colonoscopy    * Fecal Occult Blood Test (FOBT)/Fecal Immunochemical Test (FIT)  * Fecal DNA/Cologuard Test  * Flexible Sigmoidoscopy Age: 45-75 years old   Colonoscopy: every 10 years (may be performed more frequently if at higher risk)  OR  FOBT/FIT: every 1 year  OR  Cologuard: every 3 years  OR  Sigmoidoscopy: every 5 years  Screening may be recommended earlier than age 45 if at higher risk for colorectal cancer. Also, an individualized decision between you and your healthcare provider will decide whether screening between the ages of 76-85 would be appropriate. Colonoscopy: 2022  FOBT/FIT: 2023  Cologuard: Not on file  Sigmoidoscopy: Not on file    Screening Current     Breast Cancer Screening Age: 40+ years old  Frequency: every 1-2 years  Not required if history of left and right mastectomy Mammogram: 2023    Screening Current  Due for Mammogram   Cervical Cancer Screening Between the ages of 21-29, pap smear recommended once every 3 years.   Between the ages of 30-65, can perform pap smear with HPV co-testing every 5 years.   Recommendations may differ for women with a history of total hysterectomy, cervical cancer, or abnormal pap smears in past. Pap Smear: 2020        Hepatitis C Screening Once for adults born between 1945 and  1965  More frequently in patients at high risk for Hepatitis C Hep C Antibody: 03/14/2022    Screening Current   Diabetes Screening 1-2 times per year if you're at risk for diabetes or have pre-diabetes Fasting glucose: 76 mg/dL (10/18/2024)  A1C: 5.3 % (10/23/2023)  Screening Current   Cholesterol Screening Once every 5 years if you don't have a lipid disorder. May order more often based on risk factors. Lipid panel: 10/18/2024    Screening Not Indicated  History Lipid Disorder     Other Preventive Screenings Covered by Medicare:  Abdominal Aortic Aneurysm (AAA) Screening: covered once if your at risk. You're considered to be at risk if you have a family history of AAA.  Lung Cancer Screening: covers low dose CT scan once per year if you meet all of the following conditions: (1) Age 55-77; (2) No signs or symptoms of lung cancer; (3) Current smoker or have quit smoking within the last 15 years; (4) You have a tobacco smoking history of at least 20 pack years (packs per day multiplied by number of years you smoked); (5) You get a written order from a healthcare provider.  Glaucoma Screening: covered annually if you're considered high risk: (1) You have diabetes OR (2) Family history of glaucoma OR (3)  aged 50 and older OR (4)  American aged 65 and older  Osteoporosis Screening: covered every 2 years if you meet one of the following conditions: (1) You're estrogen deficient and at risk for osteoporosis based off medical history and other findings; (2) Have a vertebral abnormality; (3) On glucocorticoid therapy for more than 3 months; (4) Have primary hyperparathyroidism; (5) On osteoporosis medications and need to assess response to drug therapy.   Last bone density test (DXA Scan): 09/14/2022.  HIV Screening: covered annually if you're between the age of 15-65. Also covered annually if you are younger than 15 and older than 65 with risk factors for HIV infection. For pregnant patients, it is  covered up to 3 times per pregnancy.    Immunizations:  Immunization Recommendations   Influenza Vaccine Annual influenza vaccination during flu season is recommended for all persons aged >= 6 months who do not have contraindications   Pneumococcal Vaccine   * Pneumococcal conjugate vaccine = PCV13 (Prevnar 13), PCV15 (Vaxneuvance), PCV20 (Prevnar 20)  * Pneumococcal polysaccharide vaccine = PPSV23 (Pneumovax) Adults 19-65 yo with certain risk factors or if 65+ yo  If never received any pneumonia vaccine: recommend Prevnar 20 (PCV20)  Give PCV20 if previously received 1 dose of PCV13 or PPSV23   Hepatitis B Vaccine 3 dose series if at intermediate or high risk (ex: diabetes, end stage renal disease, liver disease)   Respiratory syncytial virus (RSV) Vaccine - COVERED BY MEDICARE PART D  * RSVPreF3 (Arexvy) CDC recommends that adults 60 years of age and older may receive a single dose of RSV vaccine using shared clinical decision-making (SCDM)   Tetanus (Td) Vaccine - COST NOT COVERED BY MEDICARE PART B Following completion of primary series, a booster dose should be given every 10 years to maintain immunity against tetanus. Td may also be given as tetanus wound prophylaxis.   Tdap Vaccine - COST NOT COVERED BY MEDICARE PART B Recommended at least once for all adults. For pregnant patients, recommended with each pregnancy.   Shingles Vaccine (Shingrix) - COST NOT COVERED BY MEDICARE PART B  2 shot series recommended in those 19 years and older who have or will have weakened immune systems or those 50 years and older     Health Maintenance Due:      Topic Date Due    Breast Cancer Screening: Mammogram  11/16/2024    Colorectal Cancer Screening  06/28/2025    HIV Screening  Completed    Hepatitis C Screening  Completed    Cervical Cancer Screening  Discontinued     Immunizations Due:      Topic Date Due    Influenza Vaccine (1) 09/01/2024     Advance Directives   What are advance directives?  Advance directives are  legal documents that state your wishes and plans for medical care. These plans are made ahead of time in case you lose your ability to make decisions for yourself. Advance directives can apply to any medical decision, such as the treatments you want, and if you want to donate organs.   What are the types of advance directives?  There are many types of advance directives, and each state has rules about how to use them. You may choose a combination of any of the following:  Living will:  This is a written record of the treatment you want. You can also choose which treatments you do not want, which to limit, and which to stop at a certain time. This includes surgery, medicine, IV fluid, and tube feedings.   Durable power of  for healthcare (DPAHC):  This is a written record that states who you want to make healthcare choices for you when you are unable to make them for yourself. This person, called a proxy, is usually a family member or a friend. You may choose more than 1 proxy.  Do not resuscitate (DNR) order:  A DNR order is used in case your heart stops beating or you stop breathing. It is a request not to have certain forms of treatment, such as CPR. A DNR order may be included in other types of advance directives.  Medical directive:  This covers the care that you want if you are in a coma, near death, or unable to make decisions for yourself. You can list the treatments you want for each condition. Treatment may include pain medicine, surgery, blood transfusions, dialysis, IV or tube feedings, and a ventilator (breathing machine).  Values history:  This document has questions about your views, beliefs, and how you feel and think about life. This information can help others choose the care that you would choose.  Why are advance directives important?  An advance directive helps you control your care. Although spoken wishes may be used, it is better to have your wishes written down. Spoken wishes can be  misunderstood, or not followed. Treatments may be given even if you do not want them. An advance directive may make it easier for your family to make difficult choices about your care.   Weight Management   Why it is important to manage your weight:  Being overweight increases your risk of health conditions such as heart disease, high blood pressure, type 2 diabetes, and certain types of cancer. It can also increase your risk for osteoarthritis, sleep apnea, and other respiratory problems. Aim for a slow, steady weight loss. Even a small amount of weight loss can lower your risk of health problems.  How to lose weight safely:  A safe and healthy way to lose weight is to eat fewer calories and get regular exercise. You can lose up about 1 pound a week by decreasing the number of calories you eat by 500 calories each day.   Healthy meal plan for weight management:  A healthy meal plan includes a variety of foods, contains fewer calories, and helps you stay healthy. A healthy meal plan includes the following:  Eat whole-grain foods more often.  A healthy meal plan should contain fiber. Fiber is the part of grains, fruits, and vegetables that is not broken down by your body. Whole-grain foods are healthy and provide extra fiber in your diet. Some examples of whole-grain foods are whole-wheat breads and pastas, oatmeal, brown rice, and bulgur.  Eat a variety of vegetables every day.  Include dark, leafy greens such as spinach, kale, kalie greens, and mustard greens. Eat yellow and orange vegetables such as carrots, sweet potatoes, and winter squash.   Eat a variety of fruits every day.  Choose fresh or canned fruit (canned in its own juice or light syrup) instead of juice. Fruit juice has very little or no fiber.  Eat low-fat dairy foods.  Drink fat-free (skim) milk or 1% milk. Eat fat-free yogurt and low-fat cottage cheese. Try low-fat cheeses such as mozzarella and other reduced-fat cheeses.  Choose meat and other  protein foods that are low in fat.  Choose beans or other legumes such as split peas or lentils. Choose fish, skinless poultry (chicken or turkey), or lean cuts of red meat (beef or pork). Before you cook meat or poultry, cut off any visible fat.   Use less fat and oil.  Try baking foods instead of frying them. Add less fat, such as margarine, sour cream, regular salad dressing and mayonnaise to foods. Eat fewer high-fat foods. Some examples of high-fat foods include french fries, doughnuts, ice cream, and cakes.  Eat fewer sweets.  Limit foods and drinks that are high in sugar. This includes candy, cookies, regular soda, and sweetened drinks.  Exercise:  Exercise at least 30 minutes per day on most days of the week. Some examples of exercise include walking, biking, dancing, and swimming. You can also fit in more physical activity by taking the stairs instead of the elevator or parking farther away from stores. Ask your healthcare provider about the best exercise plan for you.      © Copyright [employer] 2018 Information is for End User's use only and may not be sold, redistributed or otherwise used for commercial purposes. All illustrations and images included in CareNotes® are the copyrighted property of [employer].A[employer]M., Inc[employer] or [employer]

## 2024-10-31 NOTE — ASSESSMENT & PLAN NOTE
Stable.  Continue folic acid 1 mg tablet daily and repeat blood counts in 3 months prior to follow-up in the office.  Orders:    CBC and differential; Future

## 2024-10-31 NOTE — ASSESSMENT & PLAN NOTE
Mild elevation in cholesterol compared to prior. Recommend a low fat diet and continue Caduet 5-20 mg daily and we will continue to monitor.

## 2024-10-31 NOTE — ASSESSMENT & PLAN NOTE
It appears that the prior authorization for Lyrica was denied. I have a message out to the staff to call the patient to ask if she would like to restart gabapentin for her neuropathy. Continue follow up with neuropathy.

## 2024-11-07 ENCOUNTER — TELEPHONE (OUTPATIENT)
Age: 64
End: 2024-11-07

## 2024-11-07 NOTE — TELEPHONE ENCOUNTER
Spoke to brother- I offered for him to call area of aging and see what they can do for her . We cannot force the patient to go to assisted living. He is concerned with her falling all the time. He will try and get back to us

## 2024-11-07 NOTE — TELEPHONE ENCOUNTER
Brother cannot have an appointment with the provider in place of the patient. He needs to call to the office and speak to a clinical person - who can evaluate the message and review with the provider.     I left a message . He can speak to me if he calls back

## 2024-11-07 NOTE — TELEPHONE ENCOUNTER
Brother called in stating that he needed to speak to Dr Feliciano regarding his sister. He states that she is unable to get out of bed and her legs are swollen. He states he is not with his sister at the moment but he helps take care of her. Advised patient if her swelling is worse and if she is unable to walk she may need to be seen in office. He states she cannot get to office she can barely get out of bed. Advised patient that Pt may need to be seen in emergency room. He declined and stated he wanted to speak to her PCP. He wanted to book an appointment for himself to come in to talk to PCP. Called office for help and will call [facility] to further assist.

## 2025-01-05 DIAGNOSIS — I10 ESSENTIAL HYPERTENSION: ICD-10-CM

## 2025-01-06 RX ORDER — AMLODIPINE BESYLATE AND ATORVASTATIN CALCIUM 5; 20 MG/1; MG/1
1 TABLET, FILM COATED ORAL DAILY
Qty: 90 TABLET | Refills: 1 | Status: SHIPPED | OUTPATIENT
Start: 2025-01-06

## 2025-01-30 ENCOUNTER — RA CDI HCC (OUTPATIENT)
Dept: OTHER | Facility: HOSPITAL | Age: 65
End: 2025-01-30

## 2025-02-07 ENCOUNTER — APPOINTMENT (OUTPATIENT)
Age: 65
End: 2025-02-07
Payer: COMMERCIAL

## 2025-02-07 ENCOUNTER — OFFICE VISIT (OUTPATIENT)
Age: 65
End: 2025-02-07
Payer: COMMERCIAL

## 2025-02-07 VITALS
DIASTOLIC BLOOD PRESSURE: 80 MMHG | HEART RATE: 100 BPM | HEIGHT: 62 IN | SYSTOLIC BLOOD PRESSURE: 134 MMHG | WEIGHT: 172 LBS | RESPIRATION RATE: 18 BRPM | BODY MASS INDEX: 31.65 KG/M2 | OXYGEN SATURATION: 97 % | TEMPERATURE: 97.9 F

## 2025-02-07 DIAGNOSIS — R42 DIZZINESS: ICD-10-CM

## 2025-02-07 DIAGNOSIS — E78.2 MIXED HYPERLIPIDEMIA: ICD-10-CM

## 2025-02-07 DIAGNOSIS — D53.9 MACROCYTIC ANEMIA: ICD-10-CM
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
## 2025-02-07 DIAGNOSIS — E53.8 VITAMIN B12 DEFICIENCY: ICD-10-CM

## 2025-02-07 DIAGNOSIS — J44.9 COPD, MILD (HCC): ICD-10-CM

## 2025-02-07 DIAGNOSIS — G62.9 NEUROPATHY: ICD-10-CM

## 2025-02-07 DIAGNOSIS — E55.9 VITAMIN D DEFICIENCY: ICD-10-CM

## 2025-02-07 DIAGNOSIS — E87.0 SERUM SODIUM ELEVATED: ICD-10-CM

## 2025-02-07 DIAGNOSIS — I10 ESSENTIAL HYPERTENSION: Primary | ICD-10-CM

## 2025-02-07 LAB
25(OH)D3 SERPL-MCNC: 59.7 NG/ML (ref 30–100)
ALBUMIN SERPL BCG-MCNC: 4.1 G/DL (ref 3.5–5)
ALP SERPL-CCNC: 72 U/L (ref 34–104)
ALT SERPL W P-5'-P-CCNC: 11 U/L (ref 7–52)
ANION GAP SERPL CALCULATED.3IONS-SCNC: 12 MMOL/L (ref 4–13)
AST SERPL W P-5'-P-CCNC: 14 U/L (ref 13–39)
BASOPHILS # BLD AUTO: 0.07 THOUSANDS/ΜL (ref 0–0.1)
BASOPHILS NFR BLD AUTO: 1 % (ref 0–1)
BILIRUB SERPL-MCNC: 0.5 MG/DL (ref 0.2–1)
BUN SERPL-MCNC: 17 MG/DL (ref 5–25)
CALCIUM SERPL-MCNC: 10.1 MG/DL (ref 8.4–10.2)
CHLORIDE SERPL-SCNC: 104 MMOL/L (ref 96–108)
CHOLEST SERPL-MCNC: 193 MG/DL (ref ?–200)
CO2 SERPL-SCNC: 26 MMOL/L (ref 21–32)
CREAT SERPL-MCNC: 0.79 MG/DL (ref 0.6–1.3)
EOSINOPHIL # BLD AUTO: 0.51 THOUSAND/ΜL (ref 0–0.61)
EOSINOPHIL NFR BLD AUTO: 6 % (ref 0–6)
ERYTHROCYTE [DISTWIDTH] IN BLOOD BY AUTOMATED COUNT: 13.8 % (ref 11.6–15.1)
GFR SERPL CREATININE-BSD FRML MDRD: 78 ML/MIN/1.73SQ M
GLUCOSE P FAST SERPL-MCNC: 110 MG/DL (ref 65–99)
HCT VFR BLD AUTO: 35.1 % (ref 34.8–46.1)
HDLC SERPL-MCNC: 44 MG/DL
HGB BLD-MCNC: 11.3 G/DL (ref 11.5–15.4)
IMM GRANULOCYTES # BLD AUTO: 0.03 THOUSAND/UL (ref 0–0.2)
IMM GRANULOCYTES NFR BLD AUTO: 0 % (ref 0–2)
LDLC SERPL CALC-MCNC: 121 MG/DL (ref 0–100)
LYMPHOCYTES # BLD AUTO: 2.33 THOUSANDS/ΜL (ref 0.6–4.47)
LYMPHOCYTES NFR BLD AUTO: 25 % (ref 14–44)
MCH RBC QN AUTO: 31.1 PG (ref 26.8–34.3)
MCHC RBC AUTO-ENTMCNC: 32.2 G/DL (ref 31.4–37.4)
MCV RBC AUTO: 97 FL (ref 82–98)
MONOCYTES # BLD AUTO: 0.66 THOUSAND/ΜL (ref 0.17–1.22)
MONOCYTES NFR BLD AUTO: 7 % (ref 4–12)
NEUTROPHILS # BLD AUTO: 5.7 THOUSANDS/ΜL (ref 1.85–7.62)
NEUTS SEG NFR BLD AUTO: 61 % (ref 43–75)
NONHDLC SERPL-MCNC: 149 MG/DL
NRBC BLD AUTO-RTO: 0 /100 WBCS
PLATELET # BLD AUTO: 455 THOUSANDS/UL (ref 149–390)
PMV BLD AUTO: 11.8 FL (ref 8.9–12.7)
POTASSIUM SERPL-SCNC: 4.2 MMOL/L (ref 3.5–5.3)
PROT SERPL-MCNC: 7.9 G/DL (ref 6.4–8.4)
RBC # BLD AUTO: 3.63 MILLION/UL (ref 3.81–5.12)
SODIUM SERPL-SCNC: 142 MMOL/L (ref 135–147)
TRIGL SERPL-MCNC: 139 MG/DL (ref ?–150)
VIT B12 SERPL-MCNC: 370 PG/ML (ref 180–914)
WBC # BLD AUTO: 9.3 THOUSAND/UL (ref 4.31–10.16)

## 2025-02-07 PROCEDURE — 80053 COMPREHEN METABOLIC PANEL: CPT

## 2025-02-07 PROCEDURE — G2211 COMPLEX E/M VISIT ADD ON: HCPCS

## 2025-02-07 PROCEDURE — 85025 COMPLETE CBC W/AUTO DIFF WBC: CPT

## 2025-02-07 PROCEDURE — 82306 VITAMIN D 25 HYDROXY: CPT

## 2025-02-07 PROCEDURE — 82607 VITAMIN B-12: CPT

## 2025-02-07 PROCEDURE — 36415 COLL VENOUS BLD VENIPUNCTURE: CPT

## 2025-02-07 PROCEDURE — 80061 LIPID PANEL: CPT

## 2025-02-07 PROCEDURE — 99214 OFFICE O/P EST MOD 30 MIN: CPT

## 2025-02-07 NOTE — ASSESSMENT & PLAN NOTE
History of deficiency, was previously on supplementation, then discontinued because levels were too high.  Levels normalized when last checked in October.  Recommend rechecking to see if she is deficient again and in need of supplementation.  Orders:  •  Vitamin B12; Future

## 2025-02-07 NOTE — ASSESSMENT & PLAN NOTE
Lab orders placed at last office visit to recheck.  Patient plans to get lab work done today or in the near future.

## 2025-02-07 NOTE — ASSESSMENT & PLAN NOTE
History of deficiency, was previously on supplementation, then discontinued because levels were too high.  Levels normalized when last checked in October.  Recommend rechecking to see if she is deficient again and in need of supplementation.  Orders:  •  Vitamin D 25 hydroxy; Future

## 2025-02-07 NOTE — ASSESSMENT & PLAN NOTE
Discussed most recent lab work from October and that cholesterol at the time was elevated compared to previous.  Currently on atorvastatin 20 mg daily and icosapent ethyl 1 g twice daily.  Lab orders placed to recheck cholesterol.  If still elevated, may consider increasing atorvastatin dose.  Orders:  •  Lipid panel; Future

## 2025-02-07 NOTE — PROGRESS NOTES
Name: Jacquelyn Fritz      : 1960      MRN: [id number]  Encounter Provider: Ada Lima PA-C  Encounter Date: 2025   Encounter department: [facility] [place]  :  Assessment & Plan  Essential hypertension  Blood pressure stable. Continue amlodipine-atorvastatin 5-20 mg daily.        Dizziness  Intermittent for the past year or so. Possible vasovagal response, orthostatic hypotension, BPPV. Recommend further evaluation and treatment with physical therapy. If dizziness worsens or is associated with chest pain, SOB, numbness, tingling of upper extremities, go to the ER.   Orders:  •  Ambulatory Referral to Physical Therapy; Future    Fall from ground level  Fall 5 days ago using walker in the home and carrying plates at the same time.  Discussed fall precautions, including avoiding loose rugs and cords in the home and having good lighting in the home.  Recommend that she get a walker with a tray or use her walker with a seat to place her plate on so that she has both hands to use the walker when walking.  Recommend treatment of dizziness with physical therapy as discussed above.  Follow-up if symptoms worsening go to the ER if she has another fall or experiences severe dizziness or head injury.          Neuropathy  Not currently on medication because Lyrica prior authorization was denied. Recommend restarting gabapentin, but patient declines, stating she read about harmful side effects to both Lyrica and gabapentin. Recommend she follow up with her neurologist as scheduled for may.        COPD, mild (HCC)  Stable.  Continue inhalers.       Mixed hyperlipidemia  Discussed most recent lab work from October and that cholesterol at the time was elevated compared to previous.  Currently on atorvastatin 20 mg daily and icosapent ethyl 1 g twice daily.  Lab orders placed to recheck cholesterol.  If still elevated, may consider increasing atorvastatin dose.  Orders:  •  Lipid panel;  Future    Vitamin B12 deficiency  History of deficiency, was previously on supplementation, then discontinued because levels were too high.  Levels normalized when last checked in October.  Recommend rechecking to see if she is deficient again and in need of supplementation.  Orders:  •  Vitamin B12; Future    Vitamin D deficiency  History of deficiency, was previously on supplementation, then discontinued because levels were too high.  Levels normalized when last checked in October.  Recommend rechecking to see if she is deficient again and in need of supplementation.  Orders:  •  Vitamin D 25 hydroxy; Future    Macrocytic anemia  Lab orders placed at last office visit to recheck.  Patient plans to get lab work done today or in the near future.       Serum sodium elevated  Lab orders placed at last office visit to recheck.  Patient plans to get lab work done today or in the near future.              History of Present Illness   Jacquelyn is a 64-year-old female with past medical history of neuropathy, COPD, hyperlipidemia, hypertension, B12 and vitamin D deficiency presenting to the office for routine follow-up.  She fell in her home on Sunday trying to take plates to the table.  She states prior to the fall she felt a little bit dizzy, but did not get to the chair in time to sit down.  She is uncertain, but feels that she may have lost consciousness briefly before the fall.  She does not believe she hit her head when she fell and denies any significant injuries or pain from the fall.  Mild swelling of both knees, but no knee pain.  She feels the fall may have been in part due to her neuropathy and difficulty sensing where her feet are at.  She uses a walker in the home, but has difficulty carrying plates or dishes while using the walker, which contributed to her fall.  She denied any chest pain, headaches, sweats before or after the fall.  She states she has had some intermittent dizziness over the past year.  For a  "time she stopped taking showers because she was afraid of feeling dizzy and she would walk herself in front of the sink.  The dizziness tends to be worse with prolonged standing.  Sometimes with position changes.  It is uncertain what is the cause of her neuropathy.  Her neurologist thought it may be from alcohol use, but she no longer drinks alcohol.         Review of Systems   Constitutional:  Negative for chills and fever.   HENT:  Negative for congestion, ear pain and sore throat.    Eyes:  Negative for pain and visual disturbance.   Respiratory:  Negative for cough and shortness of breath.    Cardiovascular:  Negative for chest pain and palpitations.   Gastrointestinal:  Negative for abdominal pain, constipation, diarrhea and vomiting.   Genitourinary:  Negative for dysuria and hematuria.   Musculoskeletal:  Negative for arthralgias, back pain and myalgias.   Skin:  Negative for color change and rash.   Neurological:  Positive for dizziness. Negative for seizures, syncope and headaches.   All other systems reviewed and are negative.      Objective   /80 (BP Location: Right arm, Patient Position: Sitting, Cuff Size: Standard)   Pulse 100   Temp 97.9 °F (36.6 °C) (Tympanic)   Resp 18   Ht 5' 2\" (1.575 m)   Wt 78 kg (172 lb)   SpO2 97%   BMI 31.46 kg/m²      Physical Exam  Vitals and nursing note reviewed.   Constitutional:       General: She is not in acute distress.     Appearance: She is well-developed.   HENT:      Head: Normocephalic and atraumatic.      Right Ear: Tympanic membrane normal.      Left Ear: Tympanic membrane normal.      Nose: Nose normal.      Mouth/Throat:      Mouth: Mucous membranes are moist.      Pharynx: Oropharynx is clear. No oropharyngeal exudate.   Eyes:      Extraocular Movements: Extraocular movements intact.      Conjunctiva/sclera: Conjunctivae normal.      Pupils: Pupils are equal, round, and reactive to light.   Cardiovascular:      Rate and Rhythm: Normal rate and " regular rhythm.      Heart sounds: Normal heart sounds. No murmur heard.     No friction rub. No gallop.   Pulmonary:      Effort: Pulmonary effort is normal. No respiratory distress.      Breath sounds: Normal breath sounds. No wheezing, rhonchi or rales.   Abdominal:      Palpations: Abdomen is soft.      Tenderness: There is no abdominal tenderness.   Musculoskeletal:         General: No swelling.      Cervical back: Neck supple.      Right knee: Swelling present. No effusion, erythema or bony tenderness. Normal range of motion. No tenderness.      Left knee: Swelling present. No effusion, erythema or bony tenderness. Normal range of motion. No tenderness.      Comments: Mild swelling of both knees.  No tenderness to palpation, erythema, ecchymosis.    Skin:     General: Skin is warm and dry.      Capillary Refill: Capillary refill takes less than 2 seconds.   Neurological:      General: No focal deficit present.      Mental Status: She is alert. Mental status is at baseline.   Psychiatric:         Mood and Affect: Mood normal.         Behavior: Behavior normal.       Falls Plan of Care: Patient referred to physical therapy.

## 2025-02-07 NOTE — PATIENT INSTRUCTIONS
Patient Education     Preventing falls in adults   The Basics   Written by the doctors and editors at [facility]   Am I at risk of falling? -- Falls can happen to anyone, no matter how old they are. Several things can increase your risk of a fall, including:   Vision problems   Having certain chronic health conditions, being sick, having recently been in the hospital, or having had surgery   Changing the medicines you take   An unsafe or unfamiliar setting (for example, a room with rugs or furniture that might trip you, or an area you don't know well)  Your risk of falling increases as you grow older. That's because getting older can make it harder to walk steadily and keep your balance. Also, the effects of falls are more serious for older people.  Overall, 3 to 4 out of every 10 people over the age of 65 fall each year. Many people who fracture a hip never fully recover to how they were before the fracture. If you have fallen in the past, you are at higher risk of falling again.  How can my doctor help me avoid falling? -- Your doctor can talk to you about the following things:   Past falls - It is important to tell your doctor about any times that you have fallen or almost fallen. They can then suggest ways to prevent another fall.   Your health conditions - Some health problems can put you at risk of falling. These include conditions that affect eyesight, hearing, muscle strength, or balance.   What to do if you are in the hospital - Falls can happen in the hospital because you are in an unfamiliar place. You might feel unsteady or drowsy from medicines or anesthesia. Or you might be attached to catheters or IV tubing that you could trip over. You are also likely to be weaker than usual.   Your medicines - Certain medicines can increase the risk of falling. These include some medicines for sleeping problems, anxiety, high blood pressure, or depression. Adding new medicines, or changing doses of some medicines, can  also affect your risk of falling.  The more your doctor knows about your situation, the better they can help you. For example, if you fell because you have a condition that causes pain, your doctor might suggest treatments to help with the pain. Or if any of your medicines are making you dizzy and more likely to fall, your doctor might switch you to a different medicine.  Is there anything I can do on my own? -- Yes. To help keep from falling, you can:   Make your home safer - To avoid falling at home, get rid of things that might make you trip or slip. This can include furniture, electrical cords, clutter, and loose rugs (figure 1). Keep your home well lit so you can easily see where you are going. Avoid storing things in high places so you don't have to reach or climb.   Wear non-slip socks or sturdy shoes that fit well - Wearing shoes with high heels or slippery soles, or shoes that are too loose, can lead to falls. Walking around in bare feet, or only socks, can also increase your risk of falling.   Take vitamin D pills - Taking vitamin D might lower the risk of falls in older people. This is because vitamin D helps make bones and muscles stronger. Your doctor can talk to you about whether you should take extra vitamin D, and how much.   Stay active - Moving your body regularly can help lower your risk of falling. It might also help prevent you from getting hurt if you do fall. It is best to do a few different activities that help with both strength and balance. There are many kinds of exercise that can be safe for older people. These include walking, swimming, and krystal chi (a Chinese martial art involving slow, gentle movements).   Use a cane, walker, or other safety devices - If your doctor recommends that you use a cane or walker, make sure that it's the right size and you know how to use it. There are other devices that might help you avoid falling, too. These include grab bars or a sturdy seat for the  shower, non-slip bath mats, and hand rails or treads for the stairs (to prevent slipping).   Take extra care if you have had surgery or are in the hospital - Ask for help with getting out of bed, getting up from a chair, or going to the bathroom. Your body needs time to heal, and it's normal to need help with these things while you recover. It can also help to keep your belongings within reach, and avoid walking around in the dark. If you need help, use the call button instead of trying to get up.  If you worry that you could fall, you can get an emergency alert system. This is usually an alarm button that lets you call for help if you fall and can't get up. If you live alone and you do not have an emergency alert system, always carry a cell phone or portable phone with you when moving around the house.  How do I get up from a fall? -- If you do fall, try not to be afraid. Stay calm, and take slow, deep breaths. If someone is near you, call for help right away. If you have an emergency alert system, use it.  Try to find out if you are hurt. If you are hurt badly, trying to get up can make things worse. If you do not think that you are hurt badly, come up with a plan to get up off of the floor.  Some tips to get up after a fall if you are alone:   Look around you for a piece of sturdy furniture such as a couch or chair. Try to move your body to the furniture. You might need to scoot, crawl, or roll to get close. Do these movements very slowly. If you feel any sharp pains, you might need to stop.   Once you are close to the furniture, roll onto your side. Pull your knees up toward your chest, and try to get on your hands and knees.   Put your hands on the seat of the couch or chair.   Bring 1 leg forward so the foot is flat on the floor. If you have a stronger leg, move this leg first.   Now, try to stand up. Once both feet are on the ground, slowly turn and lower yourself to sit down on the seat.  What should I do  after a fall? -- If you had a fall, see your doctor right away, even if you aren't hurt. Your doctor can try to figure out what caused you to fall, and how likely you are to fall again. They will do an exam and talk to you about your health problems, medicines, and activities. They can also check how well you walk, move, and balance. Then, they can suggest things you can do to lower your risk of falling again.  Many older people have a hard time recovering after a fall. Doing things to prevent falling can help you protect your health and independence.  All topics are updated as new evidence becomes available and our peer review process is complete.  This topic retrieved from [employer] on: Apr 04, 2024.  Topic 78331 Version 25.0  Release: 32.2.4 - C32.93  © 2024 UpToDate, Inc[employer] and/or its affiliates. All rights reserved.  figure 1: How to avoid falling at home     This picture shows some of the things that can cause a fall in your home. Look around and remove any loose rugs, electrical cords, clutter, or furniture that could trip you.  Graphic 21286 Version 1.0  Consumer Information Use and Disclaimer   Disclaimer: This generalized information is a limited summary of diagnosis, treatment, and/or medication information. It is not meant to be comprehensive and should be used as a tool to help the user understand and/or assess potential diagnostic and treatment options. It does NOT include all information about conditions, treatments, medications, side effects, or risks that may apply to a specific patient. It is not intended to be medical advice or a substitute for the medical advice, diagnosis, or treatment of a health care provider based on the health care provider's examination and assessment of a patient's specific and unique circumstances. Patients must speak with a health care provider for complete information about their health, medical questions, and treatment options, including any risks or benefits regarding  use of medications. This information does not endorse any treatments or medications as safe, effective, or approved for treating a specific patient. Up[employer]Date, Inc. and its affiliates disclaim any warranty or liability relating to this information or the use thereof.The use of this information is governed by the Terms of Use, available at https://www.[employer].com/en/know/clinical-effectiveness-terms. 2024© UpToDate, Inc. and its affiliates and/or licensors. All rights reserved.  Copyright   © 2024 Up[employer]Date, Inc. and/or its affiliates. All rights reserved.

## 2025-02-07 NOTE — ASSESSMENT & PLAN NOTE
Not currently on medication because Lyrica prior authorization was denied. Recommend restarting gabapentin, but patient declines, stating she read about harmful side effects to both Lyrica and gabapentin. Recommend she follow up with her neurologist as scheduled for may.

## 2025-02-10 ENCOUNTER — RESULTS FOLLOW-UP (OUTPATIENT)
Age: 65
End: 2025-02-10

## 2025-02-13 ENCOUNTER — EVALUATION (OUTPATIENT)
Age: 65
End: 2025-02-13
Payer: COMMERCIAL

## 2025-02-13 DIAGNOSIS — R42 DIZZINESS: Primary | ICD-10-CM

## 2025-02-13 PROCEDURE — 97530 THERAPEUTIC ACTIVITIES: CPT

## 2025-02-13 PROCEDURE — 97161 PT EVAL LOW COMPLEX 20 MIN: CPT

## 2025-02-13 NOTE — PROGRESS NOTES
"                                                                    PT Evaluation          POC expires Unit limit Auth Expiration date PT/OT + Visit Limit? Co-Insurance   25 BOMN  BOMN No                               Visit/Unit Tracking                                                   Today's date: 2025  Patient name: Jacquelyn Fritz  : 1960  MRN: [id number]  Referring provider: Ada Lima PA-C  Dx:   Encounter Diagnosis     ICD-10-CM    1. Dizziness  R42 Ambulatory Referral to Physical Therapy            Assessment  Assessment details: Patient is a 64 y.o. Female who presents to skilled outpatient PT with complaints of dizziness and history of frequent falls. Was previously seen at outpatient physical therapy for balance rehab from May - Aug 2024, but was discharged due to absences against hospital attendance policy. Since discharge, she reports 4 falls with/without injury. Complains of lightheadedness/dizziness. Attempted to perform VOMS. Abnormal smooth pursuits with saccadic movements and abnormal convergence (12cm, norm is < 4cm) [central signs]. Unable to assess vestibular screen due to patient refusal: \"I don't want you to shake my head.\" Provided patient edu on need to re-create dizziness in order to determine cause; patient continued to defer. Positional testing for BPPV, (-) for posterior and horizontal, reducing likelihood of cause. From subjective intake and assessment, hypothesize dizziness may be multifactorial with differential including peripheral vestibular weakness (poor tolerance to VORx1), vasovagal response (near passing out with straining during bowel movements), influence of possible alcohol abuse, and/or OH (lightheadedness, low end for DBP, onset with anti-gravity movements). Advised patient to f/u with PCP regarding status. Regressions noted on TUG and 10mWT indicating dec gait speed and reduced functional mobility. Considered at high risk of falls per TUG " and 10mWT. Due to time constraints, will perform CONNELL and 2/6MWT to assess balance and cardiovascular endurance. Patient would benefit from interventions focused on restoring functional strength to lower extremities, as well as balance training to improve static and dynamic postural stability with hopes of maximizing safety during ambulation, decreasing risk for falls, and improving overall activity tolerance both at home and within the community. She is a good candidate to continue skilled PT services.    Outcome Measures Initial Eval  5/14 6/27 8/6 2/13/25 IE   5xSTS 15.46 sec 11.58 sec no hands 18.84 sec no hands 13.66s no UE   TUG  - Regular  - Cognitive   18.92 sec   Sec  37.18 sec     13.95 sec no AD  16.67 sec no AD (ice cream flavors)   10.85 sec no AD  13.09 sec no AD (naming animals)     15.61s     Defer       10 meter   NV  10/14.62 = 0.68 m/s 0.72 m/s 0.44 m/s no AD   CONNELL 26/56 42/56 45/56 NV   FGA NV 15/30 18/30 defer   6MWT NV ft 2MWT: 250 ft no AD (gait belt) able to walk for 2.5 mins total 2MWT: 260' no AD NV       Impairments: Abnormal coordination, Abnormal gait, Abnormal muscle tone, Abnormal or restricted ROM, Activity intolerance, Impaired balance, Impaired physical strength, Lacks appropriate HEP, Poor posture, Poor body mechanics, Pain with function, Safety issue, Weight-bearing intolerance, Abnormal movement, Difficulty understanding, Abnormal muscle firing  Understanding of Dx/Px/POC: Good  Prognosis: Good    Patient verbalized understanding of POC.      Please contact me if you have any questions or recommendations. Thank you for the referral and the opportunity to share in Jacquelyn Fritz's care.      Plan  Patient would benefit from: Skilled PT   Planned modality interventions: Biofeedback, Cryotherapy, TENS, Thermotherapy  Planned therapy interventions: Abdominal trunk stabilization, ADL training, Balance, Balance/WB training, Breathing training, Body mechanics training,  Coordination, Functional ROM exercises, Gait training, HEP, Joint Mobilization, Manual Therapy, Price taping, Motor coordination training, Neuromuscular re-education, Patient education, Postural training, Strengthening, Stretching, Therapeutic activities, Therapeutic exercises, Therapeutic training, Transfer training, Activity modification, Work reintegration  Frequency: 1-3x/wk  Duration in weeks: 3 months  Plan of Care beginning date: 2/13/2025  Plan of Care expiration date: 3 months - 5/13/2025  Treatment plan discussed with: Patient       Goals  Short Term Goals (4 weeks):    - Patient will improve time on TUG by 2.9 seconds to facilitate improved safety in all ambulation   - Patient will be independent in basic HEP 2-3 weeks  - Patient will improve 5xSTS score by 2.3 seconds to promote improved LE functional strength needed for ADLs  - Patient will perform CONNELL and FGA to assess balance and fall risk  - Patient will perform 2/6MWT to assess cardiovascular endurnace    Long Term Goals (12 weeks):  - Patient will be independent in a comprehensive home exercise program  - Patient will improve gait speed by 0.18 m/s to improve safety with community ambulation  - Patient will improve CONNELL by 6 points in order to improve static balance and reduce risk for falls  - Patient will improve scoring on FGA by 4 points to progress safety with dynamic tasks  - Patient will be able to demonstrate HT in gait without veering  - Patient will report 50% reduction in near falls in order to improve safety with functional tasks and reduce his risk for falls  - Patient will report going on walks at least 3 days per week to promote independence and improved cardiovascular endurance  - Patient will be able to ascend/descend stairs reciprocally with 1 UE assist to promote independence and safety with ADLs  - Patient will report 50% reduction in near falls when ambulating on uneven terrain      Cut off score    All date taken from  [facility] Neuro Section or Rehab Measures      [place]/56  MDC: 6 pts  Age Norms:  60-69: M - 55   F - 55  70-79: M - 54   F - 53  80-89: M - 53   F - 50 5xSTS: Fidelia et al 2010  MDC: 2.3 sec  Age Norms:  60-69: 11.4 sec  70-79: 12.6 sec  80-89: 14.8 sec   TUG  MDC: 4.14 sec  Cut off score:  >13.5 sec community dwelling adults  >32.2 frail elderly  <20 I for basic transfers  >30 dependent on transfers 10 Meter Walk Test: Colette et al 2011  MDC: 0.18 m/s  20-29: M - 1.35 m   F - 1.34 m  30-39: M - 1.43 m   F - 1.34 m  40-49: M - 1.43 m   F - 1.39 m  50-59: M - 1.43 m   F - 1.31 m  60-69: M - 1.34 m   F - 1.24 m  70-79: M - 1.26 m   F - 1.13 m  80-89: M - 0.97 m   F - 0.94 m    Household Ambulator < 0.4 m/s  Limited Community Ambulator 0.4 - 0.8 m/s  Community Ambulator 0.8 - 1.2 m/s  Safely cross the street > 1.2 m/s   FGA  MCID: 4 pts  Geriatrics/community < 22/30 fall risk  Geriatrics/community < 20/30 unexplained falls    DGI  MDC: vestibular - 4 pts  MDC: geriatric/community - 3 pts  Falls risk <19/24 mCTSIB  Norm: 20-60 yrs  Eyes open firm: norm sway 0.21-0.48  Eyes closed firm: norm sway 0.48-0.99  Eyes open foam: norm sway 0.38-0.71  Eyes closed foam: norm sway 0.70-2.22   6 Minute Walk Test  MDC: 190.98 ft  MCID: 164 ft    Age Norms  60-69: M - 1876 ft (571.80 m)  F - 1765 ft (537.98 m)  70-79: M - 1729 ft (527.00 m)  F - 1545 ft (470.92 m)  80-89: M - 1368 ft (416.97 m)  F - 1286 ft (391.97 m) ABC: Cynthia & Marcos, 2003  <67% increased risk for falls   Circle-Michel Monofilaments  Evaluator Size:        Force (grams):          Hand/Dorsal Thresholds:        Plantar Thresholds:  - 1.65                       - 0.008                       - Normal                                 - Normal  - 2.36                       - 0.02                         - Normal                                 - Normal  - 2.44                       - 0.04                         - Normal                                  - Normal  - 2.83                       - 0.07                         - Normal                                 - Normal  - 3.22                       - 0.16                         - Diminished light touch          - Normal  - 3.61                       - 0.40                         - Diminished light touch          - Normal  - 3.84                       - 0.60                         - Diminished protective           - Diminished light touch  - 4.08                       - 1.00                         - Diminished protective           - Diminished light touch  - 4.17                       - 1.40                         - Diminished protective           - Diminished light touch  - 4.31                       - 2.00                         - Diminished protective           - Diminished light touch  - 4.56                       - 4.00                         - Loss of protective sense      - Diminished protective  - 4.74                       - 6.00                         - Loss of protective sense      - Diminished protective  - 4.93                       - 8.00                         - Loss of protective sense      - Diminished protective  - 5.07                       - 10.0                         - Loss of protective sense     - Loss of protective sense  - 5.18                       - 15.0                         - Loss of protective sense     - Loss of protective sense  - 5.46                       - 26.0                         - Loss of protective sense     - Loss of protective sense  - 5.88                       - 60.0                         - Loss of protective sense     - Loss of protective sense  - 6.10                       - 100                          - Loss of protective sense     - Loss of protective sense  - 6.45                       - 180                          - Loss of protective sense     - Loss of protective sense  - 6.65                       - 300                          - Deep pressure  "sense only  - Deep pressure sense only         Subjective    History of Present Illness  - Mechanism of injury: Patient reports dizziness with toileting (straining at toilet), washing face,   She reports LOC / passing out on  while standing in the kitchen. She turned, and regained consciousness lying on her R side, + HS, which was uncomfortable. Unsure of FLAVIO: \"I'm getting old I guess.\" Unable to provide appropriate answer to causes (turns, bed mobility), with reasoning to her age. She reports 4 falls in the past year, with all falls occurring since discharge.    - Primary AD: RW/rollator for household ambulation, none for community ambulation  - Assist level at home: Independent  - Decreased fine motor tasks: No    Patient goal: Improve balance    Pain  - Current pain ratin/10    Social Support  - Steps to enter house: 3 steps  - Stairs in house: full flight, does not use   - Lives in: Two-story home first floor setup  - Lives with: lives alone    - Employment status: retired  - Hand dominance: right    Treatments  - Previous treatment: physical therapy  - Current treatment: none    Dizziness Subjective  How long does dizziness last: seconds - minutes  How would you describe the dizziness: \"lightheadedness\"  Rolling in bed: No  Supine to/from sit: No  Recent hearing loss: No  Tinnitus: No  Aural fullness/ear pain: No  Vision changes: No  History of recent viral infections: No  History of migraines: No      Objective     Vestibular Objective  Cervical Spine AROM:  - Flexion: WFL no pain  - Extension: WFL no pain  - R Rotation: WFL no pain  - L Rotation: WFL no pain  - R Lateral Flexion: WFL no pain  - L Lateral Flexion: WFL no pain    Integrity Testing  - mVBI: asymptomatic  - Sharp Lauren: intact  - Alar Stability Test: intact    Oculomotor Screen  - Baseline Symptoms: 0/10  - Baseline Observation: normal  - Gaze Holding Nystagmus: H: Normal   - Spontaneous Nystagmus Room Light: H: Normal   - Smooth " "Pursuits (central): H: Abnormal Dizziness: 0/10, Observation: difficulty maintaining fixation; saccadic movements; + \"off\" feeling  - Saccades (central): H: normal Dizziness: 0/10  - Near Point Convergence (normal: < 4\"/10 cm - central): H: Abnormal, 12 cm  - Unable to assess vestibular testing 2/2 patient tolerance (\"I don't want you shaking my head around.\")    Orthostatic Screen  - Seated: 130/54 L arm  - Standin/52 L arm    BPPV Screen  - L [place]: -  - R [place]: -  - L Horizontal Roll: -  - R Horizontal Roll: -      LE MMT  - R Hip Flexion: 4-/5   L Hip Flexion: 3+/5   - R Hip Abduction: 4-/5  L Hip Abduction: 4-/5  - R Hip Adduction: 4-/5  L Hip Adduction: 4-/5  - R Knee Extension: 4/5  L Knee Extension: 4/5  - R Knee Flexion: 4-/5   L Knee Flexion: 4-/5  - R Ankle DF: 4-/5   L Ankle DF: 4-/5  - R Ankle PF: 4/5   L Ankle PF: 4/5    Sensation  - Light touch: Impaired at feet  - Deep pressure: WNL  - Temperature: Inconsistent    Gait  - Abnormalities: wide SHRUTHI, decreased step length bilaterally, slight shuffle       Outcome Measures Initial Eval  25 IE   5xSTS 15.46 sec 11.58 sec no hands 18.84 sec no hands 13.66s no UE   TUG  - Regular  - Cognitive   18.92 sec   Sec  37.18 sec     13.95 sec no AD  16.67 sec no AD (ice cream flavors)   10.85 sec no AD  13.09 sec no AD (naming animals)     15.61s     Defer       10 meter   NV  10/14.62 = 0.68 m/s 0.72 m/s 0.44 m/s no AD   CONNELL  42/ 45/56 NV   FGA NV 15/30 18/30 defer   6MWT NV ft 2MWT: 250 ft no AD (gait belt) able to walk for 2.5 mins total 2MWT: 260' no AD NV          Precautions: Standard fall risk, hx alcohol abuse, aphasia, HTN  Past Medical History:   Diagnosis Date    Cancer (HCC) 2011    lung, upper left lung lobectomy    Hyperlipidemia     Hypertension     IBS (irritable bowel syndrome)      "

## 2025-02-18 ENCOUNTER — OFFICE VISIT (OUTPATIENT)
Age: 65
End: 2025-02-18
Payer: COMMERCIAL

## 2025-02-18 DIAGNOSIS — R42 DIZZINESS: Primary | ICD-10-CM

## 2025-02-18 PROCEDURE — 97112 NEUROMUSCULAR REEDUCATION: CPT

## 2025-02-18 NOTE — PROGRESS NOTES
Daily Note     Today's date: 2025  Patient name: Jacquelyn Fritz  : 1960  MRN: [id number]  Referring provider: Ada Lima PA-C  Dx:   Encounter Diagnosis     ICD-10-CM    1. Dizziness  R42                      Subjective: patient reports she still has a cold.       Objective: See treatment diary below  NMR:  HKM 4 laps   Lateral side stepping 4 laps   Tandem walking 5 laps   Forward backward walking 4 laps     Assessment: Tolerated treatment fair. Patient requires increased rest breaks in between sets. Patient does require UE support in order to perform exercises. Patient does require longer rest breaks during session due to fatigue. Continue to progress. Patient demonstrated fatigue post treatment, exhibited good technique with therapeutic exercises, and would benefit from continued PT      Plan: Continue per plan of care.        POC expires Unit limit Auth Expiration date PT/OT + Visit Limit? Co-Insurance   25 BOMN  BOMN No                               Visit/Unit Tracking

## 2025-02-21 ENCOUNTER — OFFICE VISIT (OUTPATIENT)
Age: 65
End: 2025-02-21
Payer: COMMERCIAL

## 2025-02-21 DIAGNOSIS — R42 DIZZINESS: Primary | ICD-10-CM

## 2025-02-21 PROCEDURE — 97112 NEUROMUSCULAR REEDUCATION: CPT

## 2025-02-21 NOTE — PROGRESS NOTES
"Daily Note     Today's date: 2025  Patient name: Jacquelyn Fritz  : 1960  MRN: [id number]  Referring provider: Ada Lima PA-C  Dx:   Encounter Diagnosis     ICD-10-CM    1. Dizziness  R42                      Subjective: patient reports she still has a cold and is very tired: \"I didn't sleep at all.\"      Objective: See treatment diary below    NMR:  STS 2 x 10 no UE assist, emphasis on ecc control  HKM with goal of 3s iso hold, vc for glute med cx 4 laps   Lateral side stepping 4 laps   Tandem walking 5 laps   Forward backward walking with 5# tidal tank 5 laps   Sit to stands with 5# tank x10  Speed amb with L HHA and 5# tank hold, vc for speed, 250'x1    Assessment: Tolerated treatment fair. Patient requires increased rest breaks in between sets. Patient does require UE support in order to perform exercises; consider providing ball and/or tidal tank hold to increase intensity and reduce UE support. Patient does require longer rest breaks during session due to fatigue. Continue to progress. Patient demonstrated fatigue post treatment, exhibited good technique with therapeutic exercises, and would benefit from continued PT      Plan: Continue per plan of care.        POC expires Unit limit Auth Expiration date PT/OT + Visit Limit? Co-Insurance   25 BOMN  BOMN No                               Visit/Unit Tracking                                                   "

## 2025-02-25 ENCOUNTER — OFFICE VISIT (OUTPATIENT)
Age: 65
End: 2025-02-25
Payer: COMMERCIAL

## 2025-02-25 DIAGNOSIS — R42 DIZZINESS: Primary | ICD-10-CM

## 2025-02-25 PROCEDURE — 97112 NEUROMUSCULAR REEDUCATION: CPT

## 2025-02-25 NOTE — PROGRESS NOTES
Daily Note     Today's date: 2025  Patient name: Jacquelyn Fritz  : 1960  MRN: [id number]  Referring provider: Ada Lima PA-C  Dx:   Encounter Diagnosis     ICD-10-CM    1. Dizziness  R42                  Subjective: Patient denies falls, but reports constipation.       Objective: See treatment diary below    NMR:  STS 2 x 10 no UE assist, emphasis on ecc control  Forward backward walking with 5# tidal tank 5 laps   Lateral side stepping with 5# tidal tank 5 laps   Amb lap around building with 5# tidal tank x 1 lap, uneven terrain, incline/decline, vc for speed  HKM with goal of 3s iso hold, vc for glute med cx, with 5# tank hold 5 laps   Tandem walking with 1UE support 5 laps   Sit to stands with 5# tank x20    Assessment: Tolerated treatment fair. Patient does require UE support in order to perform exercises, but able to progress intensity and reduced UE support with UL tank hold. Reduced rest breaks this session compared to previous. Continue to progress. Patient demonstrated fatigue post treatment, exhibited good technique with therapeutic exercises, and would benefit from continued PT      Plan: Continue per plan of care.        POC expires Unit limit Auth Expiration date PT/OT + Visit Limit? Co-Insurance   25 BOMN  BOMN No                               Visit/Unit Tracking

## 2025-02-28 ENCOUNTER — OFFICE VISIT (OUTPATIENT)
Age: 65
End: 2025-02-28
Payer: COMMERCIAL

## 2025-02-28 DIAGNOSIS — R42 DIZZINESS: Primary | ICD-10-CM

## 2025-02-28 PROCEDURE — 97112 NEUROMUSCULAR REEDUCATION: CPT | Performed by: PHYSICAL THERAPIST

## 2025-02-28 NOTE — PROGRESS NOTES
Daily Note     Today's date: 2025  Patient name: Jacquelyn Fritz  : 1960  MRN: [id number]  Referring provider: Ada Lima PA-C  Dx:   Encounter Diagnosis     ICD-10-CM    1. Dizziness  R42                  Subjective: Patient denies falls, but reports constipation.       Objective: See treatment diary below    NMR:  Forward backward walking with 5# tidal tank 5 laps   Lateral side stepping with 5# tidal tank 5 laps   Amb lap around building with 5# tidal tank x 2 lap, uneven terrain, incline/decline, vc for speed  HKM with goal of 3s iso hold, vc for glute med cx, with 5# tank hold 5 laps   Tandem walking with 1UE support 6 laps   Sit to stands with 5# tank x20    Assessment: Tolerated treatment fair. Patient does require UE support in order to perform exercises, but able to progress intensity and reduced UE support with UL tank hold. Continue to progress. Patient demonstrated fatigue post treatment, exhibited good technique with therapeutic exercises, and would benefit from continued PT      Plan: Continue per plan of care.        POC expires Unit limit Auth Expiration date PT/OT + Visit Limit? Co-Insurance   25 BOMN  BOMN No                               Visit/Unit Tracking

## 2025-03-04 ENCOUNTER — APPOINTMENT (EMERGENCY)
Dept: CT IMAGING | Facility: HOSPITAL | Age: 65
End: 2025-03-04
Payer: COMMERCIAL

## 2025-03-04 ENCOUNTER — HOSPITAL ENCOUNTER (INPATIENT)
Facility: HOSPITAL | Age: 65
LOS: 2 days | Discharge: NURSING, REHAB OR LONG-TERM CARE | End: 2025-03-07
Attending: EMERGENCY MEDICINE | Admitting: FAMILY MEDICINE
Payer: COMMERCIAL

## 2025-03-04 ENCOUNTER — OFFICE VISIT (OUTPATIENT)
Age: 65
End: 2025-03-04
Payer: COMMERCIAL

## 2025-03-04 DIAGNOSIS — E87.6 HYPOKALEMIA: ICD-10-CM

## 2025-03-04 DIAGNOSIS — R42 DIZZINESS: Primary | ICD-10-CM

## 2025-03-04 DIAGNOSIS — K59.00 CONSTIPATION: ICD-10-CM

## 2025-03-04 DIAGNOSIS — S32.000A LUMBAR COMPRESSION FRACTURE (HCC): Primary | ICD-10-CM

## 2025-03-04 LAB
ANION GAP SERPL CALCULATED.3IONS-SCNC: 9 MMOL/L (ref 4–13)
BASOPHILS # BLD AUTO: 0.06 THOUSANDS/ÂΜL (ref 0–0.1)
BASOPHILS NFR BLD AUTO: 1 % (ref 0–1)
BUN SERPL-MCNC: 6 MG/DL (ref 5–25)
CALCIUM SERPL-MCNC: 8.8 MG/DL (ref 8.4–10.2)
CHLORIDE SERPL-SCNC: 103 MMOL/L (ref 96–108)
CO2 SERPL-SCNC: 28 MMOL/L (ref 21–32)
CREAT SERPL-MCNC: 0.68 MG/DL (ref 0.6–1.3)
EOSINOPHIL # BLD AUTO: 0.1 THOUSAND/ÂΜL (ref 0–0.61)
EOSINOPHIL NFR BLD AUTO: 1 % (ref 0–6)
ERYTHROCYTE [DISTWIDTH] IN BLOOD BY AUTOMATED COUNT: 15.9 % (ref 11.6–15.1)
GFR SERPL CREATININE-BSD FRML MDRD: 92 ML/MIN/1.73SQ M
GLUCOSE SERPL-MCNC: 101 MG/DL (ref 65–140)
HCT VFR BLD AUTO: 34.3 % (ref 34.8–46.1)
HGB BLD-MCNC: 10.8 G/DL (ref 11.5–15.4)
IMM GRANULOCYTES # BLD AUTO: 0.14 THOUSAND/UL (ref 0–0.2)
IMM GRANULOCYTES NFR BLD AUTO: 1 % (ref 0–2)
LYMPHOCYTES # BLD AUTO: 2.41 THOUSANDS/ÂΜL (ref 0.6–4.47)
LYMPHOCYTES NFR BLD AUTO: 25 % (ref 14–44)
MCH RBC QN AUTO: 30 PG (ref 26.8–34.3)
MCHC RBC AUTO-ENTMCNC: 31.5 G/DL (ref 31.4–37.4)
MCV RBC AUTO: 95 FL (ref 82–98)
MONOCYTES # BLD AUTO: 1.02 THOUSAND/ÂΜL (ref 0.17–1.22)
MONOCYTES NFR BLD AUTO: 11 % (ref 4–12)
NEUTROPHILS # BLD AUTO: 5.99 THOUSANDS/ÂΜL (ref 1.85–7.62)
NEUTS SEG NFR BLD AUTO: 61 % (ref 43–75)
NRBC BLD AUTO-RTO: 0 /100 WBCS
PLATELET # BLD AUTO: 349 THOUSANDS/UL (ref 149–390)
PMV BLD AUTO: 11.8 FL (ref 8.9–12.7)
POTASSIUM SERPL-SCNC: 2.9 MMOL/L (ref 3.5–5.3)
RBC # BLD AUTO: 3.6 MILLION/UL (ref 3.81–5.12)
SODIUM SERPL-SCNC: 140 MMOL/L (ref 135–147)
WBC # BLD AUTO: 9.72 THOUSAND/UL (ref 4.31–10.16)

## 2025-03-04 PROCEDURE — 97112 NEUROMUSCULAR REEDUCATION: CPT

## 2025-03-04 PROCEDURE — 80048 BASIC METABOLIC PNL TOTAL CA: CPT | Performed by: EMERGENCY MEDICINE

## 2025-03-04 PROCEDURE — 99285 EMERGENCY DEPT VISIT HI MDM: CPT | Performed by: EMERGENCY MEDICINE

## 2025-03-04 PROCEDURE — 85025 COMPLETE CBC W/AUTO DIFF WBC: CPT | Performed by: EMERGENCY MEDICINE

## 2025-03-04 PROCEDURE — 36415 COLL VENOUS BLD VENIPUNCTURE: CPT | Performed by: EMERGENCY MEDICINE

## 2025-03-04 PROCEDURE — 96374 THER/PROPH/DIAG INJ IV PUSH: CPT

## 2025-03-04 PROCEDURE — 74177 CT ABD & PELVIS W/CONTRAST: CPT

## 2025-03-04 PROCEDURE — 99223 1ST HOSP IP/OBS HIGH 75: CPT

## 2025-03-04 PROCEDURE — 99284 EMERGENCY DEPT VISIT MOD MDM: CPT

## 2025-03-04 RX ORDER — POTASSIUM CHLORIDE 1500 MG/1
40 TABLET, EXTENDED RELEASE ORAL ONCE
Status: COMPLETED | OUTPATIENT
Start: 2025-03-04 | End: 2025-03-04

## 2025-03-04 RX ORDER — ALBUTEROL SULFATE 0.83 MG/ML
2.5 SOLUTION RESPIRATORY (INHALATION) EVERY 6 HOURS PRN
Status: DISCONTINUED | OUTPATIENT
Start: 2025-03-04 | End: 2025-03-07 | Stop reason: HOSPADM

## 2025-03-04 RX ORDER — OXYCODONE HYDROCHLORIDE 5 MG/1
5 TABLET ORAL ONCE
Refills: 0 | Status: COMPLETED | OUTPATIENT
Start: 2025-03-04 | End: 2025-03-04

## 2025-03-04 RX ORDER — AMLODIPINE BESYLATE 5 MG/1
5 TABLET ORAL DAILY
Status: DISCONTINUED | OUTPATIENT
Start: 2025-03-05 | End: 2025-03-07 | Stop reason: HOSPADM

## 2025-03-04 RX ORDER — ACETAMINOPHEN 325 MG/1
650 TABLET ORAL EVERY 6 HOURS PRN
Status: DISCONTINUED | OUTPATIENT
Start: 2025-03-04 | End: 2025-03-05

## 2025-03-04 RX ORDER — FLUTICASONE FUROATE AND VILANTEROL 200; 25 UG/1; UG/1
1 POWDER RESPIRATORY (INHALATION)
Status: DISCONTINUED | OUTPATIENT
Start: 2025-03-05 | End: 2025-03-07 | Stop reason: HOSPADM

## 2025-03-04 RX ORDER — PANTOPRAZOLE SODIUM 40 MG/1
40 TABLET, DELAYED RELEASE ORAL DAILY
Status: DISCONTINUED | OUTPATIENT
Start: 2025-03-05 | End: 2025-03-07 | Stop reason: HOSPADM

## 2025-03-04 RX ORDER — ATORVASTATIN CALCIUM 20 MG/1
20 TABLET, FILM COATED ORAL DAILY
Status: DISCONTINUED | OUTPATIENT
Start: 2025-03-05 | End: 2025-03-07 | Stop reason: HOSPADM

## 2025-03-04 RX ORDER — KETOROLAC TROMETHAMINE 30 MG/ML
15 INJECTION, SOLUTION INTRAMUSCULAR; INTRAVENOUS ONCE
Status: COMPLETED | OUTPATIENT
Start: 2025-03-04 | End: 2025-03-04

## 2025-03-04 RX ORDER — ONDANSETRON 2 MG/ML
4 INJECTION INTRAMUSCULAR; INTRAVENOUS EVERY 6 HOURS PRN
Status: DISCONTINUED | OUTPATIENT
Start: 2025-03-04 | End: 2025-03-07 | Stop reason: HOSPADM

## 2025-03-04 RX ORDER — ICOSAPENT ETHYL 1 G/1
1 CAPSULE ORAL 2 TIMES DAILY
Status: DISCONTINUED | OUTPATIENT
Start: 2025-03-04 | End: 2025-03-04 | Stop reason: RX

## 2025-03-04 RX ORDER — HYDROMORPHONE HCL IN WATER/PF 6 MG/30 ML
0.2 PATIENT CONTROLLED ANALGESIA SYRINGE INTRAVENOUS EVERY 2 HOUR PRN
Refills: 0 | Status: DISCONTINUED | OUTPATIENT
Start: 2025-03-04 | End: 2025-03-07 | Stop reason: HOSPADM

## 2025-03-04 RX ORDER — LIDOCAINE 50 MG/G
1 PATCH TOPICAL DAILY
Status: DISCONTINUED | OUTPATIENT
Start: 2025-03-05 | End: 2025-03-07 | Stop reason: HOSPADM

## 2025-03-04 RX ORDER — ALBUTEROL SULFATE 90 UG/1
2 INHALANT RESPIRATORY (INHALATION) EVERY 6 HOURS PRN
Status: DISCONTINUED | OUTPATIENT
Start: 2025-03-04 | End: 2025-03-07 | Stop reason: HOSPADM

## 2025-03-04 RX ORDER — OXYCODONE HYDROCHLORIDE 5 MG/1
5 TABLET ORAL EVERY 4 HOURS PRN
Refills: 0 | Status: DISCONTINUED | OUTPATIENT
Start: 2025-03-04 | End: 2025-03-07 | Stop reason: HOSPADM

## 2025-03-04 RX ORDER — ENOXAPARIN SODIUM 100 MG/ML
40 INJECTION SUBCUTANEOUS DAILY
Status: DISCONTINUED | OUTPATIENT
Start: 2025-03-05 | End: 2025-03-07 | Stop reason: HOSPADM

## 2025-03-04 RX ADMIN — KETOROLAC TROMETHAMINE 15 MG: 30 INJECTION, SOLUTION INTRAMUSCULAR at 17:47

## 2025-03-04 RX ADMIN — OXYCODONE HYDROCHLORIDE 5 MG: 5 TABLET ORAL at 19:38

## 2025-03-04 RX ADMIN — IOHEXOL 100 ML: 350 INJECTION, SOLUTION INTRAVENOUS at 16:29

## 2025-03-04 RX ADMIN — POTASSIUM CHLORIDE 40 MEQ: 1500 TABLET, EXTENDED RELEASE ORAL at 19:38

## 2025-03-04 NOTE — PROGRESS NOTES
Daily Note     Today's date: 3/4/2025  Patient name: Jacquelyn Fritz  : 1960  MRN: [id number]  Referring provider: Ada Lima PA-C  Dx:   Encounter Diagnosis     ICD-10-CM    1. Dizziness  R42         Start Time: 1109  Stop Time: 1147  Total time in clinic (min): 38 minutes    Subjective: Patient denies falls, reports that she sleeps a lot.       Objective: See treatment diary below    NMR:  Forward backward walking with 5# tidal tank 5 laps   Lateral side stepping with 5# tidal tank 5 laps   Amb lap around building with 5# tidal tank x 3 lap, uneven terrain, incline/decline, vc for speed  HKM with goal of 3s iso hold, vc for glute med cx, with 5# tank hold 5 laps   Tandem walking with 1UE support 6 laps   Sit to stands with 5# tank x20    Assessment: Tolerated treatment fair. Patient does require UE support in order to perform exercises, but able to progress intensity and reduced UE support with UL tank hold. Able to increase volume of distance for outdoor ambulation today. Continue to progress. Had some intermittent scuffing on ground while ambulating outdoors. Patient demonstrated fatigue post treatment, exhibited good technique with therapeutic exercises, and would benefit from continued PT      Plan: Continue per plan of care.        POC expires Unit limit Auth Expiration date PT/OT + Visit Limit? Co-Insurance   25 BOMN  BOMN No                               Visit/Unit Tracking
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
## 2025-03-05 LAB
ANION GAP SERPL CALCULATED.3IONS-SCNC: 8 MMOL/L (ref 4–13)
BUN SERPL-MCNC: 8 MG/DL (ref 5–25)
CALCIUM SERPL-MCNC: 8.5 MG/DL (ref 8.4–10.2)
CHLORIDE SERPL-SCNC: 106 MMOL/L (ref 96–108)
CO2 SERPL-SCNC: 27 MMOL/L (ref 21–32)
CREAT SERPL-MCNC: 0.75 MG/DL (ref 0.6–1.3)
ERYTHROCYTE [DISTWIDTH] IN BLOOD BY AUTOMATED COUNT: 15.6 % (ref 11.6–15.1)
GFR SERPL CREATININE-BSD FRML MDRD: 83 ML/MIN/1.73SQ M
GLUCOSE SERPL-MCNC: 103 MG/DL (ref 65–140)
HCT VFR BLD AUTO: 27.6 % (ref 34.8–46.1)
HGB BLD-MCNC: 9 G/DL (ref 11.5–15.4)
MCH RBC QN AUTO: 30.6 PG (ref 26.8–34.3)
MCHC RBC AUTO-ENTMCNC: 32.6 G/DL (ref 31.4–37.4)
MCV RBC AUTO: 94 FL (ref 82–98)
PLATELET # BLD AUTO: 314 THOUSANDS/UL (ref 149–390)
PMV BLD AUTO: 10.2 FL (ref 8.9–12.7)
POTASSIUM SERPL-SCNC: 3.2 MMOL/L (ref 3.5–5.3)
RBC # BLD AUTO: 2.94 MILLION/UL (ref 3.81–5.12)
SODIUM SERPL-SCNC: 141 MMOL/L (ref 135–147)
WBC # BLD AUTO: 6.64 THOUSAND/UL (ref 4.31–10.16)

## 2025-03-05 PROCEDURE — 97163 PT EVAL HIGH COMPLEX 45 MIN: CPT

## 2025-03-05 PROCEDURE — 80048 BASIC METABOLIC PNL TOTAL CA: CPT

## 2025-03-05 PROCEDURE — 97167 OT EVAL HIGH COMPLEX 60 MIN: CPT

## 2025-03-05 PROCEDURE — 99222 1ST HOSP IP/OBS MODERATE 55: CPT

## 2025-03-05 PROCEDURE — 85027 COMPLETE CBC AUTOMATED: CPT

## 2025-03-05 PROCEDURE — 97760 ORTHOTIC MGMT&TRAING 1ST ENC: CPT

## 2025-03-05 PROCEDURE — 99233 SBSQ HOSP IP/OBS HIGH 50: CPT | Performed by: FAMILY MEDICINE

## 2025-03-05 RX ORDER — POTASSIUM CHLORIDE 1500 MG/1
40 TABLET, EXTENDED RELEASE ORAL ONCE
Status: DISCONTINUED | OUTPATIENT
Start: 2025-03-05 | End: 2025-03-05

## 2025-03-05 RX ORDER — POTASSIUM CHLORIDE 1500 MG/1
40 TABLET, EXTENDED RELEASE ORAL ONCE
Status: COMPLETED | OUTPATIENT
Start: 2025-03-05 | End: 2025-03-05

## 2025-03-05 RX ORDER — ACETAMINOPHEN 325 MG/1
975 TABLET ORAL EVERY 8 HOURS SCHEDULED
Status: DISCONTINUED | OUTPATIENT
Start: 2025-03-05 | End: 2025-03-07 | Stop reason: HOSPADM

## 2025-03-05 RX ADMIN — ACETAMINOPHEN 975 MG: 325 TABLET, FILM COATED ORAL at 21:34

## 2025-03-05 RX ADMIN — HYDROMORPHONE HYDROCHLORIDE 0.2 MG: 0.2 INJECTION, SOLUTION INTRAMUSCULAR; INTRAVENOUS; SUBCUTANEOUS at 15:35

## 2025-03-05 RX ADMIN — ENOXAPARIN SODIUM 40 MG: 40 INJECTION SUBCUTANEOUS at 08:34

## 2025-03-05 RX ADMIN — LIDOCAINE 5% 1 PATCH: 700 PATCH TOPICAL at 08:34

## 2025-03-05 RX ADMIN — ATORVASTATIN CALCIUM 20 MG: 20 TABLET, FILM COATED ORAL at 08:35

## 2025-03-05 RX ADMIN — POTASSIUM CHLORIDE 40 MEQ: 1500 TABLET, EXTENDED RELEASE ORAL at 09:59

## 2025-03-05 RX ADMIN — ACETAMINOPHEN 975 MG: 325 TABLET, FILM COATED ORAL at 14:00

## 2025-03-05 RX ADMIN — OXYCODONE HYDROCHLORIDE 5 MG: 5 TABLET ORAL at 03:10

## 2025-03-05 RX ADMIN — AMLODIPINE BESYLATE 5 MG: 5 TABLET ORAL at 08:35

## 2025-03-05 RX ADMIN — PANTOPRAZOLE SODIUM 40 MG: 40 TABLET, DELAYED RELEASE ORAL at 08:42
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
## 2025-03-05 NOTE — ASSESSMENT & PLAN NOTE
"/72   Pulse 81   Temp 98.4 °F (36.9 °C)   Resp 18   Ht 5' 2\" (1.575 m)   Wt 78.9 kg (173 lb 15.1 oz)   SpO2 95%   BMI 31.81 kg/m²   Continue PTA amlodipine  "

## 2025-03-05 NOTE — ASSESSMENT & PLAN NOTE
Potassium 2.9 POA  Received 40 mEq p.o. repletion in ED  Monitor with daily BMP  Telemetry ordered

## 2025-03-05 NOTE — PLAN OF CARE
Problem: Prexisting or High Potential for Compromised Skin Integrity  Goal: Skin integrity is maintained or improved  Description: INTERVENTIONS:  - Identify patients at risk for skin breakdown  - Assess and monitor skin integrity  - Assess and monitor nutrition and hydration status  - Monitor labs   - Assess for incontinence   - Turn and reposition patient  - Assist with mobility/ambulation  - Relieve pressure over bony prominences  - Avoid friction and shearing  - Provide appropriate hygiene as needed including keeping skin clean and dry  - Evaluate need for skin moisturizer/barrier cream  - Collaborate with interdisciplinary team   - Patient/family teaching  - Consider wound care consult   Outcome: Progressing     Problem: MUSCULOSKELETAL - ADULT  Goal: Maintain or return mobility to safest level of function  Description: INTERVENTIONS:  - Assess patient's ability to carry out ADLs; assess patient's baseline for ADL function and identify physical deficits which impact ability to perform ADLs (bathing, care of mouth/teeth, toileting, grooming, dressing, etc.)  - Assess/evaluate cause of self-care deficits   - Assess range of motion  - Assess patient's mobility  - Assess patient's need for assistive devices and provide as appropriate  - Encourage maximum independence but intervene and supervise when necessary  - Involve family in performance of ADLs  - Assess for home care needs following discharge   - Consider OT consult to assist with ADL evaluation and planning for discharge  - Provide patient education as appropriate  Outcome: Progressing  Goal: Maintain proper alignment of affected body part  Description: INTERVENTIONS:  - Support, maintain and protect limb and body alignment  - Provide patient/ family with appropriate education  Outcome: Progressing
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
## 2025-03-05 NOTE — ASSESSMENT & PLAN NOTE
Hgb 10.8 POA, within baseline of 10-12 per chart review  Monitor with daily CBC  Transfuse for Hgb less than 7.0
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
## 2025-03-05 NOTE — DISCHARGE INSTR - AVS FIRST PAGE
Discharge Instructions - Orthopedics  [unfilled]    Weight Bearing Status:                                           Weightbearing as tolerated bilateral lower extremities    DVT prophylaxis:  Per primary    Pain:  Per primary, recommend multimodal oral pain regimen from orthopedic standpoint    Appt Instructions:   If you do not have your appointment, please call the clinic at [phone]  Follow-up outpatient with the orthopedic spine team.    Contact the office sooner if you experience any increased numbness/tingling in the extremities.
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
## 2025-03-05 NOTE — ASSESSMENT & PLAN NOTE
Subacute versus chronic L1, L2 and L3 vertebral compression fracture  No inpatient surgical intervention warranted.  Weightbearing as tolerated bilateral lower extremities  PT/OT evaluation  Pain control  DVT ppx per primary  Diet per primary  Dispo: Orthopedics signing off.  Plan for outpatient follow-up with spine team.

## 2025-03-05 NOTE — PHYSICAL THERAPY NOTE
Physical Therapy Evaluation     Patient's Name: Jacquelyn Fritz    Admitting Diagnosis  Hypokalemia [E87.6]  Lumbar compression fracture (HCC) [S32.000A]  Pelvic pain [R10.2]    Problem List  Patient Active Problem List   Diagnosis    Tibial plateau fracture, left    History of lung cancer    Essential hypertension    Mixed hyperlipidemia    Hypokalemia    Prediabetes    COPD, mild (HCC)    Post-menopausal    Macrocytic anemia    Alcohol use    Paresthesia of both lower extremities    Osteopenia    Neuropathy    Vitamin B6 deficiency    Vitamin D deficiency    Memory difficulties    Vitamin B12 deficiency    Tremor    Falls    Acute pain of left shoulder    Dysarthria    Primary osteoarthritis of left knee    Feeling lonely    Lumbar compression fracture (HCC)       Past Medical History  Past Medical History:   Diagnosis Date    Cancer (HCC) 2011    lung, upper left lung lobectomy    Hyperlipidemia     Hypertension     IBS (irritable bowel syndrome)        Past Surgical History  Past Surgical History:   Procedure Laterality Date    HYSTERECTOMY      KNEE SURGERY Left     LUNG LOBECTOMY      ORIF TIBIAL PLATEAU Left 11/07/2019    Procedure: OPEN REDUCTION W/ INTERNAL FIXATION (ORIF) TIBIAL PLATEAU;  Surgeon: Marie Deras MD;  Location: [facility];  Service: Orthopedics    WRIST SURGERY            03/05/25 1110   PT Last Visit   PT Visit Date 03/05/25   Note Type   Note type Evaluation   Pain Assessment   Pain Assessment Tool 0-10   Pain Score 8   Pain Location/Orientation Location: Back;Location: Abdomen   Multiple Pain Sites No   Restrictions/Precautions   Weight Bearing Precautions Per Order No   Braces or Orthoses LSO  (see separate documentation for fitting below. to be worn when ambulating)   Other Precautions Bed Alarm;Chair Alarm;Fall Risk;Pain;Telemetry   Home Living   Type of Home House   Home Layout Two level;Performs ADLs on one level;Able to live on main level with bedroom/bathroom;Stairs to enter  with rails  (1st floor set up, 3 ELIU with b/l handrails)   Bathroom Shower/Tub Walk-in shower  (currently sponge bathing d/t fear of falling/ recently passing out)   Bathroom Toilet Raised   Bathroom Equipment Shower chair;Grab bars around toilet;Commode  (sponge bathing currently)   Bathroom Accessibility Accessible   Home Equipment Cane;Walker;Wheelchair-manual  (RW and rollator)   Additional Comments pt ambulates without AD indoors/outdoors   Prior Function   Level of [place] Independent with ADLs;Independent with functional mobility;Independent with IADLS   Lives With Alone   Receives Help From Family;Outpatient therapy  (OPPT for dizziness/balance. brother and sister in law (on a cruise right now))   IADLs Independent with meal prep;Independent with medication management;Independent with driving  (uses [place] for therapy appts/ avoids driving highways)   Falls in the last 6 months 1 to 4  (2 falls)   Vocational Retired   General   Family/Caregiver Present No   Cognition   Arousal/Participation Alert   Orientation Level Oriented to person;Oriented to place;Oriented to time;Oriented to situation   Memory Within functional limits   Following Commands Follows all commands and directions without difficulty   Comments pt agreeable to PT evaluation   RLE Assessment   RLE Assessment   (Grossly 4-/5 observed with functional mobility)   LLE Assessment   LLE Assessment   (Grossly 4-/5 observed with functional mobility)   Coordination   Movements are Fluid and Coordinated 1   Sensation   (neuropathy b/l feet)   Bed Mobility   Additional Comments pt received at EOB upon arrival   Transfers   Sit to Stand 4  Minimal assistance   Additional items Assist x 1;Armrests;Increased time required;Verbal cues   Stand to Sit 4  Minimal assistance   Additional items Assist x 1;Armrests;Increased time required;Verbal cues   Ambulation/Elevation   Gait pattern Decreased foot clearance;Wide SHRUTHI;Antalgic;Short stride;Step  to;Excessively slow   Gait Assistance 4  Minimal assist   Additional items Assist x 1;Verbal cues   Assistive Device Rolling walker   Distance 20' x 2   Balance   Static Sitting Good   Dynamic Sitting Fair +   Static Standing Fair   Dynamic Standing Fair -   Ambulatory Fair -   Endurance Deficit   Endurance Deficit Yes   Activity Tolerance   Activity Tolerance Patient limited by fatigue;Patient limited by pain   Medical Staff Made Aware Pt seen as a co-eval with OT due to the patient's co-morbidities, clinically unstable presentation, and present impairments which are a regression from the patient's baseline.   Nurse Made Aware MATTY Plummer   Assessment   Prognosis Good   Problem List Decreased strength;Decreased endurance;Decreased mobility;Impaired balance;Pain;Impaired sensation   Assessment Pt is 64 y.o. female seen for PT evaluation on 3/5/2025 s/p admit to [facility] on 3[facility]4/2025 w/ Lumbar compression fracture (HCC). PT was consulted to assess pt's functional mobility and d/c needs. Order placed for PT eval and tx. PTA, pt lives alone in 2SH with 3 ELIU, ambulates without AD, + fall history. At time of eval, pt requiring min assist for all phases of mobility. Upon evaluation, pt presenting with impaired functional mobility d/t decreased strength, decreased endurance, impaired balance, decreased mobility, impaired sensation, pain, and activity intolerance. Pertinent PMHx and current co-morbidities affecting pt's physical performance at time of assessment include: lumbar compression fracture, HTN, HLD, hypokalemia, prediabetes, COPD, macrocytic anemia, tibial plateau fracture, alcohol use, paresthesia, memory difficulties, tremor, falls, dysarthria. Personal factors affecting pt at time of eval include: ambulating w/ assistive device, inability to navigate community distances, unable to perform dynamic tasks in community, and positive fall history. The following objective measures performed on IE also reveal  limitations: Barthel Index: 50/100, Modified Charlie: 4 (moderate/severe disability), and -PAC 6-Clicks: 16/24. Pt's clinical presentation is currently unstable/unpredictable seen in pt's presentation of abnormal lab value(s) and ongoing medical assessment. Overall, pt's rehab potential and prognosis to return to PLOF is good as impacted by objective findings, warranting pt to receive further skilled PT interventions to address identified impairments, activity limitation(s), and participation restriction(s). Pt to benefit from continued PT tx to address deficits as defined above and maximize level of functional independent mobility. From PT/mobility standpoint, recommend level 2, moderate resource intensity in order to facilitate return to PLOF.   Barriers to Discharge Inaccessible home environment;Decreased caregiver support   Goals   Patient Goals to go home   STG Expiration Date 03/15/25   Short Term Goal #1 In 7-10 days: Increase bilateral LE strength 1/2 grade to facilitate independent mobility, Perform all bed mobility tasks modified independent to decrease caregiver burden, Perform all transfers modified independent to improve independence, Ambulate > 150 ft. with least restrictive assistive device modified independent w/o LOB and w/ normalized gait pattern 100% of the time, Navigate 3 stair(s) modified independent with unilateral handrail to facilitate return to previous living environment, and Increase all balance 1/2 grade to decrease risk for falls   PT Treatment Day 1   Plan   Treatment/Interventions Functional transfer training;LE strengthening/ROM;Elevations;Therapeutic exercise;Endurance training;Patient/family training;Bed mobility;Equipment eval/education;Gait training;Spoke to nursing   PT Frequency 3-5x/wk   Discharge Recommendation   Rehab Resource Intensity Level, PT II (Moderate Resource Intensity)   AM-PAC Basic Mobility Inpatient   Turning in Flat Bed Without Bedrails 3   Lying on Back to  Sitting on Edge of Flat Bed Without Bedrails 3   Moving Bed to Chair 3   Standing Up From Chair Using Arms 3   Walk in Room 2   Climb 3-5 Stairs With Railing 2   Basic Mobility Inpatient Raw Score 16   Basic Mobility Standardized Score 38.32   [place] Highest Level Of Mobility   -HLM Goal 5: Stand one or more mins   -HLM Achieved 7: Walk 25 feet or more   Modified Charlie Scale   Modified Charlie Scale 4   Barthel Index   Feeding 10   Bathing 0   Grooming Score 0   Dressing Score 5   Bladder Score 10   Bowels Score 10   Toilet Use Score 5   Transfers (Bed/Chair) Score 10   Mobility (Level Surface) Score 0   Stairs Score 0   Barthel Index Score 50         Samira Duvall, PT                Orthotic Note      [id number]      Date: 3/5/2025      Patient Name: Jacquelyn Fritz       Reason for Consult:  Order placed for LSO brace.      Recommendations:  Pt sized w/ [place] LSO prior to PT mobility assessment, see PT evaluation for assessment findings. Pt educated on brace components, wearing schedule when OOB per MD recommendation, maintenance of brace, donning/doffing, skin inspection. Educated that brace may need to be repositioned throughout the day. Pt verbalized understanding w/ all components, requiring minimal assistance to don/doff brace w/ verbal instruction 100% of the time from therapist. Education to NSG staff on same, including don/doffing, wearing schedule, skin inspection of NSG q shift as well as assessing circulation for appropriate fit of brace.      Samira Duvall, PT

## 2025-03-05 NOTE — QUICK NOTE
I have personally counseled the patient on medication indication, compliance, utilization and side effects. Patient may be discharged home with albuterol

## 2025-03-05 NOTE — OCCUPATIONAL THERAPY NOTE
Occupational Therapy Evaluation     Patient Name: Jacquelyn Fritz  Today's Date: 3/5/2025  Problem List  Principal Problem:    Lumbar compression fracture (HCC)  Active Problems:    Essential hypertension    Mixed hyperlipidemia    Hypokalemia    Prediabetes    COPD, mild (HCC)    Macrocytic anemia    Past Medical History  Past Medical History:   Diagnosis Date    Cancer (HCC) 2011    lung, upper left lung lobectomy    Hyperlipidemia     Hypertension     IBS (irritable bowel syndrome)      Past Surgical History  Past Surgical History:   Procedure Laterality Date    HYSTERECTOMY      KNEE SURGERY Left     LUNG LOBECTOMY      ORIF TIBIAL PLATEAU Left 11/07/2019    Procedure: OPEN REDUCTION W/ INTERNAL FIXATION (ORIF) TIBIAL PLATEAU;  Surgeon: Marie Deras MD;  Location: [facility];  Service: Orthopedics    WRIST SURGERY           03/05/25 1128   OT Last Visit   OT Visit Date 03/05/25   Note Type   Note type Evaluation   Pain Assessment   Pain Assessment Tool 0-10   Pain Score 8   Pain Location/Orientation Location: Back;Location: Abdomen   Pain Onset/Description Onset: Ongoing  (back pain increases with mobility, abdominal pain is continuous)   Restrictions/Precautions   Weight Bearing Precautions Per Order No   Braces or Orthoses LSO  (to be worn during ambulation)   Other Precautions Chair Alarm;Bed Alarm;Fall Risk;Spinal precautions;Telemetry   Home Living   Type of Home House   Home Layout Two level;Able to live on main level with bedroom/bathroom;Stairs to enter with rails   Bathroom Shower/Tub Walk-in shower  (currently sponge bathing d/t fear of falling/ recently passing out)   Bathroom Toilet Raised   Bathroom Equipment Shower chair;Grab bars around toilet;Commode   Bathroom Accessibility Accessible   Home Equipment Walker;Cane;Wheelchair-manual  (rollator)   Prior Function   Level of [place] Independent with ADLs;Independent with functional mobility;Independent with IADLS  (does not use AD at  baseline)   Lives With Alone   Receives Help From Family;Outpatient therapy  (OPPT for dizziness/balance. brother and sister in law (on a cruise right now))   IADLs Independent with driving;Independent with meal prep;Independent with medication management  (pt does take [place] for therapy appts/avoids driving on the highway)   Falls in the last 6 months 1 to 4  (2 falls)   Vocational Retired   Subjective   Subjective Pt agreeable to therapy evaluation   ADL   Where Assessed Other (Comment)  (Assist levels for some self care tasks are based on functional assessment of performance skills and deficits observed during session.)   Eating Assistance 5  Supervision/Setup   Eating Deficit Setup   Grooming Assistance   (CGA)   Grooming Deficit Setup;Steadying;Supervision/safety;Increased time to complete;Standing with assistive device;Wash/dry hands  (standing at sink)   UB Dressing Assistance 4  Minimal Assistance   UB Dressing Deficit Setup;Supervision/safety;Increased time to complete   LB Dressing Assistance 4  Minimal Assistance   LB Dressing Deficit Setup;Steadying;Requires assistive device for steadying;Supervision/safety;Increased time to complete   Toileting Assistance  4  Minimal Assistance   Toileting Deficit Setup;Steadying;Supervison/safety;Increased time to complete   Bed Mobility   Additional Comments pt received seated EOB, pt in bed side chair at end of session   Transfers   Sit to Stand 4  Minimal assistance   Additional items Assist x 1;Increased time required;Verbal cues   Stand to Sit 4  Minimal assistance   Additional items Assist x 1;Increased time required;Verbal cues   Stand pivot 4  Minimal assistance   Additional items Assist x 1;Increased time required;Verbal cues   Toilet transfer 4  Minimal assistance   Additional items Assist x 1;Increased time required;Standard toilet  (grab bar)   Additional Comments with RW, LSO brace donned   Activity Tolerance   Activity Tolerance Patient limited by  fatigue;Patient limited by pain   Medical Staff Made Aware Samira PT  (Pt seen for co-evaluation with Physical Therapist due to pt's medical complexity, functional limitations and limited activity tolerance.)   Nurse Made Aware Kavitha RN   RUE Assessment   RUE Assessment WFL   LUE Assessment   LUE Assessment WFL   Hand Function   Gross Motor Coordination Functional   Fine Motor Coordination Functional   Sensation   Light Touch Partial deficits in the RLE;Partial deficits in the LLE  (pt reports baseline neuropathy)   Vision-Basic Assessment   Current Vision Wears glasses only for reading   Psychosocial   Psychosocial (WDL) WDL   Cognition   Overall Cognitive Status WFL   Arousal/Participation Alert;Cooperative   Attention Within functional limits   Orientation Level Oriented X4   Memory Within functional limits   Following Commands Follows all commands and directions without difficulty   Assessment   Limitation Decreased ADL status;Decreased endurance;Decreased high-level ADLs   Assessment Pt is a 64 y.o. female seen for OT evaluation s/p admit to [facility] on 3/4/2025 w/ Lumbar compression fracture (HCC).  Comorbidities affecting pt's functional performance at time of assessment include: HTN, obesity, COPD, neuropathy, and hx of falls . Personal factors affecting pt at time of IE include:steps to enter environment, limited home support, difficulty performing ADLS, difficulty performing IADLS , and health management . Prior to admission, pt was independent with ADLs and functional mobility without AD. Upon evaluation: Pt requires Minimal Assistance x1 with RW during mobility, and Minimal Assistance during some basic ADLs 2* the following deficits impacting occupational performance: decreased balance, decreased tolerance, increased pain, and orthopedic restrictions. Pt to benefit from continued skilled OT tx while in the hospital to address deficits as defined above and maximize level of functional independence w ADL's  and functional mobility. Occupational Performance areas to address include: grooming, bathing/shower, toilet hygiene, dressing, and functional mobility. From OT standpoint, recommendation at time of d/c would be Level 2 Moderate Resource Intensity.   Goals   Patient Goals to go home   Plan   Treatment Interventions ADL retraining;Functional transfer training;Endurance training;Patient/family training;Equipment evaluation/education   Goal Expiration Date 03/19/25   OT Treatment Day 0   OT Frequency 3-5x/wk   Discharge Recommendation   Rehab Resource Intensity Level, OT II (Moderate Resource Intensity)   AM-PAC Daily Activity Inpatient   Lower Body Dressing 3   Bathing 3   Toileting 3   Upper Body Dressing 3   Grooming 3   Eating 4   Daily Activity Raw Score 19   Daily Activity Standardized Score (Calc for Raw Score >=11) 40.22   AM-PAC Applied Cognition Inpatient   Following a Speech/Presentation 4   Understanding Ordinary Conversation 4   Taking Medications 4   Remembering Where Things Are Placed or Put Away 4   Remembering List of 4-5 Errands 4   Taking Care of Complicated Tasks 4   Applied Cognition Raw Score 24   Applied Cognition Standardized Score 62.21       Goals: to be met by 03/19/25    Patient will perform functional bed mobility with modified independence, with HOB flat, no rails  Patient will perform functional transfers with modified independence using LRAD in preparation for ADL tasks, with good safety awareness  Patient will perform UB dressing task with independence while seated, with set up  Patient will perform LB dressing task with Standby Assistance  Patient will perform toilet transfer with Standby Assistance  Patient will perform toileting with Standby Assistance, including hygiene and clothing management   Patient will improve activity tolerance by participating in 25 minutes of session at a time in preparation for participation in ADL tasks  Patient will identify 3 potential fall hazards and  identify compensatory techniques to decrease fall risk in the home environment         Liborio Shah, OTR/L

## 2025-03-05 NOTE — PLAN OF CARE
Problem: PHYSICAL THERAPY ADULT  Goal: Performs mobility at highest level of function for planned discharge setting.  See evaluation for individualized goals.  Description: Treatment/Interventions: Functional transfer training, LE strengthening/ROM, Elevations, Therapeutic exercise, Endurance training, Patient/family training, Bed mobility, Equipment eval/education, Gait training, Spoke to nursing          See flowsheet documentation for full assessment, interventions and recommendations.  3/5/2025 1352 by Samira Duvall PT  Note: Prognosis: Good  Problem List: Decreased strength, Decreased endurance, Decreased mobility, Impaired balance, Pain, Impaired sensation  Assessment: Pt is 64 y.o. female seen for PT evaluation on 3/5/2025 s/p admit to [facility] on 3/4/2025 w/ Lumbar compression fracture (HCC). PT was consulted to assess pt's functional mobility and d/c needs. Order placed for PT eval and tx. PTA, pt lives alone in 2SH with 3 ELIU, ambulates without AD, + fall history. At time of eval, pt requiring min assist for all phases of mobility. Upon evaluation, pt presenting with impaired functional mobility d/t decreased strength, decreased endurance, impaired balance, decreased mobility, impaired sensation, pain, and activity intolerance. Pertinent PMHx and current co-morbidities affecting pt's physical performance at time of assessment include: lumbar compression fracture, HTN, HLD, hypokalemia, prediabetes, COPD, macrocytic anemia, tibial plateau fracture, alcohol use, paresthesia, memory difficulties, tremor, falls, dysarthria. Personal factors affecting pt at time of eval include: ambulating w/ assistive device, inability to navigate community distances, unable to perform dynamic tasks in community, and positive fall history. The following objective measures performed on IE also reveal limitations: Barthel Index: 50/100, Modified [place]: 4 (moderate/severe disability), and AM-PAC 6-Clicks: 16/24. Pt's  clinical presentation is currently unstable/unpredictable seen in pt's presentation of abnormal lab value(s) and ongoing medical assessment. Overall, pt's rehab potential and prognosis to return to PLOF is good as impacted by objective findings, warranting pt to receive further skilled PT interventions to address identified impairments, activity limitation(s), and participation restriction(s). Pt to benefit from continued PT tx to address deficits as defined above and maximize level of functional independent mobility. From PT/mobility standpoint, recommend level 2, moderate resource intensity in order to facilitate return to PLOF.  Barriers to Discharge: Inaccessible home environment, Decreased caregiver support     Rehab Resource Intensity Level, PT: II (Moderate Resource Intensity)    See flowsheet documentation for full assessment.     3/5/2025 1352 by Samira Duvall PT  Note: Prognosis: Good  Problem List: Decreased strength, Decreased endurance, Decreased mobility, Impaired balance, Pain, Impaired sensation  Assessment: Pt is 64 y.o. female seen for PT evaluation on 3/5/2025 s/p admit to [facility] on 3/4/2025 w/ Lumbar compression fracture (HCC). PT was consulted to assess pt's functional mobility and d/c needs. Order placed for PT eval and tx. PTA, pt lives alone in 2SH with 3 ELIU, ambulates without AD, + fall history. At time of eval, pt requiring min assist for all phases of mobility. Upon evaluation, pt presenting with impaired functional mobility d/t decreased strength, decreased endurance, impaired balance, decreased mobility, impaired sensation, pain, and activity intolerance. Pertinent PMHx and current co-morbidities affecting pt's physical performance at time of assessment include: lumbar compression fracture, HTN, HLD, hypokalemia, prediabetes, COPD, macrocytic anemia, tibial plateau fracture, alcohol use, paresthesia, memory difficulties, tremor, falls, dysarthria. Personal factors affecting  pt at time of eval include: ambulating w/ assistive device, inability to navigate community distances, unable to perform dynamic tasks in community, and positive fall history. The following objective measures performed on IE also reveal limitations: Barthel Index: 50/100, Modified [place]: 4 (moderate/severe disability), and AM-PAC 6-Clicks: 16/24. Pt's clinical presentation is currently unstable/unpredictable seen in pt's presentation of abnormal lab value(s) and ongoing medical assessment. Overall, pt's rehab potential and prognosis to return to PLOF is good as impacted by objective findings, warranting pt to receive further skilled PT interventions to address identified impairments, activity limitation(s), and participation restriction(s). Pt to benefit from continued PT tx to address deficits as defined above and maximize level of functional independent mobility. From PT/mobility standpoint, recommend level 2, moderate resource intensity in order to facilitate return to PLOF.  Barriers to Discharge: Inaccessible home environment, Decreased caregiver support     Rehab Resource Intensity Level, PT: II (Moderate Resource Intensity)    See flowsheet documentation for full assessment.

## 2025-03-05 NOTE — CASE MANAGEMENT
Case Management Assessment & Discharge Planning Note    Patient name Jacquelyn Fritz  Location /-01 MRN [id number]  : 1960 Date 3/5/2025       Current Admission Date: 3/4/2025  Current Admission Diagnosis:Lumbar compression fracture (HCC)   Patient Active Problem List    Diagnosis Date Noted Date Diagnosed    Lumbar compression fracture (HCC) 2025     Feeling lonely 10/23/2024     Primary osteoarthritis of left knee 2024     Tremor 2024     Falls 2024     Acute pain of left shoulder 2024     Dysarthria 2024     Memory difficulties 2023     Vitamin B12 deficiency 2023     Vitamin D deficiency 10/23/2023     Neuropathy 2023     Vitamin B6 deficiency 2023     Paresthesia of both lower extremities 09/15/2022     Osteopenia 09/15/2022     Alcohol use 2022     Macrocytic anemia 2022     Post-menopausal 2020     COPD, mild (HCC) 2020     Prediabetes 2020     Hypokalemia 2019     Tibial plateau fracture, left 2019     History of lung cancer 2019     Essential hypertension 2019     Mixed hyperlipidemia 2019       LOS (days): 0  Geometric Mean LOS (GMLOS) (days): 2.9  Days to GMLOS:2.7     OBJECTIVE:    Risk of Unplanned Readmission Score: 8.28         Current admission status: Inpatient       Preferred Pharmacy:   [id number] - [place] - [address]  [address]  [facility] [address]  Phone: [phone] Fax: [phone]    [facility] [place] [place] - [address] [address]  Phone: [phone] Fax: [phone]    Primary Care Provider: Zander Feliciano MD    Primary Insurance: AEDEEPTI  REP  Secondary Insurance:     ASSESSMENT:  Active Health Care Proxies       Mars [facility] Representative - Brother   Primary Phone: [phone] (Mobile)                 Advance  Directives  Does patient have a Health Care POA?: No  Was patient offered paperwork?: Yes (declined)  Does patient currently have a Health Care decision maker?: Yes, please see Health Care Proxy section  Does patient have Advance Directives?: No  Was patient offered paperwork?: Yes (declined)  Primary Contact: Mars         Readmission Root Cause  30 Day Readmission: No    Patient Information  Admitted from:: Home  Mental Status: Alert  During Assessment patient was accompanied by: Not accompanied during assessment  Assessment information provided by:: Patient  Primary Caregiver: Self  Support Systems: Self, Friends/neighbors, Family members  County of Residence: [place]  What [facility] do you live in?: [place]  Living Arrangements: Lives Alone  Is patient a ?: No    Activities of Daily Living Prior to Admission  Functional Status: Independent  Completes ADLs independently?: Yes  Ambulates independently?: Yes  Does patient use assisted devices?: Yes  Assisted Devices (DME) used: Walker, Shower Chair, Bedside Commode, Nebulizer  Does patient currently own DME?: Yes  What DME does the patient currently own?: Bedside Commode, Walker, Shower Chair, Nebulizer  Does patient have a history of Outpatient Therapy (PT/OT)?: Yes  Does the patient have a history of Short-Term Rehab?: Yes (vonnie)  Does patient have a history of HHC?: Yes  Does patient currently have HHC?: No         Patient Information Continued  Income Source: SSI/SSD  Does patient have prescription coverage?: Yes  Does patient receive dialysis treatments?: No  Does patient have a history of substance abuse?: No  Does patient have a history of Mental Health Diagnosis?: No         Means of Transportation  Means of Transport to [facility]:: Drives Self      Social Determinants of Health (SDOH)      Flowsheet Row Most Recent Value   Housing Stability    In the last 12 months, was there a time when you were not able to pay the mortgage or rent on time? N   In the  past 12 months, how many times have you moved where you were living? 0   At any time in the past 12 months, were you homeless or living in a shelter (including now)? N   Transportation Needs    In the past 12 months, has lack of transportation kept you from medical appointments or from getting medications? no   In the past 12 months, has lack of transportation kept you from meetings, work, or from getting things needed for daily living? No   Food Insecurity    Within the past 12 months, you worried that your food would run out before you got the money to buy more. Never true   Within the past 12 months, the food you bought just didn't last and you didn't have money to get more. Never true   Utilities    In the past 12 months has the electric, gas, oil, or water company threatened to shut off services in your home? Yes            DISCHARGE DETAILS:    Discharge planning discussed with:: Patient  Freedom of Choice: Yes  Comments - Freedom of Choice: CM discussed freedom of choice as it pertains to discharge planning. Patient declined hhc and rehab. Patient prefers OP PT/OT. Patient agreeable to [facility] application and referral to [facility] program.  CM contacted family/caregiver?: No- see comments (declined)  Were Treatment Team discharge recommendations reviewed with patient/caregiver?: Yes  Did patient/caregiver verbalize understanding of patient care needs?: Yes  Were patient/caregiver advised of the risks associated with not following Treatment Team discharge recommendations?: Yes    Contacts  Patient Contacts: patient   Contact Method: In Person    Requested Home Health Care         Is the patient interested in HHC at discharge?: No    DME Referral Provided  Referral made for DME?: No    Other Referral/Resources/Interventions Provided:  Referral Comments: [facility] program referral and [facility]    Would you like to participate in our [facility] service program?  : No - Declined    Treatment Team Recommendation:  SNF  Discharge Destination Plan:: Home  Transport at Discharge :  ([place] vs needing ride home)                  Additional Comments: hhc and rehab referrals sent just incase decides to go closer to discharge.

## 2025-03-05 NOTE — PLAN OF CARE
Problem: OCCUPATIONAL THERAPY ADULT  Goal: Performs self-care activities at highest level of function for planned discharge setting.  See evaluation for individualized goals.  Description: Treatment Interventions: ADL retraining, Functional transfer training, Endurance training, Patient/family training, Equipment evaluation/education          See flowsheet documentation for full assessment, interventions and recommendations.   Outcome: Progressing  Note: Limitation: Decreased ADL status, Decreased endurance, Decreased high-level ADLs     Assessment: Pt is a 64 y.o. female seen for OT evaluation s/p admit to [facility] on 3/4/2025 w/ Lumbar compression fracture (HCC).  Comorbidities affecting pt's functional performance at time of assessment include: HTN, obesity, COPD, neuropathy, and hx of falls . Personal factors affecting pt at time of IE include:steps to enter environment, limited home support, difficulty performing ADLS, difficulty performing IADLS , and health management . Prior to admission, pt was independent with ADLs and functional mobility without AD. Upon evaluation: Pt requires Minimal Assistance x1 with RW during mobility, and Minimal Assistance during some basic ADLs 2* the following deficits impacting occupational performance: decreased balance, decreased tolerance, increased pain, and orthopedic restrictions. Pt to benefit from continued skilled OT tx while in the hospital to address deficits as defined above and maximize level of functional independence w ADL's and functional mobility. Occupational Performance areas to address include: grooming, bathing/shower, toilet hygiene, dressing, and functional mobility. From OT standpoint, recommendation at time of d/c would be Level 2 Moderate Resource Intensity.     Rehab Resource Intensity Level, OT: II (Moderate Resource Intensity)        NETTIE Doe/ANDERSON

## 2025-03-06 PROCEDURE — 97116 GAIT TRAINING THERAPY: CPT

## 2025-03-06 PROCEDURE — 97530 THERAPEUTIC ACTIVITIES: CPT

## 2025-03-06 PROCEDURE — 99232 SBSQ HOSP IP/OBS MODERATE 35: CPT | Performed by: FAMILY MEDICINE

## 2025-03-06 RX ORDER — POTASSIUM CHLORIDE 1500 MG/1
40 TABLET, EXTENDED RELEASE ORAL ONCE
Status: DISCONTINUED | OUTPATIENT
Start: 2025-03-06 | End: 2025-03-06

## 2025-03-06 RX ORDER — POTASSIUM CHLORIDE 1500 MG/1
40 TABLET, EXTENDED RELEASE ORAL ONCE
Status: COMPLETED | OUTPATIENT
Start: 2025-03-06 | End: 2025-03-06

## 2025-03-06 RX ORDER — POTASSIUM CHLORIDE 1500 MG/1
20 TABLET, EXTENDED RELEASE ORAL ONCE
Status: COMPLETED | OUTPATIENT
Start: 2025-03-06 | End: 2025-03-06

## 2025-03-06 RX ADMIN — FLUTICASONE FUROATE AND VILANTEROL TRIFENATATE 1 PUFF: 200; 25 POWDER RESPIRATORY (INHALATION) at 09:42

## 2025-03-06 RX ADMIN — ACETAMINOPHEN 975 MG: 325 TABLET, FILM COATED ORAL at 15:41

## 2025-03-06 RX ADMIN — ACETAMINOPHEN 975 MG: 325 TABLET, FILM COATED ORAL at 21:31

## 2025-03-06 RX ADMIN — ENOXAPARIN SODIUM 40 MG: 40 INJECTION SUBCUTANEOUS at 09:39

## 2025-03-06 RX ADMIN — POTASSIUM CHLORIDE 40 MEQ: 1500 TABLET, EXTENDED RELEASE ORAL at 09:44

## 2025-03-06 RX ADMIN — POTASSIUM CHLORIDE 20 MEQ: 1500 TABLET, EXTENDED RELEASE ORAL at 12:23

## 2025-03-06 RX ADMIN — ACETAMINOPHEN 975 MG: 325 TABLET, FILM COATED ORAL at 07:43

## 2025-03-06 RX ADMIN — LIDOCAINE 5% 1 PATCH: 700 PATCH TOPICAL at 09:40

## 2025-03-06 RX ADMIN — PANTOPRAZOLE SODIUM 40 MG: 40 TABLET, DELAYED RELEASE ORAL at 09:40

## 2025-03-06 RX ADMIN — ATORVASTATIN CALCIUM 20 MG: 20 TABLET, FILM COATED ORAL at 09:40

## 2025-03-06 NOTE — PHYSICAL THERAPY NOTE
"Physical Therapy Treatment Note       03/06/25 0938   PT Last Visit   PT Visit Date 03/06/25   Note Type   Note Type Treatment   Pain Assessment   Pain Assessment Tool 0-10   Pain Score 7   Pain Location/Orientation Location: Abdomen;Location: Back   Restrictions/Precautions   Weight Bearing Precautions Per Order No   Braces or Orthoses LSO  (to be worn during ambulation, donned at EOB)   Other Precautions Chair Alarm;Bed Alarm;Fall Risk;Spinal precautions;Telemetry   General   Chart Reviewed Yes   Response to Previous Treatment Patient with no complaints from previous session.   Family/Caregiver Present No   Cognition   Overall Cognitive Status WFL   Arousal/Participation Alert;Cooperative   Attention Within functional limits   Orientation Level Oriented to person;Oriented to place;Oriented to time;Oriented to situation   Memory Within functional limits   Following Commands Follows all commands and directions without difficulty   Comments pt agreeable to PT session   Subjective   Subjective \"my stomach hurts\"   Bed Mobility   Supine to Sit 4  Minimal assistance   Additional items Assist x 1;HOB elevated;Increased time required;Verbal cues   Additional Comments log roll technique   Transfers   Sit to Stand 4  Minimal assistance   Additional items Assist x 1;Armrests;Increased time required;Verbal cues   Stand to Sit 4  Minimal assistance   Additional items Assist x 1;Armrests;Increased time required;Verbal cues   Toilet transfer 4  Minimal assistance   Additional items Assist x 1;Armrests;Increased time required;Standard toilet  (grab bar)   Ambulation/Elevation   Gait pattern Decreased foot clearance;Wide SHRUTHI;Antalgic;Short stride;Step to;Excessively slow   Gait Assistance 4  Minimal assist   Additional items Assist x 1;Verbal cues   Assistive Device Rolling walker   Distance 80'   Balance   Static Sitting Good   Dynamic Sitting Fair +   Static Standing Fair   Dynamic Standing Fair -   Ambulatory Fair -   Endurance " Deficit   Endurance Deficit Yes   Activity Tolerance   Activity Tolerance Patient limited by fatigue;Patient limited by pain   Nurse Made Aware RN Debbie   Assessment   Prognosis Good   Problem List Decreased strength;Decreased endurance;Decreased mobility;Impaired balance;Pain;Impaired sensation   Assessment Pt seen for PT treatment session this date, consisting of ther act focused on bed mobility, transfers, toilet transfer, LSO brace training and gt training on level surfaces to improve pt safety in household environment. Since previous session, pt has made good progress in terms of increased level surface gait trial without overt LOB observed, min assist for all phases of mobility. Pertinent barriers during this session include pain. Current goals and POC established on IE remain appropriate, pt continues to have rehab potential and is making good progress towards STGs. Pt prognosis for achieving goals is good, pending pt progress with hospitalization/medical status improvements, and indicated by Stimulability and ability to follow directions. Pt continues to be functioning below baseline level, and remains limited 2* factors listed above. PT will continue to see pt during current hospitalization in order to address the deficits listed above and provide interventions consistent w/ POC in effort to achieve STGs. PT recommends level 2, moderate resource intensity upon discharge.   Barriers to Discharge Inaccessible home environment;Decreased caregiver support   Goals   STG Expiration Date 03/15/25   PT Treatment Day 2   Plan   Treatment/Interventions Functional transfer training;LE strengthening/ROM;Elevations;Therapeutic exercise;Endurance training;Patient/family training;Bed mobility;Equipment eval/education;Gait training;Spoke to nursing   Progress Progressing toward goals   PT Frequency 3-5x/wk   Discharge Recommendation   Rehab Resource Intensity Level, PT II (Moderate Resource Intensity)   Equipment Recommended  Walker   AM-PAC Basic Mobility Inpatient   Turning in Flat Bed Without Bedrails 3   Lying on Back to Sitting on Edge of Flat Bed Without Bedrails 3   Moving Bed to Chair 3   Standing Up From Chair Using Arms 3   Walk in Room 3   Climb 3-5 Stairs With Railing 2   Basic Mobility Inpatient Raw Score 17   Basic Mobility Standardized Score 39.67   [facility] Highest Level Of Mobility   -HL Goal 5: Stand one or more mins   -HLM Achieved 7: Walk 25 feet or more   Education   Education Provided Mobility training;Assistive device   Patient Demonstrates acceptance/verbal understanding;Reinforcement needed   End of Consult   Patient Position at End of Consult Bedside chair;Bed/Chair alarm activated;All needs within reach       Samira Duvall, PT, DPT

## 2025-03-06 NOTE — CASE MANAGEMENT
[facility] received request for authorization from Care Manager.  Authorization request submitted for: [facility]  Facility Name: [facility] NPI: [id number]  Facility MD: Miracle Hamm  NPI: [id number]  Authorization initiated by contacting insurance:  [employer]  Via: [employer] Portal   Clinicals submitted via [employer] attachment   Pending Reference #: [id number]    Care Manager notified: Jimena FORTE    Updates to authorization status will be noted in chart. Please reach out to CM for updates on any clinical information.

## 2025-03-06 NOTE — PLAN OF CARE
Problem: PHYSICAL THERAPY ADULT  Goal: Performs mobility at highest level of function for planned discharge setting.  See evaluation for individualized goals.  Description: Treatment/Interventions: Functional transfer training, LE strengthening/ROM, Elevations, Therapeutic exercise, Endurance training, Patient/family training, Bed mobility, Equipment eval/education, Gait training, Spoke to nursing          See flowsheet documentation for full assessment, interventions and recommendations.  Outcome: Progressing  Note: Prognosis: Good  Problem List: Decreased strength, Decreased endurance, Decreased mobility, Impaired balance, Pain, Impaired sensation  Assessment: Pt seen for PT treatment session this date, consisting of ther act focused on bed mobility, transfers, toilet transfer, LSO brace training and gt training on level surfaces to improve pt safety in household environment. Since previous session, pt has made good progress in terms of increased level surface gait trial without overt LOB observed, min assist for all phases of mobility. Pertinent barriers during this session include pain. Current goals and POC established on IE remain appropriate, pt continues to have rehab potential and is making good progress towards STGs. Pt prognosis for achieving goals is good, pending pt progress with hospitalization/medical status improvements, and indicated by Stimulability and ability to follow directions. Pt continues to be functioning below baseline level, and remains limited 2* factors listed above. PT will continue to see pt during current hospitalization in order to address the deficits listed above and provide interventions consistent w/ POC in effort to achieve STGs. PT recommends level 2, moderate resource intensity upon discharge.  Barriers to Discharge: Inaccessible home environment, Decreased caregiver support     Rehab Resource Intensity Level, PT: II (Moderate Resource Intensity)    See flowsheet  documentation for full assessment.

## 2025-03-06 NOTE — ASSESSMENT & PLAN NOTE
"/76   Pulse 74   Temp 98.3 °F (36.8 °C)   Resp 18   Ht 5' 2\" (1.575 m)   Wt 78.9 kg (173 lb 15.1 oz)   SpO2 97%   BMI 31.81 kg/m²   Continue PTA amlodipine  "

## 2025-03-06 NOTE — UTILIZATION REVIEW
Continued Stay Review    SEE INITIAL REVIEW AT BOTTOM    Date: 03/06                          Current Patient Class: Inpatient  Current Level of Care: MS    HPI:64 y.o. female initially [facility] on 03/05   Current Diagnosis: lumbar compression fx, back and abd pain      Assessment/Plan:   Date: 03/06  Day 3: Has surpassed a 2nd midnight with active treatments and services.  Pt reports back pain and abd pain improved. K improving, 3.2 today. Given KCl 40meq this am.  Cont WBAT. LSO brace. Cont pain control.  Tele dc'd.   PT/OT recommending level 2 rehab. CM ff for STR placement
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
## 2025-03-06 NOTE — CASE MANAGEMENT
Case Management Discharge Planning Note    Patient name Jacquelyn Fritz  Location /-01 MRN [id number]  : 1960 Date 3/6/2025       Current Admission Date: 3/4/2025  Current Admission Diagnosis:Lumbar compression fracture (HCC)   Patient Active Problem List    Diagnosis Date Noted Date Diagnosed    Lumbar compression fracture (HCC) 2025     Feeling lonely 10/23/2024     Primary osteoarthritis of left knee 2024     Tremor 2024     Falls 2024     Acute pain of left shoulder 2024     Dysarthria 2024     Memory difficulties 2023     Vitamin B12 deficiency 2023     Vitamin D deficiency 10/23/2023     Neuropathy 2023     Vitamin B6 deficiency 2023     Paresthesia of both lower extremities 09/15/2022     Osteopenia 09/15/2022     Alcohol use 2022     Macrocytic anemia 2022     Post-menopausal 2020     COPD, mild (HCC) 2020     Prediabetes 2020     Hypokalemia 2019     Tibial plateau fracture, left 2019     History of lung cancer 2019     Essential hypertension 2019     Mixed hyperlipidemia 2019       LOS (days): 1  Geometric Mean LOS (GMLOS) (days): 2.9  Days to GMLOS:1.7     OBJECTIVE:  Risk of Unplanned Readmission Score: 11.22         Current admission status: Inpatient   Preferred Pharmacy:   [id number] - [place] - [address]  [address]  [facility] [address]  Phone: [phone] Fax: [phone]    [facility] [place], [place] - [address] [address]  Phone: [phone] Fax: [phone]    Primary Care Provider: Zander Feliciano MD    Primary Insurance: ZACH  REP  Secondary Insurance:     DISCHARGE DETAILS:    Discharge planning discussed with:: Patient  Freedom of Choice: Yes  Comments - Freedom of Choice: CM discussed freedom of choice as it pertains to discharge planning.  Patient informed of PT/OT rec. CM informed patient of safety concerns of going home alone especially with family on vacation. Patient agreeable to [facility] due to history.     Were Treatment Team discharge recommendations reviewed with patient/caregiver?: Yes  Did patient/caregiver verbalize understanding of patient care needs?: Yes  Were patient/caregiver advised of the risks associated with not following Treatment Team discharge recommendations?: Yes         Requested Home Health Care         Is the patient interested in HHC at discharge?: No    DME Referral Provided  Referral made for DME?: No    Other Referral/Resources/Interventions Provided:  Referral Comments: [facility] reserved    Would you like to participate in our [facility] service program?  : No - Declined    Treatment Team Recommendation: SNF  Discharge Destination Plan:: SNF  Transport at Discharge : Wheelchair van
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
## 2025-03-06 NOTE — PLAN OF CARE
Problem: Prexisting or High Potential for Compromised Skin Integrity  Goal: Skin integrity is maintained or improved  Description: INTERVENTIONS:  - Identify patients at risk for skin breakdown  - Assess and monitor skin integrity  - Assess and monitor nutrition and hydration status  - Monitor labs   - Assess for incontinence   - Turn and reposition patient  - Assist with mobility/ambulation  - Relieve pressure over bony prominences  - Avoid friction and shearing  - Provide appropriate hygiene as needed including keeping skin clean and dry  - Evaluate need for skin moisturizer/barrier cream  - Collaborate with interdisciplinary team   - Patient/family teaching  - Consider wound care consult   Outcome: Progressing     Problem: MUSCULOSKELETAL - ADULT  Goal: Maintain or return mobility to safest level of function  Description: INTERVENTIONS:  - Assess patient's ability to carry out ADLs; assess patient's baseline for ADL function and identify physical deficits which impact ability to perform ADLs (bathing, care of mouth/teeth, toileting, grooming, dressing, etc.)  - Assess/evaluate cause of self-care deficits   - Assess range of motion  - Assess patient's mobility  - Assess patient's need for assistive devices and provide as appropriate  - Encourage maximum independence but intervene and supervise when necessary  - Involve family in performance of ADLs  - Assess for home care needs following discharge   - Consider OT consult to assist with ADL evaluation and planning for discharge  - Provide patient education as appropriate  Outcome: Progressing  Goal: Maintain proper alignment of affected body part  Description: INTERVENTIONS:  - Support, maintain and protect limb and body alignment  - Provide patient/ family with appropriate education  Outcome: Progressing     Problem: GASTROINTESTINAL - ADULT  Goal: Maintains or returns to baseline bowel function  Description: INTERVENTIONS:  - Assess bowel function  - Encourage  oral fluids to ensure adequate hydration  - Administer IV fluids if ordered to ensure adequate hydration  - Administer ordered medications as needed  - Encourage mobilization and activity  - Consider nutritional services referral to assist patient with adequate nutrition and appropriate food choices  Outcome: Progressing     Problem: GENITOURINARY - ADULT  Goal: Maintains or returns to baseline urinary function  Description: INTERVENTIONS:  - Assess urinary function  - Encourage oral fluids to ensure adequate hydration if ordered  - Administer IV fluids as ordered to ensure adequate hydration  - Administer ordered medications as needed  - Offer frequent toileting  - Follow urinary retention protocol if ordered  Outcome: Progressing     Problem: SKIN/TISSUE INTEGRITY - ADULT  Goal: Skin Integrity remains intact(Skin Breakdown Prevention)  Description: Assess:  -Perform Durga assessment every 2  -Clean and moisturize skin every 2  -Inspect skin when repositioning, toileting, and assisting with ADLS  -Assess under medical devices such as 2 every 2  -Assess extremities for adequate circulation and sensation     Bed Management:  -Have minimal linens on bed & keep smooth, unwrinkled  -Change linens as needed when moist or perspiring  -Avoid sitting or lying in one position for more than 2 hours while in bed  -Keep HOB at 2degrees     Toileting:  -Offer bedside commode  -Assess for incontinence every 2  -Use incontinent care products after each incontinent episode such as 2    Activity:  -Mobilize patient 2 times a day  -Encourage activity and walks on unit  -Encourage or provide ROM exercises   -Turn and reposition patient every 2 Hours  -Use appropriate equipment to lift or move patient in bed  -Instruct/ Assist with weight shifting every 2 when out of bed in chair  -Consider limitation of chair time 2 hour intervals    Skin Care:  -Avoid use of baby powder, tape, friction and shearing, hot water or constrictive  clothing  -Relieve pressure over bony prominences using 2  -Do not massage red bony areas    Next Steps:  -Teach patient strategies to minimize risks such as 2   -Consider consults to  interdisciplinary teams such as 2  Outcome: Progressing

## 2025-03-06 NOTE — CASE MANAGEMENT
[facility] has received APPROVED authorization.  Insurance:   [employer]   Auth obtained via portal.  Authorization received for: [facility]  Facility: [facility]   Authorization #: [id number]   Start of Care: 3/7  Next Review Date: 3/13  Continued Stay Care Coordinator:  n/a  Submit next review to: [phone]     Care Manager notified: Jimena FORTE    Please reach out to CM for updates on any clinical information.

## 2025-03-07 ENCOUNTER — APPOINTMENT (OUTPATIENT)
Age: 65
End: 2025-03-07
Payer: COMMERCIAL

## 2025-03-07 VITALS
RESPIRATION RATE: 16 BRPM | SYSTOLIC BLOOD PRESSURE: 123 MMHG | WEIGHT: 173.94 LBS | HEART RATE: 71 BPM | OXYGEN SATURATION: 97 % | DIASTOLIC BLOOD PRESSURE: 75 MMHG | HEIGHT: 62 IN | BODY MASS INDEX: 32.01 KG/M2 | TEMPERATURE: 98.3 F

## 2025-03-07 LAB
ANION GAP SERPL CALCULATED.3IONS-SCNC: 7 MMOL/L (ref 4–13)
BUN SERPL-MCNC: 13 MG/DL (ref 5–25)
CALCIUM SERPL-MCNC: 9 MG/DL (ref 8.4–10.2)
CHLORIDE SERPL-SCNC: 108 MMOL/L (ref 96–108)
CO2 SERPL-SCNC: 25 MMOL/L (ref 21–32)
CREAT SERPL-MCNC: 0.8 MG/DL (ref 0.6–1.3)
ERYTHROCYTE [DISTWIDTH] IN BLOOD BY AUTOMATED COUNT: 15 % (ref 11.6–15.1)
FLUAV RNA RESP QL NAA+PROBE: NEGATIVE
FLUBV RNA RESP QL NAA+PROBE: NEGATIVE
GFR SERPL CREATININE-BSD FRML MDRD: 77 ML/MIN/1.73SQ M
GLUCOSE SERPL-MCNC: 98 MG/DL (ref 65–140)
HCT VFR BLD AUTO: 29.3 % (ref 34.8–46.1)
HGB BLD-MCNC: 9.3 G/DL (ref 11.5–15.4)
MCH RBC QN AUTO: 30.3 PG (ref 26.8–34.3)
MCHC RBC AUTO-ENTMCNC: 31.7 G/DL (ref 31.4–37.4)
MCV RBC AUTO: 95 FL (ref 82–98)
PLATELET # BLD AUTO: 398 THOUSANDS/UL (ref 149–390)
PMV BLD AUTO: 10.5 FL (ref 8.9–12.7)
POTASSIUM SERPL-SCNC: 4.1 MMOL/L (ref 3.5–5.3)
RBC # BLD AUTO: 3.07 MILLION/UL (ref 3.81–5.12)
RSV RNA RESP QL NAA+PROBE: NEGATIVE
SARS-COV-2 RNA RESP QL NAA+PROBE: NEGATIVE
SODIUM SERPL-SCNC: 140 MMOL/L (ref 135–147)
WBC # BLD AUTO: 5.2 THOUSAND/UL (ref 4.31–10.16)

## 2025-03-07 PROCEDURE — 80048 BASIC METABOLIC PNL TOTAL CA: CPT | Performed by: FAMILY MEDICINE

## 2025-03-07 PROCEDURE — 85027 COMPLETE CBC AUTOMATED: CPT | Performed by: FAMILY MEDICINE

## 2025-03-07 PROCEDURE — 0241U HB NFCT DS VIR RESP RNA 4 TRGT: CPT | Performed by: FAMILY MEDICINE

## 2025-03-07 PROCEDURE — 99239 HOSP IP/OBS DSCHRG MGMT >30: CPT | Performed by: FAMILY MEDICINE

## 2025-03-07 RX ORDER — OXYCODONE HYDROCHLORIDE 5 MG/1
5 TABLET ORAL EVERY 4 HOURS PRN
Qty: 10 TABLET | Refills: 0 | Status: SHIPPED | OUTPATIENT
Start: 2025-03-07 | End: 2025-03-17

## 2025-03-07 RX ORDER — POLYETHYLENE GLYCOL 3350 17 G/17G
17 POWDER, FOR SOLUTION ORAL DAILY
Status: DISCONTINUED | OUTPATIENT
Start: 2025-03-07 | End: 2025-03-07 | Stop reason: HOSPADM

## 2025-03-07 RX ORDER — POLYETHYLENE GLYCOL 3350 17 G/17G
17 POWDER, FOR SOLUTION ORAL DAILY
Start: 2025-03-07

## 2025-03-07 RX ORDER — OXYCODONE HYDROCHLORIDE 5 MG/1
2.5 TABLET ORAL EVERY 4 HOURS PRN
Qty: 10 TABLET | Refills: 0 | Status: SHIPPED | OUTPATIENT
Start: 2025-03-07 | End: 2025-03-17

## 2025-03-07 RX ORDER — DOCUSATE SODIUM 100 MG/1
100 CAPSULE, LIQUID FILLED ORAL 2 TIMES DAILY
Start: 2025-03-07

## 2025-03-07 RX ORDER — DOCUSATE SODIUM 100 MG/1
100 CAPSULE, LIQUID FILLED ORAL 2 TIMES DAILY
Status: DISCONTINUED | OUTPATIENT
Start: 2025-03-07 | End: 2025-03-07 | Stop reason: HOSPADM

## 2025-03-07 RX ORDER — ACETAMINOPHEN 325 MG/1
975 TABLET ORAL EVERY 6 HOURS PRN
Start: 2025-03-07

## 2025-03-07 RX ORDER — LIDOCAINE 50 MG/G
1 PATCH TOPICAL DAILY
Start: 2025-03-07

## 2025-03-07 RX ADMIN — ENOXAPARIN SODIUM 40 MG: 40 INJECTION SUBCUTANEOUS at 10:26

## 2025-03-07 RX ADMIN — POLYETHYLENE GLYCOL 3350 17 G: 17 POWDER, FOR SOLUTION ORAL at 13:33

## 2025-03-07 RX ADMIN — PANTOPRAZOLE SODIUM 40 MG: 40 TABLET, DELAYED RELEASE ORAL at 10:26

## 2025-03-07 RX ADMIN — LIDOCAINE 5% 1 PATCH: 700 PATCH TOPICAL at 10:26

## 2025-03-07 RX ADMIN — ACETAMINOPHEN 975 MG: 325 TABLET, FILM COATED ORAL at 13:33

## 2025-03-07 RX ADMIN — FLUTICASONE FUROATE AND VILANTEROL TRIFENATATE 1 PUFF: 200; 25 POWDER RESPIRATORY (INHALATION) at 08:03

## 2025-03-07 RX ADMIN — DOCUSATE SODIUM 100 MG: 100 CAPSULE, LIQUID FILLED ORAL at 13:33

## 2025-03-07 RX ADMIN — ACETAMINOPHEN 975 MG: 325 TABLET, FILM COATED ORAL at 06:07

## 2025-03-07 RX ADMIN — ATORVASTATIN CALCIUM 20 MG: 20 TABLET, FILM COATED ORAL at 10:26

## 2025-03-07 RX ADMIN — AMLODIPINE BESYLATE 5 MG: 5 TABLET ORAL at 10:26

## 2025-03-07 NOTE — CASE MANAGEMENT
Case Management Discharge Planning Note    Patient name Jacquelyn Fritz  Location /-01 MRN [id number]  : 1960 Date 3/7/2025       Current Admission Date: 3/4/2025  Current Admission Diagnosis:Lumbar compression fracture (HCC)   Patient Active Problem List    Diagnosis Date Noted Date Diagnosed    Lumbar compression fracture (HCC) 2025     Feeling lonely 10/23/2024     Primary osteoarthritis of left knee 2024     Tremor 2024     Falls 2024     Acute pain of left shoulder 2024     Dysarthria 2024     Memory difficulties 2023     Vitamin B12 deficiency 2023     Vitamin D deficiency 10/23/2023     Neuropathy 2023     Vitamin B6 deficiency 2023     Paresthesia of both lower extremities 09/15/2022     Osteopenia 09/15/2022     Alcohol use 2022     Macrocytic anemia 2022     Post-menopausal 2020     COPD, mild (HCC) 2020     Prediabetes 2020     Hypokalemia 2019     Tibial plateau fracture, left 2019     History of lung cancer 2019     Essential hypertension 2019     Mixed hyperlipidemia 2019       LOS (days): 2  Geometric Mean LOS (GMLOS) (days): 2.9  Days to GMLOS:0.9     OBJECTIVE:  Risk of Unplanned Readmission Score: 12.28         Current admission status: Inpatient   Preferred Pharmacy:   [id number] [place] - [address]  [address]  [facility] [address]  Phone: [phone] Fax: [phone]    [facility] [place] - [address] [place] [address]  Phone: [phone] Fax: [phone]    Primary Care Provider: Zander Feliciano MD    Primary Insurance: [employer]  REP  Secondary Insurance:     DISCHARGE DETAILS:     Number/Name of Dispatcher: The Wheelchair Van you requested for Jacquelyn HUITRON in unit/room [place] on 2025 is [facility] to [facility] 2[facility] is handling this ride and you can contact them at [phone].     Accepting Facility Name, City & State : [address] [address]  Receiving Facility/Agency Phone Number: [phone] (P)  Facility/Agency Fax Number: [phone] (F)
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
## 2025-03-07 NOTE — ASSESSMENT & PLAN NOTE
"/75   Pulse 71   Temp 98.3 °F (36.8 °C)   Resp 16   Ht 5' 2\" (1.575 m)   Wt 78.9 kg (173 lb 15.1 oz)   SpO2 97%   BMI 31.81 kg/m²   Continue PTA amlodipine  "

## 2025-03-07 NOTE — CASE MANAGEMENT
Case Management Discharge Planning Note    Patient name Jacquelyn Fritz  Location /-01 MRN [id number]  : 1960 Date 3/7/2025       Current Admission Date: 3/4/2025  Current Admission Diagnosis:Lumbar compression fracture (HCC)   Patient Active Problem List    Diagnosis Date Noted Date Diagnosed    Lumbar compression fracture (HCC) 2025     Feeling lonely 10/23/2024     Primary osteoarthritis of left knee 2024     Tremor 2024     Falls 2024     Acute pain of left shoulder 2024     Dysarthria 2024     Memory difficulties 2023     Vitamin B12 deficiency 2023     Vitamin D deficiency 10/23/2023     Neuropathy 2023     Vitamin B6 deficiency 2023     Paresthesia of both lower extremities 09/15/2022     Osteopenia 09/15/2022     Alcohol use 2022     Macrocytic anemia 2022     Post-menopausal 2020     COPD, mild (HCC) 2020     Prediabetes 2020     Hypokalemia 2019     Tibial plateau fracture, left 2019     History of lung cancer 2019     Essential hypertension 2019     Mixed hyperlipidemia 2019       LOS (days): 2  Geometric Mean LOS (GMLOS) (days): 2.9  Days to GMLOS:0.9     OBJECTIVE:  Risk of Unplanned Readmission Score: 12.28         Current admission status: Inpatient   Preferred Pharmacy:   [facility]/pharmacy #[id number] - [place] - [address]  [address]  [facility] [address]  Phone: [phone] Fax: [phone]    [employer] [place], [place] - [address] [address]  Phone: [phone] Fax: [phone]    Primary Care Provider: Zander Feliciano MD    [facility]  Secondary Insurance:     DISCHARGE DETAILS:    Discharge planning discussed with:: Patient  Freedom of Choice: Yes  Comments - Freedom of Choice: Patient agreeable with discharge planning to [facility].     Were  Treatment Team discharge recommendations reviewed with patient/caregiver?: Yes  Did patient/caregiver verbalize understanding of patient care needs?: Yes  Were patient/caregiver advised of the risks associated with not following Treatment Team discharge recommendations?: Yes    Contacts  Patient Contacts: patient   Contact Method: In Person  Reason/Outcome: Discharge Planning    Requested Home Health Care         Is the patient interested in HHC at discharge?: No    DME Referral Provided  Referral made for DME?: No         Would you like to participate in our [facility] service program?  : No - Declined    Treatment Team Recommendation: SNF  Discharge Destination Plan:: SNF  Transport at Discharge : Wheelchair van ([facility].)     Number/Name of Dispatcher: The Wheelchair Van you requested for Jacquelyn HUITRON in unit/room  on 03/07/2025 is scheduled to arrive at 2:15pm EST! [facility] is handling this ride and you can contact them at [phone].         IMM Given (Date):: 03/07/25  IMM Given to:: Patient (CM reviewed IMM with patient at bedside. Patient reported understanding and denied any questions or concerns. Patient was given a copy and signed copy placecd in medical records.)

## 2025-03-07 NOTE — PLAN OF CARE
Problem: GASTROINTESTINAL - ADULT  Goal: Maintains or returns to baseline bowel function  Description: INTERVENTIONS:  - Assess bowel function  - Encourage oral fluids to ensure adequate hydration  - Administer IV fluids if ordered to ensure adequate hydration  - Administer ordered medications as needed  - Encourage mobilization and activity  - Consider nutritional services referral to assist patient with adequate nutrition and appropriate food choices  Outcome: Completed     Problem: GENITOURINARY - ADULT  Goal: Maintains or returns to baseline urinary function  Description: INTERVENTIONS:  - Assess urinary function  - Encourage oral fluids to ensure adequate hydration if ordered  - Administer IV fluids as ordered to ensure adequate hydration  - Administer ordered medications as needed  - Offer frequent toileting  - Follow urinary retention protocol if ordered  Outcome: Completed     Problem: MUSCULOSKELETAL - ADULT  Goal: Maintain or return mobility to safest level of function  Description: INTERVENTIONS:  - Assess patient's ability to carry out ADLs; assess patient's baseline for ADL function and identify physical deficits which impact ability to perform ADLs (bathing, care of mouth/teeth, toileting, grooming, dressing, etc.)  - Assess/evaluate cause of self-care deficits   - Assess range of motion  - Assess patient's mobility  - Assess patient's need for assistive devices and provide as appropriate  - Encourage maximum independence but intervene and supervise when necessary  - Involve family in performance of ADLs  - Assess for home care needs following discharge   - Consider OT consult to assist with ADL evaluation and planning for discharge  - Provide patient education as appropriate  Outcome: Progressing  Goal: Maintain proper alignment of affected body part  Description: INTERVENTIONS:  - Support, maintain and protect limb and body alignment  - Provide patient/ family with appropriate education  Outcome:  Progressing

## 2025-03-10 ENCOUNTER — TELEPHONE (OUTPATIENT)
Age: 65
End: 2025-03-10

## 2025-03-10 ENCOUNTER — PATIENT OUTREACH (OUTPATIENT)
Dept: CASE MANAGEMENT | Facility: OTHER | Age: 65
End: 2025-03-10

## 2025-03-10 ENCOUNTER — TRANSITIONAL CARE MANAGEMENT (OUTPATIENT)
Age: 65
End: 2025-03-10

## 2025-03-10 NOTE — UTILIZATION REVIEW
NOTIFICATION OF ADMISSION DISCHARGE   This is a Notification of Discharge from [facility]. Please be advised that this patient has been discharge from our facility. Below you will find the admission and discharge date and time including the patient’s disposition.   UTILIZATION REVIEW CONTACT:  Kerry Bangura  Utilization   Network Utilization Review Department  Phone: [phone] x carefully listen to the prompts. All voicemails are confidential.  Email: [email or web address]     ADMISSION INFORMATION  PRESENTATION DATE: 3/4/2025  3:17 PM  OBERVATION ADMISSION DATE: 03/04/2025 2055  INPATIENT ADMISSION DATE: 3/5/25 11:43 AM   DISCHARGE DATE: 3/7/2025  3:27 PM   [facility]    Network Utilization Review Department  ATTENTION: Please call with any questions or concerns to [phone] and carefully listen to the prompts so that you are directed to the right person. All voicemails are confidential.   For Discharge needs, contact Care Management DC Support Team at [phone] opt. 2  Send all requests for admission clinical reviews, approved or denied determinations and any other requests to dedicated fax number below belonging to the campus where the patient is receiving treatment. List of dedicated fax numbers for the Facilities:  FACILITY NAME UR FAX NUMBER   ADMISSION DENIALS (Administrative/Medical Necessity) [phone]   DISCHARGE SUPPORT TEAM (NETWORK) [phone]   PARENT CHILD HEALTH (Maternity/NICU/Pediatrics) [phone]   [facility] [phone]   [facility] [phone]   [facility] [phone]   [facility] [phone]   [facility] [phone]   [facility] [phone]   [facility] [phone]   [facility] [phone]   [facility] [phone]   [facility] [phone]   [facility] [phone]   [facility] [phone]

## 2025-03-10 NOTE — TELEPHONE ENCOUNTER
Called to make appt for Jacquelyn but was not sure what she needed to be seen for.  Will Find out and callback

## 2025-03-11 ENCOUNTER — APPOINTMENT (OUTPATIENT)
Age: 65
End: 2025-03-11
Payer: COMMERCIAL

## 2025-03-12 ENCOUNTER — PATIENT OUTREACH (OUTPATIENT)
Dept: CASE MANAGEMENT | Facility: OTHER | Age: 65
End: 2025-03-12

## 2025-03-12 NOTE — PROGRESS NOTES
Outpatient care management referral via HRR report 3/10/25. Discharged to [facility] 3/7/25. Email sent to facility to inform them I will be following the patient during their skilled stay.  This Admin Coordinator will continue to monitor via chart review.

## 2025-03-13 ENCOUNTER — APPOINTMENT (OUTPATIENT)
Age: 65
End: 2025-03-13
Payer: COMMERCIAL

## 2025-03-14 ENCOUNTER — TELEPHONE (OUTPATIENT)
Age: 65
End: 2025-03-14

## 2025-03-14 NOTE — TELEPHONE ENCOUNTER
MADE  APPT  FOR  MELIDA   TUESDAY   3/18  DISCHARGE   FROM   [facility]  3/10  [facility]    3/14   IS  THE  TCM   STILL  OK  FOR  TUESDAY   WITH  MELIDA

## 2025-03-18 ENCOUNTER — PATIENT OUTREACH (OUTPATIENT)
Dept: CASE MANAGEMENT | Facility: OTHER | Age: 65
End: 2025-03-18

## 2025-03-18 ENCOUNTER — APPOINTMENT (OUTPATIENT)
Age: 65
End: 2025-03-18
Payer: COMMERCIAL
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
## 2025-03-18 NOTE — PROGRESS NOTES
Update obtained from PCC the patient discharged 3/17/25 to Home. I updated the care coordination note, removed myself as the responsible staff, added the appropriate responsible staff.  A email was sent to the facility requesting discharge instructions. When Admin Coordinator has received the Discharge paperwork  Admin Coordinator will attach to this encounter.

## 2025-03-20 ENCOUNTER — APPOINTMENT (OUTPATIENT)
Age: 65
End: 2025-03-20
Payer: COMMERCIAL

## 2025-03-25 ENCOUNTER — PATIENT OUTREACH (OUTPATIENT)
Dept: CASE MANAGEMENT | Facility: HOSPITAL | Age: 65
End: 2025-03-25

## 2025-03-25 ENCOUNTER — APPOINTMENT (OUTPATIENT)
Age: 65
End: 2025-03-25
Payer: COMMERCIAL

## 2025-03-25 NOTE — PROGRESS NOTES
Chart reviewed. Last RNCM outreach was on 3/18. Call placed to patient. No answer and VM left with request for CB.

## 2025-03-27 ENCOUNTER — APPOINTMENT (OUTPATIENT)
Age: 65
End: 2025-03-27
Payer: COMMERCIAL

## 2025-04-01 ENCOUNTER — PATIENT OUTREACH (OUTPATIENT)
Dept: CASE MANAGEMENT | Facility: HOSPITAL | Age: 65
End: 2025-04-01

## 2025-04-01 NOTE — PROGRESS NOTES
Chart reviewed. Call placed to patient. No answer and VM left with request for CB.    Outreach #2 and an UTR letter sent via My Chart.

## 2025-04-01 NOTE — LETTER
Date: 04/01/25  Dear Jacquelyn Fritz,   My name is Karla; I am a registered nurse care manager working with Dr Feliciano's office.  I have not been able to reach you and would like to set a time that I can talk with you over the phone.  My work is to help patients that have complex medical conditions get the care they need. This includes patients who may have been in the hospital or emergency room.    Please call me with any questions you may have. I look forward to speaking with you.  Sincerely,  Karla Bloom, RNCM  [phone]  Outpatient Care Manager

## 2025-04-09 ENCOUNTER — OFFICE VISIT (OUTPATIENT)
Dept: OBGYN CLINIC | Facility: CLINIC | Age: 65
End: 2025-04-09
Payer: COMMERCIAL

## 2025-04-09 ENCOUNTER — APPOINTMENT (OUTPATIENT)
Dept: RADIOLOGY | Facility: CLINIC | Age: 65
End: 2025-04-09
Payer: COMMERCIAL

## 2025-04-09 VITALS — WEIGHT: 174.2 LBS | BODY MASS INDEX: 32.06 KG/M2 | HEIGHT: 62 IN

## 2025-04-09 DIAGNOSIS — S32.000A COMPRESSION FRACTURE OF LUMBAR VERTEBRA, UNSPECIFIED LUMBAR VERTEBRAL LEVEL, INITIAL ENCOUNTER (HCC): Primary | ICD-10-CM

## 2025-04-09 PROCEDURE — 72110 X-RAY EXAM L-2 SPINE 4/>VWS: CPT

## 2025-04-09 PROCEDURE — 99214 OFFICE O/P EST MOD 30 MIN: CPT | Performed by: ORTHOPAEDIC SURGERY

## 2025-04-09 RX ORDER — METHYLPREDNISOLONE 4 MG/1
TABLET ORAL
COMMUNITY
Start: 2025-04-07

## 2025-04-09 NOTE — ASSESSMENT & PLAN NOTE
Orders:    XR spine lumbar minimum 4 views non injury    Ambulatory Referral to Physical Therapy; Future    DXA bone density spine hip and pelvis; Future
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
## 2025-04-15 ENCOUNTER — TELEPHONE (OUTPATIENT)
Dept: GASTROENTEROLOGY | Facility: CLINIC | Age: 65
End: 2025-04-15

## 2025-04-15 NOTE — TELEPHONE ENCOUNTER
Left message to call back to schedule her repeat colonoscopy due in June.    Last colon 6/29/22  3 yr recall   Dr. brambila

## 2025-04-16 ENCOUNTER — EVALUATION (OUTPATIENT)
Dept: PHYSICAL THERAPY | Facility: CLINIC | Age: 65
End: 2025-04-16
Payer: COMMERCIAL

## 2025-04-16 DIAGNOSIS — S32.000A COMPRESSION FRACTURE OF LUMBAR VERTEBRA, UNSPECIFIED LUMBAR VERTEBRAL LEVEL, INITIAL ENCOUNTER (HCC): ICD-10-CM

## 2025-04-16 PROCEDURE — 97110 THERAPEUTIC EXERCISES: CPT

## 2025-04-16 PROCEDURE — 97164 PT RE-EVAL EST PLAN CARE: CPT
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
## 2025-04-21 ENCOUNTER — TELEPHONE (OUTPATIENT)
Age: 65
End: 2025-04-21

## 2025-04-22 ENCOUNTER — OFFICE VISIT (OUTPATIENT)
Dept: PHYSICAL THERAPY | Facility: CLINIC | Age: 65
End: 2025-04-22
Payer: COMMERCIAL

## 2025-04-22 DIAGNOSIS — S32.000A COMPRESSION FRACTURE OF LUMBAR VERTEBRA, UNSPECIFIED LUMBAR VERTEBRAL LEVEL, INITIAL ENCOUNTER (HCC): Primary | ICD-10-CM

## 2025-04-22 PROCEDURE — 97110 THERAPEUTIC EXERCISES: CPT

## 2025-04-22 PROCEDURE — 97112 NEUROMUSCULAR REEDUCATION: CPT

## 2025-04-22 NOTE — PROGRESS NOTES
"Daily Note     Today's date: 2025  Patient name: Jacquelyn Fritz  : 1960  MRN: [id number]  Referring provider: Oma Pond*  Dx:   Encounter Diagnosis     ICD-10-CM    1. Compression fracture of lumbar vertebra, unspecified lumbar vertebral level, initial encounter ([facility])  S32.000A           Start Time: 1415  Stop Time: 1449  Total time in clinic (min): 34 minutes    Subjective: patient reports pain with movement.  Reports use of lumbar brace      Objective: See treatment diary below      Assessment:  patient was able to progress in therapy introducing LE endurance and ROM based exercises along with re-education and control of lumbar muscularity.  Required cueing throughout on sequencing, positioning, and mechanics of exercises.  Initial tactile required for corrective TA setting.  Education on expected soreness associated with therapy provided.      Plan: Continue per plan of care.  Progress treatment as tolerated.         POC expires Unit limit Auth Expiration date PT/OT/ST + Visit Limit?   25 4 N/a BOMN                           Visit/Unit Tracking  AUTH Status:  Date               N/a Used                Remaining                    Diagnosis:  s/p lumbar compression fractures   Precautions: hx wrist surgery, hx of passing out/dizziness (2025), hypertension (uncontrolled)   POC Expires: 25   Re-evaluation Date: 25   FOTO Scores/Date: Goal -69; - 56   Visit Count 1/10 2/10      Manuals                                Ther Ex       Pt edu AM       Bike for LE Endurance  X 5 mins      LTR  5\"x10 ea      SKTC  30\"x3 ea                                       Neuro Re-Ed        TA Set  10\"x10       Hip Add w/TA Set  10\"x10       Hip Abd w/TA Set  GTB 10\"x10       Alt Marches with TA  X20 ea                              Ther Act                                                                                 Modalities                                 "

## 2025-04-23 ENCOUNTER — OFFICE VISIT (OUTPATIENT)
Dept: PHYSICAL THERAPY | Facility: CLINIC | Age: 65
End: 2025-04-23
Payer: COMMERCIAL

## 2025-04-23 DIAGNOSIS — S32.000A COMPRESSION FRACTURE OF LUMBAR VERTEBRA, UNSPECIFIED LUMBAR VERTEBRAL LEVEL, INITIAL ENCOUNTER (HCC): Primary | ICD-10-CM

## 2025-04-23 NOTE — PROGRESS NOTES
"Daily Note     Today's date: 2025  Patient name: Jacquelyn Fritz  : 1960  MRN: [id number]  Referring provider: Oma Pond*  Dx: No diagnosis found.               Subjective: pt reports      Objective: See treatment diary below      Assessment: Tolerated treatment well. Patient would benefit from continued PT.      Plan: Continue per plan of care.        POC expires Unit limit Auth Expiration date PT/OT/ST + Visit Limit?   25 4 N/a BOMN                           Visit/Unit Tracking  AUTH Status:  Date               N/a Used                Remaining                    Diagnosis:  s/p lumbar compression fractures   Precautions: hx wrist surgery, hx of passing out/dizziness (2025), hypertension (uncontrolled)   POC Expires: 25   Re-evaluation Date: 25   FOTO Scores/Date: Goal -69; -    Visit Count 1/10 2/10 3/10     Manuals                               Ther Ex      Pt edu AM       Bike for LE Endurance  X 5 mins      LTR  5\"x10 ea      SKTC  30\"x3 ea                                       Neuro Re-Ed        TA Set  10\"x10       Hip Add w/TA Set  10\"x10       Hip Abd w/TA Set  GTB 10\"x10       Alt Marches with TA  X20 ea                              Ther Act                                                                                 Modalities                                              "

## 2025-04-29 ENCOUNTER — OFFICE VISIT (OUTPATIENT)
Dept: PHYSICAL THERAPY | Facility: CLINIC | Age: 65
End: 2025-04-29
Payer: COMMERCIAL

## 2025-04-29 DIAGNOSIS — S32.000A COMPRESSION FRACTURE OF LUMBAR VERTEBRA, UNSPECIFIED LUMBAR VERTEBRAL LEVEL, INITIAL ENCOUNTER (HCC): Primary | ICD-10-CM

## 2025-04-29 PROCEDURE — 97110 THERAPEUTIC EXERCISES: CPT

## 2025-04-29 PROCEDURE — 97112 NEUROMUSCULAR REEDUCATION: CPT

## 2025-04-29 NOTE — PROGRESS NOTES
"dDaily Note     Today's date: 2025  Patient name: Jacquelyn Fritz  : 1960  MRN: [id number]  Referring provider: Oma Pond*  Dx:   Encounter Diagnosis     ICD-10-CM    1. Compression fracture of lumbar vertebra, unspecified lumbar vertebral level, initial encounter ([facility])  S32.000A                      Subjective: pt reports feeling better compared to previous session.       Objective: See treatment diary below      Assessment: Tolerated treatment well. Patient would benefit from continued PT.      Plan: Continue per plan of care.        POC expires Unit limit Auth Expiration date PT/OT/ST + Visit Limit?   25 4 N/a BOMN                           Visit/Unit Tracking  AUTH Status:  Date               N/a Used                Remaining                    Diagnosis:  s/p lumbar compression fractures   Precautions: hx wrist surgery, hx of passing out/dizziness (2025), hypertension (uncontrolled)   POC Expires: 25   Re-evaluation Date: 25   FOTO Scores/Date: Goal -69; -    Visit Count 1/10 2/10 3/10     Manuals                               Ther Ex      Pt edu AM       Bike for LE Endurance  X 5 mins X 5 min     LTR  5\"x10 ea 5\" x10 ea     SKTC  30\"x3 ea  30\" x3 ea                                     Neuro Re-Ed       TA Set  10\"x10  10\" x10     Hip Add w/TA Set  10\"x10  10\"x 10     Hip Abd w/TA Set  GTB 10\"x10  GTB 10\"x10      Alt Marches with TA  X20 ea  X20 ea     TA SLR   x10                    Ther Act                                                                                 Modalities                                              "

## 2025-04-30 ENCOUNTER — OFFICE VISIT (OUTPATIENT)
Dept: PHYSICAL THERAPY | Facility: CLINIC | Age: 65
End: 2025-04-30
Payer: COMMERCIAL

## 2025-04-30 DIAGNOSIS — S32.000A COMPRESSION FRACTURE OF LUMBAR VERTEBRA, UNSPECIFIED LUMBAR VERTEBRAL LEVEL, INITIAL ENCOUNTER (HCC): Primary | ICD-10-CM

## 2025-04-30 PROCEDURE — 97112 NEUROMUSCULAR REEDUCATION: CPT

## 2025-04-30 PROCEDURE — 97110 THERAPEUTIC EXERCISES: CPT

## 2025-04-30 NOTE — PROGRESS NOTES
"Daily Note     Today's date: 2025  Patient name: Jacquelyn Fritz  : 1960  MRN: [id number]  Referring provider: Oma Pond*  Dx:   Encounter Diagnosis     ICD-10-CM    1. Compression fracture of lumbar vertebra, unspecified lumbar vertebral level, initial encounter ([facility])  S32.000A                      Subjective: pt reports feeling sore post previous session.       Objective: See treatment diary below      Assessment: Tolerated treatment well. Patient would benefit from continued PT.      Plan: Continue per plan of care.        POC expires Unit limit Auth Expiration date PT/OT/ST + Visit Limit?   25 4 N/a BOMN                           Visit/Unit Tracking  AUTH Status:  Date               N/a Used                Remaining                    Diagnosis:  s/p lumbar compression fractures   Precautions: hx wrist surgery, hx of passing out/dizziness (2025), hypertension (uncontrolled)   POC Expires: 25   Re-evaluation Date: 25   FOTO Scores/Date: Goal -69; -    Visit Count 1/10 2/10 3/10 4/10    Manuals                              Ther Ex     Pt edu AM       Bike for LE Endurance  X 5 mins X 5 min 5 min    LTR  5\"x10 ea 5\" x10 ea 5\" x10 ea    SKTC  30\"x3 ea  30\" x3 ea 30\" x3 ea                                    Neuro Re-Ed      TA Set  10\"x10  10\" x10 10\" x10    Hip Add w/TA Set  10\"x10  10\"x 10 10\" x10    Hip Abd w/TA Set  GTB 10\"x10  GTB 10\"x10  GTB 10\" x10    Alt Marches with TA  X20 ea  X20 ea X20 ea    TA SLR   x10 2x10                   Ther Act                                                                                 Modalities                                                "

## 2025-05-01 ENCOUNTER — TELEPHONE (OUTPATIENT)
Dept: NEUROLOGY | Facility: CLINIC | Age: 65
End: 2025-05-01

## 2025-05-01 NOTE — TELEPHONE ENCOUNTER
LVM confirming appointment for Dr. Norris for 5/2/25 at 11 AM. Gave [phone] to reschedule if needed.

## 2025-05-06 ENCOUNTER — OFFICE VISIT (OUTPATIENT)
Dept: PHYSICAL THERAPY | Facility: CLINIC | Age: 65
End: 2025-05-06
Payer: COMMERCIAL

## 2025-05-06 DIAGNOSIS — S32.000A COMPRESSION FRACTURE OF LUMBAR VERTEBRA, UNSPECIFIED LUMBAR VERTEBRAL LEVEL, INITIAL ENCOUNTER (HCC): Primary | ICD-10-CM

## 2025-05-06 PROCEDURE — 97112 NEUROMUSCULAR REEDUCATION: CPT

## 2025-05-06 PROCEDURE — 97110 THERAPEUTIC EXERCISES: CPT

## 2025-05-06 NOTE — PROGRESS NOTES
"Daily Note     Today's date: 2025  Patient name: Jacquelyn Fritz  : 1960  MRN: [id number]  Referring provider: Oma Pond*  Dx:   Encounter Diagnosis     ICD-10-CM    1. Compression fracture of lumbar vertebra, unspecified lumbar vertebral level, initial encounter ([facility])  S32.000A           Start Time: 1226  Stop Time: 1308  Total time in clinic (min): 42 minutes    Subjective:  patient reports no new complaints or incidents.  Denies any increase of LB pain since last therapy session      Objective: See treatment diary below      Assessment: patient was able to progress in therapy further challenging TA muscular control and BL LE CKC strengthening.  Required cueing throughout on sequencing, positioning, and mechanics of exercises.  Introduced HL bridges, lateral band walking, and TRX assisted DBL squats.  Demonstrates good control post initial cues.  Reports normal fatigue without pain.   Plan: Continue per plan of care.  Progress treatment as tolerated.         POC expires Unit limit Auth Expiration date PT/OT/ST + Visit Limit?   25 4 N/a BOMN                           Visit/Unit Tracking  AUTH Status:  Date               N/a Used                Remaining                    Diagnosis:  s/p lumbar compression fractures   Precautions: hx wrist surgery, hx of passing out/dizziness (2025), hypertension (uncontrolled)   POC Expires: 25   Re-evaluation Date: 25   FOTO Scores/Date: Goal -69; -    Visit Count 1/10 2/10 3/10 4/10 510   Manuals   5/6                           Ther Ex  5/6   Pt edu AM       Bike for LE Endurance  X 5 mins X 5 min 5 min 5 mins    LTR  5\"x10 ea 5\" x10 ea 5\" x10 ea 5\"x10 ea    SKTC  30\"x3 ea  30\" x3 ea 30\" x3 ea 30\"x3 ea    Lateral Band Walk      RTB x 3 laps    Squats     TRX x10 to low mat                    Neuro Re-Ed   5/6   TA Set  10\"x10  10\" x10 10\" x10 10\"x10    Hip Add w/TA Set  10\"x10  " "10\"x 10 10\" x10 10\"x10    Hip Abd w/TA Set  GTB 10\"x10  GTB 10\"x10  GTB 10\" x10 GTB 10\"x10    Bridges     GTB 5\" x10    Alt Marches with TA  X20 ea  X20 ea X20 ea X20 ea   TA SLR   x10 2x10 2x10 ea                  Ther Act                                                                                 Modalities                                                  "

## 2025-05-07 ENCOUNTER — OFFICE VISIT (OUTPATIENT)
Dept: PHYSICAL THERAPY | Facility: CLINIC | Age: 65
End: 2025-05-07
Payer: COMMERCIAL

## 2025-05-07 DIAGNOSIS — S32.000A COMPRESSION FRACTURE OF LUMBAR VERTEBRA, UNSPECIFIED LUMBAR VERTEBRAL LEVEL, INITIAL ENCOUNTER (HCC): Primary | ICD-10-CM

## 2025-05-07 PROCEDURE — 97110 THERAPEUTIC EXERCISES: CPT

## 2025-05-07 PROCEDURE — 97112 NEUROMUSCULAR REEDUCATION: CPT

## 2025-05-07 NOTE — PROGRESS NOTES
"Daily Note     Today's date: 2025  Patient name: Jacquelyn Fritz  : 1960  MRN: [id number]  Referring provider: Oma Pond*  Dx:   Encounter Diagnosis     ICD-10-CM    1. Compression fracture of lumbar vertebra, unspecified lumbar vertebral level, initial encounter ([facility])  S32.000A           Start Time: 1415  Stop Time: 1453  Total time in clinic (min): 38 minutes    Subjective: pt reports the back has been doing better and she is wearing her brace less.       Objective: See treatment diary below      Assessment: Difficulty with bridges due to fatigue. Progressions held due to being in therapy yesterday. Able to complete all exercises without an increase in symptoms. Tolerated treatment well. Patient would benefit from continued PT      Plan: Continue per plan of care.        POC expires Unit limit Auth Expiration date PT/OT/ST + Visit Limit?   25 4 N/a BOMN                           Visit/Unit Tracking  AUTH Status:  Date               N/a Used                Remaining                    Diagnosis:  s/p lumbar compression fractures   Precautions: hx wrist surgery, hx of passing out/dizziness (2025), hypertension (uncontrolled)   POC Expires: 25   Re-evaluation Date: 25   FOTO Scores/Date: Goal -69; -    Visit Count 6/10 2/10 3/10 4/10 5/10   Manuals  56                           Ther Ex  5/6   Pt edu        Bike for LE Endurance X 5mins X 5 mins X 5 min 5 min 5 mins    LTR 5\" 10x 5\"x10 ea 5\" x10 ea 5\" x10 ea 5\"x10 ea    SKTC 30\" 3x ea  30\"x3 ea  30\" x3 ea 30\" x3 ea 30\"x3 ea    Lateral Band Walk  Rtb x 3 laps     RTB x 3 laps    Squats TRX 10x to low mat     TRX x10 to low mat                    Neuro Re-Ed  5/6   TA Set 10\" 10x  10\"x10  10\" x10 10\" x10 10\"x10    Hip Add w/TA Set 10\" 10x  10\"x10  10\"x 10 10\" x10 10\"x10    Hip Abd w/TA Set GTB 10\" 10x  GTB 10\"x10  GTB 10\"x10  GTB 10\" x10 GTB 10\"x10    Bridges GTB " "5\" 10x    GTB 5\" x10    Alt Marches with TA X20 ea  X20 ea  X20 ea X20 ea X20 ea   TA SLR 2x10   x10 2x10 2x10 ea                 Ther Act                                                                                Modalities                                                    "

## 2025-05-13 ENCOUNTER — OFFICE VISIT (OUTPATIENT)
Dept: PHYSICAL THERAPY | Facility: CLINIC | Age: 65
End: 2025-05-13
Payer: COMMERCIAL

## 2025-05-13 DIAGNOSIS — S32.000A COMPRESSION FRACTURE OF LUMBAR VERTEBRA, UNSPECIFIED LUMBAR VERTEBRAL LEVEL, INITIAL ENCOUNTER (HCC): Primary | ICD-10-CM

## 2025-05-13 PROCEDURE — 97110 THERAPEUTIC EXERCISES: CPT

## 2025-05-13 PROCEDURE — 97112 NEUROMUSCULAR REEDUCATION: CPT

## 2025-05-13 NOTE — PROGRESS NOTES
"Daily Note     Today's date: 2025  Patient name: Jacquelyn Fritz  : 1960  MRN: [id number]  Referring provider: Oma Pond*  Dx:   Encounter Diagnosis     ICD-10-CM    1. Compression fracture of lumbar vertebra, unspecified lumbar vertebral level, initial encounter ([facility])  S32.000A           Start Time: 1415          Subjective: pt reports itching in the back      Objective: See treatment diary below      Assessment: Able to progress with addition of weights with completion of TA strengthening. Able to complete STS without straps this session. Introduced pallof press this session. Tolerated treatment well. Patient would benefit from continued PT      Plan: Continue per plan of care.        POC expires Unit limit Auth Expiration date PT/OT/ST + Visit Limit?   25 4 N/a BOMN                           Visit/Unit Tracking  AUTH Status:  Date               N/a Used                Remaining                    Diagnosis:  s/p lumbar compression fractures   Precautions: hx wrist surgery, hx of passing out/dizziness (2025), hypertension (uncontrolled)   POC Expires: 25   Re-evaluation Date: 25   FOTO Scores/Date: Goal -69; -    Visit Count 6/10 7/10 3/10 4/10 5/10   Manuals                            Ther Ex  5   Pt edu        Bike for LE Endurance X 5mins X 5 mins X 5 min 5 min 5 mins    LTR 5\" 10x 5\" 10x  5\" x10 ea 5\" x10 ea 5\"x10 ea    SKTC 30\" 3x ea  30\" 3x ea  30\" x3 ea 30\" x3 ea 30\"x3 ea    Lateral Band Walk  Rtb x 3 laps  Rtb x 3 laps    RTB x 3 laps    Squats TRX 10x to low mat  10x low mat   TRX x10 to low mat                    Neuro Re-Ed  56   TA Set 10\" 10x  10\" 10x  10\" x10 10\" x10 10\"x10    Hip Add w/TA Set 10\" 10x  10\" 10x 10\"x 10 10\" x10 10\"x10    Hip Abd w/TA Set GTB 10\" 10x  Gtb 10\" 10x  GTB 10\"x10  GTB 10\" x10 GTB 10\"x10    Bridges GTB 5\" 10x GTB 5\" 10x    GTB 5\" x10    Alt Marches with TA X20 ea  X20 " ea 1# X20 ea X20 ea X20 ea   TA SLR 2x10  2x10 1# x10 2x10 2x10 ea   Pallof press  Rtb 20x                    Ther Act                                                                             Modalities

## 2025-05-14 ENCOUNTER — EVALUATION (OUTPATIENT)
Dept: PHYSICAL THERAPY | Facility: CLINIC | Age: 65
End: 2025-05-14
Payer: COMMERCIAL

## 2025-05-14 DIAGNOSIS — S32.000A COMPRESSION FRACTURE OF LUMBAR VERTEBRA, UNSPECIFIED LUMBAR VERTEBRAL LEVEL, INITIAL ENCOUNTER (HCC): Primary | ICD-10-CM

## 2025-05-14 PROCEDURE — 97110 THERAPEUTIC EXERCISES: CPT
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
## 2025-05-20 ENCOUNTER — OFFICE VISIT (OUTPATIENT)
Dept: PHYSICAL THERAPY | Facility: CLINIC | Age: 65
End: 2025-05-20
Payer: COMMERCIAL

## 2025-05-20 DIAGNOSIS — S32.000A COMPRESSION FRACTURE OF LUMBAR VERTEBRA, UNSPECIFIED LUMBAR VERTEBRAL LEVEL, INITIAL ENCOUNTER (HCC): Primary | ICD-10-CM

## 2025-05-20 PROCEDURE — 97112 NEUROMUSCULAR REEDUCATION: CPT

## 2025-05-20 PROCEDURE — 97110 THERAPEUTIC EXERCISES: CPT

## 2025-05-20 NOTE — PROGRESS NOTES
"Daily Note     Today's date: 2025  Patient name: Jacquelyn Fritz  : 1960  MRN: [id number]  Referring provider: Oma Pond*  Dx:   Encounter Diagnosis     ICD-10-CM    1. Compression fracture of lumbar vertebra, unspecified lumbar vertebral level, initial encounter ([facility])  S32.000A           Start Time: 1415  Stop Time: 1459  Total time in clinic (min): 44 minutes    Subjective: Pt reports no issues today, arrived to clinic without brace.      Objective: See treatment diary below      Assessment: Pt completed program today with good tolerance and minimal difficulty. Good carryover of previous visit. Minimal verbal/tactile cues needed for correct form and sequencing of exercises. Pt utilized rest breaks between sets and exercises due to onset of fatigue. No c/o pain or discomfort throughout session.       Plan: Continue per plan of care.  Progress treatment as tolerated.         POC expires Unit limit Auth Expiration date PT/OT/ST + Visit Limit?   25 4 N/a BOMN                           Visit/Unit Tracking  AUTH Status:  Date               N/a Used                Remaining                    Diagnosis:  s/p lumbar compression fractures   Precautions: hx wrist surgery, hx of passing out/dizziness (2025), hypertension (uncontrolled)   POC Expires: 25   Re-evaluation Date: 25   FOTO Scores/Date: Goal 69; -    Visit Count 6/10 7/10 1/10 2/10 5/10   Manuals  5/                           Ther Ex  5   Pt edu        Bike for LE Endurance X 5mins X 5 mins  5 min 5 mins    LTR 5\" 10x 5\" 10x   5\" x10 ea 5\"x10 ea    SKTC 30\" 3x ea  30\" 3x ea   30\" x3 ea 30\"x3 ea    Lateral Band Walk  Rtb x 3 laps  Rtb x 3 laps   Rtb x 4 RTB x 3 laps    Squats TRX 10x to low mat  10x low mat   2x10 low mat TRX x10 to low mat       Updated foto/measurements             Neuro Re-Ed  5/   TA Set 10\" 10x  10\" 10x   10\" x10 10\"x10    Hip Add w/TA Set 10\" " "10x  10\" 10x  10\" x10 10\"x10    Hip Abd w/TA Set GTB 10\" 10x  Gtb 10\" 10x   GTB 10\" x10 GTB 10\"x10    Bridges GTB 5\" 10x GTB 5\" 10x   Gtb 5\"x10 GTB 5\" x10    Alt Marches with TA X20 ea  X20 ea 1#  X20 ea X20 ea   TA SLR 2x10  2x10 1#  2x10 2x10 ea   Pallof press  Rtb 20x   Rtb 20x                 Ther Act                                                                          Modalities                                               "

## 2025-05-21 ENCOUNTER — OFFICE VISIT (OUTPATIENT)
Dept: PHYSICAL THERAPY | Facility: CLINIC | Age: 65
End: 2025-05-21
Payer: COMMERCIAL

## 2025-05-21 DIAGNOSIS — S32.000A COMPRESSION FRACTURE OF LUMBAR VERTEBRA, UNSPECIFIED LUMBAR VERTEBRAL LEVEL, INITIAL ENCOUNTER (HCC): Primary | ICD-10-CM

## 2025-05-21 PROCEDURE — 97112 NEUROMUSCULAR REEDUCATION: CPT

## 2025-05-21 PROCEDURE — 97110 THERAPEUTIC EXERCISES: CPT

## 2025-05-21 NOTE — PROGRESS NOTES
"Daily Note     Today's date: 2025  Patient name: Jacquelyn Fritz  : 1960  MRN: [id number]  Referring provider: Oma Pond*  Dx:   Encounter Diagnosis     ICD-10-CM    1. Compression fracture of lumbar vertebra, unspecified lumbar vertebral level, initial encounter ([facility])  S32.000A           Start Time: 1415  Stop Time: 1453  Total time in clinic (min): 38 minutes    Subjective: pt offers no new complaints       Objective: See treatment diary below      Assessment: Continued difficulty with completion of bridges and standing exercises. No pain just fatigue with completion. Rest breaks taken as needed during session. Tolerated treatment well. Patient would benefit from continued PT      Plan: Continue per plan of care.        POC expires Unit limit Auth Expiration date PT/OT/ST + Visit Limit?   25 4 N/a BOMN                           Visit/Unit Tracking  AUTH Status:  Date               N/a Used                Remaining                    Diagnosis:  s/p lumbar compression fractures   Precautions: hx wrist surgery, hx of passing out/dizziness (2025), hypertension (uncontrolled)   POC Expires: 25   Re-evaluation Date: 25   FOTO Scores/Date: Goal -69; -    Visit Count 6/10 7/10 1/10 2/10 3/10   Manuals                            Ther Ex    Pt edu        Bike for LE Endurance X 5mins X 5 mins  5 min 5 min L1    LTR 5\" 10x 5\" 10x   5\" x10 ea 5\" 10x   SKTC 30\" 3x ea  30\" 3x ea   30\" x3 ea 30\" 3x ea    Lateral Band Walk  Rtb x 3 laps  Rtb x 3 laps   Rtb x 4 Rtb x 4 laps    Squats TRX 10x to low mat  10x low mat   2x10 low mat 2x10 low mat       Updated foto/measurements             Neuro Re-Ed     TA Set 10\" 10x  10\" 10x   10\" x10 10\" 10x    Hip Add w/TA Set 10\" 10x  10\" 10x  10\" x10 10\" 10x    Hip Abd w/TA Set GTB 10\" 10x  Gtb 10\" 10x   GTB 10\" x10 GTB 10\" 10x    Bridges GTB 5\" 10x GTB 5\" 10x   Gtb 5\"x10 GTB 5\" 10x "   Alt Michael with TA X20 ea  X20 ea 1#  X20 ea X20 ea 1#   TA SLR 2x10  2x10 1#  2x10 2x10 1#   Pallof press  Rtb 20x   Rtb 20x  Rtb 20x               Ther Act                                                                        Modalities

## 2025-05-27 ENCOUNTER — APPOINTMENT (OUTPATIENT)
Dept: PHYSICAL THERAPY | Facility: CLINIC | Age: 65
End: 2025-05-27
Payer: COMMERCIAL

## 2025-05-28 ENCOUNTER — OFFICE VISIT (OUTPATIENT)
Dept: PHYSICAL THERAPY | Facility: CLINIC | Age: 65
End: 2025-05-28
Payer: COMMERCIAL

## 2025-05-28 DIAGNOSIS — S32.000A COMPRESSION FRACTURE OF LUMBAR VERTEBRA, UNSPECIFIED LUMBAR VERTEBRAL LEVEL, INITIAL ENCOUNTER (HCC): Primary | ICD-10-CM

## 2025-05-28 PROCEDURE — 97110 THERAPEUTIC EXERCISES: CPT

## 2025-05-28 PROCEDURE — 97112 NEUROMUSCULAR REEDUCATION: CPT

## 2025-05-28 NOTE — PROGRESS NOTES
"Daily Note     Today's date: 2025  Patient name: Jacquelyn Fritz  : 1960  MRN: [id number]  Referring provider: Oma Pond*  Dx:   Encounter Diagnosis     ICD-10-CM    1. Compression fracture of lumbar vertebra, unspecified lumbar vertebral level, initial encounter ([facility])  S32.000A           Start Time: 1230  Stop Time: 1309  Total time in clinic (min): 39 minutes    Subjective: Pt reports feeling very fatigued today.      Objective: See treatment diary below      Assessment: Pt completed program within tolerance due to presence of fatigue at start of session. No c/o pain during session. Rest breaks were utilized throughout session.      Plan: Continue per plan of care.  Progress treatment as tolerated.         POC expires Unit limit Auth Expiration date PT/OT/ST + Visit Limit?   25 4 N/a BOMN                           Visit/Unit Tracking  AUTH Status:  Date               N/a Used                Remaining                    Diagnosis:  s/p lumbar compression fractures   Precautions: hx wrist surgery, hx of passing out/dizziness (2025), hypertension (uncontrolled)   POC Expires: 25   Re-evaluation Date: 25   FOTO Scores/Date: Goal -69; -    Visit Count 4/10 7/10 1/10 2/10 3/10   Manuals                            Ther Ex    Pt edu        Bike for LE Endurance X 5mins X 5 mins  5 min 5 min L1    LTR 5\" 10x 5\" 10x   5\" x10 ea 5\" 10x   SKTC 30\" 3x ea  30\" 3x ea   30\" x3 ea 30\" 3x ea    Lateral Band Walk  Rtb x3 laps Rtb x 3 laps   Rtb x 4 Rtb x 4 laps    Squats 10x low mat 10x low mat   2x10 low mat 2x10 low mat       Updated foto/measurements             Neuro Re-Ed     TA Set 10\" 10x  10\" 10x   10\" x10 10\" 10x    Hip Add w/TA Set 10\" 10x  10\" 10x  10\" x10 10\" 10x    Hip Abd w/TA Set GTB 10\" 10x  Gtb 10\" 10x   GTB 10\" x10 GTB 10\" 10x    Bridges Nt ! GTB 5\" 10x   Gtb 5\"x10 GTB 5\" 10x   Alt Marches with TA X20 ea  " X20 ea 1#  X20 ea X20 ea 1#   TA SLR X10 ea 2x10 1#  2x10 2x10 1#   Pallof press nt Rtb 20x   Rtb 20x  Rtb 20x               Ther Act                                                                        Modalities

## 2025-07-07 DIAGNOSIS — J44.9 COPD, MILD (HCC): ICD-10-CM

## 2025-07-08 RX ORDER — FLUTICASONE PROPIONATE AND SALMETEROL XINAFOATE 115; 21 UG/1; UG/1
2 AEROSOL, METERED RESPIRATORY (INHALATION) 2 TIMES DAILY
Qty: 60 G | Refills: 5 | Status: SHIPPED | OUTPATIENT
Start: 2025-07-08

## 2025-07-11 DIAGNOSIS — I10 ESSENTIAL HYPERTENSION: ICD-10-CM

## 2025-07-11 DIAGNOSIS — D53.9 MACROCYTIC ANEMIA: ICD-10-CM

## 2025-07-11 RX ORDER — PANTOPRAZOLE SODIUM 40 MG/1
40 TABLET, DELAYED RELEASE ORAL DAILY
Qty: 90 TABLET | Refills: 1 | Status: SHIPPED | OUTPATIENT
Start: 2025-07-11

## 2025-07-11 RX ORDER — AMLODIPINE BESYLATE AND ATORVASTATIN CALCIUM 5; 20 MG/1; MG/1
1 TABLET, FILM COATED ORAL DAILY
Qty: 90 TABLET | Refills: 1 | Status: SHIPPED | OUTPATIENT
Start: 2025-07-11

## 2025-07-18 ENCOUNTER — TELEPHONE (OUTPATIENT)
Dept: NEUROLOGY | Facility: CLINIC | Age: 65
End: 2025-07-18

## 2025-07-18 NOTE — TELEPHONE ENCOUNTER
LVM confirming appointment with Dr. Norris for 7/21/25 at 12 PM.  Gave [phone] to reschedule if needed.

## 2025-07-21 NOTE — TELEPHONE ENCOUNTER
Patient called in to r/s appt for today 7/21/25 at 12:00 pm as she is not feeling well.   Pt is now r/s 8/6/25 at 1:00 pm with Dr. Vicenta Carranza office.

## 2025-08-06 ENCOUNTER — OFFICE VISIT (OUTPATIENT)
Dept: NEUROLOGY | Facility: CLINIC | Age: 65
End: 2025-08-06
Payer: COMMERCIAL

## 2025-08-06 VITALS
BODY MASS INDEX: 31.58 KG/M2 | DIASTOLIC BLOOD PRESSURE: 102 MMHG | HEART RATE: 91 BPM | WEIGHT: 171.6 LBS | SYSTOLIC BLOOD PRESSURE: 142 MMHG | OXYGEN SATURATION: 98 % | HEIGHT: 62 IN

## 2025-08-06 DIAGNOSIS — R41.3 MEMORY DIFFICULTIES: ICD-10-CM

## 2025-08-06 DIAGNOSIS — G62.9 NEUROPATHY: Primary | ICD-10-CM

## 2025-08-06 PROCEDURE — 99214 OFFICE O/P EST MOD 30 MIN: CPT | Performed by: PSYCHIATRY & NEUROLOGY

## 2025-08-06 RX ORDER — DULOXETIN HYDROCHLORIDE 20 MG/1
20 CAPSULE, DELAYED RELEASE ORAL DAILY
Qty: 30 CAPSULE | Refills: 5 | Status: SHIPPED | OUTPATIENT
Start: 2025-08-06

## (undated) DEVICE — 5.0MM CANNULATED DRILL BIT/QC 200MM

## (undated) DEVICE — IMPERVIOUS STOCKINETTE: Brand: DEROYAL

## (undated) DEVICE — GLOVE INDICATOR PI UNDERGLOVE SZ 9 BLUE

## (undated) DEVICE — BANDAGE, ESMARK LF STR 6"X9' (20/CS): Brand: CYPRESS

## (undated) DEVICE — DRAPE C-ARM X-RAY

## (undated) DEVICE — ASTOUND SURGICAL GOWN, XXX LARGE, X-LONG: Brand: CONVERTORS

## (undated) DEVICE — LIGHT HANDLE COVER SLEEVE DISP BLUE STELLAR

## (undated) DEVICE — 3.5MM CORTEX SCREW SELF-TAPPING 45MM
Type: IMPLANTABLE DEVICE | Site: KNEE | Status: NON-FUNCTIONAL
Removed: 2019-11-07

## (undated) DEVICE — Device

## (undated) DEVICE — PADDING CAST 4 IN  COTTON STRL

## (undated) DEVICE — SCD SEQUENTIAL COMPRESSION COMFORT SLEEVE MEDIUM KNEE LENGTH: Brand: KENDALL SCD

## (undated) DEVICE — GAUZE SPONGES,16 PLY: Brand: CURITY

## (undated) DEVICE — HEWSON SUTURE RETRIEVER: Brand: HEWSON SUTURE RETRIEVER

## (undated) DEVICE — UNIVERSAL MAJOR EXTREMITY,KIT: Brand: CARDINAL HEALTH

## (undated) DEVICE — SUT VICRYL 2-0 CT-1 27 IN J259H

## (undated) DEVICE — WET SKIN PREP TRAY: Brand: MEDLINE INDUSTRIES, INC.

## (undated) DEVICE — 1.6MM DRILL TIP GUIDE WIRE 150MM

## (undated) DEVICE — CUFF TOURNIQUET 34 X 4 IN QUICK CONNECT DISP 1BLA

## (undated) DEVICE — 3M™ IOBAN™ 2 ANTIMICROBIAL INCISE DRAPE 6650EZ: Brand: IOBAN™ 2

## (undated) DEVICE — SNAP KOVER: Brand: UNBRANDED

## (undated) DEVICE — DRAPE C-ARMOUR

## (undated) DEVICE — 2.5MM DRILL BIT/QC/GOLD/110MM

## (undated) DEVICE — DRAPE EQUIPMENT RF WAND

## (undated) DEVICE — 2.8MM THREADED GUIDE WIRE- TROCAR POINT 300MM

## (undated) DEVICE — KERLIX BANDAGE ROLL: Brand: KERLIX

## (undated) DEVICE — SUT VICRYL 0 CT-1 27 IN J260H

## (undated) DEVICE — SUT FIBERWIRE #2 1/2 CIRCLE T-5 38IN AR-7200

## (undated) DEVICE — DRESSING MEPILEX AG BORDER 4 X 10 IN

## (undated) DEVICE — GLOVE SRG BIOGEL 9

## (undated) DEVICE — 3.2MM CANNULATED DRILL BIT/QC 170MM

## (undated) DEVICE — SUT ETHILON 3-0 PS-1 18 IN 1663H

## (undated) DEVICE — DELIVERY NEEDLE 12GA X 10CM CURVED-STERILE

## (undated) DEVICE — INTENDED FOR TISSUE SEPARATION, AND OTHER PROCEDURES THAT REQUIRE A SHARP SURGICAL BLADE TO PUNCTURE OR CUT.: Brand: BARD-PARKER SAFETY BLADES SIZE 10, STERILE

## (undated) DEVICE — CHLORAPREP HI-LITE 26ML ORANGE

## (undated) DEVICE — PAD GROUNDING ADULT